# Patient Record
Sex: FEMALE | Race: WHITE | NOT HISPANIC OR LATINO | Employment: UNEMPLOYED | URBAN - NONMETROPOLITAN AREA
[De-identification: names, ages, dates, MRNs, and addresses within clinical notes are randomized per-mention and may not be internally consistent; named-entity substitution may affect disease eponyms.]

---

## 2017-01-21 ENCOUNTER — HOSPITAL ENCOUNTER (EMERGENCY)
Facility: HOSPITAL | Age: 52
Discharge: HOME | End: 2017-01-21
Admitting: EMERGENCY MEDICINE

## 2017-01-21 ENCOUNTER — APPOINTMENT (OUTPATIENT)
Dept: GENERAL RADIOLOGY | Facility: HOSPITAL | Age: 52
End: 2017-01-21

## 2017-01-21 VITALS
TEMPERATURE: 97.5 F | RESPIRATION RATE: 16 BRPM | HEIGHT: 62 IN | BODY MASS INDEX: 25.76 KG/M2 | DIASTOLIC BLOOD PRESSURE: 79 MMHG | WEIGHT: 140 LBS | SYSTOLIC BLOOD PRESSURE: 125 MMHG | HEART RATE: 72 BPM | OXYGEN SATURATION: 100 %
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
## 2017-01-21 PROCEDURE — 73130 X-RAY EXAM OF HAND: CPT

## 2017-01-21 PROCEDURE — 99283 EMERGENCY DEPT VISIT LOW MDM: CPT

## 2017-01-21 PROCEDURE — 25010000002 TDAP 5-2.5-18.5 LF-MCG/0.5 SUSPENSION: Performed by: PHYSICIAN ASSISTANT

## 2017-01-21 PROCEDURE — 90715 TDAP VACCINE 7 YRS/> IM: CPT | Performed by: PHYSICIAN ASSISTANT

## 2017-01-21 PROCEDURE — 90471 IMMUNIZATION ADMIN: CPT | Performed by: PHYSICIAN ASSISTANT

## 2017-01-21 PROCEDURE — 73130 X-RAY EXAM OF HAND: CPT | Performed by: RADIOLOGY

## 2017-01-21 RX ORDER — AMOXICILLIN AND CLAVULANATE POTASSIUM 875; 125 MG/1; MG/1
1 TABLET, FILM COATED ORAL ONCE
Status: COMPLETED | OUTPATIENT
Start: 2017-01-21 | End: 2017-01-21

## 2017-01-21 RX ORDER — ACETAMINOPHEN AND CODEINE PHOSPHATE 300; 30 MG/1; MG/1
1 TABLET ORAL ONCE
Status: COMPLETED | OUTPATIENT
Start: 2017-01-21 | End: 2017-01-21

## 2017-01-21 RX ORDER — AMOXICILLIN AND CLAVULANATE POTASSIUM 875; 125 MG/1; MG/1
1 TABLET, FILM COATED ORAL 2 TIMES DAILY
Qty: 10 TABLET | Refills: 0 | Status: SHIPPED | OUTPATIENT
Start: 2017-01-21 | End: 2017-01-26

## 2017-01-21 RX ADMIN — AMOXICILLIN AND CLAVULANATE POTASSIUM 1 TABLET: 875; 125 TABLET, FILM COATED ORAL at 22:46

## 2017-01-21 RX ADMIN — ACETAMINOPHEN AND CODEINE PHOSPHATE 1 TABLET: 30; 300 TABLET ORAL at 22:46

## 2017-01-21 RX ADMIN — TETANUS TOXOID, REDUCED DIPHTHERIA TOXOID AND ACELLULAR PERTUSSIS VACCINE, ADSORBED 0.5 ML: 5; 2.5; 8; 8; 2.5 SUSPENSION INTRAMUSCULAR at 21:25

## 2017-01-22 NOTE — ED PROVIDER NOTES
Subjective   Patient is a 51 y.o. female presenting with animal bite.   History provided by:  Patient   used: No    Animal Bite   Contact animal:  Cat  Location:  Finger  Finger injury location:  R ring finger  Time since incident:  1 day  Incident location:  Home  Provoked: provoked    Notifications:  None  Animal's rabies vaccination status:  Unknown  Animal in possession: yes    Tetanus status:  Out of date  Relieved by:  Nothing  Worsened by:  Nothing  Ineffective treatments:  None tried      Review of Systems   Constitutional: Negative.    HENT: Negative.    Eyes: Negative.    Respiratory: Negative.    Cardiovascular: Negative.    Gastrointestinal: Negative.    Endocrine: Negative.    Genitourinary: Negative.    Musculoskeletal: Negative.    Skin: Negative.    Allergic/Immunologic: Negative.    Neurological: Negative.    Hematological: Negative.    Psychiatric/Behavioral: Negative.    All other systems reviewed and are negative.      Past Medical History   Diagnosis Date   • Coronary artery disease    • Hyperlipidemia    • Hypertension        Allergies   Allergen Reactions   • Bactrim [Sulfamethoxazole-Trimethoprim]    • Ciprofloxacin        History reviewed. No pertinent past surgical history.    Family History   Problem Relation Age of Onset   • Heart disease Mother    • Heart disease Father    • Heart attack Father    • No Known Problems Sister    • Heart attack Brother    • Heart disease Brother        Social History     Social History   • Marital status:      Spouse name: N/A   • Number of children: N/A   • Years of education: N/A     Social History Main Topics   • Smoking status: Current Every Day Smoker     Packs/day: 0.50     Years: 25.00     Types: Cigarettes   • Smokeless tobacco: None   • Alcohol use No   • Drug use: No   • Sexual activity: Not Asked     Other Topics Concern   • None     Social History Narrative           Objective   Physical Exam   Constitutional: She is  oriented to person, place, and time. She appears well-developed and well-nourished.   HENT:   Head: Normocephalic and atraumatic.   Right Ear: External ear normal.   Left Ear: External ear normal.   Nose: Nose normal.   Mouth/Throat: Oropharynx is clear and moist.   Eyes: EOM are normal. Pupils are equal, round, and reactive to light.   Neck: Normal range of motion. Neck supple.   Cardiovascular: Normal rate, regular rhythm, normal heart sounds and intact distal pulses.    Pulmonary/Chest: Effort normal and breath sounds normal.   Abdominal: Soft. Bowel sounds are normal.   Musculoskeletal: Normal range of motion.   Neurological: She is alert and oriented to person, place, and time.   Skin: Skin is warm and dry.        Nursing note and vitals reviewed.      Procedures         ED Course  ED Course                  MDM    Final diagnoses:   Cat bite, initial encounter            RACIEL Islas  01/21/17 6890

## 2017-03-06 RX ORDER — ATENOLOL 25 MG/1
25 TABLET ORAL DAILY
Qty: 30 TABLET | Refills: 11 | Status: SHIPPED | OUTPATIENT
Start: 2017-03-06 | End: 2018-01-31 | Stop reason: SDUPTHER

## 2017-04-17 ENCOUNTER — TELEPHONE (OUTPATIENT)
Dept: CARDIOLOGY | Facility: CLINIC | Age: 52
End: 2017-04-17

## 2017-06-06 RX ORDER — SPIRONOLACTONE 25 MG/1
25 TABLET ORAL DAILY
Qty: 30 TABLET | Refills: 6 | Status: SHIPPED | OUTPATIENT
Start: 2017-06-06 | End: 2017-12-19 | Stop reason: SDUPTHER

## 2017-06-06 RX ORDER — ISOSORBIDE MONONITRATE 60 MG/1
60 TABLET, EXTENDED RELEASE ORAL DAILY
Qty: 30 TABLET | Refills: 6 | Status: SHIPPED | OUTPATIENT
Start: 2017-06-06 | End: 2017-12-19 | Stop reason: SDUPTHER

## 2017-06-27 RX ORDER — PRASUGREL 10 MG/1
10 TABLET, FILM COATED ORAL DAILY
Qty: 30 TABLET | Refills: 6 | Status: SHIPPED | OUTPATIENT
Start: 2017-06-27 | End: 2017-12-19 | Stop reason: SDUPTHER

## 2017-07-10 ENCOUNTER — TELEPHONE (OUTPATIENT)
Dept: CARDIOLOGY | Facility: CLINIC | Age: 52
End: 2017-07-10

## 2017-07-11 ENCOUNTER — OFFICE VISIT (OUTPATIENT)
Dept: CARDIOLOGY | Facility: CLINIC | Age: 52
End: 2017-07-11

## 2017-07-11 VITALS
DIASTOLIC BLOOD PRESSURE: 68 MMHG | BODY MASS INDEX: 24.95 KG/M2 | OXYGEN SATURATION: 98 % | SYSTOLIC BLOOD PRESSURE: 124 MMHG | WEIGHT: 140.8 LBS | HEART RATE: 77 BPM | HEIGHT: 63 IN

## 2017-07-11 DIAGNOSIS — I25.10 CORONARY ARTERY DISEASE INVOLVING NATIVE CORONARY ARTERY OF NATIVE HEART WITHOUT ANGINA PECTORIS: Primary | ICD-10-CM

## 2017-07-11 DIAGNOSIS — E78.2 MIXED HYPERLIPIDEMIA: ICD-10-CM

## 2017-07-11 DIAGNOSIS — Z72.0 TOBACCO ABUSE: ICD-10-CM

## 2017-07-11 PROCEDURE — 99214 OFFICE O/P EST MOD 30 MIN: CPT | Performed by: INTERNAL MEDICINE

## 2017-07-11 NOTE — PROGRESS NOTES
Subjective   Veda Enamorado is a 52 y.o. female.     Chief Complaint   Patient presents with   • Follow-up   • Hypertension       History of Present Illness     Veda is a pleasant 52-year-old woman who presents with a history of known coronary artery disease. She initially presented approximately 15 years ago with coronary artery disease and over the years undergone multiple percutaneous revascularizations. However, in February 2015 she presented with relatively typical anginal symptoms and had a drug-eluting stent placed in her LAD and diagonal branches. She did relatively well until December 2015 at which time she developed recurrent chest pain. She underwent a stress test which was unremarkable and has been managed clinically since that time. She currently denies any angina or anginal-like symptoms. She denies any heart failure symptoms. She denies any palpitations, near syncope or syncopal symptoms. However, she does note that with any activity she becomes extremely exhausted relatively quickly.  She believes that this has been gradually progressive.  She has been compliant with medical therapy.  Unfortunately, she has continued to use tobacco products.    The following portions of the patient's history were reviewed and updated as appropriate: allergies, current medications, past family history, past medical history, past social history, past surgical history and problem list.    Review of Systems   Constitutional: Positive for activity change and fatigue. Negative for appetite change.   HENT: Negative for congestion and tinnitus.    Eyes: Negative for visual disturbance.   Respiratory: Negative for cough, chest tightness and shortness of breath.    Cardiovascular: Positive for leg swelling. Negative for chest pain and palpitations.   Gastrointestinal: Positive for nausea. Negative for blood in stool and vomiting.   Endocrine: Negative for cold intolerance, heat intolerance and polyuria.   Genitourinary: Negative  for dysuria.   Musculoskeletal: Negative for myalgias and neck pain.   Skin: Negative for rash.   Neurological: Negative for dizziness, syncope, weakness and light-headedness.   Hematological: Bruises/bleeds easily.   Psychiatric/Behavioral: Negative for confusion and sleep disturbance.       Objective   Physical Exam   Constitutional: She is oriented to person, place, and time. She appears well-developed and well-nourished. No distress.   HENT:   Head: Normocephalic and atraumatic.   Nose: Nose normal.   Mouth/Throat: Oropharynx is clear and moist.   Eyes: Conjunctivae and EOM are normal. Right eye exhibits no discharge. Left eye exhibits no discharge. No scleral icterus.   Neck: Normal range of motion. No hepatojugular reflux and no JVD present. Carotid bruit is not present. No tracheal deviation present.   Cardiovascular: Normal rate, regular rhythm, S1 normal, S2 normal and intact distal pulses.  PMI is not displaced.  Exam reveals no gallop, no S3, no S4 and no friction rub.    No murmur heard.  Pulmonary/Chest: Effort normal and breath sounds normal. No accessory muscle usage. No respiratory distress. She has no wheezes. She has no rales. She exhibits no tenderness.   Abdominal: Soft. Bowel sounds are normal. She exhibits no distension. There is no tenderness.   Musculoskeletal: Normal range of motion. She exhibits no edema or tenderness.       Vascular Status -  Her exam exhibits no right foot edema. Her exam exhibits no left foot edema.  Neurological: She is alert and oriented to person, place, and time. No cranial nerve deficit. Coordination normal.   Skin: Skin is warm and dry. She is not diaphoretic.   Nursing note and vitals reviewed.      Procedures    Assessment/Plan     Coronary Artery Disease. I suspect that overall the patient's coronary artery disease is stable.  However, I am somewhat concerned that Veda is describing exertional fatigue.  I did discuss with her the possibility of obtaining a  stress Cardiolite test.  However, she feels that most of her symptoms are due to back pain issues that are limiting her exertional capacity.  For now we agreed to simply work on risk factor modification however, if her symptoms escalate then we'll obtain a stress Cardiolite test.  Additionally, after she sees orthopedic surgery if she continues to have symptoms again, we will consider stress testing.     Tobacco.  In regard to their history of tobacco consumption, I have discussed the importance of tobacco cessation as it relates to progression of coronary artery disease and comorbidities. I discussed options for tobacco cessation to include medical therapy. I discussed the importance of the planned approach.     Hyperlipidemia. Given the patient's history of CAD I have asked them to obtain a fasting lipid panel and an AST and ALT.     In short, it is been a pleasure to participate in Veda's care, I look forward to seeing her back in 4 months.

## 2017-12-19 RX ORDER — ISOSORBIDE MONONITRATE 60 MG/1
60 TABLET, EXTENDED RELEASE ORAL DAILY
Qty: 30 TABLET | Refills: 6 | Status: SHIPPED | OUTPATIENT
Start: 2017-12-19 | End: 2018-08-07 | Stop reason: SDUPTHER

## 2017-12-19 RX ORDER — SPIRONOLACTONE 25 MG/1
25 TABLET ORAL DAILY
Qty: 30 TABLET | Refills: 6 | Status: SHIPPED | OUTPATIENT
Start: 2017-12-19 | End: 2018-08-07 | Stop reason: SDUPTHER

## 2017-12-19 RX ORDER — PRASUGREL 10 MG/1
10 TABLET, FILM COATED ORAL DAILY
Qty: 30 TABLET | Refills: 6 | Status: SHIPPED | OUTPATIENT
Start: 2017-12-19 | End: 2018-01-31 | Stop reason: SDUPTHER

## 2018-01-24 ENCOUNTER — OFFICE VISIT (OUTPATIENT)
Dept: CARDIOLOGY | Facility: CLINIC | Age: 53
End: 2018-01-24

## 2018-01-24 VITALS
DIASTOLIC BLOOD PRESSURE: 73 MMHG | BODY MASS INDEX: 25.8 KG/M2 | HEIGHT: 62 IN | WEIGHT: 140.2 LBS | HEART RATE: 73 BPM | SYSTOLIC BLOOD PRESSURE: 126 MMHG | OXYGEN SATURATION: 98 %

## 2018-01-24 DIAGNOSIS — M79.605 PAIN OF LEFT LOWER EXTREMITY: ICD-10-CM

## 2018-01-24 DIAGNOSIS — I25.10 CORONARY ARTERY DISEASE INVOLVING NATIVE CORONARY ARTERY OF NATIVE HEART WITHOUT ANGINA PECTORIS: Primary | ICD-10-CM

## 2018-01-24 DIAGNOSIS — E78.2 MIXED HYPERLIPIDEMIA: ICD-10-CM

## 2018-01-24 PROCEDURE — 99214 OFFICE O/P EST MOD 30 MIN: CPT | Performed by: NURSE PRACTITIONER

## 2018-01-24 PROCEDURE — 93000 ELECTROCARDIOGRAM COMPLETE: CPT | Performed by: NURSE PRACTITIONER

## 2018-01-24 NOTE — PROGRESS NOTES
[facility] CARDIOLOGY  [address] [place] [address]. [address]  [place] [address]  Phone: [phone]  Fax: [phone]    01/24/2018    Chief Complaint: {[facility]:35389}    History:   Veda Enamorado is a 53 y.o. female seen in followup,     Past Medical History:   Diagnosis Date   • Coronary artery disease    • Hyperlipidemia    • Hypertension        Past Social History:  Social History     Social History   • Marital status:      Spouse name: N/A   • Number of children: N/A   • Years of education: N/A     Social History Main Topics   • Smoking status: Current Every Day Smoker     Packs/day: 0.50     Years: 25.00     Types: Cigarettes   • Smokeless tobacco: None   • Alcohol use No   • Drug use: No   • Sexual activity: Not Asked     Other Topics Concern   • None     Social History Narrative       Past Family History:  Family History   Problem Relation Age of Onset   • Heart disease Mother    • Heart disease Father    • Heart attack Father    • No Known Problems Sister    • Heart attack Brother    • Heart disease Brother        Review of Systems:   Please see HPI  Constitution: No chills, no rigors, no unexplained weight loss or weight gain  Eyes:  No diplopia, no blurred vision, no loss of vision, conjunctiva is pink and sclera is anicteric  ENT:  No tinnitus, no otorrhea, no epistaxis, no sore throat   Respiratory: No cough, no hemoptysis  Cardiovascular: see HPI  Gastrointestinal: No nausea, no vomiting, no hematemesis, no diarrhea or constipation, no melena  Genitourinary: No frequency of dysuria no hematuria  Integument: No pruritis and  no skin rash  Hematologic / Lymphatic: No excessive bleeding, easy bruising, fatigue, lymphadenopathy and petechiae  Musculoskeletal: No joint pain, joint stiffness, joint swelling, muscle pain, muscle weakness and neck pain  Neurological: No dizziness, headaches, light headedness, seizures and vertigo  Endocrine: No frequent urination and nocturia, temperature  intolerance, weight gain, unintended and weight loss, unintended      Current Outpatient Prescriptions on File Prior to Visit   Medication Sig Dispense Refill   • aspirin 81 MG EC tablet Take 81 mg by mouth daily.     • atenolol (TENORMIN) 25 MG tablet Take 1 tablet by mouth Daily. 30 tablet 11   • atorvastatin (LIPITOR) 40 MG tablet Take 1 tablet by mouth Daily. 30 tablet 6   • HYDROcodone-acetaminophen (NORCO) 7.5-325 MG per tablet Take 1 tablet by mouth every 6 (six) hours as needed for moderate pain (4-6).     • isosorbide mononitrate (IMDUR) 60 MG 24 hr tablet Take 1 tablet by mouth Daily. 30 tablet 6   • omeprazole (PriLOSEC) 20 MG capsule Take 20 mg by mouth daily.     • ondansetron (ZOFRAN) 8 MG tablet Take  by mouth every 8 (eight) hours as needed for nausea or vomiting.     • prasugrel (EFFIENT) 10 MG tablet Take 1 tablet by mouth Daily. 30 tablet 6   • spironolactone (ALDACTONE) 25 MG tablet Take 1 tablet by mouth Daily. 30 tablet 6   • traMADol (ULTRAM) 50 MG tablet Take 50 mg by mouth every 6 (six) hours as needed for moderate pain (4-6).       No current facility-administered medications on file prior to visit.        Allergies   Allergen Reactions   • Bactrim [Sulfamethoxazole-Trimethoprim]    • Ciprofloxacin        Objective:  Vitals:    01/24/18 1124   BP: 126/73   Pulse: 73   SpO2: 98%     Comfortable NAD  PERRL, conjunctiva clear  Neck supple, no JVD or thyromegaly appreciated  S1/S2 RRR, no m/r/g  Lungs CTA B, normal effort  Abdomen S/NT/ND (+) BS, no HSM appreciated  Extremities warm, no clubbing, cyanosis, or edema  Normal gait  No visible or palpable skin lesions  A/Ox4, mood and affect appropriate  Pulse exam: Torsten's:    DATA:                               ECG 12 Lead  Date/Time: 1/24/2018 11:29 AM  Performed by: ZAIRA BURDICK  Authorized by: ZAIRA BURDICK               I personally viewed and interpreted the patient's EKG:      A/P:    Thank you for allowing me to participate in the  care of Veda Enamorado. Feel free to contact me directly with any further questions or concerns.

## 2018-01-24 NOTE — PROGRESS NOTES
Subjective     Chief Complaint: Coronary Artery Disease and Hyperlipidemia    History of Present Illness   Veda Enamorado is a 53 y.o. female who presents with coronary artery disease and hyper-lipidemia.  She initially presented approximately 16 years ago with coronary artery disease.  Over the years she has undergone multiple percutaneous revascularizations.  In February 2050 T and she presented with relatively typical anginal symptoms and had a drug eluding stent placed to her LAD and diagonal branches.  She did reasonably well until December 2015 at which time she developed recurrent chest pain.  She underwent a Cardiolite stress test which was unremarkable and has been managed clinically since that time.    At today's visit she denies chest pain and palpitations.  She reports lower extremity swelling.  She reports that her left leg, in the back of her calf, has been hurting for some time.  The pain apparently waxes and wanes.  She does not recall any injury to the leg, nor has she been participating in any strenuous exercise.  She also reports generalized weakness and fatigue.  Her physical activity is limited due to a low back injury.  She reports that she bruises easily however she denies bright red blood per rectum or black tarry stools.    Mrs. Enamorado is requesting a cardiac clearance.  She is scheduled to have all of her upper teeth removed and replaced with dentures.    She she continues to smoke about half a pack a day.  She does not participate in any regular physical activity.      Current Outpatient Prescriptions:   •  aspirin 81 MG EC tablet, Take 81 mg by mouth daily., Disp: , Rfl:   •  atenolol (TENORMIN) 25 MG tablet, Take 1 tablet by mouth Daily., Disp: 30 tablet, Rfl: 11  •  atorvastatin (LIPITOR) 40 MG tablet, Take 1 tablet by mouth Daily., Disp: 30 tablet, Rfl: 6  •  HYDROcodone-acetaminophen (NORCO) 7.5-325 MG per tablet, Take 1 tablet by mouth every 6 (six) hours as needed for moderate pain  "(4-6)., Disp: , Rfl:   •  isosorbide mononitrate (IMDUR) 60 MG 24 hr tablet, Take 1 tablet by mouth Daily., Disp: 30 tablet, Rfl: 6  •  omeprazole (PriLOSEC) 20 MG capsule, Take 20 mg by mouth daily., Disp: , Rfl:   •  ondansetron (ZOFRAN) 8 MG tablet, Take  by mouth every 8 (eight) hours as needed for nausea or vomiting., Disp: , Rfl:   •  prasugrel (EFFIENT) 10 MG tablet, Take 1 tablet by mouth Daily., Disp: 30 tablet, Rfl: 6  •  spironolactone (ALDACTONE) 25 MG tablet, Take 1 tablet by mouth Daily., Disp: 30 tablet, Rfl: 6  •  traMADol (ULTRAM) 50 MG tablet, Take 50 mg by mouth every 6 (six) hours as needed for moderate pain (4-6)., Disp: , Rfl:      The following portions of the patient's history were reviewed and updated as appropriate: allergies, current medications, past family history, past medical history, past social history, past surgical history and problem list.    Review of Systems   Constitutional: Positive for fatigue.   Respiratory: Negative for shortness of breath.    Cardiovascular: Positive for leg swelling. Negative for chest pain and palpitations.   Gastrointestinal: Negative for blood in stool.   Neurological: Positive for weakness. Negative for dizziness, syncope and light-headedness.   Hematological: Does not bruise/bleed easily.       Objective     /73 (BP Location: Right arm)  Pulse 73  Ht 157.5 cm (62\")  Wt 63.6 kg (140 lb 3.2 oz)  SpO2 98%  BMI 25.64 kg/m2    Physical Exam   Constitutional: She appears well-developed and well-nourished.   HENT:   Head: Normocephalic and atraumatic.   Eyes: Pupils are equal, round, and reactive to light.   Neck: No JVD present.   Cardiovascular: Normal rate, regular rhythm and intact distal pulses.  Exam reveals no gallop and no friction rub.    No murmur heard.  Pulmonary/Chest: Effort normal and breath sounds normal. No respiratory distress. She has no wheezes. She has no rales.   Abdominal: Soft. She exhibits no mass. There is no tenderness. " No hernia.   Musculoskeletal: She exhibits tenderness (left lower extremity). She exhibits no edema.   Skin: Skin is warm and dry.   Psychiatric: She has a normal mood and affect.   Vitals reviewed.        ECG 12 Lead  Date/Time: 1/24/2018 1:13 PM  Performed by: SHARDA MEYERS  Authorized by: SHARDA MEYERS   Rhythm: sinus rhythm  Clinical impression: normal ECG  Comments: QTc  402              Assessment/Plan       Veda was seen today for coronary artery disease and hyperlipidemia.    Diagnoses and all orders for this visit:    Coronary artery disease involving native coronary artery of native heart without angina pectoris  -     ECG 12 Lead  -     Stress Test With Myocardial Perfusion (1 Day); Future  -     Adult Transthoracic Echo Complete W/ Cont if Necessary Per Protocol; Future  -     Comprehensive Metabolic Panel; Future  -     CBC (No Diff); Future     Stable.  She does not have any chest discomfort complaints.   I have ordered a stress test for surgical clearance evaluation   Echocardiogram for same   Risk factor modification as discussed below    Mixed hyperlipidemia        -     Lipid Panel; Future     No current labs available   Tests have been ordered and Mrs. Enamorado prefers to use [facility] in [place]    Pain of left lower extremity  -     Duplex Venous Lower Extremity - Left CAR; Future     Duplex scan to rule out presence of DVT               Sharda Meyers, ZION

## 2018-01-29 ENCOUNTER — RESULTS ENCOUNTER (OUTPATIENT)
Dept: CARDIOLOGY | Facility: CLINIC | Age: 53
End: 2018-01-29

## 2018-01-29 DIAGNOSIS — I25.10 CORONARY ARTERY DISEASE INVOLVING NATIVE CORONARY ARTERY OF NATIVE HEART WITHOUT ANGINA PECTORIS: ICD-10-CM

## 2018-01-31 RX ORDER — NITROGLYCERIN 0.4 MG/1
TABLET SUBLINGUAL
Qty: 100 TABLET | Refills: 3 | Status: SHIPPED | OUTPATIENT
Start: 2018-01-31 | End: 2019-11-14 | Stop reason: SDUPTHER

## 2018-01-31 RX ORDER — PRASUGREL 10 MG/1
10 TABLET, FILM COATED ORAL DAILY
Qty: 30 TABLET | Refills: 11 | Status: SHIPPED | OUTPATIENT
Start: 2018-01-31 | End: 2018-08-07 | Stop reason: SDUPTHER

## 2018-01-31 RX ORDER — ATENOLOL 25 MG/1
25 TABLET ORAL DAILY
Qty: 30 TABLET | Refills: 11 | Status: SHIPPED | OUTPATIENT
Start: 2018-01-31 | End: 2019-03-06 | Stop reason: SDUPTHER

## 2018-02-09 ENCOUNTER — APPOINTMENT (OUTPATIENT)
Dept: CARDIOLOGY | Facility: HOSPITAL | Age: 53
End: 2018-02-09

## 2018-02-09 ENCOUNTER — APPOINTMENT (OUTPATIENT)
Dept: NUCLEAR MEDICINE | Facility: HOSPITAL | Age: 53
End: 2018-02-09

## 2018-02-22 ENCOUNTER — HOSPITAL ENCOUNTER (OUTPATIENT)
Dept: NUCLEAR MEDICINE | Facility: HOSPITAL | Age: 53
Discharge: HOME | End: 2018-02-22

## 2018-02-22 ENCOUNTER — HOSPITAL ENCOUNTER (OUTPATIENT)
Dept: CARDIOLOGY | Facility: HOSPITAL | Age: 53
Discharge: HOME | End: 2018-02-22

## 2018-02-22 DIAGNOSIS — M79.605 PAIN OF LEFT LOWER EXTREMITY: ICD-10-CM

## 2018-02-22 DIAGNOSIS — I25.10 CORONARY ARTERY DISEASE INVOLVING NATIVE CORONARY ARTERY OF NATIVE HEART WITHOUT ANGINA PECTORIS: ICD-10-CM

## 2018-02-22 PROCEDURE — 93971 EXTREMITY STUDY: CPT

## 2018-02-22 PROCEDURE — 25010000002 REGADENOSON 0.4 MG/5ML SOLUTION: Performed by: NURSE PRACTITIONER

## 2018-02-22 PROCEDURE — A9500 TC99M SESTAMIBI: HCPCS | Performed by: NURSE PRACTITIONER

## 2018-02-22 PROCEDURE — 93017 CV STRESS TEST TRACING ONLY: CPT

## 2018-02-22 PROCEDURE — 78452 HT MUSCLE IMAGE SPECT MULT: CPT

## 2018-02-22 PROCEDURE — 93018 CV STRESS TEST I&R ONLY: CPT | Performed by: INTERNAL MEDICINE

## 2018-02-22 PROCEDURE — 0 TECHNETIUM SESTAMIBI: Performed by: NURSE PRACTITIONER

## 2018-02-22 PROCEDURE — 25010000002 AMINOPHYLLINE PER 250 MG: Performed by: NURSE PRACTITIONER

## 2018-02-22 PROCEDURE — 93971 EXTREMITY STUDY: CPT | Performed by: RADIOLOGY

## 2018-02-22 PROCEDURE — 78452 HT MUSCLE IMAGE SPECT MULT: CPT | Performed by: INTERNAL MEDICINE

## 2018-02-22 RX ORDER — AMINOPHYLLINE DIHYDRATE 25 MG/ML
125 INJECTION, SOLUTION INTRAVENOUS
Status: COMPLETED | OUTPATIENT
Start: 2018-02-22 | End: 2018-02-22

## 2018-02-22 RX ADMIN — TECHNETIUM TC 99M SESTAMIBI 1 DOSE: 1 INJECTION INTRAVENOUS at 11:05

## 2018-02-22 RX ADMIN — TECHNETIUM TC 99M SESTAMIBI 1 DOSE: 1 INJECTION INTRAVENOUS at 09:15

## 2018-02-22 RX ADMIN — REGADENOSON 0.4 MG: 0.08 INJECTION, SOLUTION INTRAVENOUS at 11:05

## 2018-02-22 RX ADMIN — AMINOPHYLLINE 125 MG: 25 INJECTION, SOLUTION INTRAVENOUS at 11:10

## 2018-02-23 LAB
BH CV ECHO MEAS - % IVS THICK: 79.2 %
BH CV ECHO MEAS - % LVPW THICK: 64 %
BH CV ECHO MEAS - ACS: 1.8 CM
BH CV ECHO MEAS - AO MAX PG (FULL): 1.6 MMHG
BH CV ECHO MEAS - AO MAX PG: 6.8 MMHG
BH CV ECHO MEAS - AO MEAN PG (FULL): 0.98 MMHG
BH CV ECHO MEAS - AO MEAN PG: 3.3 MMHG
BH CV ECHO MEAS - AO ROOT AREA (BSA CORRECTED): 1.8
BH CV ECHO MEAS - AO ROOT AREA: 6.6 CM^2
BH CV ECHO MEAS - AO ROOT DIAM: 2.9 CM
BH CV ECHO MEAS - AO V2 MAX: 130.3 CM/SEC
BH CV ECHO MEAS - AO V2 MEAN: 84.3 CM/SEC
BH CV ECHO MEAS - AO V2 VTI: 29.7 CM
BH CV ECHO MEAS - AVA(I,A): 2.5 CM^2
BH CV ECHO MEAS - AVA(I,D): 2.5 CM^2
BH CV ECHO MEAS - AVA(V,A): 2.7 CM^2
BH CV ECHO MEAS - AVA(V,D): 2.7 CM^2
BH CV ECHO MEAS - BSA(HAYCOCK): 1.7 M^2
BH CV ECHO MEAS - BSA: 1.6 M^2
BH CV ECHO MEAS - BZI_BMI: 25.6 KILOGRAMS/M^2
BH CV ECHO MEAS - BZI_METRIC_HEIGHT: 157.5 CM
BH CV ECHO MEAS - BZI_METRIC_WEIGHT: 63.5 KG
BH CV ECHO MEAS - EDV(CUBED): 135.3 ML
BH CV ECHO MEAS - EDV(TEICH): 125.7 ML
BH CV ECHO MEAS - EF(CUBED): 80.6 %
BH CV ECHO MEAS - EF(TEICH): 72.8 %
BH CV ECHO MEAS - ESV(CUBED): 26.2 ML
BH CV ECHO MEAS - ESV(TEICH): 34.1 ML
BH CV ECHO MEAS - FS: 42.1 %
BH CV ECHO MEAS - IVS/LVPW: 0.96
BH CV ECHO MEAS - IVSD: 0.88 CM
BH CV ECHO MEAS - IVSS: 1.6 CM
BH CV ECHO MEAS - LA DIMENSION: 3.6 CM
BH CV ECHO MEAS - LA/AO: 1.3
BH CV ECHO MEAS - LV MASS(C)D: 164.9 GRAMS
BH CV ECHO MEAS - LV MASS(C)DI: 100.4 GRAMS/M^2
BH CV ECHO MEAS - LV MASS(C)S: 163 GRAMS
BH CV ECHO MEAS - LV MASS(C)SI: 99.2 GRAMS/M^2
BH CV ECHO MEAS - LV MAX PG: 5.2 MMHG
BH CV ECHO MEAS - LV MEAN PG: 2.3 MMHG
BH CV ECHO MEAS - LV V1 MAX: 114 CM/SEC
BH CV ECHO MEAS - LV V1 MEAN: 67.6 CM/SEC
BH CV ECHO MEAS - LV V1 VTI: 23.3 CM
BH CV ECHO MEAS - LVIDD: 5.1 CM
BH CV ECHO MEAS - LVIDS: 3 CM
BH CV ECHO MEAS - LVOT AREA (M): 3.1 CM^2
BH CV ECHO MEAS - LVOT AREA: 3.1 CM^2
BH CV ECHO MEAS - LVOT DIAM: 2 CM
BH CV ECHO MEAS - LVPWD: 0.92 CM
BH CV ECHO MEAS - LVPWS: 1.5 CM
BH CV ECHO MEAS - MV A MAX VEL: 47.4 CM/SEC
BH CV ECHO MEAS - MV DEC SLOPE: 410.4 CM/SEC^2
BH CV ECHO MEAS - MV E MAX VEL: 100.2 CM/SEC
BH CV ECHO MEAS - MV E/A: 2.1
BH CV ECHO MEAS - MV P1/2T MAX VEL: 101.7 CM/SEC
BH CV ECHO MEAS - MV P1/2T: 72.6 MSEC
BH CV ECHO MEAS - MVA P1/2T LCG: 2.2 CM^2
BH CV ECHO MEAS - MVA(P1/2T): 3 CM^2
BH CV ECHO MEAS - PA ACC SLOPE: 835.3 CM/SEC^2
BH CV ECHO MEAS - PA ACC TIME: 0.13 SEC
BH CV ECHO MEAS - PA PR(ACCEL): 22 MMHG
BH CV ECHO MEAS - RVDD: 2.4 CM
BH CV ECHO MEAS - SI(AO): 119 ML/M^2
BH CV ECHO MEAS - SI(CUBED): 66.4 ML/M^2
BH CV ECHO MEAS - SI(LVOT): 44.6 ML/M^2
BH CV ECHO MEAS - SI(TEICH): 55.7 ML/M^2
BH CV ECHO MEAS - SV(AO): 195.5 ML
BH CV ECHO MEAS - SV(CUBED): 109.1 ML
BH CV ECHO MEAS - SV(LVOT): 73.2 ML
BH CV ECHO MEAS - SV(TEICH): 91.5 ML
BH CV NUCLEAR PRIOR STUDY: 3
BH CV STRESS BP STAGE 1: NORMAL
BH CV STRESS BP STAGE 2: NORMAL
BH CV STRESS COMMENTS STAGE 1: NORMAL
BH CV STRESS COMMENTS STAGE 2: NORMAL
BH CV STRESS DOSE REGADENOSON STAGE 1: 0.4
BH CV STRESS DURATION MIN STAGE 1: 0
BH CV STRESS DURATION MIN STAGE 2: 4
BH CV STRESS DURATION SEC STAGE 1: 15
BH CV STRESS DURATION SEC STAGE 2: 0
BH CV STRESS HR STAGE 1: 93
BH CV STRESS HR STAGE 2: 63
BH CV STRESS PROTOCOL 1: NORMAL
BH CV STRESS RECOVERY BP: NORMAL MMHG
BH CV STRESS RECOVERY HR: 63 BPM
BH CV STRESS STAGE 1: 1
BH CV STRESS STAGE 2: 2
LV EF NUC BP: 82 %
MAXIMAL PREDICTED HEART RATE: 168 BPM
PERCENT MAX PREDICTED HR: 55.36 %
STRESS BASELINE BP: NORMAL MMHG
STRESS BASELINE HR: 66 BPM
STRESS PERCENT HR: 65 %
STRESS POST PEAK BP: NORMAL MMHG
STRESS POST PEAK HR: 93 BPM
STRESS TARGET HR: 143 BPM

## 2018-02-26 ENCOUNTER — TELEPHONE (OUTPATIENT)
Dept: CARDIOLOGY | Facility: CLINIC | Age: 53
End: 2018-02-26

## 2018-02-26 RX ORDER — ATORVASTATIN CALCIUM 40 MG/1
40 TABLET, FILM COATED ORAL DAILY
Qty: 30 TABLET | Refills: 11 | Status: SHIPPED | OUTPATIENT
Start: 2018-02-26 | End: 2018-09-18 | Stop reason: SINTOL

## 2018-02-26 NOTE — TELEPHONE ENCOUNTER
----- Message from ZION Drake sent at 2/26/2018  1:24 PM EST -----  Please call and tell her that her stress test was normal.  Echo was normal as well.    Thanks, Sharda

## 2018-02-26 NOTE — TELEPHONE ENCOUNTER
----- Message from ZION Drake sent at 2/22/2018  3:37 PM EST -----  Please call Ms Enamorado and tell her that her duplex scan was normal.      Thanks, Sharda

## 2018-02-26 NOTE — TELEPHONE ENCOUNTER
----- Message from ZION Drake sent at 2/26/2018  1:25 PM EST -----  Please call and tell her that her echo was normal.  Stress was normal as well.    Thanks, Sharda

## 2018-02-26 NOTE — TELEPHONE ENCOUNTER
----- Message from ZION Drake sent at 2/21/2018 10:28 AM EST -----  Please call Ms Enamorado.  I have reviewed her labs from 2/19/18.  Her total cholesterol is 215 and LDL cholesterol is 156.    Is she taking her atorvastatin as prescribed?  If so, please have her to increase her medication to 80mg daily.    If not, she needs to get back on that medication.    Let me know if I need to send through a new Rx for 80.    Thanks, Sharda

## 2018-02-26 NOTE — TELEPHONE ENCOUNTER
Called patient about recent labs. Asked if she was still taking her atorvastatin as prescribed. She states that she has not taken this in a while since it hurt her legs. But agrees to go back on it since her LDL was not where it needed to be.

## 2018-08-07 RX ORDER — PRASUGREL 10 MG/1
10 TABLET, FILM COATED ORAL DAILY
Qty: 30 TABLET | Refills: 11 | Status: SHIPPED | OUTPATIENT
Start: 2018-08-07 | End: 2019-09-30 | Stop reason: SDUPTHER

## 2018-08-07 RX ORDER — ISOSORBIDE MONONITRATE 60 MG/1
60 TABLET, EXTENDED RELEASE ORAL DAILY
Qty: 30 TABLET | Refills: 6 | Status: SHIPPED | OUTPATIENT
Start: 2018-08-07 | End: 2019-03-06 | Stop reason: SDUPTHER

## 2018-08-07 RX ORDER — SPIRONOLACTONE 25 MG/1
25 TABLET ORAL DAILY
Qty: 30 TABLET | Refills: 6 | Status: SHIPPED | OUTPATIENT
Start: 2018-08-07 | End: 2019-03-06 | Stop reason: SDUPTHER

## 2018-09-17 NOTE — PROGRESS NOTES
"Subjective     Chief Complaint: Coronary Artery Disease; Hyperlipidemia; and Leg Pain (Left)    History of Present Illness   Veda Enamorado is a 53 y.o. female who presents with past medical history significant for coronary artery disease, status post multiple percutaneous revascularizations, last being in in February 2015 when she underwent successful PCI ZURI to LAD and diagonal branches for angina, hyperlipidemia and hypertension.  She is here today for follow up.      St today's visit she denies chest pain and lower extremity swelling.  She reports palpitations and irregular heart beats.  This is chronic and non worsening for her.  There are no asociated symptoms.  She reports recent worsening cough, congestion and shortness of breath.  She has seen her PCP for these complaints.      Ms Enamorado reports that \"months\" ago she was having worsening muscle pain, particularly in her left shoulder and arm.  She discontinued her lipitor and the muscle cramping resolved.  She has her blood work with her today, drawn 8/24/18, which shows LDL of 130 and triglycerides of 198.  BUN and creatinine are normal and ALT/AST/Alk Phos are not concerning.      Unfortunately Ms Enamorado has continued to smoke.        Current Outpatient Prescriptions:   •  aspirin 81 MG EC tablet, Take 81 mg by mouth daily., Disp: , Rfl:   •  atenolol (TENORMIN) 25 MG tablet, Take 1 tablet by mouth Daily., Disp: 30 tablet, Rfl: 11  •  HYDROcodone-acetaminophen (NORCO) 7.5-325 MG per tablet, Take 1 tablet by mouth every 6 (six) hours as needed for moderate pain (4-6)., Disp: , Rfl:   •  isosorbide mononitrate (IMDUR) 60 MG 24 hr tablet, Take 1 tablet by mouth Daily., Disp: 30 tablet, Rfl: 6  •  nitroglycerin (NITROSTAT) 0.4 MG SL tablet, 1 under the tongue as needed for angina, may repeat q5mins for up three doses, Disp: 100 tablet, Rfl: 3  •  omeprazole (PriLOSEC) 20 MG capsule, Take 20 mg by mouth daily., Disp: , Rfl:   •  prasugrel (EFFIENT) 10 MG " "tablet, Take 1 tablet by mouth Daily., Disp: 30 tablet, Rfl: 11  •  spironolactone (ALDACTONE) 25 MG tablet, Take 1 tablet by mouth Daily., Disp: 30 tablet, Rfl: 6  •  traMADol (ULTRAM) 50 MG tablet, Take 50 mg by mouth every 6 (six) hours as needed for moderate pain (4-6)., Disp: , Rfl:   •  ondansetron (ZOFRAN) 8 MG tablet, Take  by mouth every 8 (eight) hours as needed for nausea or vomiting., Disp: , Rfl:   •  pravastatin (PRAVACHOL) 20 MG tablet, Take 1 tablet by mouth Daily for 30 days., Disp: 30 tablet, Rfl: 5     The following portions of the patient's history were reviewed and updated as appropriate: allergies, current medications, past family history, past medical history, past social history, past surgical history and problem list.    Review of Systems   Constitution: Positive for decreased appetite, weakness, malaise/fatigue and weight loss.   HENT: Negative.    Eyes: Negative.    Cardiovascular: Positive for claudication, irregular heartbeat and palpitations.   Respiratory: Positive for cough and shortness of breath.    Endocrine: Negative.    Skin: Negative.    Musculoskeletal: Negative.    Gastrointestinal: Negative.    Genitourinary: Negative.    Psychiatric/Behavioral: Negative.    Allergic/Immunologic: Negative.          Objective     /69 (BP Location: Left arm)   Pulse 61   Ht 157.5 cm (62\")   Wt 57.7 kg (127 lb 1.6 oz)   SpO2 98%   BMI 23.25 kg/m²     Physical Exam   Constitutional: She appears well-developed and well-nourished.   HENT:   Head: Normocephalic and atraumatic.   Eyes: Pupils are equal, round, and reactive to light.   Neck: No JVD present.   Cardiovascular: Normal rate, regular rhythm and intact distal pulses.  Exam reveals no gallop and no friction rub.    No murmur heard.  Pulmonary/Chest: Effort normal and breath sounds normal. No respiratory distress. She has no wheezes. She has no rales.   Abdominal: Soft. She exhibits no mass. There is no tenderness. No hernia. "   Skin: Skin is warm and dry.   Psychiatric: She has a normal mood and affect.   Flat affect   Vitals reviewed.      Procedures      Assessment/Plan       Veda was seen today for coronary artery disease, hyperlipidemia and leg pain.    Diagnoses and all orders for this visit:    Assessment/Plan:  1. Coronary artery disease, status post multiple percutaneous revascularizations, last being in in February 2015 when she underwent successful PCI ZURI to LAD and diagonal branches for angina. Stable.  2. Hyperlipidemia.  I have asked Ms Enamorado to try pravastatin and if she experiences muscle cramps and aches, add Co-Q 10.  She has stated understanding.  3. I have counseled Ms Enamorado with regard to tobacco cessation.  Educational materials were given.    4. Return to clinic in 6 months, sooner for worsening or concerning symptoms.    Return in about 6 months (around 3/18/2019).      ZION Ocasio

## 2018-09-18 ENCOUNTER — OFFICE VISIT (OUTPATIENT)
Dept: CARDIOLOGY | Facility: CLINIC | Age: 53
End: 2018-09-18

## 2018-09-18 VITALS
HEART RATE: 61 BPM | OXYGEN SATURATION: 98 % | WEIGHT: 127.1 LBS | SYSTOLIC BLOOD PRESSURE: 137 MMHG | HEIGHT: 62 IN | DIASTOLIC BLOOD PRESSURE: 69 MMHG | BODY MASS INDEX: 23.39 KG/M2

## 2018-09-18 DIAGNOSIS — E78.2 MIXED HYPERLIPIDEMIA: ICD-10-CM

## 2018-09-18 DIAGNOSIS — I25.10 CORONARY ARTERY DISEASE INVOLVING NATIVE CORONARY ARTERY OF NATIVE HEART WITHOUT ANGINA PECTORIS: Primary | ICD-10-CM

## 2018-09-18 DIAGNOSIS — Z72.0 TOBACCO ABUSE: ICD-10-CM

## 2018-09-18 DIAGNOSIS — I10 ESSENTIAL HYPERTENSION: ICD-10-CM

## 2018-09-18 PROCEDURE — 99213 OFFICE O/P EST LOW 20 MIN: CPT | Performed by: NURSE PRACTITIONER

## 2018-09-18 RX ORDER — DILTIAZEM HYDROCHLORIDE 120 MG/1
CAPSULE, COATED, EXTENDED RELEASE ORAL
COMMUNITY
End: 2018-09-18

## 2018-09-18 RX ORDER — PRAVASTATIN SODIUM 20 MG
20 TABLET ORAL DAILY
Qty: 30 TABLET | Refills: 5 | Status: SHIPPED | OUTPATIENT
Start: 2018-09-18 | End: 2018-10-18

## 2018-09-18 RX ORDER — CLOPIDOGREL BISULFATE 75 MG/1
TABLET ORAL
COMMUNITY
End: 2018-09-18

## 2018-09-18 NOTE — PATIENT INSTRUCTIONS
You may want to try Co Q 10 which will help decrease and possibly eliminate the muscle pain that you have when you take a statin.        Steps to Quit Smoking  Smoking tobacco can be bad for your health. It can also affect almost every organ in your body. Smoking puts you and people around you at risk for many serious long-lasting (chronic) diseases. Quitting smoking is hard, but it is one of the best things that you can do for your health. It is never too late to quit.  What are the benefits of quitting smoking?  When you quit smoking, you lower your risk for getting serious diseases and conditions. They can include:  · Lung cancer or lung disease.  · Heart disease.  · Stroke.  · Heart attack.  · Not being able to have children (infertility).  · Weak bones (osteoporosis) and broken bones (fractures).    If you have coughing, wheezing, and shortness of breath, those symptoms may get better when you quit. You may also get sick less often. If you are pregnant, quitting smoking can help to lower your chances of having a baby of low birth weight.  What can I do to help me quit smoking?  Talk with your doctor about what can help you quit smoking. Some things you can do (strategies) include:  · Quitting smoking totally, instead of slowly cutting back how much you smoke over a period of time.  · Going to in-person counseling. You are more likely to quit if you go to many counseling sessions.  · Using resources and support systems, such as:  ? Online chats with a counselor.  ? Phone quitlines.  ? Printed self-help materials.  ? Support groups or group counseling.  ? Text messaging programs.  ? Mobile phone apps or applications.  · Taking medicines. Some of these medicines may have nicotine in them. If you are pregnant or breastfeeding, do not take any medicines to quit smoking unless your doctor says it is okay. Talk with your doctor about counseling or other things that can help you.    Talk with your doctor about using  more than one strategy at the same time, such as taking medicines while you are also going to in-person counseling. This can help make quitting easier.  What things can I do to make it easier to quit?  Quitting smoking might feel very hard at first, but there is a lot that you can do to make it easier. Take these steps:  · Talk to your family and friends. Ask them to support and encourage you.  · Call phone quitlines, reach out to support groups, or work with a counselor.  · Ask people who smoke to not smoke around you.  · Avoid places that make you want (trigger) to smoke, such as:  ? Bars.  ? Parties.  ? Smoke-break areas at work.  · Spend time with people who do not smoke.  · Lower the stress in your life. Stress can make you want to smoke. Try these things to help your stress:  ? Getting regular exercise.  ? Deep-breathing exercises.  ? Yoga.  ? Meditating.  ? Doing a body scan. To do this, close your eyes, focus on one area of your body at a time from head to toe, and notice which parts of your body are tense. Try to relax the muscles in those areas.  · Download or buy apps on your mobile phone or tablet that can help you stick to your quit plan. There are many free apps, such as QuitGuide from the CDC (Centers for Disease Control and Prevention). You can find more support from smokefree.gov and other websites.    This information is not intended to replace advice given to you by your health care provider. Make sure you discuss any questions you have with your health care provider.  Document Released: 10/14/2010 Document Revised: 08/15/2017 Document Reviewed: 05/03/2016  Elsevier Interactive Patient Education © 2018 [employer] Inc[employer]

## 2019-01-23 DIAGNOSIS — M25.552 LEFT HIP PAIN: Primary | ICD-10-CM

## 2019-01-24 ENCOUNTER — HOSPITAL ENCOUNTER (OUTPATIENT)
Dept: ULTRASOUND IMAGING | Facility: HOSPITAL | Age: 54
End: 2019-01-24

## 2019-01-24 ENCOUNTER — APPOINTMENT (OUTPATIENT)
Dept: GENERAL RADIOLOGY | Facility: HOSPITAL | Age: 54
End: 2019-01-24
Attending: ORTHOPAEDIC SURGERY

## 2019-01-31 ENCOUNTER — HOSPITAL ENCOUNTER (OUTPATIENT)
Dept: MAMMOGRAPHY | Facility: HOSPITAL | Age: 54
Discharge: HOME | End: 2019-01-31

## 2019-01-31 ENCOUNTER — HOSPITAL ENCOUNTER (OUTPATIENT)
Dept: ULTRASOUND IMAGING | Facility: HOSPITAL | Age: 54
Discharge: HOME | End: 2019-01-31
Admitting: NURSE PRACTITIONER

## 2019-01-31 DIAGNOSIS — R92.8 ABNORMAL MAMMOGRAM OF BOTH BREASTS: ICD-10-CM

## 2019-01-31 DIAGNOSIS — R92.8 ABNORMAL MAMMOGRAM: ICD-10-CM

## 2019-01-31 PROCEDURE — 77066 DX MAMMO INCL CAD BI: CPT | Performed by: RADIOLOGY

## 2019-01-31 PROCEDURE — G0279 TOMOSYNTHESIS, MAMMO: HCPCS

## 2019-01-31 PROCEDURE — 77062 BREAST TOMOSYNTHESIS BI: CPT | Performed by: RADIOLOGY

## 2019-01-31 PROCEDURE — 77066 DX MAMMO INCL CAD BI: CPT

## 2019-02-14 ENCOUNTER — OFFICE VISIT (OUTPATIENT)
Dept: SURGERY | Facility: CLINIC | Age: 54
End: 2019-02-14

## 2019-02-14 VITALS — BODY MASS INDEX: 23.37 KG/M2 | WEIGHT: 127 LBS | HEIGHT: 62 IN

## 2019-02-14 DIAGNOSIS — L98.9 SKIN LESION OF BACK: ICD-10-CM

## 2019-02-14 DIAGNOSIS — L72.3 SEBACEOUS CYST: Primary | ICD-10-CM

## 2019-02-14 PROCEDURE — 99202 OFFICE O/P NEW SF 15 MIN: CPT | Performed by: SURGERY

## 2019-02-14 RX ORDER — OMEPRAZOLE 40 MG/1
CAPSULE, DELAYED RELEASE ORAL
COMMUNITY
Start: 2018-12-31 | End: 2019-09-30 | Stop reason: ALTCHOICE

## 2019-02-14 RX ORDER — PRAVASTATIN SODIUM 20 MG
TABLET ORAL
COMMUNITY
Start: 2019-01-28 | End: 2019-04-04 | Stop reason: SDUPTHER

## 2019-02-14 NOTE — PROGRESS NOTES
Subjective   Veda Enamorado is a 54 y.o. female.     History of Present Illness She has a sebaceous cyst on her left anterior chest that has been growing over the last couple of years. It does bother her. She also has a tag like lesion on the upper mid back that is bothering her as well and is growing.     The following portions of the patient's history were reviewed and updated as appropriate: current medications, past family history, past medical history, past social history, past surgical history and problem list.    Review of Systems skin lesions    Objective   Physical Exam 2.5 cm sebaceous cyst left anterior chest. 6 mm lesion upper mid back    Assessment/Plan   Veda was seen today for suspicious skin lesion and cyst.    Diagnoses and all orders for this visit:    Sebaceous cyst    Skin lesion of back    excise in the office

## 2019-02-19 ENCOUNTER — OFFICE VISIT (OUTPATIENT)
Dept: SURGERY | Facility: CLINIC | Age: 54
End: 2019-02-19

## 2019-02-19 VITALS — HEIGHT: 62 IN | BODY MASS INDEX: 23.37 KG/M2 | WEIGHT: 127 LBS

## 2019-02-19 DIAGNOSIS — L72.3 SEBACEOUS CYST: ICD-10-CM

## 2019-02-19 DIAGNOSIS — L98.9 SKIN LESION OF BACK: Primary | ICD-10-CM

## 2019-02-19 PROCEDURE — 11401 EXC TR-EXT B9+MARG 0.6-1 CM: CPT | Performed by: SURGERY

## 2019-02-19 PROCEDURE — 11403 EXC TR-EXT B9+MARG 2.1-3CM: CPT | Performed by: SURGERY

## 2019-02-19 NOTE — PROGRESS NOTES
Subjective   Veda Enamorado is a 54 y.o. female.     History of Present Illness She is here for excision of cyst on chest that is growing and bothering her,  and lesion on the back that is rough, growing and bothering her.    The following portions of the patient's history were reviewed and updated as appropriate: current medications, past family history, past medical history, past social history, past surgical history and problem list.    Review of Systems    Objective   Physical Exam Excised 2.5 cm cyst on left anterior chest and 6 mm lesion on her back    Assessment/Plan   Veda was seen today for suspicious skin lesion.    Diagnoses and all orders for this visit:    Skin lesion of back    Sebaceous cyst      Sutures out in a week.

## 2019-02-26 ENCOUNTER — OFFICE VISIT (OUTPATIENT)
Dept: SURGERY | Facility: CLINIC | Age: 54
End: 2019-02-26

## 2019-02-26 VITALS — HEIGHT: 62 IN | BODY MASS INDEX: 23.37 KG/M2 | WEIGHT: 127 LBS

## 2019-02-26 DIAGNOSIS — Z72.0 TOBACCO ABUSE: Primary | ICD-10-CM

## 2019-02-26 DIAGNOSIS — Z51.89 VISIT FOR WOUND CHECK: ICD-10-CM

## 2019-02-26 PROBLEM — C00.9: Status: ACTIVE | Noted: 2019-02-26

## 2019-02-26 PROBLEM — L72.3 SEBACEOUS CYST: Status: RESOLVED | Noted: 2019-02-14 | Resolved: 2019-02-26

## 2019-02-26 PROBLEM — L98.9 SKIN LESION OF BACK: Status: RESOLVED | Noted: 2019-02-14 | Resolved: 2019-02-26

## 2019-02-26 PROCEDURE — 99024 POSTOP FOLLOW-UP VISIT: CPT | Performed by: SURGERY

## 2019-02-26 NOTE — PROGRESS NOTES
Subjective   Veda Enamorado is a 54 y.o. female.     History of Present Illness she is here for suture removal from chest and back    The following portions of the patient's history were reviewed and updated as appropriate: current medications, past family history, past medical history, past social history, past surgical history and problem list.    Review of Systems    Objective   Physical Exam wounds ok and sutures removed.    Assessment/Plan   Veda was seen today for follow-up.    Diagnoses and all orders for this visit:    Tobacco abuse    Visit for wound check    return prn

## 2019-03-06 RX ORDER — SPIRONOLACTONE 25 MG/1
25 TABLET ORAL DAILY
Qty: 30 TABLET | Refills: 6 | Status: SHIPPED | OUTPATIENT
Start: 2019-03-06 | End: 2019-09-30 | Stop reason: SDUPTHER

## 2019-03-06 RX ORDER — ISOSORBIDE MONONITRATE 60 MG/1
60 TABLET, EXTENDED RELEASE ORAL DAILY
Qty: 30 TABLET | Refills: 6 | Status: SHIPPED | OUTPATIENT
Start: 2019-03-06 | End: 2019-09-30 | Stop reason: SDUPTHER

## 2019-03-06 RX ORDER — ATENOLOL 25 MG/1
25 TABLET ORAL DAILY
Qty: 30 TABLET | Refills: 6 | Status: SHIPPED | OUTPATIENT
Start: 2019-03-06 | End: 2019-09-30 | Stop reason: SDUPTHER

## 2019-03-19 ENCOUNTER — HOSPITAL ENCOUNTER (OUTPATIENT)
Dept: GENERAL RADIOLOGY | Facility: HOSPITAL | Age: 54
Discharge: HOME | End: 2019-03-19

## 2019-03-19 DIAGNOSIS — M25.559 ARTHRALGIA OF HIP, UNSPECIFIED LATERALITY: ICD-10-CM

## 2019-03-22 ENCOUNTER — OFFICE VISIT (OUTPATIENT)
Dept: CARDIOLOGY | Facility: CLINIC | Age: 54
End: 2019-03-22

## 2019-03-22 VITALS
DIASTOLIC BLOOD PRESSURE: 75 MMHG | OXYGEN SATURATION: 98 % | SYSTOLIC BLOOD PRESSURE: 133 MMHG | HEART RATE: 73 BPM | HEIGHT: 62 IN | BODY MASS INDEX: 22.41 KG/M2 | WEIGHT: 121.8 LBS

## 2019-03-22 DIAGNOSIS — I25.10 CORONARY ARTERY DISEASE INVOLVING NATIVE CORONARY ARTERY OF NATIVE HEART WITHOUT ANGINA PECTORIS: Primary | ICD-10-CM

## 2019-03-22 DIAGNOSIS — I10 ESSENTIAL HYPERTENSION: ICD-10-CM

## 2019-03-22 DIAGNOSIS — E78.2 MIXED HYPERLIPIDEMIA: ICD-10-CM

## 2019-03-22 PROCEDURE — 99213 OFFICE O/P EST LOW 20 MIN: CPT | Performed by: NURSE PRACTITIONER

## 2019-03-22 NOTE — PROGRESS NOTES
Subjective     Chief Complaint: Coronary Artery Disease and Hyperlipidemia    History of Present Illness   Veda Enamorado is a 54 y.o. female who presents with past medical history significant for coronary artery disease, status post multiple percutaneous revascularizations, last being in in February 2015 when she underwent successful PCI ZURI to LAD and diagonal branches for angina, hyperlipidemia and hypertension.  She is here today for follow-up.    At today's visit Ms. Enamorado denies chest pain, lower extremity swelling.  She does report some occasional palpitations.  She reports fatigue.      Current Outpatient Medications:   •  aspirin 81 MG EC tablet, Take 81 mg by mouth daily., Disp: , Rfl:   •  atenolol (TENORMIN) 25 MG tablet, Take 1 tablet by mouth Daily., Disp: 30 tablet, Rfl: 6  •  HYDROcodone-acetaminophen (NORCO) 7.5-325 MG per tablet, Take 1 tablet by mouth every 6 (six) hours as needed for moderate pain (4-6)., Disp: , Rfl:   •  isosorbide mononitrate (IMDUR) 60 MG 24 hr tablet, Take 1 tablet by mouth Daily., Disp: 30 tablet, Rfl: 6  •  omeprazole (PriLOSEC) 20 MG capsule, Take 20 mg by mouth daily., Disp: , Rfl:   •  omeprazole (priLOSEC) 40 MG capsule, , Disp: , Rfl:   •  ondansetron (ZOFRAN) 8 MG tablet, Take  by mouth every 8 (eight) hours as needed for nausea or vomiting., Disp: , Rfl:   •  prasugrel (EFFIENT) 10 MG tablet, Take 1 tablet by mouth Daily., Disp: 30 tablet, Rfl: 11  •  pravastatin (PRAVACHOL) 20 MG tablet, , Disp: , Rfl:   •  spironolactone (ALDACTONE) 25 MG tablet, Take 1 tablet by mouth Daily., Disp: 30 tablet, Rfl: 6  •  traMADol (ULTRAM) 50 MG tablet, Take 50 mg by mouth every 6 (six) hours as needed for moderate pain (4-6)., Disp: , Rfl:   •  nitroglycerin (NITROSTAT) 0.4 MG SL tablet, 1 under the tongue as needed for angina, may repeat q5mins for up three doses, Disp: 100 tablet, Rfl: 3     The following portions of the patient's history were reviewed and updated as  "appropriate: allergies, current medications, past family history, past medical history, past social history, past surgical history and problem list.    Review of Systems   Constitution: Positive for malaise/fatigue. Negative for weakness.   Cardiovascular: Positive for palpitations. Negative for chest pain, dyspnea on exertion, irregular heartbeat, leg swelling, near-syncope, orthopnea, paroxysmal nocturnal dyspnea and syncope.   Respiratory: Negative for shortness of breath.    Hematologic/Lymphatic: Does not bruise/bleed easily.   Neurological: Negative for dizziness and light-headedness.         Objective     /75 (BP Location: Left arm)   Pulse 73   Ht 157.5 cm (62\")   Wt 55.2 kg (121 lb 12.8 oz)   SpO2 98%   BMI 22.28 kg/m²     Physical Exam   Constitutional: She is oriented to person, place, and time. She appears well-developed and well-nourished.   HENT:   Head: Normocephalic and atraumatic.   Eyes: Pupils are equal, round, and reactive to light.   Neck: No JVD present.   Cardiovascular: Normal rate, regular rhythm and intact distal pulses. Exam reveals no gallop and no friction rub.   No murmur heard.  Pulmonary/Chest: Effort normal and breath sounds normal. No respiratory distress. She has no wheezes. She has no rales.   Neurological: She is alert and oriented to person, place, and time.   Skin: Skin is warm and dry.   Psychiatric: She has a normal mood and affect.   Vitals reviewed.      Procedures      Assessment/Plan       Veda was seen today for coronary artery disease and hyperlipidemia.    Diagnoses and all orders for this visit:    Assessment:  1. ASCVD, status post multiple stent placements, most recently PCI ZURI to LAD and diagonal branches in February 2015  2. Mixed hyperlipidemia  3. Essential hypertension  4. Tobacco use      Plan:  1. To continue current medications  2. Risk factor modification: Patient counseled regarding smoking cessation.  Educational materials given.  3. Return to " clinic in 6 months, sooner for any worsening or concerning symptoms.      Return in about 6 months (around 9/22/2019).    Sharda Meyers APRN

## 2019-03-22 NOTE — PATIENT INSTRUCTIONS
Steps to Quit Smoking  Smoking tobacco can be bad for your health. It can also affect almost every organ in your body. Smoking puts you and people around you at risk for many serious long-lasting (chronic) diseases. Quitting smoking is hard, but it is one of the best things that you can do for your health. It is never too late to quit.  What are the benefits of quitting smoking?  When you quit smoking, you lower your risk for getting serious diseases and conditions. They can include:  · Lung cancer or lung disease.  · Heart disease.  · Stroke.  · Heart attack.  · Not being able to have children (infertility).  · Weak bones (osteoporosis) and broken bones (fractures).    If you have coughing, wheezing, and shortness of breath, those symptoms may get better when you quit. You may also get sick less often. If you are pregnant, quitting smoking can help to lower your chances of having a baby of low birth weight.  What can I do to help me quit smoking?  Talk with your doctor about what can help you quit smoking. Some things you can do (strategies) include:  · Quitting smoking totally, instead of slowly cutting back how much you smoke over a period of time.  · Going to in-person counseling. You are more likely to quit if you go to many counseling sessions.  · Using resources and support systems, such as:  ? Online chats with a counselor.  ? Phone quitlines.  ? Printed self-help materials.  ? Support groups or group counseling.  ? Text messaging programs.  ? Mobile phone apps or applications.  · Taking medicines. Some of these medicines may have nicotine in them. If you are pregnant or breastfeeding, do not take any medicines to quit smoking unless your doctor says it is okay. Talk with your doctor about counseling or other things that can help you.    Talk with your doctor about using more than one strategy at the same time, such as taking medicines while you are also going to in-person counseling. This can help make  quitting easier.  What things can I do to make it easier to quit?  Quitting smoking might feel very hard at first, but there is a lot that you can do to make it easier. Take these steps:  · Talk to your family and friends. Ask them to support and encourage you.  · Call phone quitlines, reach out to support groups, or work with a counselor.  · Ask people who smoke to not smoke around you.  · Avoid places that make you want (trigger) to smoke, such as:  ? Bars.  ? Parties.  ? Smoke-break areas at work.  · Spend time with people who do not smoke.  · Lower the stress in your life. Stress can make you want to smoke. Try these things to help your stress:  ? Getting regular exercise.  ? Deep-breathing exercises.  ? Yoga.  ? Meditating.  ? Doing a body scan. To do this, close your eyes, focus on one area of your body at a time from head to toe, and notice which parts of your body are tense. Try to relax the muscles in those areas.  · Download or buy apps on your mobile phone or tablet that can help you stick to your quit plan. There are many free apps, such as QuitGuide from the CDC (Centers for Disease Control and Prevention). You can find more support from smokefree.gov and other websites.    This information is not intended to replace advice given to you by your health care provider. Make sure you discuss any questions you have with your health care provider.  Document Released: 10/14/2010 Document Revised: 08/15/2017 Document Reviewed: 05/03/2016  [employer] Interactive Patient Education © 2019 [employer] Inc.

## 2019-03-25 ENCOUNTER — TELEPHONE (OUTPATIENT)
Dept: CARDIOLOGY | Facility: CLINIC | Age: 54
End: 2019-03-25

## 2019-03-25 NOTE — TELEPHONE ENCOUNTER
Called patient to see who I needed to speak with about her cardiac clearance for her hip injection. She said just radiology downstairs here at the hospital.    Called RAD nurses at [phone] to let them know that the clearance for Veda to come off her effient for 5-7 days prior to injection was in chart. Did not get to speak to anyone but left a detailed message with this information in it with my extension as well as the phone number for the patient so that she could be scheduled for injection.

## 2019-03-29 ENCOUNTER — HOSPITAL ENCOUNTER (OUTPATIENT)
Dept: GENERAL RADIOLOGY | Facility: HOSPITAL | Age: 54
Discharge: HOME | End: 2019-03-29
Admitting: PHYSICIAN ASSISTANT

## 2019-03-29 VITALS
SYSTOLIC BLOOD PRESSURE: 142 MMHG | HEART RATE: 63 BPM | RESPIRATION RATE: 18 BRPM | DIASTOLIC BLOOD PRESSURE: 78 MMHG | OXYGEN SATURATION: 99 %

## 2019-03-29 PROCEDURE — 77002 NEEDLE LOCALIZATION BY XRAY: CPT | Performed by: RADIOLOGY

## 2019-03-29 PROCEDURE — 77002 NEEDLE LOCALIZATION BY XRAY: CPT

## 2019-03-29 PROCEDURE — 25010000002 IOPAMIDOL 61 % SOLUTION: Performed by: RADIOLOGY

## 2019-03-29 PROCEDURE — 25010000002 TRIAMCINOLONE PER 10 MG: Performed by: RADIOLOGY

## 2019-03-29 PROCEDURE — 20610 DRAIN/INJ JOINT/BURSA W/O US: CPT | Performed by: RADIOLOGY

## 2019-03-29 RX ORDER — TRIAMCINOLONE ACETONIDE 40 MG/ML
40 INJECTION, SUSPENSION INTRA-ARTICULAR; INTRAMUSCULAR
Status: COMPLETED | OUTPATIENT
Start: 2019-03-29 | End: 2019-03-29

## 2019-03-29 RX ORDER — LIDOCAINE HYDROCHLORIDE 20 MG/ML
20 INJECTION, SOLUTION INFILTRATION; PERINEURAL
Status: COMPLETED | OUTPATIENT
Start: 2019-03-29 | End: 2019-03-29

## 2019-03-29 RX ORDER — BUPIVACAINE HYDROCHLORIDE 5 MG/ML
30 INJECTION, SOLUTION PERINEURAL
Status: COMPLETED | OUTPATIENT
Start: 2019-03-29 | End: 2019-03-29

## 2019-03-29 RX ADMIN — IOPAMIDOL 3 ML: 612 INJECTION, SOLUTION INTRAVENOUS at 10:45

## 2019-03-29 RX ADMIN — TRIAMCINOLONE ACETONIDE 40 MG: 40 INJECTION, SUSPENSION INTRA-ARTICULAR; INTRAMUSCULAR at 10:46

## 2019-03-29 RX ADMIN — BUPIVACAINE HYDROCHLORIDE 5 ML: 5 INJECTION, SOLUTION PERINEURAL at 10:46

## 2019-03-29 RX ADMIN — LIDOCAINE HYDROCHLORIDE 20 ML: 20 INJECTION, SOLUTION INFILTRATION; PERINEURAL at 10:39

## 2019-04-04 RX ORDER — PRAVASTATIN SODIUM 20 MG
20 TABLET ORAL DAILY
Qty: 30 TABLET | Refills: 6 | Status: SHIPPED | OUTPATIENT
Start: 2019-04-04 | End: 2019-09-30 | Stop reason: SDUPTHER

## 2019-09-30 ENCOUNTER — OFFICE VISIT (OUTPATIENT)
Dept: CARDIOLOGY | Facility: CLINIC | Age: 54
End: 2019-09-30

## 2019-09-30 ENCOUNTER — CLINICAL SUPPORT NO REQUIREMENTS (OUTPATIENT)
Dept: CARDIOLOGY | Facility: CLINIC | Age: 54
End: 2019-09-30

## 2019-09-30 VITALS
BODY MASS INDEX: 21.53 KG/M2 | HEART RATE: 55 BPM | WEIGHT: 117 LBS | SYSTOLIC BLOOD PRESSURE: 148 MMHG | OXYGEN SATURATION: 98 % | DIASTOLIC BLOOD PRESSURE: 76 MMHG | HEIGHT: 62 IN

## 2019-09-30 DIAGNOSIS — R00.2 PALPITATIONS: Primary | ICD-10-CM

## 2019-09-30 DIAGNOSIS — Z72.0 TOBACCO USE: Primary | ICD-10-CM

## 2019-09-30 DIAGNOSIS — R00.2 PALPITATIONS: ICD-10-CM

## 2019-09-30 PROCEDURE — 93270 REMOTE 30 DAY ECG REV/REPORT: CPT | Performed by: NURSE PRACTITIONER

## 2019-09-30 PROCEDURE — 99214 OFFICE O/P EST MOD 30 MIN: CPT | Performed by: NURSE PRACTITIONER

## 2019-09-30 RX ORDER — ATENOLOL 25 MG/1
25 TABLET ORAL DAILY
Qty: 30 TABLET | Refills: 6 | Status: SHIPPED | OUTPATIENT
Start: 2019-09-30 | End: 2019-12-11

## 2019-09-30 RX ORDER — PRAVASTATIN SODIUM 20 MG
20 TABLET ORAL DAILY
Qty: 30 TABLET | Refills: 6 | Status: SHIPPED | OUTPATIENT
Start: 2019-09-30 | End: 2020-05-07 | Stop reason: SDUPTHER

## 2019-09-30 RX ORDER — SPIRONOLACTONE 25 MG/1
25 TABLET ORAL DAILY
Qty: 30 TABLET | Refills: 6 | Status: SHIPPED | OUTPATIENT
Start: 2019-09-30 | End: 2020-05-07 | Stop reason: SDUPTHER

## 2019-09-30 RX ORDER — PANTOPRAZOLE SODIUM 20 MG/1
20 TABLET, DELAYED RELEASE ORAL DAILY
COMMUNITY
End: 2020-07-05

## 2019-09-30 RX ORDER — PRASUGREL 10 MG/1
10 TABLET, FILM COATED ORAL DAILY
Qty: 30 TABLET | Refills: 6 | Status: SHIPPED | OUTPATIENT
Start: 2019-09-30 | End: 2020-07-21 | Stop reason: HOSPADM

## 2019-09-30 RX ORDER — ISOSORBIDE MONONITRATE 60 MG/1
60 TABLET, EXTENDED RELEASE ORAL DAILY
Qty: 30 TABLET | Refills: 6 | Status: SHIPPED | OUTPATIENT
Start: 2019-09-30 | End: 2020-05-07 | Stop reason: SDUPTHER

## 2019-09-30 NOTE — PROGRESS NOTES
Subjective     Chief Complaint: Coronary Artery Disease; Hypertension; and Hyperlipidemia    History of Present Illness   Veda Enamorado is a 54 y.o. female who presents with past medical history significant for coronary artery disease, status post multiple percutaneous revascularizations, last being in in February 2015 when she underwent successful PCI ZURI to LAD and diagonal branches for angina, hyperlipidemia and hypertension.  She is here today for follow-up.    Ms. Enamorado reports intermittent palpitations.  She states that she can have the palpitations for as long as just a few seconds to up to an hour.  They are worrisome to the patient.  She denies any associated shortness of breath, chest pain, or diaphoresis.  She states that she has these palpitations about 3-4 times a week.    With regard to her blood pressure today: Ms. Enamorado states that her blood pressure at home usually runs systolically in the 120s.  She does not take it on a daily basis.     Ms. Enamorado reports that she has recent diagnosis of Estevez's esophagitis.  She also reports that she is scheduled for colonoscopy in October.    Current Outpatient Medications:   •  aspirin 81 MG EC tablet, Take 81 mg by mouth daily., Disp: , Rfl:   •  atenolol (TENORMIN) 25 MG tablet, Take 1 tablet by mouth Daily., Disp: 30 tablet, Rfl: 6  •  HYDROcodone-acetaminophen (NORCO) 7.5-325 MG per tablet, Take 1 tablet by mouth every 6 (six) hours as needed for moderate pain (4-6)., Disp: , Rfl:   •  isosorbide mononitrate (IMDUR) 60 MG 24 hr tablet, Take 1 tablet by mouth Daily., Disp: 30 tablet, Rfl: 6  •  nitroglycerin (NITROSTAT) 0.4 MG SL tablet, 1 under the tongue as needed for angina, may repeat q5mins for up three doses, Disp: 100 tablet, Rfl: 3  •  ondansetron (ZOFRAN) 8 MG tablet, Take  by mouth every 8 (eight) hours as needed for nausea or vomiting., Disp: , Rfl:   •  pantoprazole (PROTONIX) 20 MG EC tablet, Take 20 mg by mouth Daily., Disp: , Rfl:   •   "prasugrel (EFFIENT) 10 MG tablet, Take 1 tablet by mouth Daily., Disp: 30 tablet, Rfl: 6  •  pravastatin (PRAVACHOL) 20 MG tablet, Take 1 tablet by mouth Daily., Disp: 30 tablet, Rfl: 6  •  spironolactone (ALDACTONE) 25 MG tablet, Take 1 tablet by mouth Daily., Disp: 30 tablet, Rfl: 6  •  traMADol (ULTRAM) 50 MG tablet, Take 50 mg by mouth every 6 (six) hours as needed for moderate pain (4-6)., Disp: , Rfl:      On this date:  The following portions of the patient's history were reviewed and updated as appropriate: allergies, current medications, past family history, past medical history, past social history, past surgical history and problem list.    Review of Systems   Constitution: Positive for decreased appetite, malaise/fatigue and weight gain. Negative for weakness.   Cardiovascular: Positive for leg swelling and palpitations. Negative for chest pain, dyspnea on exertion, irregular heartbeat, near-syncope, orthopnea, paroxysmal nocturnal dyspnea and syncope.   Respiratory: Negative for shortness of breath.    Hematologic/Lymphatic: Bruises/bleeds easily.   Neurological: Negative for dizziness and light-headedness.         Objective     /76 (BP Location: Left arm, Patient Position: Sitting, Cuff Size: Adult)   Pulse 55   Ht 157.5 cm (62\")   Wt 53.1 kg (117 lb)   SpO2 98%   BMI 21.40 kg/m²     Physical Exam   Constitutional: She appears well-developed and well-nourished.   HENT:   Head: Normocephalic and atraumatic.   Eyes: Pupils are equal, round, and reactive to light.   Neck: No JVD present.   Cardiovascular: Normal rate, regular rhythm and intact distal pulses. Exam reveals no gallop and no friction rub.   No murmur heard.  Pulmonary/Chest: Effort normal and breath sounds normal. No respiratory distress. She has no wheezes. She has no rales.   Abdominal: Soft. She exhibits no mass. There is no tenderness. No hernia.   Skin: Skin is warm and dry.   Psychiatric: She has a normal mood and affect. "   Vitals reviewed.      Procedures      Assessment/Plan       Veda was seen today for coronary artery disease, hypertension and hyperlipidemia.    Diagnoses and all orders for this visit:      Assessment:  1. Palpitations  2. CAD, status post multiple percutaneous revascularizations, last being in February 2015 when she underwent successful PCI ZURI LAD and diagonal branches for angina  3. Essential hypertension  4. Dyslipidemia  5. Estevez's esophagitis  6. Perioperative risk assessment  7. Tobacco use      Plan:  1. Request results of recent labs from PCP office  2. Requested pt keep a blood pressure diary  3. Event monitor placed for evaluation of heart palpitations  4. Pt to continue guideline directed medical therapy for CAD: Aspirin, Effient and pravastatin.  Patient is also on atenolol, spironolactone, and Imdur  5. Risk factor modification: Patient counseled for less than 3 minutes regarding smoking cessation.  Educational material given.  6. Return to clinic in 4 weeks, sooner for any worsening or concerning symptoms.      Return in about 4 weeks (around 10/28/2019).      ZION Ocasio

## 2019-09-30 NOTE — PATIENT INSTRUCTIONS
Steps to Quit Smoking    Smoking tobacco can be harmful to your health and can affect almost every organ in your body. Smoking puts you, and those around you, at risk for developing many serious chronic diseases. Quitting smoking is difficult, but it is one of the best things that you can do for your health. It is never too late to quit.  What are the benefits of quitting smoking?  When you quit smoking, you lower your risk of developing serious diseases and conditions, such as:  · Lung cancer or lung disease, such as COPD.  · Heart disease.  · Stroke.  · Heart attack.  · Infertility.  · Osteoporosis and bone fractures.  Additionally, symptoms such as coughing, wheezing, and shortness of breath may get better when you quit. You may also find that you get sick less often because your body is stronger at fighting off colds and infections. If you are pregnant, quitting smoking can help to reduce your chances of having a baby of low birth weight.  How do I get ready to quit?  When you decide to quit smoking, create a plan to make sure that you are successful. Before you quit:  · Pick a date to quit. Set a date within the next two weeks to give you time to prepare.  · Write down the reasons why you are quitting. Keep this list in places where you will see it often, such as on your bathroom mirror or in your car or wallet.  · Identify the people, places, things, and activities that make you want to smoke (triggers) and avoid them. Make sure to take these actions:  ? Throw away all cigarettes at home, at work, and in your car.  ? Throw away smoking accessories, such as ashtrays and lighters.  ? Clean your car and make sure to empty the ashtray.  ? Clean your home, including curtains and carpets.  · Tell your family, friends, and coworkers that you are quitting. Support from your loved ones can make quitting easier.  · Talk with your health care provider about your options for quitting smoking.  · Find out what  treatment options are covered by your health insurance.  What strategies can I use to quit smoking?  Talk with your healthcare provider about different strategies to quit smoking. Some strategies include:  · Quitting smoking altogether instead of gradually lessening how much you smoke over a period of time. Research shows that quitting “cold turkey” is more successful than gradually quitting.  · Attending in-person counseling to help you build problem-solving skills. You are more likely to have success in quitting if you attend several counseling sessions. Even short sessions of 10 minutes can be effective.  · Finding resources and support systems that can help you to quit smoking and remain smoke-free after you quit. These resources are most helpful when you use them often. They can include:  ? Online chats with a counselor.  ? Telephone quitlines.  ? Printed self-help materials.  ? Support groups or group counseling.  ? Text messaging programs.  ? Mobile phone applications.  · Taking medicines to help you quit smoking. (If you are pregnant or breastfeeding, talk with your health care provider first.) Some medicines contain nicotine and some do not. Both types of medicines help with cravings, but the medicines that include nicotine help to relieve withdrawal symptoms. Your health care provider may recommend:  ? Nicotine patches, gum, or lozenges.  ? Nicotine inhalers or sprays.  ? Non-nicotine medicine that is taken by mouth.  Talk with your health care provider about combining strategies, such as taking medicines while you are also receiving in-person counseling. Using these two strategies together makes you more likely to succeed in quitting than if you used either strategy on its own.  If you are pregnant or breastfeeding, talk with your health care provider about finding counseling or other support strategies to quit smoking. Do not take medicine to help you quit smoking unless told to do so by your health care  provider.  What things can I do to make it easier to quit?  Quitting smoking might feel overwhelming at first, but there is a lot that you can do to make it easier. Take these important actions:  · Reach out to your family and friends and ask that they support and encourage you during this time. Call telephone quitlines, reach out to support groups, or work with a counselor for support.  · Ask people who smoke to avoid smoking around you.  · Avoid places that trigger you to smoke, such as bars, parties, or smoke-break areas at work.  · Spend time around people who do not smoke.  · Lessen stress in your life, because stress can be a smoking trigger for some people. To lessen stress, try:  ? Exercising regularly.  ? Deep-breathing exercises.  ? Yoga.  ? Meditating.  ? Performing a body scan. This involves closing your eyes, scanning your body from head to toe, and noticing which parts of your body are particularly tense. Purposefully relax the muscles in those areas.  · Download or purchase mobile phone or tablet apps (applications) that can help you stick to your quit plan by providing reminders, tips, and encouragement. There are many free apps, such as QuitGuide from the CDC (Centers for Disease Control and Prevention). You can find other support for quitting smoking (smoking cessation) through smokefree.gov and other websites.  How will I feel when I quit smoking?  Within the first 24 hours of quitting smoking, you may start to feel some withdrawal symptoms. These symptoms are usually most noticeable 2-3 days after quitting, but they usually do not last beyond 2-3 weeks. Changes or symptoms that you might experience include:  · Mood swings.  · Restlessness, anxiety, or irritation.  · Difficulty concentrating.  · Dizziness.  · Strong cravings for sugary foods in addition to nicotine.  · Mild weight gain.  · Constipation.  · Nausea.  · Coughing or a sore throat.  · Changes in how your medicines work in your  body.  · A depressed mood.  · Difficulty sleeping (insomnia).  After the first 2-3 weeks of quitting, you may start to notice more positive results, such as:  · Improved sense of smell and taste.  · Decreased coughing and sore throat.  · Slower heart rate.  · Lower blood pressure.  · Clearer skin.  · The ability to breathe more easily.  · Fewer sick days.  Quitting smoking is very challenging for most people. Do not get discouraged if you are not successful the first time. Some people need to make many attempts to quit before they achieve long-term success. Do your best to stick to your quit plan, and talk with your health care provider if you have any questions or concerns.  This information is not intended to replace advice given to you by your health care provider. Make sure you discuss any questions you have with your health care provider.  Document Released: 12/12/2002 Document Revised: 07/24/2018 Document Reviewed: 05/03/2016  [employer] Interactive Patient Education © 2019 [employer]

## 2019-10-13 PROCEDURE — 93272 ECG/REVIEW INTERPRET ONLY: CPT | Performed by: NURSE PRACTITIONER

## 2019-11-08 NOTE — PROGRESS NOTES
"Subjective     Chief Complaint: Hyperlipidemia and Coronary Artery Disease    History of Present Illness   Veda Enamorado is a 54 y.o. female who presents with a past medical history significant for coronary artery disease, status post multiple percutaneous revascularizations, last being in February 2015 when she underwent successful PCI ZURI to the LAD and diagonal branches for angina, hyperlipidemia and hypertension.  At her last visit in September 2019 she reported intermittent palpitations.  She was placed on event monitor and is here today for follow-up.    Event monitor was unremarkable.    At today's visit Ms. Enamorado reports ongoing fatigue and occasional palpitations.  She also reports that over the last week she has had intermittent neck and back of head pain.  She states that in the past this is been a sign that she was \"clogged up\".  She denies any specific chest pain.  She denies left arm pain.  She states that she does not always have chest pain with her coronary events.        Current Outpatient Medications:   •  aspirin 81 MG EC tablet, Take 81 mg by mouth daily., Disp: , Rfl:   •  atenolol (TENORMIN) 25 MG tablet, Take 1 tablet by mouth Daily., Disp: 30 tablet, Rfl: 6  •  HYDROcodone-acetaminophen (NORCO) 7.5-325 MG per tablet, Take 1 tablet by mouth every 6 (six) hours as needed for moderate pain (4-6)., Disp: , Rfl:   •  isosorbide mononitrate (IMDUR) 60 MG 24 hr tablet, Take 1 tablet by mouth Daily., Disp: 30 tablet, Rfl: 6  •  nitroglycerin (NITROSTAT) 0.4 MG SL tablet, 1 under the tongue as needed for angina, may repeat q5mins for up three doses, Disp: 100 tablet, Rfl: 3  •  ondansetron (ZOFRAN) 8 MG tablet, Take  by mouth every 8 (eight) hours as needed for nausea or vomiting., Disp: , Rfl:   •  pantoprazole (PROTONIX) 20 MG EC tablet, Take 20 mg by mouth Daily., Disp: , Rfl:   •  prasugrel (EFFIENT) 10 MG tablet, Take 1 tablet by mouth Daily., Disp: 30 tablet, Rfl: 6  •  pravastatin (PRAVACHOL) " "20 MG tablet, Take 1 tablet by mouth Daily., Disp: 30 tablet, Rfl: 6  •  spironolactone (ALDACTONE) 25 MG tablet, Take 1 tablet by mouth Daily., Disp: 30 tablet, Rfl: 6  •  traMADol (ULTRAM) 50 MG tablet, Take 50 mg by mouth every 6 (six) hours as needed for moderate pain (4-6)., Disp: , Rfl:      On this date:  The following portions of the patient's history were reviewed and updated as appropriate: allergies, current medications, past family history, past medical history, past social history, past surgical history and problem list.    Review of Systems   Constitution: Positive for malaise/fatigue. Negative for weakness.   Cardiovascular: Positive for palpitations. Negative for chest pain, dyspnea on exertion, irregular heartbeat, leg swelling, near-syncope, orthopnea, paroxysmal nocturnal dyspnea and syncope.   Respiratory: Negative for shortness of breath.    Hematologic/Lymphatic: Does not bruise/bleed easily.   Neurological: Negative for dizziness and light-headedness.         Objective     /79 (BP Location: Left arm, Patient Position: Sitting)   Pulse 55   Ht 157.5 cm (62\")   Wt 52.6 kg (116 lb)   SpO2 99%   BMI 21.22 kg/m²     Physical Exam   Constitutional: She appears well-developed and well-nourished.   HENT:   Head: Normocephalic and atraumatic.   Eyes: Pupils are equal, round, and reactive to light.   Neck: No JVD present.   Cardiovascular: Normal rate and regular rhythm. Exam reveals no gallop and no friction rub.   No murmur heard.  Pulmonary/Chest: Effort normal and breath sounds normal. No respiratory distress. She has no wheezes. She has no rales.   Abdominal: Soft. She exhibits no mass. There is no tenderness. No hernia.   Skin: Skin is warm and dry.   Psychiatric: She exhibits a depressed mood.   Vitals reviewed.      Procedures      Assessment/Plan       Veda was seen today for hyperlipidemia and coronary artery disease.    Diagnoses and all orders for this " visit:    Assessment:  1. Palpitations  2. ASCVD, status post multiple percutaneous revascularizations, last being February 2015 when she underwent successful PCI ZURI LAD and diagonal branches for angina  3. Essential hypertension  4. Dyslipidemia, 2/19/2018 total cholesterol 215, triglycerides 83, and   5. Tobacco use      Plan:  1. Results reviewed with patient: Event monitor  2. Will request labs from patient's primary care provider.  She states she has had them in the last 6 months.  3. Pt to continue guideline directed medical therapy for CAD: Aspirin, Effient, atenolol and pravastatin.  4. With patient report of neck pain I have ordered a nuclear stress test as this may well be anginal equivalent for her.  I do, however recognize that she may be experiencing muscle stress and strain.  5. She does straight that her blood pressures usually run in the 130s systolically at home.  I have asked her to keep a blood pressure log.  6. Risk factor modification: Patient counseled for less than 3 minutes regarding smoking cessation.  I reminded the patient regarding the connection between coronary artery disease and tobacco use.  Educational material given.  7. Return to clinic in 1 month, sooner for any worsening or concerning symptoms.      Return in about 4 weeks (around 12/9/2019).      ZION Ocasio

## 2019-11-11 ENCOUNTER — OFFICE VISIT (OUTPATIENT)
Dept: CARDIOLOGY | Facility: CLINIC | Age: 54
End: 2019-11-11

## 2019-11-11 VITALS
HEIGHT: 62 IN | DIASTOLIC BLOOD PRESSURE: 79 MMHG | BODY MASS INDEX: 21.35 KG/M2 | WEIGHT: 116 LBS | HEART RATE: 55 BPM | OXYGEN SATURATION: 99 % | SYSTOLIC BLOOD PRESSURE: 148 MMHG

## 2019-11-11 DIAGNOSIS — Z72.0 TOBACCO ABUSE: ICD-10-CM

## 2019-11-11 DIAGNOSIS — I25.110 CORONARY ARTERY DISEASE INVOLVING NATIVE CORONARY ARTERY OF NATIVE HEART WITH UNSTABLE ANGINA PECTORIS (HCC): ICD-10-CM

## 2019-11-11 DIAGNOSIS — I25.10 CORONARY ARTERY DISEASE INVOLVING NATIVE CORONARY ARTERY OF NATIVE HEART WITHOUT ANGINA PECTORIS: Primary | ICD-10-CM

## 2019-11-11 DIAGNOSIS — E78.2 MIXED HYPERLIPIDEMIA: ICD-10-CM

## 2019-11-11 DIAGNOSIS — I10 ESSENTIAL HYPERTENSION: ICD-10-CM

## 2019-11-11 PROCEDURE — 99214 OFFICE O/P EST MOD 30 MIN: CPT | Performed by: NURSE PRACTITIONER

## 2019-11-11 NOTE — PATIENT INSTRUCTIONS
Steps to Quit Smoking    Smoking tobacco can be bad for your health. It can also affect almost every organ in your body. Smoking puts you and people around you at risk for many serious long-lasting (chronic) diseases. Quitting smoking is hard, but it is one of the best things that you can do for your health. It is never too late to quit.  What are the benefits of quitting smoking?  When you quit smoking, you lower your risk for getting serious diseases and conditions. They can include:  · Lung cancer or lung disease.  · Heart disease.  · Stroke.  · Heart attack.  · Not being able to have children (infertility).  · Weak bones (osteoporosis) and broken bones (fractures).     If you have coughing, wheezing, and shortness of breath, those symptoms may get better when you quit. You may also get sick less often. If you are pregnant, quitting smoking can help to lower your chances of having a baby of low birth weight.  What can I do to help me quit smoking?  Talk with your doctor about what can help you quit smoking. Some things you can do (strategies) include:  · Quitting smoking totally, instead of slowly cutting back how much you smoke over a period of time.  · Going to in-person counseling. You are more likely to quit if you go to many counseling sessions.  · Using resources and support systems, such as:  ? Online chats with a counselor.  ? Phone quitlines.  ? Printed self-help materials.  ? Support groups or group counseling.  ? Text messaging programs.  ? Mobile phone apps or applications.  · Taking medicines. Some of these medicines may have nicotine in them. If you are pregnant or breastfeeding, do not take any medicines to quit smoking unless your doctor says it is okay. Talk with your doctor about counseling or other things that can help you.     Talk with your doctor about using more than one strategy at the same time, such as taking medicines while you are also going to in-person counseling. This can help make  quitting easier.  What things can I do to make it easier to quit?  Quitting smoking might feel very hard at first, but there is a lot that you can do to make it easier. Take these steps:  · Talk to your family and friends. Ask them to support and encourage you.  · Call phone quitlines, reach out to support groups, or work with a counselor.  · Ask people who smoke to not smoke around you.  · Avoid places that make you want (trigger) to smoke, such as:  ? Bars.  ? Parties.  ? Smoke-break areas at work.  · Spend time with people who do not smoke.  · Lower the stress in your life. Stress can make you want to smoke. Try these things to help your stress:  ? Getting regular exercise.  ? Deep-breathing exercises.  ? Yoga.  ? Meditating.  ? Doing a body scan. To do this, close your eyes, focus on one area of your body at a time from head to toe, and notice which parts of your body are tense. Try to relax the muscles in those areas.  · Download or buy apps on your mobile phone or tablet that can help you stick to your quit plan. There are many free apps, such as QuitGuide from the CDC (Centers for Disease Control and Prevention). You can find more support from smokefree.gov and other websites.     This information is not intended to replace advice given to you by your health care provider. Make sure you discuss any questions you have with your health care provider.  Document Released: 10/14/2010 Document Revised: 08/15/2017 Document Reviewed: 05/03/2016  [employer] Interactive Patient Education © 2019 [employer]

## 2019-11-14 RX ORDER — NITROGLYCERIN 0.4 MG/1
TABLET SUBLINGUAL
Qty: 100 TABLET | Refills: 3 | Status: SHIPPED | OUTPATIENT
Start: 2019-11-14 | End: 2021-05-11

## 2019-11-25 ENCOUNTER — HOSPITAL ENCOUNTER (OUTPATIENT)
Dept: NUCLEAR MEDICINE | Facility: HOSPITAL | Age: 54
Discharge: HOME | End: 2019-11-25

## 2019-11-25 ENCOUNTER — HOSPITAL ENCOUNTER (OUTPATIENT)
Dept: CARDIOLOGY | Facility: HOSPITAL | Age: 54
Discharge: HOME | End: 2019-11-25

## 2019-11-25 DIAGNOSIS — I25.10 CORONARY ARTERY DISEASE INVOLVING NATIVE CORONARY ARTERY OF NATIVE HEART WITHOUT ANGINA PECTORIS: ICD-10-CM

## 2019-11-25 PROCEDURE — 25010000002 REGADENOSON 0.4 MG/5ML SOLUTION: Performed by: NURSE PRACTITIONER

## 2019-11-25 PROCEDURE — 78452 HT MUSCLE IMAGE SPECT MULT: CPT

## 2019-11-25 PROCEDURE — A9500 TC99M SESTAMIBI: HCPCS | Performed by: NURSE PRACTITIONER

## 2019-11-25 PROCEDURE — 78452 HT MUSCLE IMAGE SPECT MULT: CPT | Performed by: INTERNAL MEDICINE

## 2019-11-25 PROCEDURE — 0 TECHNETIUM SESTAMIBI: Performed by: NURSE PRACTITIONER

## 2019-11-25 PROCEDURE — 25010000002 AMINOPHYLLINE PER 250 MG: Performed by: NURSE PRACTITIONER

## 2019-11-25 PROCEDURE — 93018 CV STRESS TEST I&R ONLY: CPT | Performed by: INTERNAL MEDICINE

## 2019-11-25 PROCEDURE — 93017 CV STRESS TEST TRACING ONLY: CPT

## 2019-11-25 RX ORDER — AMINOPHYLLINE DIHYDRATE 25 MG/ML
100 INJECTION, SOLUTION INTRAVENOUS
Status: COMPLETED | OUTPATIENT
Start: 2019-11-25 | End: 2019-11-25

## 2019-11-25 RX ADMIN — REGADENOSON 0.4 MG: 0.08 INJECTION, SOLUTION INTRAVENOUS at 10:11

## 2019-11-25 RX ADMIN — AMINOPHYLLINE 100 MG: 25 INJECTION, SOLUTION INTRAVENOUS at 10:17

## 2019-11-25 RX ADMIN — TECHNETIUM TC 99M SESTAMIBI 1 DOSE: 1 INJECTION INTRAVENOUS at 10:11

## 2019-11-25 RX ADMIN — TECHNETIUM TC 99M SESTAMIBI 1 DOSE: 1 INJECTION INTRAVENOUS at 08:25

## 2019-11-26 LAB
BH CV NUCLEAR PRIOR STUDY: 3
BH CV STRESS BP STAGE 1: NORMAL
BH CV STRESS BP STAGE 2: NORMAL
BH CV STRESS COMMENTS STAGE 1: NORMAL
BH CV STRESS COMMENTS STAGE 2: NORMAL
BH CV STRESS DOSE REGADENOSON STAGE 1: 0.4
BH CV STRESS DURATION MIN STAGE 1: 0
BH CV STRESS DURATION MIN STAGE 2: 4
BH CV STRESS DURATION SEC STAGE 1: 10
BH CV STRESS DURATION SEC STAGE 2: 0
BH CV STRESS HR STAGE 1: 84
BH CV STRESS HR STAGE 2: 63
BH CV STRESS PROTOCOL 1: NORMAL
BH CV STRESS RECOVERY BP: NORMAL MMHG
BH CV STRESS RECOVERY HR: 63 BPM
BH CV STRESS STAGE 1: 1
BH CV STRESS STAGE 2: 2
LV EF NUC BP: 64 %
MAXIMAL PREDICTED HEART RATE: 167 BPM
PERCENT MAX PREDICTED HR: 50.3 %
STRESS BASELINE BP: NORMAL MMHG
STRESS BASELINE HR: 61 BPM
STRESS PERCENT HR: 59 %
STRESS POST PEAK BP: NORMAL MMHG
STRESS POST PEAK HR: 84 BPM
STRESS TARGET HR: 142 BPM

## 2019-12-04 ENCOUNTER — TELEPHONE (OUTPATIENT)
Dept: CARDIOLOGY | Facility: CLINIC | Age: 54
End: 2019-12-04

## 2019-12-04 NOTE — TELEPHONE ENCOUNTER
Called patient to discuss nuclear stress test results.  The stress test was reviewed with Dr Chang and patient will require cath.  These results were discussed with the patient.  Louisa will call to make arrangements with the patient.

## 2019-12-11 ENCOUNTER — OFFICE VISIT (OUTPATIENT)
Dept: CARDIOLOGY | Facility: CLINIC | Age: 54
End: 2019-12-11

## 2019-12-11 VITALS
BODY MASS INDEX: 21.42 KG/M2 | WEIGHT: 116.4 LBS | DIASTOLIC BLOOD PRESSURE: 80 MMHG | HEIGHT: 62 IN | HEART RATE: 60 BPM | SYSTOLIC BLOOD PRESSURE: 155 MMHG | OXYGEN SATURATION: 98 %

## 2019-12-11 DIAGNOSIS — I25.110 CORONARY ARTERY DISEASE INVOLVING NATIVE CORONARY ARTERY OF NATIVE HEART WITH UNSTABLE ANGINA PECTORIS (HCC): Primary | ICD-10-CM

## 2019-12-11 DIAGNOSIS — I10 ESSENTIAL HYPERTENSION: ICD-10-CM

## 2019-12-11 DIAGNOSIS — Z72.0 TOBACCO ABUSE: ICD-10-CM

## 2019-12-11 DIAGNOSIS — E78.2 MIXED HYPERLIPIDEMIA: ICD-10-CM

## 2019-12-11 PROCEDURE — 99214 OFFICE O/P EST MOD 30 MIN: CPT | Performed by: NURSE PRACTITIONER

## 2019-12-11 RX ORDER — METOPROLOL SUCCINATE 25 MG/1
25 TABLET, EXTENDED RELEASE ORAL DAILY
Qty: 30 TABLET | Refills: 11 | Status: SHIPPED | OUTPATIENT
Start: 2019-12-11 | End: 2019-12-18 | Stop reason: HOSPADM

## 2019-12-11 NOTE — PATIENT INSTRUCTIONS
Steps to Quit Smoking    Smoking tobacco can be bad for your health. It can also affect almost every organ in your body. Smoking puts you and people around you at risk for many serious long-lasting (chronic) diseases. Quitting smoking is hard, but it is one of the best things that you can do for your health. It is never too late to quit.  What are the benefits of quitting smoking?  When you quit smoking, you lower your risk for getting serious diseases and conditions. They can include:  · Lung cancer or lung disease.  · Heart disease.  · Stroke.  · Heart attack.  · Not being able to have children (infertility).  · Weak bones (osteoporosis) and broken bones (fractures).     If you have coughing, wheezing, and shortness of breath, those symptoms may get better when you quit. You may also get sick less often. If you are pregnant, quitting smoking can help to lower your chances of having a baby of low birth weight.  What can I do to help me quit smoking?  Talk with your doctor about what can help you quit smoking. Some things you can do (strategies) include:  · Quitting smoking totally, instead of slowly cutting back how much you smoke over a period of time.  · Going to in-person counseling. You are more likely to quit if you go to many counseling sessions.  · Using resources and support systems, such as:  ? Online chats with a counselor.  ? Phone quitlines.  ? Printed self-help materials.  ? Support groups or group counseling.  ? Text messaging programs.  ? Mobile phone apps or applications.  · Taking medicines. Some of these medicines may have nicotine in them. If you are pregnant or breastfeeding, do not take any medicines to quit smoking unless your doctor says it is okay. Talk with your doctor about counseling or other things that can help you.     Talk with your doctor about using more than one strategy at the same time, such as taking medicines while you are also going to in-person counseling. This can help make  quitting easier.  What things can I do to make it easier to quit?  Quitting smoking might feel very hard at first, but there is a lot that you can do to make it easier. Take these steps:  · Talk to your family and friends. Ask them to support and encourage you.  · Call phone quitlines, reach out to support groups, or work with a counselor.  · Ask people who smoke to not smoke around you.  · Avoid places that make you want (trigger) to smoke, such as:  ? Bars.  ? Parties.  ? Smoke-break areas at work.  · Spend time with people who do not smoke.  · Lower the stress in your life. Stress can make you want to smoke. Try these things to help your stress:  ? Getting regular exercise.  ? Deep-breathing exercises.  ? Yoga.  ? Meditating.  ? Doing a body scan. To do this, close your eyes, focus on one area of your body at a time from head to toe, and notice which parts of your body are tense. Try to relax the muscles in those areas.  · Download or buy apps on your mobile phone or tablet that can help you stick to your quit plan. There are many free apps, such as QuitGuide from the CDC (Centers for Disease Control and Prevention). You can find more support from smokefree.gov and other websites.     This information is not intended to replace advice given to you by your health care provider. Make sure you discuss any questions you have with your health care provider.  Document Released: 10/14/2010 Document Revised: 08/15/2017 Document Reviewed: 05/03/2016  [employer] Interactive Patient Education © 2019 [employer]

## 2019-12-11 NOTE — PROGRESS NOTES
Subjective     Chief Complaint: Coronary Artery Disease and Hyperlipidemia    History of Present Illness   Veda nEamorado is a 54 y.o. female who presents with a past medical history significant for coronary artery disease, status post multiple percutaneous revascularizations, last being in February 2015 when she underwent successful PCI ZURI to the LAD and diagonal branches for angina, hyperlipidemia and hypertension.  At her last visit she reported chest pain similar to how she felt before her last stents were placed.  Nuclear stress test was ordered and were abnormal.  She was made aware of the results and has been scheduled for cardiac catheterization.    Ms Enamorado denies recent chest pain.  She reports occasional palpitations.      Current Outpatient Medications:   •  aspirin 81 MG EC tablet, Take 81 mg by mouth daily., Disp: , Rfl:   •  HYDROcodone-acetaminophen (NORCO) 7.5-325 MG per tablet, Take 1 tablet by mouth every 6 (six) hours as needed for moderate pain (4-6)., Disp: , Rfl:   •  isosorbide mononitrate (IMDUR) 60 MG 24 hr tablet, Take 1 tablet by mouth Daily., Disp: 30 tablet, Rfl: 6  •  nitroglycerin (NITROSTAT) 0.4 MG SL tablet, 1 under the tongue as needed for angina, may repeat q5mins for up three doses, Disp: 100 tablet, Rfl: 3  •  ondansetron (ZOFRAN) 8 MG tablet, Take  by mouth every 8 (eight) hours as needed for nausea or vomiting., Disp: , Rfl:   •  pantoprazole (PROTONIX) 20 MG EC tablet, Take 20 mg by mouth Daily., Disp: , Rfl:   •  prasugrel (EFFIENT) 10 MG tablet, Take 1 tablet by mouth Daily., Disp: 30 tablet, Rfl: 6  •  pravastatin (PRAVACHOL) 20 MG tablet, Take 1 tablet by mouth Daily., Disp: 30 tablet, Rfl: 6  •  spironolactone (ALDACTONE) 25 MG tablet, Take 1 tablet by mouth Daily., Disp: 30 tablet, Rfl: 6  •  traMADol (ULTRAM) 50 MG tablet, Take 50 mg by mouth every 6 (six) hours as needed for moderate pain (4-6)., Disp: , Rfl:   •  metoprolol succinate XL (TOPROL-XL) 25 MG 24 hr  "tablet, Take 1 tablet by mouth Daily., Disp: 30 tablet, Rfl: 11     The following portions of the patient's history were reviewed and updated as appropriate: allergies, current medications, past family history, past medical history, past social history, past surgical history and problem list.    Review of Systems   Constitution: Negative for malaise/fatigue.   Cardiovascular: Positive for palpitations. Negative for chest pain, dyspnea on exertion, irregular heartbeat, leg swelling, near-syncope, orthopnea, paroxysmal nocturnal dyspnea and syncope.   Respiratory: Negative for shortness of breath.    Hematologic/Lymphatic: Does not bruise/bleed easily.   Neurological: Negative for dizziness, light-headedness and weakness.         Objective     /80 (BP Location: Left arm, Patient Position: Sitting)   Pulse 60   Ht 157.5 cm (62\")   Wt 52.8 kg (116 lb 6.4 oz)   SpO2 98%   BMI 21.29 kg/m²     Physical Exam   Constitutional: She is oriented to person, place, and time. She appears well-developed and well-nourished.   HENT:   Head: Normocephalic and atraumatic.   Eyes: Pupils are equal, round, and reactive to light.   Neck: No JVD present.   Cardiovascular: Normal rate, regular rhythm and intact distal pulses. Exam reveals no gallop and no friction rub.   No murmur heard.  No lower extremity swelling   Pulmonary/Chest: Effort normal and breath sounds normal. No respiratory distress. She has no wheezes. She has no rales.   Neurological: She is alert and oriented to person, place, and time.   Skin: Skin is warm and dry.   Psychiatric: She has a normal mood and affect.   Vitals reviewed.      Procedures      Assessment/Plan       Veda was seen today for coronary artery disease and hyperlipidemia.    Diagnoses and all orders for this visit:    Coronary artery disease involving native coronary artery of native heart with unstable angina pectoris (CMS/HCC)   Abnormal nuclear stress test, plan for cath next " week     Mixed hyperlipidemia   Requested labs from PCP office however labs that were sent here do not include lipid panel.     Will have panel drawn when pt comes for cath   Pt had myalgias with atorvastatin in the past and she was placed on pravastatin    Essential hypertension   Changed tenormin to metoprolol for better BP control.  Will monitor her response.    Tobacco abuse   Counseled patient regarding smoking cessation.    Other orders  -     metoprolol succinate XL (TOPROL-XL) 25 MG 24 hr tablet; Take 1 tablet by mouth Daily.    Reviewed treatment plan with patient.           Return in about 4 weeks (around 1/8/2020).       ZION Ocasio

## 2019-12-16 ENCOUNTER — LAB (OUTPATIENT)
Dept: LAB | Facility: HOSPITAL | Age: 54
End: 2019-12-16

## 2019-12-16 DIAGNOSIS — E78.2 MIXED HYPERLIPIDEMIA: Primary | ICD-10-CM

## 2019-12-16 DIAGNOSIS — Z72.0 TOBACCO USE: ICD-10-CM

## 2019-12-16 DIAGNOSIS — I25.110 CORONARY ARTERY DISEASE INVOLVING NATIVE CORONARY ARTERY OF NATIVE HEART WITH UNSTABLE ANGINA PECTORIS (HCC): ICD-10-CM

## 2019-12-16 DIAGNOSIS — Z72.0 TOBACCO ABUSE: ICD-10-CM

## 2019-12-16 DIAGNOSIS — E78.2 MIXED HYPERLIPIDEMIA: ICD-10-CM

## 2019-12-16 LAB
ALBUMIN SERPL-MCNC: 3.96 G/DL (ref 3.5–5.2)
ALBUMIN/GLOB SERPL: 1.1 G/DL
ALP SERPL-CCNC: 140 U/L (ref 39–117)
ALT SERPL W P-5'-P-CCNC: <5 U/L (ref 1–33)
ANION GAP SERPL CALCULATED.3IONS-SCNC: 11.1 MMOL/L (ref 5–15)
APTT PPP: 25 SECONDS (ref 23.8–36.1)
AST SERPL-CCNC: 11 U/L (ref 1–32)
BASOPHILS # BLD AUTO: 0.04 10*3/MM3 (ref 0–0.2)
BASOPHILS NFR BLD AUTO: 0.4 % (ref 0–1.5)
BILIRUB SERPL-MCNC: 0.2 MG/DL (ref 0.2–1.2)
BUN BLD-MCNC: 5 MG/DL (ref 6–20)
BUN/CREAT SERPL: 7.7 (ref 7–25)
CALCIUM SPEC-SCNC: 9.3 MG/DL (ref 8.6–10.5)
CHLORIDE SERPL-SCNC: 102 MMOL/L (ref 98–107)
CHOLEST SERPL-MCNC: 175 MG/DL (ref 0–200)
CO2 SERPL-SCNC: 29.9 MMOL/L (ref 22–29)
CREAT BLD-MCNC: 0.65 MG/DL (ref 0.57–1)
DEPRECATED RDW RBC AUTO: 46.7 FL (ref 37–54)
EOSINOPHIL # BLD AUTO: 0.07 10*3/MM3 (ref 0–0.4)
EOSINOPHIL NFR BLD AUTO: 0.6 % (ref 0.3–6.2)
ERYTHROCYTE [DISTWIDTH] IN BLOOD BY AUTOMATED COUNT: 14 % (ref 12.3–15.4)
GFR SERPL CREATININE-BSD FRML MDRD: 95 ML/MIN/1.73
GLOBULIN UR ELPH-MCNC: 3.5 GM/DL
GLUCOSE BLD-MCNC: 96 MG/DL (ref 65–99)
HCT VFR BLD AUTO: 38 % (ref 34–46.6)
HDLC SERPL-MCNC: 48 MG/DL (ref 40–60)
HGB BLD-MCNC: 12.3 G/DL (ref 12–15.9)
IMM GRANULOCYTES # BLD AUTO: 0.04 10*3/MM3 (ref 0–0.05)
IMM GRANULOCYTES NFR BLD AUTO: 0.4 % (ref 0–0.5)
INR PPP: 0.98 (ref 0.9–1.1)
LDLC SERPL CALC-MCNC: 103 MG/DL (ref 0–100)
LDLC/HDLC SERPL: 2.14 {RATIO}
LYMPHOCYTES # BLD AUTO: 1.57 10*3/MM3 (ref 0.7–3.1)
LYMPHOCYTES NFR BLD AUTO: 14.5 % (ref 19.6–45.3)
MCH RBC QN AUTO: 29.1 PG (ref 26.6–33)
MCHC RBC AUTO-ENTMCNC: 32.4 G/DL (ref 31.5–35.7)
MCV RBC AUTO: 90 FL (ref 79–97)
MONOCYTES # BLD AUTO: 0.57 10*3/MM3 (ref 0.1–0.9)
MONOCYTES NFR BLD AUTO: 5.2 % (ref 5–12)
NEUTROPHILS # BLD AUTO: 8.57 10*3/MM3 (ref 1.7–7)
NEUTROPHILS NFR BLD AUTO: 78.9 % (ref 42.7–76)
NRBC BLD AUTO-RTO: 0 /100 WBC (ref 0–0.2)
PLATELET # BLD AUTO: 329 10*3/MM3 (ref 140–450)
PMV BLD AUTO: 9.6 FL (ref 6–12)
POTASSIUM BLD-SCNC: 2.8 MMOL/L (ref 3.5–5.2)
PROT SERPL-MCNC: 7.5 G/DL (ref 6–8.5)
PROTHROMBIN TIME: 13.5 SECONDS (ref 11–15.4)
RBC # BLD AUTO: 4.22 10*6/MM3 (ref 3.77–5.28)
SODIUM BLD-SCNC: 143 MMOL/L (ref 136–145)
TRIGL SERPL-MCNC: 122 MG/DL (ref 0–150)
VLDLC SERPL-MCNC: 24.4 MG/DL (ref 5–40)
WBC NRBC COR # BLD: 10.86 10*3/MM3 (ref 3.4–10.8)

## 2019-12-16 PROCEDURE — 80053 COMPREHEN METABOLIC PANEL: CPT

## 2019-12-16 PROCEDURE — 85025 COMPLETE CBC W/AUTO DIFF WBC: CPT

## 2019-12-16 PROCEDURE — 85610 PROTHROMBIN TIME: CPT

## 2019-12-16 PROCEDURE — 36415 COLL VENOUS BLD VENIPUNCTURE: CPT

## 2019-12-16 PROCEDURE — 85730 THROMBOPLASTIN TIME PARTIAL: CPT

## 2019-12-16 PROCEDURE — 80061 LIPID PANEL: CPT

## 2019-12-17 ENCOUNTER — TELEPHONE (OUTPATIENT)
Dept: CARDIOLOGY | Facility: CLINIC | Age: 54
End: 2019-12-17

## 2019-12-17 DIAGNOSIS — I25.110 CORONARY ARTERY DISEASE INVOLVING NATIVE CORONARY ARTERY OF NATIVE HEART WITH UNSTABLE ANGINA PECTORIS (HCC): Primary | ICD-10-CM

## 2019-12-17 DIAGNOSIS — R00.2 PALPITATIONS: ICD-10-CM

## 2019-12-17 DIAGNOSIS — I10 ESSENTIAL HYPERTENSION: ICD-10-CM

## 2019-12-17 RX ORDER — POTASSIUM CHLORIDE 20 MEQ/1
40 TABLET, EXTENDED RELEASE ORAL DAILY
Qty: 60 TABLET | Refills: 0 | Status: ON HOLD | OUTPATIENT
Start: 2019-12-17 | End: 2020-01-08

## 2019-12-17 RX ORDER — POTASSIUM CHLORIDE 20 MEQ/1
40 TABLET, EXTENDED RELEASE ORAL DAILY
COMMUNITY
End: 2019-12-17 | Stop reason: SDUPTHER

## 2019-12-17 NOTE — TELEPHONE ENCOUNTER
Patient notified by Elisa RN in Cath lab to take Klor Con 40 meq today.  Rx sent to pharmacy. Labs to be rechecked 12-18-19

## 2019-12-18 ENCOUNTER — HOSPITAL ENCOUNTER (OUTPATIENT)
Facility: HOSPITAL | Age: 54
Discharge: HOME | End: 2019-12-18
Attending: INTERNAL MEDICINE | Admitting: NURSE PRACTITIONER

## 2019-12-18 VITALS
SYSTOLIC BLOOD PRESSURE: 164 MMHG | TEMPERATURE: 98.9 F | DIASTOLIC BLOOD PRESSURE: 99 MMHG | HEIGHT: 62 IN | OXYGEN SATURATION: 100 % | BODY MASS INDEX: 21.35 KG/M2 | RESPIRATION RATE: 16 BRPM | HEART RATE: 61 BPM | WEIGHT: 116 LBS

## 2019-12-18 DIAGNOSIS — I25.110 CORONARY ARTERY DISEASE INVOLVING NATIVE CORONARY ARTERY OF NATIVE HEART WITH UNSTABLE ANGINA PECTORIS (HCC): ICD-10-CM

## 2019-12-18 DIAGNOSIS — R00.2 PALPITATIONS: ICD-10-CM

## 2019-12-18 DIAGNOSIS — E78.2 MIXED HYPERLIPIDEMIA: ICD-10-CM

## 2019-12-18 DIAGNOSIS — I10 ESSENTIAL HYPERTENSION: ICD-10-CM

## 2019-12-18 LAB
ALBUMIN SERPL-MCNC: 3.98 G/DL (ref 3.5–5.2)
ALBUMIN/GLOB SERPL: 1.2 G/DL
ALP SERPL-CCNC: 131 U/L (ref 39–117)
ALT SERPL W P-5'-P-CCNC: <5 U/L (ref 1–33)
ANION GAP SERPL CALCULATED.3IONS-SCNC: 11.4 MMOL/L (ref 5–15)
AST SERPL-CCNC: 11 U/L (ref 1–32)
BILIRUB SERPL-MCNC: 0.2 MG/DL (ref 0.2–1.2)
BUN BLD-MCNC: 8 MG/DL (ref 6–20)
BUN/CREAT SERPL: 11.3 (ref 7–25)
CALCIUM SPEC-SCNC: 9.2 MG/DL (ref 8.6–10.5)
CHLORIDE SERPL-SCNC: 103 MMOL/L (ref 98–107)
CO2 SERPL-SCNC: 28.6 MMOL/L (ref 22–29)
CREAT BLD-MCNC: 0.71 MG/DL (ref 0.57–1)
GFR SERPL CREATININE-BSD FRML MDRD: 86 ML/MIN/1.73
GLOBULIN UR ELPH-MCNC: 3.2 GM/DL
GLUCOSE BLD-MCNC: 115 MG/DL (ref 65–99)
POTASSIUM BLD-SCNC: 3.4 MMOL/L (ref 3.5–5.2)
PROT SERPL-MCNC: 7.2 G/DL (ref 6–8.5)
SODIUM BLD-SCNC: 143 MMOL/L (ref 136–145)

## 2019-12-18 PROCEDURE — 93458 L HRT ARTERY/VENTRICLE ANGIO: CPT | Performed by: INTERNAL MEDICINE

## 2019-12-18 PROCEDURE — 25010000002 MIDAZOLAM PER 1 MG: Performed by: INTERNAL MEDICINE

## 2019-12-18 PROCEDURE — 0 IOPAMIDOL PER 1 ML: Performed by: INTERNAL MEDICINE

## 2019-12-18 PROCEDURE — 94799 UNLISTED PULMONARY SVC/PX: CPT

## 2019-12-18 PROCEDURE — 80053 COMPREHEN METABOLIC PANEL: CPT | Performed by: INTERNAL MEDICINE

## 2019-12-18 PROCEDURE — 25010000002 HEPARIN (PORCINE) PER 1000 UNITS: Performed by: INTERNAL MEDICINE

## 2019-12-18 PROCEDURE — C1894 INTRO/SHEATH, NON-LASER: HCPCS | Performed by: INTERNAL MEDICINE

## 2019-12-18 PROCEDURE — C1769 GUIDE WIRE: HCPCS | Performed by: INTERNAL MEDICINE

## 2019-12-18 PROCEDURE — 25010000002 FENTANYL CITRATE (PF) 100 MCG/2ML SOLUTION: Performed by: INTERNAL MEDICINE

## 2019-12-18 RX ORDER — ATENOLOL 25 MG/1
25 TABLET ORAL DAILY
COMMUNITY
End: 2020-05-07 | Stop reason: SDUPTHER

## 2019-12-18 RX ORDER — SODIUM CHLORIDE 9 MG/ML
100 INJECTION, SOLUTION INTRAVENOUS CONTINUOUS
Status: DISCONTINUED | OUTPATIENT
Start: 2019-12-18 | End: 2019-12-18 | Stop reason: HOSPADM

## 2019-12-18 RX ORDER — SODIUM CHLORIDE 9 MG/ML
INJECTION, SOLUTION INTRAVENOUS CONTINUOUS PRN
Status: COMPLETED | OUTPATIENT
Start: 2019-12-18 | End: 2019-12-18

## 2019-12-18 RX ORDER — HEPARIN SODIUM 1000 [USP'U]/ML
INJECTION, SOLUTION INTRAVENOUS; SUBCUTANEOUS AS NEEDED
Status: DISCONTINUED | OUTPATIENT
Start: 2019-12-18 | End: 2019-12-18 | Stop reason: HOSPADM

## 2019-12-18 RX ORDER — FENTANYL CITRATE 50 UG/ML
INJECTION, SOLUTION INTRAMUSCULAR; INTRAVENOUS AS NEEDED
Status: DISCONTINUED | OUTPATIENT
Start: 2019-12-18 | End: 2019-12-18 | Stop reason: HOSPADM

## 2019-12-18 RX ORDER — MIDAZOLAM HYDROCHLORIDE 1 MG/ML
INJECTION INTRAMUSCULAR; INTRAVENOUS AS NEEDED
Status: DISCONTINUED | OUTPATIENT
Start: 2019-12-18 | End: 2019-12-18 | Stop reason: HOSPADM

## 2019-12-18 RX ORDER — LIDOCAINE HYDROCHLORIDE 20 MG/ML
INJECTION, SOLUTION INFILTRATION; PERINEURAL AS NEEDED
Status: DISCONTINUED | OUTPATIENT
Start: 2019-12-18 | End: 2019-12-18 | Stop reason: HOSPADM

## 2020-01-03 ENCOUNTER — TELEPHONE (OUTPATIENT)
Dept: CARDIOLOGY | Facility: CLINIC | Age: 55
End: 2020-01-03

## 2020-01-03 NOTE — TELEPHONE ENCOUNTER
----- Message from ZION Drake sent at 1/2/2020  4:49 PM EST -----  Please call the patient regarding her abnormal result.  Her LDL is too high.  She will need a referral to the lipid clinic for repatha every other week.      Thanks, massiel

## 2020-01-06 ENCOUNTER — MEDICATION THERAPY MANAGEMENT (OUTPATIENT)
Dept: PHARMACY | Facility: HOSPITAL | Age: 55
End: 2020-01-06

## 2020-01-06 NOTE — PROGRESS NOTES
Pharmacy Note  Medication:  Repatha  Dose: 140mg every 2 weeks  Referring Provider: Sharda Meyers  Start Date: TBD  Last Injection: N/A  PA status/Time Period: Through 5/7/2020, copay $4    Diagnosis Details:     Established CVD (with primary hyperlipidemia):  ACS +  Hx of MI +  Stable/unstable angina +  Coronary or other arterial revascularization +      Past Medical History:   Diagnosis Date   • Arthritis    • Back ache    • Coronary artery disease    • Hyperlipidemia    • Hypertension      Social History     Socioeconomic History   • Marital status:      Spouse name: Not on file   • Number of children: Not on file   • Years of education: Not on file   • Highest education level: Not on file   Tobacco Use   • Smoking status: Current Every Day Smoker     Packs/day: 0.50     Years: 25.00     Pack years: 12.50     Types: Cigarettes   • Smokeless tobacco: Never Used   Substance and Sexual Activity   • Alcohol use: No   • Drug use: No   • Sexual activity: Defer     Allergies   Allergen Reactions   • Bactrim [Sulfamethoxazole-Trimethoprim] Rash   • Ciprofloxacin      Current Outpatient Medications   Medication Sig Dispense Refill   • aspirin 81 MG EC tablet Take 81 mg by mouth daily.     • atenolol (TENORMIN) 25 MG tablet Take 25 mg by mouth Daily.     • fluticasone-salmeterol (ADVAIR) 500-50 MCG/DOSE DISKUS Inhale 2 (Two) Times a Day.     • HYDROcodone-acetaminophen (NORCO) 7.5-325 MG per tablet Take 1 tablet by mouth every 6 (six) hours as needed for moderate pain (4-6).     • isosorbide mononitrate (IMDUR) 60 MG 24 hr tablet Take 1 tablet by mouth Daily. 30 tablet 6   • nitroglycerin (NITROSTAT) 0.4 MG SL tablet 1 under the tongue as needed for angina, may repeat q5mins for up three doses 100 tablet 3   • pantoprazole (PROTONIX) 20 MG EC tablet Take 20 mg by mouth Daily.     • potassium chloride (K-DUR,KLOR-CON) 20 MEQ CR tablet Take 2 tablets by mouth Daily. 60 tablet 0   • prasugrel (EFFIENT) 10 MG tablet  Take 1 tablet by mouth Daily. 30 tablet 6   • pravastatin (PRAVACHOL) 20 MG tablet Take 1 tablet by mouth Daily. 30 tablet 6   • spironolactone (ALDACTONE) 25 MG tablet Take 1 tablet by mouth Daily. 30 tablet 6   • traMADol (ULTRAM) 50 MG tablet Take 50 mg by mouth every 6 (six) hours as needed for moderate pain (4-6).       No current facility-administered medications for this visit.        Labs  Lab Results   Component Value Date    CHOL 175 12/16/2019    CHLPL 215 (H) 04/11/2016    TRIG 122 12/16/2019    HDL 48 12/16/2019     (H) 12/16/2019       Assessment     Drug Dates Notes   Current Lipid-lowering Thearpy Pravastatin 20mg       Previous Lipid-lowering Therapy Lipitor [id number] (Per patient) Myalgias                           Pt was assessed today for PCSK9 inhibitor therapy.  The patient denies any allergies to the medication or latex and denies pregnancy, breastfeeding, or planning to become pregnant.      Plan    Will order Repatha 140mg every 2 weeks and begin the prior authorization, if applicable.  Will see patient in clinic once prior authorization is obtained for injection training and medication counseling and follow-up with patient as necessary.        Kaia Stein, [facility]  01/06/20  4:33 PM

## 2020-01-06 NOTE — TELEPHONE ENCOUNTER
Patient called back, I explained to her that per Shadra Mira, her LDL cholesterol was still too high and that Sharda would like to refer her to the lipid clinic and start her on Repatha. Patient states she is already taking medication for her cholesterol and was confused by this. I told her that the new medication was more specific to the LDL or bad cholesterol and worked in addition to the pravastatin. Patient was more willing to try Repatha after brief explanation of drug. She is aware and understands that someone from the lipid clinic will be reaching out to her once insurance approves the medication.

## 2020-01-07 PROCEDURE — 99284 EMERGENCY DEPT VISIT MOD MDM: CPT

## 2020-01-07 NOTE — PROGRESS NOTES
Left message with patient to call me back. Need to schedule injection training.     Kaia Stein, [facility]  01/07/20  1:04 PM

## 2020-01-08 ENCOUNTER — HOSPITAL ENCOUNTER (INPATIENT)
Facility: HOSPITAL | Age: 55
LOS: 5 days | Discharge: HOME HEALTH | End: 2020-01-13
Attending: FAMILY MEDICINE | Admitting: OBSTETRICS & GYNECOLOGY

## 2020-01-08 ENCOUNTER — APPOINTMENT (OUTPATIENT)
Dept: CT IMAGING | Facility: HOSPITAL | Age: 55
End: 2020-01-08

## 2020-01-08 DIAGNOSIS — N76.2 CELLULITIS OF LABIA: Primary | ICD-10-CM

## 2020-01-08 DIAGNOSIS — N76.4 LABIAL ABSCESS: ICD-10-CM

## 2020-01-08 LAB
ALBUMIN SERPL-MCNC: 4.04 G/DL (ref 3.5–5.2)
ALBUMIN/GLOB SERPL: 1.2 G/DL
ALP SERPL-CCNC: 124 U/L (ref 39–117)
ALT SERPL W P-5'-P-CCNC: 6 U/L (ref 1–33)
ANION GAP SERPL CALCULATED.3IONS-SCNC: 15.6 MMOL/L (ref 5–15)
AST SERPL-CCNC: 12 U/L (ref 1–32)
BACTERIA UR QL AUTO: ABNORMAL /HPF
BASOPHILS # BLD AUTO: 0.04 10*3/MM3 (ref 0–0.2)
BASOPHILS NFR BLD AUTO: 0.3 % (ref 0–1.5)
BILIRUB SERPL-MCNC: 0.3 MG/DL (ref 0.2–1.2)
BILIRUB UR QL STRIP: NEGATIVE
BUN BLD-MCNC: 10 MG/DL (ref 6–20)
BUN/CREAT SERPL: 16.1 (ref 7–25)
CALCIUM SPEC-SCNC: 9.1 MG/DL (ref 8.6–10.5)
CHLORIDE SERPL-SCNC: 101 MMOL/L (ref 98–107)
CLARITY UR: CLEAR
CO2 SERPL-SCNC: 24.4 MMOL/L (ref 22–29)
COLOR UR: YELLOW
CREAT BLD-MCNC: 0.62 MG/DL (ref 0.57–1)
CRP SERPL-MCNC: 6.95 MG/DL (ref 0–0.5)
D-LACTATE SERPL-SCNC: 1.1 MMOL/L (ref 0.5–2)
DEPRECATED RDW RBC AUTO: 49.3 FL (ref 37–54)
EOSINOPHIL # BLD AUTO: 0.16 10*3/MM3 (ref 0–0.4)
EOSINOPHIL NFR BLD AUTO: 1.3 % (ref 0.3–6.2)
ERYTHROCYTE [DISTWIDTH] IN BLOOD BY AUTOMATED COUNT: 14.6 % (ref 12.3–15.4)
ERYTHROCYTE [SEDIMENTATION RATE] IN BLOOD: 41 MM/HR (ref 0–30)
GFR SERPL CREATININE-BSD FRML MDRD: 101 ML/MIN/1.73
GLOBULIN UR ELPH-MCNC: 3.5 GM/DL
GLUCOSE BLD-MCNC: 97 MG/DL (ref 65–99)
GLUCOSE UR STRIP-MCNC: NEGATIVE MG/DL
HCT VFR BLD AUTO: 37.1 % (ref 34–46.6)
HGB BLD-MCNC: 12.2 G/DL (ref 12–15.9)
HGB UR QL STRIP.AUTO: ABNORMAL
HOLD SPECIMEN: NORMAL
HOLD SPECIMEN: NORMAL
HYALINE CASTS UR QL AUTO: ABNORMAL /LPF
IMM GRANULOCYTES # BLD AUTO: 0.04 10*3/MM3 (ref 0–0.05)
IMM GRANULOCYTES NFR BLD AUTO: 0.3 % (ref 0–0.5)
KETONES UR QL STRIP: NEGATIVE
LEUKOCYTE ESTERASE UR QL STRIP.AUTO: NEGATIVE
LYMPHOCYTES # BLD AUTO: 3.05 10*3/MM3 (ref 0.7–3.1)
LYMPHOCYTES NFR BLD AUTO: 24.7 % (ref 19.6–45.3)
MCH RBC QN AUTO: 29.7 PG (ref 26.6–33)
MCHC RBC AUTO-ENTMCNC: 32.9 G/DL (ref 31.5–35.7)
MCV RBC AUTO: 90.3 FL (ref 79–97)
MONOCYTES # BLD AUTO: 1.05 10*3/MM3 (ref 0.1–0.9)
MONOCYTES NFR BLD AUTO: 8.5 % (ref 5–12)
MRSA DNA SPEC QL NAA+PROBE: NEGATIVE
NEUTROPHILS # BLD AUTO: 8.02 10*3/MM3 (ref 1.7–7)
NEUTROPHILS NFR BLD AUTO: 64.9 % (ref 42.7–76)
NITRITE UR QL STRIP: NEGATIVE
NRBC BLD AUTO-RTO: 0 /100 WBC (ref 0–0.2)
PH UR STRIP.AUTO: 5.5 [PH] (ref 5–8)
PLATELET # BLD AUTO: 270 10*3/MM3 (ref 140–450)
PMV BLD AUTO: 9.7 FL (ref 6–12)
POTASSIUM BLD-SCNC: 3.1 MMOL/L (ref 3.5–5.2)
PROT SERPL-MCNC: 7.5 G/DL (ref 6–8.5)
PROT UR QL STRIP: NEGATIVE
RBC # BLD AUTO: 4.11 10*6/MM3 (ref 3.77–5.28)
RBC # UR: ABNORMAL /HPF
REF LAB TEST METHOD: ABNORMAL
S AUREUS DNA SPEC QL NAA+PROBE: NEGATIVE
SODIUM BLD-SCNC: 141 MMOL/L (ref 136–145)
SP GR UR STRIP: 1.01 (ref 1–1.03)
SQUAMOUS #/AREA URNS HPF: ABNORMAL /HPF
UROBILINOGEN UR QL STRIP: ABNORMAL
WBC NRBC COR # BLD: 12.36 10*3/MM3 (ref 3.4–10.8)
WBC UR QL AUTO: ABNORMAL /HPF
WHOLE BLOOD HOLD SPECIMEN: NORMAL
WHOLE BLOOD HOLD SPECIMEN: NORMAL

## 2020-01-08 PROCEDURE — 87040 BLOOD CULTURE FOR BACTERIA: CPT | Performed by: PHYSICIAN ASSISTANT

## 2020-01-08 PROCEDURE — 87186 SC STD MICRODIL/AGAR DIL: CPT | Performed by: OBSTETRICS & GYNECOLOGY

## 2020-01-08 PROCEDURE — 80053 COMPREHEN METABOLIC PANEL: CPT | Performed by: PHYSICIAN ASSISTANT

## 2020-01-08 PROCEDURE — 87641 MR-STAPH DNA AMP PROBE: CPT | Performed by: NURSE PRACTITIONER

## 2020-01-08 PROCEDURE — 83605 ASSAY OF LACTIC ACID: CPT | Performed by: PHYSICIAN ASSISTANT

## 2020-01-08 PROCEDURE — 25010000002 PIPERACILLIN-TAZOBACTAM: Performed by: NURSE PRACTITIONER

## 2020-01-08 PROCEDURE — 25010000002 ONDANSETRON PER 1 MG: Performed by: PHYSICIAN ASSISTANT

## 2020-01-08 PROCEDURE — 87205 SMEAR GRAM STAIN: CPT | Performed by: OBSTETRICS & GYNECOLOGY

## 2020-01-08 PROCEDURE — 85652 RBC SED RATE AUTOMATED: CPT | Performed by: PHYSICIAN ASSISTANT

## 2020-01-08 PROCEDURE — 81001 URINALYSIS AUTO W/SCOPE: CPT | Performed by: PHYSICIAN ASSISTANT

## 2020-01-08 PROCEDURE — 36415 COLL VENOUS BLD VENIPUNCTURE: CPT

## 2020-01-08 PROCEDURE — 0 IOVERSOL 68 % SOLUTION: Performed by: FAMILY MEDICINE

## 2020-01-08 PROCEDURE — 25010000002 MORPHINE PER 10 MG: Performed by: OBSTETRICS & GYNECOLOGY

## 2020-01-08 PROCEDURE — 85025 COMPLETE CBC W/AUTO DIFF WBC: CPT | Performed by: PHYSICIAN ASSISTANT

## 2020-01-08 PROCEDURE — 86140 C-REACTIVE PROTEIN: CPT | Performed by: PHYSICIAN ASSISTANT

## 2020-01-08 PROCEDURE — 87147 CULTURE TYPE IMMUNOLOGIC: CPT | Performed by: OBSTETRICS & GYNECOLOGY

## 2020-01-08 PROCEDURE — 74177 CT ABD & PELVIS W/CONTRAST: CPT

## 2020-01-08 PROCEDURE — 87640 STAPH A DNA AMP PROBE: CPT | Performed by: NURSE PRACTITIONER

## 2020-01-08 PROCEDURE — 25010000002 VANCOMYCIN 5 G RECONSTITUTED SOLUTION 5,000 MG VIAL: Performed by: PHYSICIAN ASSISTANT

## 2020-01-08 PROCEDURE — 87070 CULTURE OTHR SPECIMN AEROBIC: CPT | Performed by: OBSTETRICS & GYNECOLOGY

## 2020-01-08 PROCEDURE — 25010000002 MORPHINE PER 10 MG: Performed by: FAMILY MEDICINE

## 2020-01-08 RX ORDER — MORPHINE SULFATE 2 MG/ML
2 INJECTION, SOLUTION INTRAMUSCULAR; INTRAVENOUS ONCE
Status: COMPLETED | OUTPATIENT
Start: 2020-01-08 | End: 2020-01-08

## 2020-01-08 RX ORDER — HYDROCODONE BITARTRATE AND ACETAMINOPHEN 7.5; 325 MG/1; MG/1
1 TABLET ORAL EVERY 6 HOURS PRN
Status: CANCELLED | OUTPATIENT
Start: 2020-01-08

## 2020-01-08 RX ORDER — PRASUGREL 10 MG/1
10 TABLET, FILM COATED ORAL DAILY
Status: CANCELLED | OUTPATIENT
Start: 2020-01-08

## 2020-01-08 RX ORDER — BUDESONIDE AND FORMOTEROL FUMARATE DIHYDRATE 160; 4.5 UG/1; UG/1
2 AEROSOL RESPIRATORY (INHALATION)
Status: CANCELLED | OUTPATIENT
Start: 2020-01-08

## 2020-01-08 RX ORDER — ASPIRIN 81 MG/1
81 TABLET ORAL DAILY
Status: DISCONTINUED | OUTPATIENT
Start: 2020-01-08 | End: 2020-01-13 | Stop reason: HOSPADM

## 2020-01-08 RX ORDER — TRAMADOL HYDROCHLORIDE 50 MG/1
50 TABLET ORAL EVERY 6 HOURS PRN
Status: DISCONTINUED | OUTPATIENT
Start: 2020-01-08 | End: 2020-01-13 | Stop reason: HOSPADM

## 2020-01-08 RX ORDER — SODIUM CHLORIDE 0.9 % (FLUSH) 0.9 %
10 SYRINGE (ML) INJECTION AS NEEDED
Status: DISCONTINUED | OUTPATIENT
Start: 2020-01-08 | End: 2020-01-13 | Stop reason: HOSPADM

## 2020-01-08 RX ORDER — BUDESONIDE AND FORMOTEROL FUMARATE DIHYDRATE 160; 4.5 UG/1; UG/1
2 AEROSOL RESPIRATORY (INHALATION)
Status: DISCONTINUED | OUTPATIENT
Start: 2020-01-08 | End: 2020-01-08

## 2020-01-08 RX ORDER — NITROGLYCERIN 0.4 MG/1
0.4 TABLET SUBLINGUAL
Status: CANCELLED | OUTPATIENT
Start: 2020-01-08

## 2020-01-08 RX ORDER — SPIRONOLACTONE 25 MG/1
25 TABLET ORAL DAILY
Status: DISCONTINUED | OUTPATIENT
Start: 2020-01-08 | End: 2020-01-13 | Stop reason: HOSPADM

## 2020-01-08 RX ORDER — PRAVASTATIN SODIUM 40 MG
20 TABLET ORAL DAILY
Status: CANCELLED | OUTPATIENT
Start: 2020-01-08

## 2020-01-08 RX ORDER — ONDANSETRON 2 MG/ML
4 INJECTION INTRAMUSCULAR; INTRAVENOUS EVERY 6 HOURS PRN
Status: DISCONTINUED | OUTPATIENT
Start: 2020-01-08 | End: 2020-01-13 | Stop reason: HOSPADM

## 2020-01-08 RX ORDER — NITROGLYCERIN 0.4 MG/1
0.4 TABLET SUBLINGUAL
Status: DISCONTINUED | OUTPATIENT
Start: 2020-01-08 | End: 2020-01-13 | Stop reason: HOSPADM

## 2020-01-08 RX ORDER — PRASUGREL 10 MG/1
10 TABLET, FILM COATED ORAL DAILY
Status: DISCONTINUED | OUTPATIENT
Start: 2020-01-08 | End: 2020-01-13 | Stop reason: HOSPADM

## 2020-01-08 RX ORDER — ATENOLOL 25 MG/1
25 TABLET ORAL DAILY
Status: DISCONTINUED | OUTPATIENT
Start: 2020-01-08 | End: 2020-01-13 | Stop reason: HOSPADM

## 2020-01-08 RX ORDER — ONDANSETRON 2 MG/ML
4 INJECTION INTRAMUSCULAR; INTRAVENOUS ONCE
Status: COMPLETED | OUTPATIENT
Start: 2020-01-08 | End: 2020-01-08

## 2020-01-08 RX ORDER — ISOSORBIDE MONONITRATE 60 MG/1
60 TABLET, EXTENDED RELEASE ORAL DAILY
Status: DISCONTINUED | OUTPATIENT
Start: 2020-01-08 | End: 2020-01-13 | Stop reason: HOSPADM

## 2020-01-08 RX ORDER — ASPIRIN 81 MG/1
81 TABLET ORAL DAILY
Status: CANCELLED | OUTPATIENT
Start: 2020-01-08

## 2020-01-08 RX ORDER — MORPHINE SULFATE 2 MG/ML
1 INJECTION, SOLUTION INTRAMUSCULAR; INTRAVENOUS
Status: DISCONTINUED | OUTPATIENT
Start: 2020-01-08 | End: 2020-01-13 | Stop reason: HOSPADM

## 2020-01-08 RX ORDER — HYDROCODONE BITARTRATE AND ACETAMINOPHEN 7.5; 325 MG/1; MG/1
1 TABLET ORAL EVERY 6 HOURS PRN
Status: DISCONTINUED | OUTPATIENT
Start: 2020-01-08 | End: 2020-01-13 | Stop reason: HOSPADM

## 2020-01-08 RX ORDER — PRASUGREL 10 MG/1
10 TABLET, FILM COATED ORAL DAILY
Status: DISCONTINUED | OUTPATIENT
Start: 2020-01-08 | End: 2020-01-08

## 2020-01-08 RX ORDER — PANTOPRAZOLE SODIUM 20 MG/1
20 TABLET, DELAYED RELEASE ORAL DAILY
Status: CANCELLED | OUTPATIENT
Start: 2020-01-08

## 2020-01-08 RX ORDER — ISOSORBIDE MONONITRATE 60 MG/1
60 TABLET, EXTENDED RELEASE ORAL DAILY
Status: CANCELLED | OUTPATIENT
Start: 2020-01-08

## 2020-01-08 RX ORDER — SPIRONOLACTONE 25 MG/1
25 TABLET ORAL DAILY
Status: CANCELLED | OUTPATIENT
Start: 2020-01-08

## 2020-01-08 RX ORDER — PRAVASTATIN SODIUM 40 MG
20 TABLET ORAL NIGHTLY
Status: DISCONTINUED | OUTPATIENT
Start: 2020-01-08 | End: 2020-01-13 | Stop reason: HOSPADM

## 2020-01-08 RX ORDER — SODIUM CHLORIDE, SODIUM LACTATE, POTASSIUM CHLORIDE, CALCIUM CHLORIDE 600; 310; 30; 20 MG/100ML; MG/100ML; MG/100ML; MG/100ML
125 INJECTION, SOLUTION INTRAVENOUS CONTINUOUS
Status: DISCONTINUED | OUTPATIENT
Start: 2020-01-08 | End: 2020-01-10

## 2020-01-08 RX ORDER — PANTOPRAZOLE SODIUM 40 MG/1
40 TABLET, DELAYED RELEASE ORAL DAILY
Status: DISCONTINUED | OUTPATIENT
Start: 2020-01-08 | End: 2020-01-13 | Stop reason: HOSPADM

## 2020-01-08 RX ORDER — ATENOLOL 25 MG/1
25 TABLET ORAL DAILY
Status: CANCELLED | OUTPATIENT
Start: 2020-01-08

## 2020-01-08 RX ORDER — TRAMADOL HYDROCHLORIDE 50 MG/1
50 TABLET ORAL EVERY 6 HOURS PRN
Status: CANCELLED | OUTPATIENT
Start: 2020-01-08

## 2020-01-08 RX ADMIN — ASPIRIN 81 MG: 81 TABLET, COATED ORAL at 18:07

## 2020-01-08 RX ADMIN — MORPHINE SULFATE 1 MG: 2 INJECTION, SOLUTION INTRAMUSCULAR; INTRAVENOUS at 04:26

## 2020-01-08 RX ADMIN — MORPHINE SULFATE 1 MG: 2 INJECTION, SOLUTION INTRAMUSCULAR; INTRAVENOUS at 22:14

## 2020-01-08 RX ADMIN — ISOSORBIDE MONONITRATE 60 MG: 60 TABLET, EXTENDED RELEASE ORAL at 18:07

## 2020-01-08 RX ADMIN — PIPERACILLIN SODIUM,TAZOBACTAM SODIUM 3.38 G: 3; .375 INJECTION, POWDER, FOR SOLUTION INTRAVENOUS at 18:07

## 2020-01-08 RX ADMIN — ATENOLOL 25 MG: 25 TABLET ORAL at 18:07

## 2020-01-08 RX ADMIN — MORPHINE SULFATE 1 MG: 2 INJECTION, SOLUTION INTRAMUSCULAR; INTRAVENOUS at 15:10

## 2020-01-08 RX ADMIN — PIPERACILLIN SODIUM,TAZOBACTAM SODIUM 3.38 G: 3; .375 INJECTION, POWDER, FOR SOLUTION INTRAVENOUS at 12:17

## 2020-01-08 RX ADMIN — PANTOPRAZOLE SODIUM 40 MG: 40 TABLET, DELAYED RELEASE ORAL at 18:07

## 2020-01-08 RX ADMIN — SODIUM CHLORIDE, POTASSIUM CHLORIDE, SODIUM LACTATE AND CALCIUM CHLORIDE 125 ML/HR: 600; 310; 30; 20 INJECTION, SOLUTION INTRAVENOUS at 04:19

## 2020-01-08 RX ADMIN — PRASUGREL 10 MG: 10 TABLET, FILM COATED ORAL at 18:07

## 2020-01-08 RX ADMIN — MORPHINE SULFATE 2 MG: 2 INJECTION, SOLUTION INTRAMUSCULAR; INTRAVENOUS at 03:44

## 2020-01-08 RX ADMIN — IOVERSOL 95 ML: 678 INJECTION INTRA-ARTERIAL; INTRAVENOUS at 03:07

## 2020-01-08 RX ADMIN — PRAVASTATIN SODIUM 20 MG: 40 TABLET ORAL at 20:41

## 2020-01-08 RX ADMIN — MORPHINE SULFATE 1 MG: 2 INJECTION, SOLUTION INTRAMUSCULAR; INTRAVENOUS at 08:01

## 2020-01-08 RX ADMIN — SODIUM CHLORIDE 1000 ML: 9 INJECTION, SOLUTION INTRAVENOUS at 03:25

## 2020-01-08 RX ADMIN — VANCOMYCIN HYDROCHLORIDE 1000 MG: 5 INJECTION, POWDER, LYOPHILIZED, FOR SOLUTION INTRAVENOUS at 03:26

## 2020-01-08 RX ADMIN — MORPHINE SULFATE 2 MG: 2 INJECTION, SOLUTION INTRAMUSCULAR; INTRAVENOUS at 02:15

## 2020-01-08 RX ADMIN — ONDANSETRON 4 MG: 2 INJECTION INTRAMUSCULAR; INTRAVENOUS at 02:15

## 2020-01-08 RX ADMIN — SPIRONOLACTONE 25 MG: 25 TABLET ORAL at 18:07

## 2020-01-09 ENCOUNTER — ANESTHESIA (OUTPATIENT)
Dept: PERIOP | Facility: HOSPITAL | Age: 55
End: 2020-01-09

## 2020-01-09 ENCOUNTER — ANESTHESIA EVENT (OUTPATIENT)
Dept: PERIOP | Facility: HOSPITAL | Age: 55
End: 2020-01-09

## 2020-01-09 LAB
B-HCG UR QL: NEGATIVE
BASOPHILS # BLD AUTO: 0.04 10*3/MM3 (ref 0–0.2)
BASOPHILS NFR BLD AUTO: 0.3 % (ref 0–1.5)
CRP SERPL-MCNC: 7.05 MG/DL (ref 0–0.5)
DEPRECATED RDW RBC AUTO: 51.7 FL (ref 37–54)
EOSINOPHIL # BLD AUTO: 0.24 10*3/MM3 (ref 0–0.4)
EOSINOPHIL NFR BLD AUTO: 2 % (ref 0.3–6.2)
ERYTHROCYTE [DISTWIDTH] IN BLOOD BY AUTOMATED COUNT: 14.7 % (ref 12.3–15.4)
HCT VFR BLD AUTO: 33.9 % (ref 34–46.6)
HGB BLD-MCNC: 10.6 G/DL (ref 12–15.9)
IMM GRANULOCYTES # BLD AUTO: 0.03 10*3/MM3 (ref 0–0.05)
IMM GRANULOCYTES NFR BLD AUTO: 0.2 % (ref 0–0.5)
LYMPHOCYTES # BLD AUTO: 3.5 10*3/MM3 (ref 0.7–3.1)
LYMPHOCYTES NFR BLD AUTO: 29 % (ref 19.6–45.3)
MCH RBC QN AUTO: 29.5 PG (ref 26.6–33)
MCHC RBC AUTO-ENTMCNC: 31.3 G/DL (ref 31.5–35.7)
MCV RBC AUTO: 94.4 FL (ref 79–97)
MONOCYTES # BLD AUTO: 1.1 10*3/MM3 (ref 0.1–0.9)
MONOCYTES NFR BLD AUTO: 9.1 % (ref 5–12)
NEUTROPHILS # BLD AUTO: 7.14 10*3/MM3 (ref 1.7–7)
NEUTROPHILS NFR BLD AUTO: 59.4 % (ref 42.7–76)
NRBC BLD AUTO-RTO: 0 /100 WBC (ref 0–0.2)
PLATELET # BLD AUTO: 255 10*3/MM3 (ref 140–450)
PMV BLD AUTO: 10.2 FL (ref 6–12)
RBC # BLD AUTO: 3.59 10*6/MM3 (ref 3.77–5.28)
WBC NRBC COR # BLD: 12.05 10*3/MM3 (ref 3.4–10.8)

## 2020-01-09 PROCEDURE — 25010000002 PIPERACILLIN-TAZOBACTAM: Performed by: NURSE PRACTITIONER

## 2020-01-09 PROCEDURE — 25010000002 MORPHINE PER 10 MG: Performed by: OBSTETRICS & GYNECOLOGY

## 2020-01-09 PROCEDURE — 86140 C-REACTIVE PROTEIN: CPT | Performed by: NURSE PRACTITIONER

## 2020-01-09 PROCEDURE — 81025 URINE PREGNANCY TEST: CPT | Performed by: OBSTETRICS & GYNECOLOGY

## 2020-01-09 PROCEDURE — 25010000002 VANCOMYCIN 5 G RECONSTITUTED SOLUTION 5,000 MG VIAL: Performed by: INTERNAL MEDICINE

## 2020-01-09 PROCEDURE — 0U9M0ZX DRAINAGE OF VULVA, OPEN APPROACH, DIAGNOSTIC: ICD-10-PCS | Performed by: OBSTETRICS & GYNECOLOGY

## 2020-01-09 PROCEDURE — 25010000002 FENTANYL CITRATE (PF) 100 MCG/2ML SOLUTION: Performed by: NURSE ANESTHETIST, CERTIFIED REGISTERED

## 2020-01-09 PROCEDURE — 25010000002 MIDAZOLAM PER 1 MG: Performed by: NURSE ANESTHETIST, CERTIFIED REGISTERED

## 2020-01-09 PROCEDURE — 85025 COMPLETE CBC W/AUTO DIFF WBC: CPT | Performed by: NURSE PRACTITIONER

## 2020-01-09 PROCEDURE — 25010000002 ONDANSETRON PER 1 MG: Performed by: NURSE ANESTHETIST, CERTIFIED REGISTERED

## 2020-01-09 PROCEDURE — 25010000002 KETOROLAC TROMETHAMINE PER 15 MG: Performed by: OBSTETRICS & GYNECOLOGY

## 2020-01-09 PROCEDURE — 25010000002 HYDROMORPHONE PER 4 MG: Performed by: NURSE ANESTHETIST, CERTIFIED REGISTERED

## 2020-01-09 PROCEDURE — 25010000002 PROPOFOL 10 MG/ML EMULSION: Performed by: NURSE ANESTHETIST, CERTIFIED REGISTERED

## 2020-01-09 PROCEDURE — 25010000002 PIPERACILLIN-TAZOBACTAM: Performed by: INTERNAL MEDICINE

## 2020-01-09 RX ORDER — FENTANYL CITRATE 50 UG/ML
50 INJECTION, SOLUTION INTRAMUSCULAR; INTRAVENOUS
Status: DISCONTINUED | OUTPATIENT
Start: 2020-01-09 | End: 2020-01-09 | Stop reason: HOSPADM

## 2020-01-09 RX ORDER — ONDANSETRON 4 MG/1
4 TABLET, FILM COATED ORAL EVERY 6 HOURS PRN
Status: DISCONTINUED | OUTPATIENT
Start: 2020-01-09 | End: 2020-01-13 | Stop reason: HOSPADM

## 2020-01-09 RX ORDER — PROPOFOL 10 MG/ML
VIAL (ML) INTRAVENOUS AS NEEDED
Status: DISCONTINUED | OUTPATIENT
Start: 2020-01-09 | End: 2020-01-09 | Stop reason: SURG

## 2020-01-09 RX ORDER — ONDANSETRON 2 MG/ML
4 INJECTION INTRAMUSCULAR; INTRAVENOUS AS NEEDED
Status: DISCONTINUED | OUTPATIENT
Start: 2020-01-09 | End: 2020-01-09 | Stop reason: HOSPADM

## 2020-01-09 RX ORDER — ONDANSETRON 2 MG/ML
4 INJECTION INTRAMUSCULAR; INTRAVENOUS EVERY 6 HOURS PRN
Status: DISCONTINUED | OUTPATIENT
Start: 2020-01-09 | End: 2020-01-13 | Stop reason: HOSPADM

## 2020-01-09 RX ORDER — MIDAZOLAM HYDROCHLORIDE 1 MG/ML
INJECTION INTRAMUSCULAR; INTRAVENOUS AS NEEDED
Status: DISCONTINUED | OUTPATIENT
Start: 2020-01-09 | End: 2020-01-09 | Stop reason: SURG

## 2020-01-09 RX ORDER — SODIUM CHLORIDE 0.9 % (FLUSH) 0.9 %
10 SYRINGE (ML) INJECTION EVERY 12 HOURS SCHEDULED
Status: DISCONTINUED | OUTPATIENT
Start: 2020-01-09 | End: 2020-01-09 | Stop reason: HOSPADM

## 2020-01-09 RX ORDER — FAMOTIDINE 10 MG/ML
INJECTION, SOLUTION INTRAVENOUS AS NEEDED
Status: DISCONTINUED | OUTPATIENT
Start: 2020-01-09 | End: 2020-01-09 | Stop reason: SURG

## 2020-01-09 RX ORDER — SODIUM CHLORIDE 0.9 % (FLUSH) 0.9 %
10 SYRINGE (ML) INJECTION AS NEEDED
Status: DISCONTINUED | OUTPATIENT
Start: 2020-01-09 | End: 2020-01-09 | Stop reason: HOSPADM

## 2020-01-09 RX ORDER — LIDOCAINE HYDROCHLORIDE 20 MG/ML
INJECTION, SOLUTION INFILTRATION; PERINEURAL AS NEEDED
Status: DISCONTINUED | OUTPATIENT
Start: 2020-01-09 | End: 2020-01-09 | Stop reason: SURG

## 2020-01-09 RX ORDER — KETOROLAC TROMETHAMINE 30 MG/ML
30 INJECTION, SOLUTION INTRAMUSCULAR; INTRAVENOUS EVERY 6 HOURS PRN
Status: DISPENSED | OUTPATIENT
Start: 2020-01-09 | End: 2020-01-10

## 2020-01-09 RX ORDER — FENTANYL CITRATE 50 UG/ML
INJECTION, SOLUTION INTRAMUSCULAR; INTRAVENOUS AS NEEDED
Status: DISCONTINUED | OUTPATIENT
Start: 2020-01-09 | End: 2020-01-09 | Stop reason: SURG

## 2020-01-09 RX ORDER — OXYCODONE HYDROCHLORIDE AND ACETAMINOPHEN 5; 325 MG/1; MG/1
1 TABLET ORAL ONCE AS NEEDED
Status: DISCONTINUED | OUTPATIENT
Start: 2020-01-09 | End: 2020-01-09 | Stop reason: HOSPADM

## 2020-01-09 RX ORDER — MEPERIDINE HYDROCHLORIDE 25 MG/ML
12.5 INJECTION INTRAMUSCULAR; INTRAVENOUS; SUBCUTANEOUS
Status: DISCONTINUED | OUTPATIENT
Start: 2020-01-09 | End: 2020-01-09 | Stop reason: HOSPADM

## 2020-01-09 RX ORDER — ONDANSETRON 2 MG/ML
INJECTION INTRAMUSCULAR; INTRAVENOUS AS NEEDED
Status: DISCONTINUED | OUTPATIENT
Start: 2020-01-09 | End: 2020-01-09 | Stop reason: SURG

## 2020-01-09 RX ORDER — HYDROMORPHONE HCL 110MG/55ML
PATIENT CONTROLLED ANALGESIA SYRINGE INTRAVENOUS AS NEEDED
Status: DISCONTINUED | OUTPATIENT
Start: 2020-01-09 | End: 2020-01-09 | Stop reason: SURG

## 2020-01-09 RX ORDER — HYDROCODONE BITARTRATE AND ACETAMINOPHEN 5; 325 MG/1; MG/1
1 TABLET ORAL EVERY 4 HOURS PRN
Status: DISCONTINUED | OUTPATIENT
Start: 2020-01-09 | End: 2020-01-13 | Stop reason: HOSPADM

## 2020-01-09 RX ORDER — SODIUM CHLORIDE, SODIUM LACTATE, POTASSIUM CHLORIDE, CALCIUM CHLORIDE 600; 310; 30; 20 MG/100ML; MG/100ML; MG/100ML; MG/100ML
125 INJECTION, SOLUTION INTRAVENOUS CONTINUOUS
Status: DISCONTINUED | OUTPATIENT
Start: 2020-01-09 | End: 2020-01-10

## 2020-01-09 RX ORDER — MAGNESIUM HYDROXIDE 1200 MG/15ML
LIQUID ORAL AS NEEDED
Status: DISCONTINUED | OUTPATIENT
Start: 2020-01-09 | End: 2020-01-09 | Stop reason: HOSPADM

## 2020-01-09 RX ORDER — IPRATROPIUM BROMIDE AND ALBUTEROL SULFATE 2.5; .5 MG/3ML; MG/3ML
3 SOLUTION RESPIRATORY (INHALATION) ONCE AS NEEDED
Status: DISCONTINUED | OUTPATIENT
Start: 2020-01-09 | End: 2020-01-09 | Stop reason: HOSPADM

## 2020-01-09 RX ORDER — SODIUM CHLORIDE 0.9 % (FLUSH) 0.9 %
3 SYRINGE (ML) INJECTION EVERY 12 HOURS SCHEDULED
Status: DISCONTINUED | OUTPATIENT
Start: 2020-01-09 | End: 2020-01-09 | Stop reason: HOSPADM

## 2020-01-09 RX ADMIN — FAMOTIDINE 20 MG: 10 INJECTION INTRAVENOUS at 15:08

## 2020-01-09 RX ADMIN — MORPHINE SULFATE 1 MG: 2 INJECTION, SOLUTION INTRAMUSCULAR; INTRAVENOUS at 16:27

## 2020-01-09 RX ADMIN — PANTOPRAZOLE SODIUM 40 MG: 40 TABLET, DELAYED RELEASE ORAL at 09:45

## 2020-01-09 RX ADMIN — PIPERACILLIN SODIUM,TAZOBACTAM SODIUM 3.38 G: 3; .375 INJECTION, POWDER, FOR SOLUTION INTRAVENOUS at 19:13

## 2020-01-09 RX ADMIN — LIDOCAINE HYDROCHLORIDE 40 MG: 20 INJECTION, SOLUTION INFILTRATION; PERINEURAL at 15:08

## 2020-01-09 RX ADMIN — PIPERACILLIN SODIUM,TAZOBACTAM SODIUM 3.38 G: 3; .375 INJECTION, POWDER, FOR SOLUTION INTRAVENOUS at 09:45

## 2020-01-09 RX ADMIN — FENTANYL CITRATE 100 MCG: 50 INJECTION INTRAMUSCULAR; INTRAVENOUS at 15:08

## 2020-01-09 RX ADMIN — PRAVASTATIN SODIUM 20 MG: 40 TABLET ORAL at 21:52

## 2020-01-09 RX ADMIN — MORPHINE SULFATE 1 MG: 2 INJECTION, SOLUTION INTRAMUSCULAR; INTRAVENOUS at 02:15

## 2020-01-09 RX ADMIN — SPIRONOLACTONE 25 MG: 25 TABLET ORAL at 09:45

## 2020-01-09 RX ADMIN — MIDAZOLAM HYDROCHLORIDE 2 MG: 1 INJECTION, SOLUTION INTRAMUSCULAR; INTRAVENOUS at 15:08

## 2020-01-09 RX ADMIN — MORPHINE SULFATE 1 MG: 2 INJECTION, SOLUTION INTRAMUSCULAR; INTRAVENOUS at 19:14

## 2020-01-09 RX ADMIN — ONDANSETRON 4 MG: 2 INJECTION INTRAMUSCULAR; INTRAVENOUS at 15:12

## 2020-01-09 RX ADMIN — MORPHINE SULFATE 1 MG: 2 INJECTION, SOLUTION INTRAMUSCULAR; INTRAVENOUS at 21:53

## 2020-01-09 RX ADMIN — MORPHINE SULFATE 1 MG: 2 INJECTION, SOLUTION INTRAMUSCULAR; INTRAVENOUS at 09:55

## 2020-01-09 RX ADMIN — KETOROLAC TROMETHAMINE 30 MG: 30 INJECTION, SOLUTION INTRAMUSCULAR at 18:57

## 2020-01-09 RX ADMIN — SODIUM CHLORIDE, POTASSIUM CHLORIDE, SODIUM LACTATE AND CALCIUM CHLORIDE 125 ML/HR: 600; 310; 30; 20 INJECTION, SOLUTION INTRAVENOUS at 14:49

## 2020-01-09 RX ADMIN — MORPHINE SULFATE 1 MG: 2 INJECTION, SOLUTION INTRAMUSCULAR; INTRAVENOUS at 12:37

## 2020-01-09 RX ADMIN — PROPOFOL 150 MG: 10 INJECTION, EMULSION INTRAVENOUS at 15:12

## 2020-01-09 RX ADMIN — HYDROMORPHONE HYDROCHLORIDE 1 MG: 2 INJECTION, SOLUTION INTRAMUSCULAR; INTRAVENOUS; SUBCUTANEOUS at 15:24

## 2020-01-09 RX ADMIN — ASPIRIN 81 MG: 81 TABLET, COATED ORAL at 09:45

## 2020-01-09 RX ADMIN — PRASUGREL 10 MG: 10 TABLET, FILM COATED ORAL at 09:45

## 2020-01-09 RX ADMIN — ATENOLOL 25 MG: 25 TABLET ORAL at 09:45

## 2020-01-09 RX ADMIN — ISOSORBIDE MONONITRATE 60 MG: 60 TABLET, EXTENDED RELEASE ORAL at 09:45

## 2020-01-09 RX ADMIN — PIPERACILLIN SODIUM,TAZOBACTAM SODIUM 3.38 G: 3; .375 INJECTION, POWDER, FOR SOLUTION INTRAVENOUS at 02:15

## 2020-01-09 RX ADMIN — HYDROMORPHONE HYDROCHLORIDE 1 MG: 2 INJECTION, SOLUTION INTRAMUSCULAR; INTRAVENOUS; SUBCUTANEOUS at 15:28

## 2020-01-09 RX ADMIN — VANCOMYCIN HYDROCHLORIDE 750 MG: 5 INJECTION, POWDER, LYOPHILIZED, FOR SOLUTION INTRAVENOUS at 16:35

## 2020-01-09 NOTE — ANESTHESIA PROCEDURE NOTES
Airway  Urgency: elective    Date/Time: 1/9/2020 3:12 PM  Airway not difficult    General Information and Staff    Patient location during procedure: OR  Anesthesiologist: Raphael Lozoya MD  CRNA: Mirella Madrigal CRNA    Indications and Patient Condition  Indications for airway management: airway protection    Preoxygenated: yes  Mask difficulty assessment: 0 - not attempted    Final Airway Details  Final airway type: supraglottic airway      Successful airway: classic  Size 4    Number of attempts at approach: 1  Assessment: lips, teeth, and gum same as pre-op    Additional Comments  LMA placed with no trauma noted. Patient tolerated well. Good seal. Secured.

## 2020-01-09 NOTE — ANESTHESIA POSTPROCEDURE EVALUATION
Patient: Veda Enamorado    Procedure Summary     Date:  01/09/20 Room / Location:  [facility] OR 02 /  [facility] OR    Anesthesia Start:  1503 Anesthesia Stop:  1538    Procedure:  I&D ABSCESS (Right Perineum) Diagnosis:       Cellulitis of labia      Labial abscess      (Cellulitis of labia [N76.2])      (Labial abscess [N76.4])    Surgeon:  Leander Cardenas III, MD Provider:  Raphael Lozoya MD    Anesthesia Type:  general ASA Status:  3          Anesthesia Type: general    Vitals  Vitals Value Taken Time   /82 1/9/2020  4:07 PM   Temp 97.2 °F (36.2 °C) 1/9/2020  4:07 PM   Pulse 55 1/9/2020  4:07 PM   Resp 18 1/9/2020  4:07 PM   SpO2 100 % 1/9/2020  4:07 PM           Post Anesthesia Care and Evaluation    Patient location during evaluation: PACU  Patient participation: complete - patient participated  Level of consciousness: awake and alert  Pain score: 1  Pain management: adequate  Airway patency: patent  Anesthetic complications: No anesthetic complications  PONV Status: controlled  Cardiovascular status: acceptable  Respiratory status: acceptable  Hydration status: acceptable

## 2020-01-09 NOTE — ANESTHESIA PREPROCEDURE EVALUATION
Anesthesia Evaluation     Patient summary reviewed and Nursing notes reviewed   no history of anesthetic complications:  NPO Solid Status: > 8 hours  NPO Liquid Status: > 8 hours           Airway   Mallampati: II  TM distance: >3 FB  Neck ROM: full  no difficulty expected  Dental - normal exam   (+) upper dentures and poor dentition    Pulmonary - normal exam   (+) a smoker Current Smoked day of surgery,   (-) asthma  Cardiovascular - normal exam  Exercise tolerance: good (4-7 METS)    NYHA Classification: II  PT is on anticoagulation therapy  Patient on routine beta blocker and Beta blocker given within 24 hours of surgery    (+) hypertension, CAD, dysrhythmias, angina, hyperlipidemia,   (-) past MI, CHF      Neuro/Psych- negative ROS  (-) seizures, CVA  GI/Hepatic/Renal/Endo    (+)  GERD,    (-) liver disease, no renal disease, diabetes, no thyroid disorder    Musculoskeletal     (+) back pain,   Abdominal  - normal exam    Bowel sounds: normal.   Substance History - negative use     OB/GYN negative ob/gyn ROS         Other   arthritis,                      Anesthesia Plan    ASA 3     general     intravenous induction     Anesthetic plan, all risks, benefits, and alternatives have been provided, discussed and informed consent has been obtained with: patient.  Use of blood products discussed with patient  Consented to blood products.

## 2020-01-10 LAB — CRP SERPL-MCNC: 3.43 MG/DL (ref 0–0.5)

## 2020-01-10 PROCEDURE — 25010000002 PIPERACILLIN-TAZOBACTAM: Performed by: OBSTETRICS & GYNECOLOGY

## 2020-01-10 PROCEDURE — 86140 C-REACTIVE PROTEIN: CPT | Performed by: OBSTETRICS & GYNECOLOGY

## 2020-01-10 PROCEDURE — 25010000002 VANCOMYCIN 5 G RECONSTITUTED SOLUTION 5,000 MG VIAL: Performed by: OBSTETRICS & GYNECOLOGY

## 2020-01-10 PROCEDURE — 25010000002 MORPHINE PER 10 MG: Performed by: OBSTETRICS & GYNECOLOGY

## 2020-01-10 RX ORDER — OXYCODONE HYDROCHLORIDE AND ACETAMINOPHEN 5; 325 MG/1; MG/1
1 TABLET ORAL EVERY 4 HOURS PRN
Status: DISCONTINUED | OUTPATIENT
Start: 2020-01-10 | End: 2020-01-13 | Stop reason: HOSPADM

## 2020-01-10 RX ORDER — LOPERAMIDE HYDROCHLORIDE 2 MG/1
4 CAPSULE ORAL 4 TIMES DAILY PRN
Status: DISCONTINUED | OUTPATIENT
Start: 2020-01-10 | End: 2020-01-13 | Stop reason: HOSPADM

## 2020-01-10 RX ORDER — PROMETHAZINE HYDROCHLORIDE 25 MG/1
25 TABLET ORAL EVERY 6 HOURS PRN
Status: DISCONTINUED | OUTPATIENT
Start: 2020-01-10 | End: 2020-01-13 | Stop reason: HOSPADM

## 2020-01-10 RX ORDER — ZOLPIDEM TARTRATE 5 MG/1
5 TABLET ORAL NIGHTLY PRN
Status: DISCONTINUED | OUTPATIENT
Start: 2020-01-10 | End: 2020-01-13 | Stop reason: HOSPADM

## 2020-01-10 RX ORDER — SODIUM CHLORIDE, SODIUM LACTATE, POTASSIUM CHLORIDE, CALCIUM CHLORIDE 600; 310; 30; 20 MG/100ML; MG/100ML; MG/100ML; MG/100ML
30 INJECTION, SOLUTION INTRAVENOUS CONTINUOUS
Status: DISCONTINUED | OUTPATIENT
Start: 2020-01-10 | End: 2020-01-11

## 2020-01-10 RX ADMIN — HYDROCODONE BITARTRATE AND ACETAMINOPHEN 1 TABLET: 7.5; 325 TABLET ORAL at 21:43

## 2020-01-10 RX ADMIN — ZOLPIDEM TARTRATE 5 MG: 5 TABLET ORAL at 21:43

## 2020-01-10 RX ADMIN — HYDROCODONE BITARTRATE AND ACETAMINOPHEN 1 TABLET: 7.5; 325 TABLET ORAL at 06:41

## 2020-01-10 RX ADMIN — PIPERACILLIN SODIUM,TAZOBACTAM SODIUM 3.38 G: 3; .375 INJECTION, POWDER, FOR SOLUTION INTRAVENOUS at 09:23

## 2020-01-10 RX ADMIN — PRAVASTATIN SODIUM 20 MG: 40 TABLET ORAL at 21:43

## 2020-01-10 RX ADMIN — SODIUM CHLORIDE, POTASSIUM CHLORIDE, SODIUM LACTATE AND CALCIUM CHLORIDE 125 ML/HR: 600; 310; 30; 20 INJECTION, SOLUTION INTRAVENOUS at 19:13

## 2020-01-10 RX ADMIN — PIPERACILLIN SODIUM,TAZOBACTAM SODIUM 3.38 G: 3; .375 INJECTION, POWDER, FOR SOLUTION INTRAVENOUS at 02:25

## 2020-01-10 RX ADMIN — METRONIDAZOLE 500 MG: 500 INJECTION, SOLUTION INTRAVENOUS at 11:35

## 2020-01-10 RX ADMIN — ATENOLOL 25 MG: 25 TABLET ORAL at 08:27

## 2020-01-10 RX ADMIN — METRONIDAZOLE 500 MG: 500 INJECTION, SOLUTION INTRAVENOUS at 20:23

## 2020-01-10 RX ADMIN — ASPIRIN 81 MG: 81 TABLET, COATED ORAL at 08:27

## 2020-01-10 RX ADMIN — LOPERAMIDE HYDROCHLORIDE 4 MG: 2 CAPSULE ORAL at 21:43

## 2020-01-10 RX ADMIN — PANTOPRAZOLE SODIUM 40 MG: 40 TABLET, DELAYED RELEASE ORAL at 08:27

## 2020-01-10 RX ADMIN — OXYCODONE HYDROCHLORIDE AND ACETAMINOPHEN 1 TABLET: 5; 325 TABLET ORAL at 17:33

## 2020-01-10 RX ADMIN — VANCOMYCIN HYDROCHLORIDE 750 MG: 5 INJECTION, POWDER, LYOPHILIZED, FOR SOLUTION INTRAVENOUS at 02:25

## 2020-01-10 RX ADMIN — LOPERAMIDE HYDROCHLORIDE 4 MG: 2 CAPSULE ORAL at 13:31

## 2020-01-10 RX ADMIN — MORPHINE SULFATE 1 MG: 2 INJECTION, SOLUTION INTRAMUSCULAR; INTRAVENOUS at 06:07

## 2020-01-10 RX ADMIN — LOPERAMIDE HYDROCHLORIDE 4 MG: 2 CAPSULE ORAL at 08:27

## 2020-01-10 RX ADMIN — SPIRONOLACTONE 25 MG: 25 TABLET ORAL at 08:27

## 2020-01-10 RX ADMIN — MORPHINE SULFATE 1 MG: 2 INJECTION, SOLUTION INTRAMUSCULAR; INTRAVENOUS at 00:46

## 2020-01-10 RX ADMIN — OXYCODONE HYDROCHLORIDE AND ACETAMINOPHEN 1 TABLET: 5; 325 TABLET ORAL at 13:32

## 2020-01-10 RX ADMIN — PRASUGREL 10 MG: 10 TABLET, FILM COATED ORAL at 08:27

## 2020-01-10 RX ADMIN — MORPHINE SULFATE 1 MG: 2 INJECTION, SOLUTION INTRAMUSCULAR; INTRAVENOUS at 20:33

## 2020-01-10 RX ADMIN — OXYCODONE HYDROCHLORIDE AND ACETAMINOPHEN 1 TABLET: 5; 325 TABLET ORAL at 08:27

## 2020-01-10 RX ADMIN — ISOSORBIDE MONONITRATE 60 MG: 60 TABLET, EXTENDED RELEASE ORAL at 08:27

## 2020-01-11 LAB
BACTERIA SPEC AEROBE CULT: ABNORMAL
BASOPHILS # BLD AUTO: 0.04 10*3/MM3 (ref 0–0.2)
BASOPHILS NFR BLD AUTO: 0.4 % (ref 0–1.5)
CRP SERPL-MCNC: 2.7 MG/DL (ref 0–0.5)
DEPRECATED RDW RBC AUTO: 49.5 FL (ref 37–54)
EOSINOPHIL # BLD AUTO: 0.22 10*3/MM3 (ref 0–0.4)
EOSINOPHIL NFR BLD AUTO: 2.3 % (ref 0.3–6.2)
ERYTHROCYTE [DISTWIDTH] IN BLOOD BY AUTOMATED COUNT: 14.6 % (ref 12.3–15.4)
GRAM STN SPEC: ABNORMAL
GRAM STN SPEC: ABNORMAL
HCT VFR BLD AUTO: 25.7 % (ref 34–46.6)
HGB BLD-MCNC: 8.2 G/DL (ref 12–15.9)
IMM GRANULOCYTES # BLD AUTO: 0.03 10*3/MM3 (ref 0–0.05)
IMM GRANULOCYTES NFR BLD AUTO: 0.3 % (ref 0–0.5)
LYMPHOCYTES # BLD AUTO: 2.39 10*3/MM3 (ref 0.7–3.1)
LYMPHOCYTES NFR BLD AUTO: 24.8 % (ref 19.6–45.3)
MCH RBC QN AUTO: 29.4 PG (ref 26.6–33)
MCHC RBC AUTO-ENTMCNC: 31.9 G/DL (ref 31.5–35.7)
MCV RBC AUTO: 92.1 FL (ref 79–97)
MONOCYTES # BLD AUTO: 0.74 10*3/MM3 (ref 0.1–0.9)
MONOCYTES NFR BLD AUTO: 7.7 % (ref 5–12)
NEUTROPHILS # BLD AUTO: 6.22 10*3/MM3 (ref 1.7–7)
NEUTROPHILS NFR BLD AUTO: 64.5 % (ref 42.7–76)
NRBC BLD AUTO-RTO: 0 /100 WBC (ref 0–0.2)
PLATELET # BLD AUTO: 239 10*3/MM3 (ref 140–450)
PMV BLD AUTO: 10.2 FL (ref 6–12)
RBC # BLD AUTO: 2.79 10*6/MM3 (ref 3.77–5.28)
VANCOMYCIN TROUGH SERPL-MCNC: 12.2 MCG/ML (ref 5–20)
WBC NRBC COR # BLD: 9.64 10*3/MM3 (ref 3.4–10.8)

## 2020-01-11 PROCEDURE — 85025 COMPLETE CBC W/AUTO DIFF WBC: CPT | Performed by: NURSE PRACTITIONER

## 2020-01-11 PROCEDURE — 80202 ASSAY OF VANCOMYCIN: CPT

## 2020-01-11 PROCEDURE — 86140 C-REACTIVE PROTEIN: CPT | Performed by: OBSTETRICS & GYNECOLOGY

## 2020-01-11 PROCEDURE — 25010000002 VANCOMYCIN 5 G RECONSTITUTED SOLUTION 5,000 MG VIAL: Performed by: INTERNAL MEDICINE

## 2020-01-11 PROCEDURE — 25010000002 VANCOMYCIN 5 G RECONSTITUTED SOLUTION 5,000 MG VIAL: Performed by: OBSTETRICS & GYNECOLOGY

## 2020-01-11 RX ADMIN — HYDROCODONE BITARTRATE AND ACETAMINOPHEN 1 TABLET: 7.5; 325 TABLET ORAL at 21:57

## 2020-01-11 RX ADMIN — OXYCODONE HYDROCHLORIDE AND ACETAMINOPHEN 1 TABLET: 5; 325 TABLET ORAL at 12:37

## 2020-01-11 RX ADMIN — PRAVASTATIN SODIUM 20 MG: 40 TABLET ORAL at 21:57

## 2020-01-11 RX ADMIN — METRONIDAZOLE 500 MG: 500 INJECTION, SOLUTION INTRAVENOUS at 19:25

## 2020-01-11 RX ADMIN — VANCOMYCIN HYDROCHLORIDE 1000 MG: 5 INJECTION, POWDER, LYOPHILIZED, FOR SOLUTION INTRAVENOUS at 16:12

## 2020-01-11 RX ADMIN — VANCOMYCIN HYDROCHLORIDE 750 MG: 5 INJECTION, POWDER, LYOPHILIZED, FOR SOLUTION INTRAVENOUS at 03:06

## 2020-01-11 RX ADMIN — LOPERAMIDE HYDROCHLORIDE 4 MG: 2 CAPSULE ORAL at 08:37

## 2020-01-11 RX ADMIN — SPIRONOLACTONE 25 MG: 25 TABLET ORAL at 08:37

## 2020-01-11 RX ADMIN — ASPIRIN 81 MG: 81 TABLET, COATED ORAL at 08:37

## 2020-01-11 RX ADMIN — PRASUGREL 10 MG: 10 TABLET, FILM COATED ORAL at 08:37

## 2020-01-11 RX ADMIN — ISOSORBIDE MONONITRATE 60 MG: 60 TABLET, EXTENDED RELEASE ORAL at 08:37

## 2020-01-11 RX ADMIN — METRONIDAZOLE 500 MG: 500 INJECTION, SOLUTION INTRAVENOUS at 11:34

## 2020-01-11 RX ADMIN — PANTOPRAZOLE SODIUM 40 MG: 40 TABLET, DELAYED RELEASE ORAL at 08:37

## 2020-01-11 RX ADMIN — METRONIDAZOLE 500 MG: 500 INJECTION, SOLUTION INTRAVENOUS at 04:36

## 2020-01-11 RX ADMIN — ZOLPIDEM TARTRATE 5 MG: 5 TABLET ORAL at 23:02

## 2020-01-11 RX ADMIN — VANCOMYCIN HYDROCHLORIDE 750 MG: 5 INJECTION, POWDER, LYOPHILIZED, FOR SOLUTION INTRAVENOUS at 14:55

## 2020-01-11 RX ADMIN — OXYCODONE HYDROCHLORIDE AND ACETAMINOPHEN 1 TABLET: 5; 325 TABLET ORAL at 07:40

## 2020-01-11 RX ADMIN — ATENOLOL 25 MG: 25 TABLET ORAL at 08:37

## 2020-01-11 RX ADMIN — TRAMADOL HYDROCHLORIDE 50 MG: 50 TABLET, FILM COATED ORAL at 23:02

## 2020-01-12 PROBLEM — Z22.322 MRSA (METHICILLIN RESISTANT STAPH AUREUS) CULTURE POSITIVE: Status: ACTIVE | Noted: 2020-01-12

## 2020-01-12 LAB
ANION GAP SERPL CALCULATED.3IONS-SCNC: 12.5 MMOL/L (ref 5–15)
BASOPHILS # BLD AUTO: 0.03 10*3/MM3 (ref 0–0.2)
BASOPHILS NFR BLD AUTO: 0.3 % (ref 0–1.5)
BUN BLD-MCNC: 3 MG/DL (ref 6–20)
BUN/CREAT SERPL: 4.9 (ref 7–25)
CALCIUM SPEC-SCNC: 8.6 MG/DL (ref 8.6–10.5)
CHLORIDE SERPL-SCNC: 108 MMOL/L (ref 98–107)
CO2 SERPL-SCNC: 23.5 MMOL/L (ref 22–29)
CREAT BLD-MCNC: 0.61 MG/DL (ref 0.57–1)
CRP SERPL-MCNC: 2.37 MG/DL (ref 0–0.5)
DEPRECATED RDW RBC AUTO: 48.5 FL (ref 37–54)
EOSINOPHIL # BLD AUTO: 0.23 10*3/MM3 (ref 0–0.4)
EOSINOPHIL NFR BLD AUTO: 2.7 % (ref 0.3–6.2)
ERYTHROCYTE [DISTWIDTH] IN BLOOD BY AUTOMATED COUNT: 14.4 % (ref 12.3–15.4)
GFR SERPL CREATININE-BSD FRML MDRD: 103 ML/MIN/1.73
GLUCOSE BLD-MCNC: 105 MG/DL (ref 65–99)
HCT VFR BLD AUTO: 26.5 % (ref 34–46.6)
HGB BLD-MCNC: 8.3 G/DL (ref 12–15.9)
IMM GRANULOCYTES # BLD AUTO: 0.02 10*3/MM3 (ref 0–0.05)
IMM GRANULOCYTES NFR BLD AUTO: 0.2 % (ref 0–0.5)
LYMPHOCYTES # BLD AUTO: 1.88 10*3/MM3 (ref 0.7–3.1)
LYMPHOCYTES NFR BLD AUTO: 21.7 % (ref 19.6–45.3)
MCH RBC QN AUTO: 28.6 PG (ref 26.6–33)
MCHC RBC AUTO-ENTMCNC: 31.3 G/DL (ref 31.5–35.7)
MCV RBC AUTO: 91.4 FL (ref 79–97)
MONOCYTES # BLD AUTO: 0.78 10*3/MM3 (ref 0.1–0.9)
MONOCYTES NFR BLD AUTO: 9 % (ref 5–12)
NEUTROPHILS # BLD AUTO: 5.71 10*3/MM3 (ref 1.7–7)
NEUTROPHILS NFR BLD AUTO: 66.1 % (ref 42.7–76)
NRBC BLD AUTO-RTO: 0 /100 WBC (ref 0–0.2)
PLATELET # BLD AUTO: 225 10*3/MM3 (ref 140–450)
PMV BLD AUTO: 10.1 FL (ref 6–12)
POTASSIUM BLD-SCNC: 3 MMOL/L (ref 3.5–5.2)
RBC # BLD AUTO: 2.9 10*6/MM3 (ref 3.77–5.28)
SODIUM BLD-SCNC: 144 MMOL/L (ref 136–145)
WBC NRBC COR # BLD: 8.65 10*3/MM3 (ref 3.4–10.8)

## 2020-01-12 PROCEDURE — 25010000002 VANCOMYCIN 5 G RECONSTITUTED SOLUTION 5,000 MG VIAL: Performed by: INTERNAL MEDICINE

## 2020-01-12 PROCEDURE — 85025 COMPLETE CBC W/AUTO DIFF WBC: CPT | Performed by: OBSTETRICS & GYNECOLOGY

## 2020-01-12 PROCEDURE — 86140 C-REACTIVE PROTEIN: CPT | Performed by: OBSTETRICS & GYNECOLOGY

## 2020-01-12 PROCEDURE — 80048 BASIC METABOLIC PNL TOTAL CA: CPT | Performed by: INTERNAL MEDICINE

## 2020-01-12 RX ADMIN — VANCOMYCIN HYDROCHLORIDE 1000 MG: 5 INJECTION, POWDER, LYOPHILIZED, FOR SOLUTION INTRAVENOUS at 16:39

## 2020-01-12 RX ADMIN — PRASUGREL 10 MG: 10 TABLET, FILM COATED ORAL at 08:12

## 2020-01-12 RX ADMIN — PANTOPRAZOLE SODIUM 40 MG: 40 TABLET, DELAYED RELEASE ORAL at 08:12

## 2020-01-12 RX ADMIN — SPIRONOLACTONE 25 MG: 25 TABLET ORAL at 08:12

## 2020-01-12 RX ADMIN — ATENOLOL 25 MG: 25 TABLET ORAL at 08:12

## 2020-01-12 RX ADMIN — METRONIDAZOLE 500 MG: 500 INJECTION, SOLUTION INTRAVENOUS at 12:21

## 2020-01-12 RX ADMIN — HYDROCODONE BITARTRATE AND ACETAMINOPHEN 1 TABLET: 7.5; 325 TABLET ORAL at 19:29

## 2020-01-12 RX ADMIN — ISOSORBIDE MONONITRATE 60 MG: 60 TABLET, EXTENDED RELEASE ORAL at 08:12

## 2020-01-12 RX ADMIN — PRAVASTATIN SODIUM 20 MG: 40 TABLET ORAL at 21:38

## 2020-01-12 RX ADMIN — VANCOMYCIN HYDROCHLORIDE 1000 MG: 5 INJECTION, POWDER, LYOPHILIZED, FOR SOLUTION INTRAVENOUS at 03:12

## 2020-01-12 RX ADMIN — HYDROCODONE BITARTRATE AND ACETAMINOPHEN 1 TABLET: 7.5; 325 TABLET ORAL at 09:57

## 2020-01-12 RX ADMIN — ASPIRIN 81 MG: 81 TABLET, COATED ORAL at 08:12

## 2020-01-12 RX ADMIN — LOPERAMIDE HYDROCHLORIDE 4 MG: 2 CAPSULE ORAL at 17:01

## 2020-01-12 RX ADMIN — METRONIDAZOLE 500 MG: 500 INJECTION, SOLUTION INTRAVENOUS at 04:23

## 2020-01-12 RX ADMIN — ZOLPIDEM TARTRATE 5 MG: 5 TABLET ORAL at 21:38

## 2020-01-13 VITALS
RESPIRATION RATE: 18 BRPM | DIASTOLIC BLOOD PRESSURE: 76 MMHG | WEIGHT: 113 LBS | HEART RATE: 68 BPM | SYSTOLIC BLOOD PRESSURE: 148 MMHG | BODY MASS INDEX: 20.8 KG/M2 | TEMPERATURE: 98.2 F | HEIGHT: 62 IN | OXYGEN SATURATION: 98 %

## 2020-01-13 LAB
BACTERIA SPEC AEROBE CULT: NORMAL
BACTERIA SPEC AEROBE CULT: NORMAL
CRP SERPL-MCNC: 1.38 MG/DL (ref 0–0.5)

## 2020-01-13 PROCEDURE — 25010000002 VANCOMYCIN 5 G RECONSTITUTED SOLUTION 5,000 MG VIAL: Performed by: INTERNAL MEDICINE

## 2020-01-13 PROCEDURE — 86140 C-REACTIVE PROTEIN: CPT | Performed by: OBSTETRICS & GYNECOLOGY

## 2020-01-13 RX ORDER — HYDROCODONE BITARTRATE AND ACETAMINOPHEN 5; 325 MG/1; MG/1
1 TABLET ORAL EVERY 4 HOURS PRN
Qty: 15 TABLET | Refills: 0 | Status: SHIPPED | OUTPATIENT
Start: 2020-01-13 | End: 2020-01-19

## 2020-01-13 RX ORDER — DOXYCYCLINE HYCLATE 100 MG/1
100 CAPSULE ORAL 2 TIMES DAILY
Qty: 16 CAPSULE | Refills: 0 | Status: SHIPPED | OUTPATIENT
Start: 2020-01-13 | End: 2020-01-27

## 2020-01-13 RX ORDER — METRONIDAZOLE 500 MG/1
500 TABLET ORAL 3 TIMES DAILY
Qty: 24 TABLET | Refills: 0 | Status: SHIPPED | OUTPATIENT
Start: 2020-01-13 | End: 2020-01-20

## 2020-01-13 RX ORDER — FERROUS SULFATE 325(65) MG
325 TABLET ORAL
Qty: 60 TABLET | Refills: 1 | Status: SHIPPED | OUTPATIENT
Start: 2020-01-13 | End: 2020-07-05

## 2020-01-13 RX ADMIN — VANCOMYCIN HYDROCHLORIDE 1000 MG: 5 INJECTION, POWDER, LYOPHILIZED, FOR SOLUTION INTRAVENOUS at 16:09

## 2020-01-13 RX ADMIN — ATENOLOL 25 MG: 25 TABLET ORAL at 09:51

## 2020-01-13 RX ADMIN — PRASUGREL 10 MG: 10 TABLET, FILM COATED ORAL at 09:51

## 2020-01-13 RX ADMIN — SPIRONOLACTONE 25 MG: 25 TABLET ORAL at 09:52

## 2020-01-13 RX ADMIN — ISOSORBIDE MONONITRATE 60 MG: 60 TABLET, EXTENDED RELEASE ORAL at 09:52

## 2020-01-13 RX ADMIN — PANTOPRAZOLE SODIUM 40 MG: 40 TABLET, DELAYED RELEASE ORAL at 09:52

## 2020-01-13 RX ADMIN — VANCOMYCIN HYDROCHLORIDE 1000 MG: 5 INJECTION, POWDER, LYOPHILIZED, FOR SOLUTION INTRAVENOUS at 03:17

## 2020-01-13 RX ADMIN — HYDROCODONE BITARTRATE AND ACETAMINOPHEN 1 TABLET: 7.5; 325 TABLET ORAL at 18:02

## 2020-01-13 RX ADMIN — HYDROCODONE BITARTRATE AND ACETAMINOPHEN 1 TABLET: 7.5; 325 TABLET ORAL at 09:47

## 2020-01-13 RX ADMIN — ASPIRIN 81 MG: 81 TABLET, COATED ORAL at 09:52

## 2020-01-13 RX ADMIN — HYDROCODONE BITARTRATE AND ACETAMINOPHEN 1 TABLET: 5; 325 TABLET ORAL at 14:51

## 2020-01-14 ENCOUNTER — APPOINTMENT (OUTPATIENT)
Dept: PHARMACY | Facility: HOSPITAL | Age: 55
End: 2020-01-14

## 2020-01-14 ENCOUNTER — READMISSION MANAGEMENT (OUTPATIENT)
Dept: CALL CENTER | Facility: HOSPITAL | Age: 55
End: 2020-01-14

## 2020-01-14 NOTE — OUTREACH NOTE
Prep Survey      Responses   Facility patient discharged from?  [facility]   Is patient eligible?  Yes   Discharge diagnosis  Cellulitis of labia    Does the patient have one of the following disease processes/diagnoses(primary or secondary)?  General Surgery   Does the patient have Home health ordered?  Yes   What is the Home health agency?   [facility].     Is there a DME ordered?  No   Prep survey completed?  Yes          Swati Christopher RN

## 2020-01-16 ENCOUNTER — READMISSION MANAGEMENT (OUTPATIENT)
Dept: CALL CENTER | Facility: HOSPITAL | Age: 55
End: 2020-01-16

## 2020-01-16 ENCOUNTER — HOSPITAL ENCOUNTER (OUTPATIENT)
Dept: WOUND CARE | Facility: HOSPITAL | Age: 55
Discharge: HOME | End: 2020-01-16
Admitting: NURSE PRACTITIONER

## 2020-01-16 VITALS
HEART RATE: 63 BPM | DIASTOLIC BLOOD PRESSURE: 61 MMHG | WEIGHT: 113 LBS | TEMPERATURE: 98.4 F | HEIGHT: 62 IN | RESPIRATION RATE: 17 BRPM | OXYGEN SATURATION: 99 % | SYSTOLIC BLOOD PRESSURE: 129 MMHG | BODY MASS INDEX: 20.8 KG/M2

## 2020-01-16 DIAGNOSIS — N76.2 CELLULITIS OF LABIA: ICD-10-CM

## 2020-01-16 PROCEDURE — 97602 WOUND(S) CARE NON-SELECTIVE: CPT

## 2020-01-16 NOTE — OUTREACH NOTE
General Surgery Week 1 Survey      Responses   Facility patient discharged from?  [facility]   Does the patient have one of the following disease processes/diagnoses(primary or secondary)?  General Surgery   Is there a successful TCM telephone encounter documented?  No   Week 1 attempt successful?  Yes   Call start time  1253   Call end time  1258   Discharge diagnosis  Cellulitis of labia    Person spoke with today (if not patient) and relationship  Arliss-spouse    Meds reviewed with patient/caregiver?  Yes   Is the patient having any side effects they believe may be caused by any medication additions or changes?  Yes   Side effects comments   Hydrocodone is making patient sick   Does the patient have all medications related to this admission filled (includes all antibiotics, pain medications, etc.)  Yes   Is the patient taking all medications as directed (includes completed medication regime)?  Yes   Medication comments  Advised pt to call surgeon's office for different pain medication   Does the patient have a follow up appointment scheduled with their surgeon?  Yes [1/17/20]   Has the patient kept scheduled appointments due by today?  Yes   Comments  appt with wound care 1/16/20   What is the Home health agency?   [facility].     Has home health visited the patient within 72 hours of discharge?  Yes   Psychosocial issues?  No   Nursing interventions  Reviewed instructions with patient   What is the patient's perception of their health status since discharge?  Improving   Nursing interventions  Nurse provided patient education   Is the patient /caregiver able to teach back basic post-op care?  Lifting as instructed by MD in discharge instructions, Drive as instructed by MD in discharge instructions, Take showers only when approved by MD-sponge bathe until then, No tub bath, swimming, or hot tub until instructed by MD   Is the patient/caregiver able to teach back signs and symptoms of incisional infection?  Fever,  Increased drainage or bleeding   Is the patient/caregiver able to teach back steps to recovery at home?  Rest and rebuild strength, gradually increase activity, Eat a well-balance diet   If the patient is a current smoker, are they able to teach back resources for cessation?  [id number] [Pt is a smoker]   Is the patient/caregiver able to teach back the hierarchy of who to call/visit for symptoms/problems? PCP, Specialist, Home health nurse, Urgent Care, ED, 911  Yes   Week 1 call completed?  Yes          Louisa Domínguez RN

## 2020-01-16 NOTE — PATIENT INSTRUCTIONS
Wound Care, Adult  Taking care of your wound properly can help to prevent pain, infection, and scarring. It can also help your wound to heal more quickly.  How to care for your wound  Wound care         · Follow instructions from your health care provider about how to take care of your wound. Make sure you:  ? Wash your hands with soap and water before you change the bandage (dressing). If soap and water are not available, use hand .  ? Change your dressing as told by your health care provider.  ? Leave stitches (sutures), skin glue, or adhesive strips in place. These skin closures may need to stay in place for 2 weeks or longer. If adhesive strip edges start to loosen and curl up, you may trim the loose edges. Do not remove adhesive strips completely unless your health care provider tells you to do that.  · Check your wound area every day for signs of infection. Check for:  ? Redness, swelling, or pain.  ? Fluid or blood.  ? Warmth.  ? Pus or a bad smell.  · Ask your health care provider if you should clean the wound with mild soap and water. Doing this may include:  ? Using a clean towel to pat the wound dry after cleaning it. Do not rub or scrub the wound.  ? Applying a cream or ointment. Do this only as told by your health care provider.  ? Covering the incision with a clean dressing.  · Ask your health care provider when you can leave the wound uncovered.  · Keep the dressing dry until your health care provider says it can be removed. Do not take baths, swim, use a hot tub, or do anything that would put the wound underwater until your health care provider approves. Ask your health care provider if you can take showers. You may only be allowed to take sponge baths.  Medicines    · If you were prescribed an antibiotic medicine, cream, or ointment, take or use the antibiotic as told by your health care provider. Do not stop taking or using the antibiotic even if your condition improves.  · Take  over-the-counter and prescription medicines only as told by your health care provider. If you were prescribed pain medicine, take it 30 or more minutes before you do any wound care or as told by your health care provider.  General instructions  · Return to your normal activities as told by your health care provider. Ask your health care provider what activities are safe.  · Do not scratch or pick at the wound.  · Do not use any products that contain nicotine or tobacco, such as cigarettes and e-cigarettes. These may delay wound healing. If you need help quitting, ask your health care provider.  · Keep all follow-up visits as told by your health care provider. This is important.  · Eat a diet that includes protein, vitamin A, vitamin C, and other nutrient-rich foods to help the wound heal.  ? Foods rich in protein include meat, dairy, beans, nuts, and other sources.  ? Foods rich in vitamin A include carrots and dark green, leafy vegetables.  ? Foods rich in vitamin C include citrus, tomatoes, and other fruits and vegetables.  ? Nutrient-rich foods have protein, carbohydrates, fat, vitamins, or minerals. Eat a variety of healthy foods including vegetables, fruits, and whole grains.  Contact a health care provider if:  · You received a tetanus shot and you have swelling, severe pain, redness, or bleeding at the injection site.  · Your pain is not controlled with medicine.  · You have redness, swelling, or pain around the wound.  · You have fluid or blood coming from the wound.  · Your wound feels warm to the touch.  · You have pus or a bad smell coming from the wound.  · You have a fever or chills.  · You are nauseous or you vomit.  · You are dizzy.  Get help right away if:  · You have a red streak going away from your wound.  · The edges of the wound open up and separate.  · Your wound is bleeding, and the bleeding does not stop with gentle pressure.  · You have a rash.  · You faint.  · You have trouble  breathing.  Summary  · Always wash your hands with soap and water before changing your bandage (dressing).  · To help with healing, eat foods that are rich in protein, vitamin A, vitamin C, and other nutrients.  · Check your wound every day for signs of infection. Contact your health care provider if you suspect that your wound is infected.  This information is not intended to replace advice given to you by your health care provider. Make sure you discuss any questions you have with your health care provider.  Document Released: 09/26/2009 Document Revised: 01/29/2019 Document Reviewed: 07/04/2017  [employer] Interactive Patient Education © 2019 [employer]

## 2020-01-16 NOTE — PROGRESS NOTES
Wound Clinic Initial Evaluation  Patient Identification:  Name:  Veda Enamorado  Age:  54 y.o.  Sex:  female  :  1965  MRN:  [id number]   Visit Number:  [id number]  Primary Care Physician:  Chiquita Choudhary APRN     Subjective     Chief complaint:     Abscess     History of presenting illness:     Patient is a 54 y.o. female with no significant medical history.  Present's today for evaluation of right groin abscess s/p I&D. Patient was recently admitted to the hospital for abscess to right groin. I&D was performed by Dr. Jeffery. She reports that initially area started out as just a small bump occurring 20. She reports that pain has significantly improved since her procedure. She reports moderate amount of drainage without odor. She states she has family that will be helping her pack her wound twice a day at home. Has professional home set up for assistance. She denies any fever or chills. No nausea or vomiting reported. Wound was cultured during her admission and resulted with MRSA. She was discharged with Doxycyline and Flagyl.  ---------------------------------------------------------------------------------------------------------------------   Review of Systems   Constitutional: Negative for chills and fever.   HENT: Negative for congestion and rhinorrhea.    Eyes: Negative for pain.   Respiratory: Negative for cough and shortness of breath.    Cardiovascular: Negative for chest pain and leg swelling.   Gastrointestinal: Negative for abdominal pain, diarrhea and vomiting.   Genitourinary: Negative for difficulty urinating.   Musculoskeletal: Positive for back pain. Negative for gait problem.   Skin: Positive for wound.   Neurological: Negative for dizziness, syncope and numbness.   Hematological: Does not bruise/bleed easily.   Psychiatric/Behavioral: Negative for confusion.    ---------------------------------------------------------------------------------------------------------------------      Past Medical History:   Diagnosis Date   • Arthritis    • Back ache    • Coronary artery disease    • Hyperlipidemia    • Hypertension      Past Surgical History:   Procedure Laterality Date   • CARDIAC CATHETERIZATION     • CARDIAC CATHETERIZATION N/A 12/18/2019    Procedure: Left Heart Cath;  Surgeon: Lazaro Conway MD;  Location: [facility] CATH INVASIVE LOCATION;  Service: Cardiology   • PERINEAL LESION/CYST EXCISION Right 1/9/2020    Procedure: I&D ABSCESS;  Surgeon: Leander Cardenas III, MD;  Location: [facility] OR;  Service: Obstetrics/Gynecology     Family History   Problem Relation Age of Onset   • Heart disease Mother    • Heart disease Father    • Heart attack Father    • No Known Problems Sister    • Heart attack Brother    • Heart disease Brother    • Breast cancer Neg Hx      Social History     Socioeconomic History   • Marital status:      Spouse name: Not on file   • Number of children: Not on file   • Years of education: Not on file   • Highest education level: Not on file   Tobacco Use   • Smoking status: Current Every Day Smoker     Packs/day: 0.50     Years: 25.00     Pack years: 12.50     Types: Cigarettes   • Smokeless tobacco: Never Used   Substance and Sexual Activity   • Alcohol use: No   • Drug use: No   • Sexual activity: Defer     ---------------------------------------------------------------------------------------------------------------------   Allergies:  Bactrim [sulfamethoxazole-trimethoprim] and Ciprofloxacin  ---------------------------------------------------------------------------------------------------------------------  Objective     ---------------------------------------------------------------------------------------------------------------------   Vital Signs:  Temp:  [98.4 °F (36.9 °C)] 98.4 °F (36.9 °C)  Heart Rate:  [63] 63  Resp:  [17] 17  BP: (129)/(61) 129/61  SpO2 Percentage    01/16/20 1048   SpO2: 99%     SpO2:  [99 %] 99 %  on   ;      Body mass  index is 20.67 kg/m².  Wt Readings from Last 3 Encounters:   01/16/20 51.3 kg (113 lb)   01/09/20 51.3 kg (113 lb)   12/18/19 52.6 kg (116 lb)     ---------------------------------------------------------------------------------------------------------------------   Physical Exam  Constitutional: Vital sign were reviewed (temperature, pulse, respiration, and blood pressure) and found to be within expected limits, general appearance was assessed and the patient was found to be in no distress and calm and comfortable appears  Eyes:lids and lashes normal, pupils equal, round, reactive to light  Ears, Nose, Mouth, Throat:hearing intact and external inspection of ears and nose shoes no evidence of trauma or disease   Neck: shows normal range of motion without pain, and no evidence of trauma or deformity  and trachea is midline   Respiratory: Normal respiratory effort and symmetrical chest expansion and Auscultation of lugs reveals that lungs are clear to auscultation bilaterally   Cardiovascular:Auscultation of heart revealed regular rate, rhythm without murmurs, gallops or rubs.  Gastrointestinal:no masses and no tenderness  Musculoskeletal: inspection of digits and nails shows normal findings  and no edema   Neurologic: Mental Status orientated to person, place, time and situation  Skin: Temperature:normal turgor and temperatureColor: normal, no cyanosis, jaundice, pallor or bruising, Moisture: dry,Nails: pink nail beds, Hair:thinning to lower extremities .  Wound:present, Lesions: surgical wound, Rash: absent, Mobility Normal Gait, Location of wound: right groin, Changes since last exam: this is initial exam, Etiology and classification:surgical wound left open intentionally, Wound bed structures/characteristics: full-thickness (subcutaneous tissue is exposed in at least a portion of the wound), Drainage characteristics: moderate and serous drainage, Edges vital, , Bioburden characteristics:no bioburden is present in  "wound bed .  Measurements: 0.76cm X 1.07cm X 2.1cm   ---------------------------------------------------------------------------------------------------------------------             Results from last 7 days   Lab Units 01/13/20  0401 01/12/20  0820 01/12/20  0615 01/11/20  1320 01/11/20  0613   CRP mg/dL 1.38*  --  2.37*  --  2.70*   WBC 10*3/mm3  --  8.65  --  9.64  --    HEMOGLOBIN g/dL  --  8.3*  --  8.2*  --    HEMATOCRIT %  --  26.5*  --  25.7*  --    MCV fL  --  91.4  --  92.1  --    MCHC g/dL  --  31.3*  --  31.9  --    PLATELETS 10*3/mm3  --  225  --  239  --      Results from last 7 days   Lab Units 01/12/20  0615   SODIUM mmol/L 144   POTASSIUM mmol/L 3.0*   CHLORIDE mmol/L 108*   CO2 mmol/L 23.5   BUN mg/dL 3*   CREATININE mg/dL 0.61   EGFR IF NONAFRICN AM mL/min/1.73 103   CALCIUM mg/dL 8.6   GLUCOSE mg/dL 105*   Estimated Creatinine Clearance: 86.4 mL/min (by C-G formula based on SCr of 0.61 mg/dL).    Pain Management Panel     There is no flowsheet data to display.        I have personally reviewed the above laboratory results.   ---------------------------------------------------------------------------------------------------------------------    Assessment & Plan      Assessment     1. Abscess to right groin S/P I&D    Plan:     Will pack wound with 1/4\" gauze moistened with saline, and cover with mepilex. This is to be done twice a day. Advised that if signs of infection to report to clinic or Ed for further evaluation.     Follow up in 3 weeks per patient request.    ZION John   [facility]    01/16/20  11:18 AM  "

## 2020-01-27 ENCOUNTER — MEDICATION THERAPY MANAGEMENT (OUTPATIENT)
Dept: PHARMACY | Facility: HOSPITAL | Age: 55
End: 2020-01-27

## 2020-01-27 DIAGNOSIS — E78.2 MIXED HYPERLIPIDEMIA: Primary | ICD-10-CM

## 2020-01-27 NOTE — PROGRESS NOTES
Pharmacy Note  Medication:  Repatha  Dose: 140mg every 2 weeks  Referring Provider: Sharda Meyers  Start Date: 1/27/20  Last Injection: 1/27/20 (right arm)   PA status/Time Period: Through 5/7/2020, copay $4    Diagnosis Details:     Established CVD (with primary hyperlipidemia):  ACS +  Hx of MI +  Stable/unstable angina +  Coronary or other arterial revascularization +      Past Medical History:   Diagnosis Date   • Arthritis    • Back ache    • Coronary artery disease    • Hyperlipidemia    • Hypertension      Social History     Socioeconomic History   • Marital status:      Spouse name: Not on file   • Number of children: Not on file   • Years of education: Not on file   • Highest education level: Not on file   Tobacco Use   • Smoking status: Current Every Day Smoker     Packs/day: 0.50     Years: 25.00     Pack years: 12.50     Types: Cigarettes   • Smokeless tobacco: Never Used   Substance and Sexual Activity   • Alcohol use: No   • Drug use: No   • Sexual activity: Defer     Allergies   Allergen Reactions   • Bactrim [Sulfamethoxazole-Trimethoprim] Rash   • Ciprofloxacin Other (See Comments)     tremors     Current Outpatient Medications   Medication Sig Dispense Refill   • aspirin 81 MG EC tablet Take 81 mg by mouth daily.     • atenolol (TENORMIN) 25 MG tablet Take 25 mg by mouth Daily.     • doxycycline (VIBRAMYCIN) 100 MG capsule Take 1 capsule by mouth 2 (Two) Times a Day. 16 capsule 0   • Evolocumab (REPATHA SURECLICK) 140 MG/ML solution auto-injector Inject 1 mL under the skin into the appropriate area as directed Every 14 (Fourteen) Days. 2 mL 5   • ferrous sulfate 325 (65 FE) MG tablet Take 1 tablet by mouth Daily With Breakfast. 60 tablet 1   • fluticasone-salmeterol (ADVAIR) 500-50 MCG/DOSE DISKUS Inhale 1 puff 2 (Two) Times a Day.     • isosorbide mononitrate (IMDUR) 60 MG 24 hr tablet Take 1 tablet by mouth Daily. 30 tablet 6   • nitroglycerin (NITROSTAT) 0.4 MG SL tablet 1 under the  tongue as needed for angina, may repeat q5mins for up three doses (Patient taking differently: Place 0.4 mg under the tongue Every 5 (Five) Minutes As Needed for Chest Pain. 1 under the tongue as needed for angina, may repeat q5mins for up three doses) 100 tablet 3   • pantoprazole (PROTONIX) 20 MG EC tablet Take 20 mg by mouth Daily.     • prasugrel (EFFIENT) 10 MG tablet Take 1 tablet by mouth Daily. 30 tablet 6   • pravastatin (PRAVACHOL) 20 MG tablet Take 1 tablet by mouth Daily. 30 tablet 6   • spironolactone (ALDACTONE) 25 MG tablet Take 1 tablet by mouth Daily. 30 tablet 6   • traMADol (ULTRAM) 50 MG tablet Take 50 mg by mouth every 6 (six) hours as needed for moderate pain (4-6).       No current facility-administered medications for this visit.        Labs  Lab Results   Component Value Date    CHOL 175 12/16/2019    CHLPL 215 (H) 04/11/2016    TRIG 122 12/16/2019    HDL 48 12/16/2019     (H) 12/16/2019       Assessment     Drug Dates Notes   Current Lipid-lowering Thearpy Pravastatin 20mg       Previous Lipid-lowering Therapy Lipitor [id number] (Per patient) Myalgias                           Pt was assessed today for PCSK9 inhibitor therapy.  The patient denies any allergies to the medication or latex and denies pregnancy, breastfeeding, or planning to become pregnant.      Plan    Patient was seen in clinic today for injection training and medication counseling.  The patient received Repatha 140mg SC in her right arm.  The patient was instructed to go to the ED with any S/Sx of allergic reaction.  The patient tolerated the injection well and had no additional questions.     Patient instructed to get lipid panel in 4-8 weeks.  Will follow up then.      Pt requests mail out services each month.     Kaia Stein, [facility]  01/27/20  1:08 PM

## 2020-02-19 ENCOUNTER — TELEPHONE (OUTPATIENT)
Dept: PHARMACY | Facility: HOSPITAL | Age: 55
End: 2020-02-19

## 2020-02-19 ENCOUNTER — SPECIALTY PHARMACY (OUTPATIENT)
Dept: PHARMACY | Facility: HOSPITAL | Age: 55
End: 2020-02-19

## 2020-02-19 NOTE — TELEPHONE ENCOUNTER
Patient called to clinic stating that she had runny nose and has been sneezing since she has started using Repatha. Last injection was 02/11/2020. Patient seems to think it is the injection that's causing it. Told Patient about Praulent. She said she would try it and see if she could tolerate it better.

## 2020-02-19 NOTE — TELEPHONE ENCOUNTER
Patient reports similar symptoms to me and reports low grade fever running around 99 degrees. She denies any muscle aches or pains.  Her biggest complaint is runny nose and sneezing.      I advised her to go to her PCP or urgent care for evaluation.  If that provider feels like she does not have flu/cold/etc. She could contact us again and we could look into switching her Repatha.     Kaia Stein, formerly [facility]  02/19/20  11:12 AM

## 2020-03-02 ENCOUNTER — LAB (OUTPATIENT)
Dept: LAB | Facility: HOSPITAL | Age: 55
End: 2020-03-02

## 2020-03-02 DIAGNOSIS — E78.2 MIXED HYPERLIPIDEMIA: ICD-10-CM

## 2020-03-02 PROCEDURE — 80061 LIPID PANEL: CPT

## 2020-03-02 PROCEDURE — 36415 COLL VENOUS BLD VENIPUNCTURE: CPT

## 2020-03-03 LAB
CHOLEST SERPL-MCNC: 116 MG/DL (ref 0–200)
HDLC SERPL-MCNC: 48 MG/DL (ref 40–60)
LDLC SERPL CALC-MCNC: 47 MG/DL (ref 0–100)
LDLC/HDLC SERPL: 0.98 {RATIO}
TRIGL SERPL-MCNC: 104 MG/DL (ref 0–150)
VLDLC SERPL-MCNC: 20.8 MG/DL (ref 5–40)

## 2020-03-04 ENCOUNTER — TELEPHONE (OUTPATIENT)
Dept: CARDIOLOGY | Facility: CLINIC | Age: 55
End: 2020-03-04

## 2020-03-04 NOTE — TELEPHONE ENCOUNTER
Called Veda to let her know that Sharda Meyers had reviewed her Lipid Panel and that it was normal. Patient is aware and understands.

## 2020-03-04 NOTE — TELEPHONE ENCOUNTER
----- Message from ZION Drake sent at 3/3/2020  3:24 PM EST -----  Please call patient.  Normal results.    Thanks, Sharda

## 2020-03-11 ENCOUNTER — OFFICE VISIT (OUTPATIENT)
Dept: CARDIOLOGY | Facility: CLINIC | Age: 55
End: 2020-03-11

## 2020-03-11 VITALS
DIASTOLIC BLOOD PRESSURE: 72 MMHG | HEIGHT: 62 IN | SYSTOLIC BLOOD PRESSURE: 145 MMHG | OXYGEN SATURATION: 98 % | WEIGHT: 115.6 LBS | HEART RATE: 65 BPM | BODY MASS INDEX: 21.27 KG/M2

## 2020-03-11 DIAGNOSIS — I25.110 CORONARY ARTERY DISEASE INVOLVING NATIVE CORONARY ARTERY OF NATIVE HEART WITH UNSTABLE ANGINA PECTORIS (HCC): Primary | ICD-10-CM

## 2020-03-11 DIAGNOSIS — M79.605 PAIN IN BOTH LOWER EXTREMITIES: ICD-10-CM

## 2020-03-11 DIAGNOSIS — I10 ESSENTIAL HYPERTENSION: ICD-10-CM

## 2020-03-11 DIAGNOSIS — E78.2 MIXED HYPERLIPIDEMIA: ICD-10-CM

## 2020-03-11 DIAGNOSIS — M79.604 PAIN IN BOTH LOWER EXTREMITIES: ICD-10-CM

## 2020-03-11 PROCEDURE — 99214 OFFICE O/P EST MOD 30 MIN: CPT | Performed by: INTERNAL MEDICINE

## 2020-03-11 NOTE — PROGRESS NOTES
[facility] CARDIOLOGY  [address] [facility] [address]  [place] [address]  Phone: [phone]  Fax: [phone]    03/11/2020    Chief Complaint   Patient presents with   • Coronary Artery Disease   • Hypertension   • Hyperlipidemia        History:   Veda Enamorado is a 55 y.o. female seen in follow up.  She has complaints today of being very fatigued.  She hasn't been to see Dr. Marcus yet in [place].  LDL has improved with Repatha. Pain of both legs with exertion and calf area. She denies any chest pain, shortness of breath or palpitations.   She has a past medical history significant for chronic tobacco abuse, dyslipidemia, CAD S/P stent/PCI in the LAD and D1 in the pastSevere in stent restenosis of D1, and OA,   The chart and medications were reviewed today. Problems above addressed this visit.    Past Medical History:   Diagnosis Date   • Arthritis    • Back ache    • Coronary artery disease    • Hyperlipidemia    • Hypertension        Past Surgical History:   Procedure Laterality Date   • CARDIAC CATHETERIZATION     • CARDIAC CATHETERIZATION N/A 12/18/2019    Procedure: Left Heart Cath;  Surgeon: Lazaro Conway MD;  Location: [facility] CATH INVASIVE LOCATION;  Service: Cardiology   • PERINEAL LESION/CYST EXCISION Right 1/9/2020    Procedure: I&D ABSCESS;  Surgeon: Leander Cardenas III, MD;  Location: [facility] OR;  Service: Obstetrics/Gynecology        Past Social History:  Social History     Socioeconomic History   • Marital status:      Spouse name: Not on file   • Number of children: Not on file   • Years of education: Not on file   • Highest education level: Not on file   Tobacco Use   • Smoking status: Current Every Day Smoker     Packs/day: 0.50     Years: 25.00     Pack years: 12.50     Types: Cigarettes   • Smokeless tobacco: Never Used   Substance and Sexual Activity   • Alcohol use: No   • Drug use: No   • Sexual activity: Defer       Past Family History:  Family  History   Problem Relation Age of Onset   • Heart disease Mother    • Heart disease Father    • Heart attack Father    • No Known Problems Sister    • Heart attack Brother    • Heart disease Brother    • Breast cancer Neg Hx        Review of Systems:   Review of Systems   Constitution: Positive for fever and malaise/fatigue. Negative for diaphoresis, weight gain and weight loss.   HENT: Negative for congestion, nosebleeds and sore throat.    Eyes: Negative for blurred vision and visual disturbance.   Cardiovascular: Negative for chest pain, claudication, dyspnea on exertion, irregular heartbeat, leg swelling, orthopnea, palpitations, paroxysmal nocturnal dyspnea and syncope.   Respiratory: Negative for cough, hemoptysis, shortness of breath, sleep disturbances due to breathing, snoring, sputum production and wheezing.    Endocrine: Negative for cold intolerance, heat intolerance, polydipsia, polyphagia and polyuria.   Hematologic/Lymphatic: Positive for bleeding problem.   Skin: Negative for dry skin, itching and rash.   Musculoskeletal: Negative for muscle cramps, muscle weakness and myalgias.   Gastrointestinal: Negative for abdominal pain, constipation, diarrhea, heartburn, hematemesis, hematochezia, hemorrhoids, melena, nausea and vomiting.   Genitourinary: Negative for hematuria and nocturia.   Neurological: Positive for excessive daytime sleepiness. Negative for dizziness, focal weakness, headaches, light-headedness, numbness, seizures, vertigo and weakness.   Psychiatric/Behavioral: Negative for depression, substance abuse and suicidal ideas.   Allergic/Immunologic: Negative for environmental allergies.         Current Outpatient Medications   Medication Sig Dispense Refill   • aspirin 81 MG EC tablet Take 81 mg by mouth daily.     • atenolol (TENORMIN) 25 MG tablet Take 25 mg by mouth Daily.     • Evolocumab (REPATHA SURECLICK) 140 MG/ML solution auto-injector Inject 1 mL under the skin into the appropriate  "area as directed Every 14 (Fourteen) Days. 2 mL 5   • ferrous sulfate 325 (65 FE) MG tablet Take 1 tablet by mouth Daily With Breakfast. 60 tablet 1   • fluticasone-salmeterol (ADVAIR) 500-50 MCG/DOSE DISKUS Inhale 1 puff 2 (Two) Times a Day.     • isosorbide mononitrate (IMDUR) 60 MG 24 hr tablet Take 1 tablet by mouth Daily. 30 tablet 6   • nitroglycerin (NITROSTAT) 0.4 MG SL tablet 1 under the tongue as needed for angina, may repeat q5mins for up three doses (Patient taking differently: Place 0.4 mg under the tongue Every 5 (Five) Minutes As Needed for Chest Pain. 1 under the tongue as needed for angina, may repeat q5mins for up three doses) 100 tablet 3   • pantoprazole (PROTONIX) 20 MG EC tablet Take 20 mg by mouth Daily.     • prasugrel (EFFIENT) 10 MG tablet Take 1 tablet by mouth Daily. 30 tablet 6   • pravastatin (PRAVACHOL) 20 MG tablet Take 1 tablet by mouth Daily. 30 tablet 6   • spironolactone (ALDACTONE) 25 MG tablet Take 1 tablet by mouth Daily. 30 tablet 6   • traMADol (ULTRAM) 50 MG tablet Take 50 mg by mouth every 6 (six) hours as needed for moderate pain (4-6).       No current facility-administered medications for this visit.         Allergies   Allergen Reactions   • Bactrim [Sulfamethoxazole-Trimethoprim] Rash   • Ciprofloxacin Other (See Comments)     tremors       Objective     /72 (BP Location: Left arm, Patient Position: Sitting)   Pulse 65   Ht 157.5 cm (62\")   Wt 52.4 kg (115 lb 9.6 oz)   SpO2 98%   BMI 21.14 kg/m²     Physical Exam   Constitutional: She is oriented to person, place, and time. She appears well-developed and well-nourished.   HENT:   Head: Normocephalic.   Eyes: Pupils are equal, round, and reactive to light.   Neck: Neck supple. No JVD present.   Cardiovascular: Normal rate and regular rhythm.   Pulmonary/Chest: Effort normal and breath sounds normal. She has no wheezes.   Abdominal: Soft. Bowel sounds are normal.   Musculoskeletal: She exhibits no edema.   "   Neurological: She is alert and oriented to person, place, and time.   Skin: Skin is warm.   Psychiatric: She has a normal mood and affect. Judgment normal.       DATA:      Results for orders placed during the hospital encounter of 02/22/18   Adult Transthoracic Echo Complete W/ Cont if Necessary Per Protocol    Narrative · Left ventricular systolic function is normal.  · Estimated EF appears to be in the range of 61 - 65%. Normal left   ventricular cavity size and wall thickness noted. All left ventricular   wall segments contract normally.     No significant valvular heart disease noted.  Essentially normal cardiac structure and function.        Results for orders placed during the hospital encounter of 11/25/19   Stress Test With Myocardial Perfusion One Day    Narrative · Breast attenuation artifact is present.  · Left ventricular ejection fraction is normal (Calculated EF = 64%).  · Myocardial perfusion imaging indicates a medium-sized, mild-to-moderate   area of ischemia located in the anterior wall.  · Impressions are consistent with an intermediate risk study.  · Findings consistent with an abnormal ECG stress test.  · Symptoms of nausea with Lexiscan may be non-specific, and mild ST   depression noted in inferolateral leads.  · Partially reversible anterior defect may also represent breast tissue   artifact however may represent ischemia.  · Clinical correlation is required.         Results for orders placed during the hospital encounter of 11/25/19   Stress Test With Myocardial Perfusion One Day    Narrative · Breast attenuation artifact is present.  · Left ventricular ejection fraction is normal (Calculated EF = 64%).  · Myocardial perfusion imaging indicates a medium-sized, mild-to-moderate   area of ischemia located in the anterior wall.  · Impressions are consistent with an intermediate risk study.  · Findings consistent with an abnormal ECG stress test.  · Symptoms of nausea with Lexiscan may be  non-specific, and mild ST   depression noted in inferolateral leads.  · Partially reversible anterior defect may also represent breast tissue   artifact however may represent ischemia.  · Clinical correlation is required.         Results for orders placed during the hospital encounter of 12/18/19   Cardiac Catheterization/Vascular Study    Addendum                 CARDIAC CATHETERIZATION / INTERVENTION REPORT       DATE OF PROCEDURE: 12/18/2019    INDICATION FOR PROCEDURE: Abnormal stress test   PRE PROCEDURE DIAGNOSIS: CAD s/p LAD /D1 PCI in past CCS class 2 Abnormal stress test with anterolateral mild to moderate ischemia  POST PROCEDURE DIAGNOSIS: CAD LAD/Dx bifurcation lesion cool 1,1,1 with LAD just after takeoff of  Dx had 80%, Dx itself has 90% instent restenosis, diffuse type3, and also  severe 90% stenosis just distal to stent edge, and LAD before Dx also had  70% stenosis, mid LAD beyond this bifurcation also had 60-70% tubular  stenosis. OM1 branch also had proximal 50-60% stenosis, and Native lcx  after OM1 branch is small artery with mild diffuse disease. RCA Proximal  had 40% , mid 40% and distal mild 20% diffuse stenosis, PDA ostial had  diffuse 40%, PL no significant stenosis.    Face to face mdoerate conscious  sedation time :    COMPLICATIONS : None  Specimens collected : None   PROCEDURE PERFORMED:   1. Selective right and left Coronary Angiogram 2. Left heart catheretization   3. Left Ventriculography  Description of the procedure: Prior to the procedure risk, benefits and possible alternative were  discussed with the patient and informed consent was obtained. Patient was  brought to cardiac cath lab table in post absorbtive state. Patient was  prepped and drape in usual sterile fashion. IV Versed and Fentanyl was  used for moderate sedation. 2% Lidocaine was used for topical anesthesia.  R radial arterial site was prepped and a micropuncture needle was used to  access the artery and a 5 F  slender sheath was placed. 2.5 mg of Verapamil  and 200 mcg of NTG was given through the sheath intra arterial and 5000  units of Heparin was given once the catheter crossed the aortic arch.    5 F TIG 4 catheters was used for right and left coronary angiogram and 5  F pigtail catheter was used for Left heart hemodynamics and left  ventriculography. All the catheters were exchanged over 0.035 wire. The R  radial arterial sheath was removed and TR band was applied and immediate  and complete hemostasis was achieved. The patient tolerate the entire  procedure well without any immediate known complications.  Coronary anatomy findings:  LM: Is a large calibre vessel , normal take off from left cusp, divides  into LAD and Lcx. Distal had mild 30% tubular stenosis.  LAD:  LAD just after takeoff of Dx had 80%, Dx itself has 90% instent  restenosis, diffuse type3, and also severe 90% stenosis just distal to  stent edge, and LAD before Dx also had 70% stenosis, mid LAD beyond this  bifurcation also had 60-70% tubular stenosis.   LCX:  OM1 branch also had proximal 50-60% stenosis, and Native lcx after  OM1 branch is small artery with mild diffuse disease.   RCA: Large calibre, dominant artery, normal take off from right cusp.. RCA  Proximal had 40% , mid 40% and distal mild 20% diffuse stenosis, PDA  ostial had diffuse 40%, PL no significant stenosis.    Left Ventriculography:  LV systolic function was normal with visual estimated EF of 65%. No wall  motion abnormalities.  No significant mitral regurgitation noted.   LVEDP: 14 mmHg No gradient across the aortic valve on pull back.       Final Impression: CAD LAD/Dx bifurcation lesion cool 1,1,1 with LAD just after takeoff of  Dx had 80%, Dx itself has 90% instent restenosis, diffuse type3, and also  severe 90% stenosis just distal to stent edge, and LAD before Dx also had  70% stenosis, mid LAD beyond this bifurcation also had 60-70% tubular  stenosis. OM1 branch also had  proximal 50-60% stenosis, and Native lcx  after OM1 branch is small artery with mild diffuse disease. RCA Proximal  had 40% , mid 40% and distal mild 20% diffuse stenosis, PDA ostial had  diffuse 40%, PL no significant stenosis.  Normal LV systolic function   Recommendations: Pt has complex LAD /Dx bifurcation lesion with severe in stent restenosis  of D1, discussed with Interventionalist at [facility] regarding PCI options vs  CABG for better long term patency, pt initially was not interested in CABG  but after explaining her complexity of lesions she was open to , but  wanted to have another opinion, so we will refer to Dr Marcus at [facility] and  have her discuss about the options.  She is not having more than CCS II angina now on medications, will  continue with same.     Lazaro Conway MD, [facility] Interventional Cardiology  12/30/19 8:49 PM       Lazaro Conway MD 1/2/2020  8:03 PM          Narrative                 CARDIAC CATHETERIZATION / INTERVENTION REPORT             DATE OF PROCEDURE: 12/30/2019       INDICATION FOR PROCEDURE: Abnormal stress test      PRE PROCEDURE DIAGNOSIS:  CAD s/p LAD /D1 PCI in past  CCS class 2  Abnormal stress test with anterolateral mild to moderate ischemia    POST PROCEDURE DIAGNOSIS:  CAD LAD/Dx bifurcation lesion cool 1,1,1 with LAD just after takeoff of   Dx had 80%, Dx itself has 90% instent restenosis, diffuse type3, and also   severe 90% stenosis just distal to stent edge, and LAD before Dx also had   70% stenosis, mid LAD beyond this bifurcation also had 60-70% tubular   stenosis. OM1 branch also had proximal 50-60% stenosis, and Native lcx   after OM1 branch is small artery with mild diffuse disease. RCA Proximal   had 40% , mid 40% and distal mild 20% diffuse stenosis, PDA ostial had   diffuse 40%, PL no significant stenosis.       Face to face mdoerate conscious  sedation time :       COMPLICATIONS : None    Specimens collected : None     PROCEDURE PERFORMED:          1. Selective right and left Coronary Angiogram  2. Left heart catheretization    3. Left Ventriculography    Description of the procedure:  Prior to the procedure risk, benefits and possible alternative were   discussed with the patient and informed consent was obtained. Patient was   brought to cardiac cath lab table in post absorbtive state. Patient was   prepped and drape in usual sterile fashion. IV Versed and Fentanyl was   used for moderate sedation. 2% Lidocaine was used for topical anesthesia.   R radial arterial site was prepped and a micropuncture needle was used to   access the artery and a 5 F slender sheath was placed. 2.5 mg of Verapamil   and 200 mcg of NTG was given through the sheath intra arterial and 5000   units of Heparin was given once the catheter crossed the aortic arch.      5 F TIG 4 catheters was used for right and left coronary angiogram and 5   F pigtail catheter was used for Left heart hemodynamics and left   ventriculography. All the catheters were exchanged over 0.035 wire. The R   radial arterial sheath was removed and TR band was applied and immediate   and complete hemostasis was achieved. The patient tolerate the entire   procedure well without any immediate known complications.    Coronary anatomy findings:    LM: Is a large calibre vessel , normal take off from left cusp, divides   into LAD and Lcx. Distal had mild 30% tubular stenosis.    LAD:  LAD just after takeoff of Dx had 80%, Dx itself has 90% instent   restenosis, diffuse type3, and also severe 90% stenosis just distal to   stent edge, and LAD before Dx also had 70% stenosis, mid LAD beyond this   bifurcation also had 60-70% tubular stenosis.     LCX:  OM1 branch also had proximal 50-60% stenosis, and Native lcx after   OM1 branch is small artery with mild diffuse disease.     RCA: Large calibre, dominant artery, normal take off from right cusp.. RCA   Proximal had 40% , mid 40% and distal mild 20% diffuse stenosis,  PDA   ostial had diffuse 40%, PL no significant stenosis.       Left Ventriculography:    LV systolic function was normal with visual estimated EF of 65%. No wall   motion abnormalities.   No significant mitral regurgitation noted.     LVEDP: 14 mmHg  No gradient across the aortic valve on pull back.             Final Impression:  CAD LAD/Dx bifurcation lesion cool 1,1,1 with LAD just after takeoff of   Dx had 80%, Dx itself has 90% instent restenosis, diffuse type3, and also   severe 90% stenosis just distal to stent edge, and LAD before Dx also had   70% stenosis, mid LAD beyond this bifurcation also had 60-70% tubular   stenosis. OM1 branch also had proximal 50-60% stenosis, and Native lcx   after OM1 branch is small artery with mild diffuse disease. RCA Proximal   had 40% , mid 40% and distal mild 20% diffuse stenosis, PDA ostial had   diffuse 40%, PL no significant stenosis.   Normal LV systolic function      Recommendations:  Pt has complex LAD /Dx bifurcation lesion with severe in stent restenosis   of D1, discussed with Interventionalist at [place] [facility] regarding PCI options vs   CABG for better long term patency, pt initially was not interested in CABG   but after explaining her complexity of lesions she was open to , but   wanted to have another opinion, so we will refer to Dr Marcus at [place] [facility] and   have her discuss about the options.   She is not having more than CCS II angina now on medications, will   continue with same.          Lazaro Conway MD, [facility]  Interventional Cardiology    12/30/19  8:49 PM         Assessment:  Fatigue  CAD, Severe in stent restenosis of D1, and s/p LAD /D1 PCI in past   Essential Hypertension well controlled  Dyslipidemia improved LDL with Repatha  Chronic tobacco abuse  BLE leg pain      Plan:  Referral to Dr. Marcus PCI vs CABG. already discussed her coronary anatomy findings on the day of her coronary angiogram with possible approaches of laser atherectomy for in-stent restenosis  involving both LAD and the diagonal branches this is CABG which might give her better long-term outcomes.  Patient was initially not willing to go down the surgical revascularization pathway but now after better understanding she is more open to discuss about it.  Arterial US  No change in medications  Follow up in 3 months or sooner if needed.   I extensively discussed consequences of smoking namely cardiovascular/metabolic, lung cancer, mouth cancer, pulmonary disorder including COPD and reduced quality of life.  At the end of the conversation, patient voices understanding of the risk involved in smoking.  Patient also understands the benefits of quitting.  I provided the patient smoking cessation material. Patient displayed understanding and stated that he will try to quit smoking.  During this visit I spent 3-5  minutes counseling the patient regarding the smoking cessation.    Recommended increase activity to 30 minutes of walking daily, most days of the week.  Discussed diet and weight loss with patient.        Patient's Body mass index is 21.14 kg/m². BMI is within normal parameters. No follow-up required..       Return in about 3 months (around 6/11/2020).    Thank you for allowing me to participate in the care of Veda Enamorado. Feel free to contact me directly with any further questions or concerns.          Lazaro Conway MD, FACC  Interventional Cardiology    MARYJANE Ferrell, acting as scribe for Lazaro Conway MD, [facility], [facility].     03/11/20  15:51

## 2020-03-24 ENCOUNTER — HOSPITAL ENCOUNTER (OUTPATIENT)
Dept: CARDIOLOGY | Facility: HOSPITAL | Age: 55
Discharge: HOME | End: 2020-03-24
Admitting: INTERNAL MEDICINE

## 2020-03-24 DIAGNOSIS — I25.110 CORONARY ARTERY DISEASE INVOLVING NATIVE CORONARY ARTERY OF NATIVE HEART WITH UNSTABLE ANGINA PECTORIS (HCC): ICD-10-CM

## 2020-03-24 DIAGNOSIS — I10 ESSENTIAL HYPERTENSION: ICD-10-CM

## 2020-03-24 DIAGNOSIS — M79.604 PAIN IN BOTH LOWER EXTREMITIES: ICD-10-CM

## 2020-03-24 DIAGNOSIS — M79.605 PAIN IN BOTH LOWER EXTREMITIES: ICD-10-CM

## 2020-03-24 DIAGNOSIS — E78.2 MIXED HYPERLIPIDEMIA: ICD-10-CM

## 2020-03-24 PROCEDURE — 93925 LOWER EXTREMITY STUDY: CPT | Performed by: RADIOLOGY

## 2020-03-24 PROCEDURE — 93925 LOWER EXTREMITY STUDY: CPT

## 2020-03-25 ENCOUNTER — SPECIALTY PHARMACY (OUTPATIENT)
Dept: PHARMACY | Facility: HOSPITAL | Age: 55
End: 2020-03-25

## 2020-03-25 ENCOUNTER — TELEPHONE (OUTPATIENT)
Dept: CARDIOLOGY | Facility: CLINIC | Age: 55
End: 2020-03-25

## 2020-03-25 NOTE — TELEPHONE ENCOUNTER
Called patient per Dr. Chang, she is aware and understands that her Arterial doppler of her legs were normal.

## 2020-03-25 NOTE — TELEPHONE ENCOUNTER
----- Message from Lazaro Conway MD sent at 3/24/2020  3:24 PM EDT -----  Please call patient to and tell him his test was normal.   Thanks.

## 2020-03-27 NOTE — PROGRESS NOTES
Specialty Pharmacy Note      Name:  Veda Enamorado  :  1965  Date:  2020         Past Medical History:   Diagnosis Date   • Arthritis    • Back ache    • Coronary artery disease    • Hyperlipidemia    • Hypertension        Past Surgical History:   Procedure Laterality Date   • CARDIAC CATHETERIZATION     • CARDIAC CATHETERIZATION N/A 2019    Procedure: Left Heart Cath;  Surgeon: Lazaro Conway MD;  Location: [facility] CATH INVASIVE LOCATION;  Service: Cardiology   • PERINEAL LESION/CYST EXCISION Right 2020    Procedure: I&D ABSCESS;  Surgeon: Leander Cardenas III, MD;  Location: [facility] OR;  Service: Obstetrics/Gynecology       Social History     Socioeconomic History   • Marital status:      Spouse name: Not on file   • Number of children: Not on file   • Years of education: Not on file   • Highest education level: Not on file   Tobacco Use   • Smoking status: Current Every Day Smoker     Packs/day: 0.50     Years: 25.00     Pack years: 12.50     Types: Cigarettes   • Smokeless tobacco: Never Used   Substance and Sexual Activity   • Alcohol use: No   • Drug use: No   • Sexual activity: Defer       Family History   Problem Relation Age of Onset   • Heart disease Mother    • Heart disease Father    • Heart attack Father    • No Known Problems Sister    • Heart attack Brother    • Heart disease Brother    • Breast cancer Neg Hx        Allergies   Allergen Reactions   • Bactrim [Sulfamethoxazole-Trimethoprim] Rash   • Ciprofloxacin Other (See Comments)     tremors       Current Outpatient Medications   Medication Sig Dispense Refill   • aspirin 81 MG EC tablet Take 81 mg by mouth daily.     • atenolol (TENORMIN) 25 MG tablet Take 25 mg by mouth Daily.     • Evolocumab (Repatha SureClick) 140 MG/ML solution auto-injector Inject 1 mL under the skin into the appropriate area as directed Every 14 (Fourteen) Days. 2 mL 5   • ferrous sulfate 325 (65 FE) MG tablet Take 1 tablet by  mouth Daily With Breakfast. 60 tablet 1   • fluticasone-salmeterol (ADVAIR) 500-50 MCG/DOSE DISKUS Inhale 1 puff 2 (Two) Times a Day.     • isosorbide mononitrate (IMDUR) 60 MG 24 hr tablet Take 1 tablet by mouth Daily. 30 tablet 6   • nitroglycerin (NITROSTAT) 0.4 MG SL tablet 1 under the tongue as needed for angina, may repeat q5mins for up three doses (Patient taking differently: Place 0.4 mg under the tongue Every 5 (Five) Minutes As Needed for Chest Pain. 1 under the tongue as needed for angina, may repeat q5mins for up three doses) 100 tablet 3   • pantoprazole (PROTONIX) 20 MG EC tablet Take 20 mg by mouth Daily.     • prasugrel (EFFIENT) 10 MG tablet Take 1 tablet by mouth Daily. 30 tablet 6   • pravastatin (PRAVACHOL) 20 MG tablet Take 1 tablet by mouth Daily. 30 tablet 6   • spironolactone (ALDACTONE) 25 MG tablet Take 1 tablet by mouth Daily. 30 tablet 6   • traMADol (ULTRAM) 50 MG tablet Take 50 mg by mouth every 6 (six) hours as needed for moderate pain (4-6).       No current facility-administered medications for this visit.          LABORATORY:    Lab Results   Component Value Date    PROTIME 13.5 12/16/2019    INR 0.98 12/16/2019     Lab Results   Component Value Date    PROTIME 13.5 12/16/2019    INR 0.98 12/16/2019    PTT 25.0 12/16/2019       Last Urine Toxicity     There is no flowsheet data to display.          ASSESSMENT/PLAN:    Patient Update Assessment (new medications, allergies, medical history): No changes.      Medication(s): REPATHA SURECLICK 140 MG/ML solution auto-injector.     Currently Taking Medication(s): Patient reports taking medication as directed.      Experiencing Side Effects: Minimal.     Prior Authorization Status: Approved.      Financial Assistance Status: Assistance is not needed at this time.  Patient copay balance due after insurance is $4.     Any Issues Identified: No issues at this time.     Appropriate to Process Prescription(s):  Yes.  Medication refill will be  dispensed from [facility] and delivered via home delivery services per patient request.    Counseling Offered: Patient previously counseled upon initiation of therapy, aware to contact pharmacy with any new questions or concerns.      Next Specialty Pharmacy Visit:  04/16/2020

## 2020-04-23 ENCOUNTER — SPECIALTY PHARMACY (OUTPATIENT)
Dept: PHARMACY | Facility: HOSPITAL | Age: 55
End: 2020-04-23

## 2020-04-28 NOTE — PROGRESS NOTES
Specialty Pharmacy Note      Name:  Veda Enamorado  :  1965  Date:  2020         Past Medical History:   Diagnosis Date   • Arthritis    • Back ache    • Coronary artery disease    • Hyperlipidemia    • Hypertension        Past Surgical History:   Procedure Laterality Date   • CARDIAC CATHETERIZATION     • CARDIAC CATHETERIZATION N/A 2019    Procedure: Left Heart Cath;  Surgeon: Lazaro Conway MD;  Location: [facility] CATH INVASIVE LOCATION;  Service: Cardiology   • PERINEAL LESION/CYST EXCISION Right 2020    Procedure: I&D ABSCESS;  Surgeon: Leander Cardenas III, MD;  Location: [facility] OR;  Service: Obstetrics/Gynecology       Social History     Socioeconomic History   • Marital status:      Spouse name: Not on file   • Number of children: Not on file   • Years of education: Not on file   • Highest education level: Not on file   Tobacco Use   • Smoking status: Current Every Day Smoker     Packs/day: 0.50     Years: 25.00     Pack years: 12.50     Types: Cigarettes   • Smokeless tobacco: Never Used   Substance and Sexual Activity   • Alcohol use: No   • Drug use: No   • Sexual activity: Defer       Family History   Problem Relation Age of Onset   • Heart disease Mother    • Heart disease Father    • Heart attack Father    • No Known Problems Sister    • Heart attack Brother    • Heart disease Brother    • Breast cancer Neg Hx        Allergies   Allergen Reactions   • Bactrim [Sulfamethoxazole-Trimethoprim] Rash   • Ciprofloxacin Other (See Comments)     tremors       Current Outpatient Medications   Medication Sig Dispense Refill   • aspirin 81 MG EC tablet Take 81 mg by mouth daily.     • atenolol (TENORMIN) 25 MG tablet Take 25 mg by mouth Daily.     • Evolocumab (Repatha SureClick) 140 MG/ML solution auto-injector Inject 1 mL under the skin into the appropriate area as directed Every 14 (Fourteen) Days. 2 mL 5   • ferrous sulfate 325 (65 FE) MG tablet Take 1 tablet by  mouth Daily With Breakfast. 60 tablet 1   • fluticasone-salmeterol (ADVAIR) 500-50 MCG/DOSE DISKUS Inhale 1 puff 2 (Two) Times a Day.     • isosorbide mononitrate (IMDUR) 60 MG 24 hr tablet Take 1 tablet by mouth Daily. 30 tablet 6   • nitroglycerin (NITROSTAT) 0.4 MG SL tablet 1 under the tongue as needed for angina, may repeat q5mins for up three doses (Patient taking differently: Place 0.4 mg under the tongue Every 5 (Five) Minutes As Needed for Chest Pain. 1 under the tongue as needed for angina, may repeat q5mins for up three doses) 100 tablet 3   • pantoprazole (PROTONIX) 20 MG EC tablet Take 20 mg by mouth Daily.     • prasugrel (EFFIENT) 10 MG tablet Take 1 tablet by mouth Daily. 30 tablet 6   • pravastatin (PRAVACHOL) 20 MG tablet Take 1 tablet by mouth Daily. 30 tablet 6   • spironolactone (ALDACTONE) 25 MG tablet Take 1 tablet by mouth Daily. 30 tablet 6   • traMADol (ULTRAM) 50 MG tablet Take 50 mg by mouth every 6 (six) hours as needed for moderate pain (4-6).       No current facility-administered medications for this visit.          LABORATORY:    Lab Results   Component Value Date    PROTIME 13.5 12/16/2019    INR 0.98 12/16/2019     Lab Results   Component Value Date    PROTIME 13.5 12/16/2019    INR 0.98 12/16/2019    PTT 25.0 12/16/2019       Last Urine Toxicity     There is no flowsheet data to display.          ASSESSMENT/PLAN:    Patient Update Assessment (new medications, allergies, medical history): No changes.      Medication(s): REPATHA SURECLICK 140 MG/ML solution auto-injector.     Currently Taking Medication(s): Patient reports taking medication as directed.      Experiencing Side Effects: Minimal.     Prior Authorization Status: Approved.      Financial Assistance Status: Assistance is not needed at this time.  Patient copay balance due after insurance is $0.     Any Issues Identified: No issues at this time.      Appropriate to Process Prescription(s): Yes.  Medication will be  dispensed from [facility] and delivered vis home delivery services per patient request.     Counseling Offered: Patient previously counseled upon initiation of therapy, aware to contact pharmacy with any new questions or concerns.     Next Specialty Pharmacy Visit: 05/20/2020

## 2020-05-07 DIAGNOSIS — I25.110 CORONARY ARTERY DISEASE INVOLVING NATIVE CORONARY ARTERY OF NATIVE HEART WITH UNSTABLE ANGINA PECTORIS (HCC): ICD-10-CM

## 2020-05-07 DIAGNOSIS — M79.604 PAIN IN BOTH LOWER EXTREMITIES: ICD-10-CM

## 2020-05-07 DIAGNOSIS — E78.2 MIXED HYPERLIPIDEMIA: ICD-10-CM

## 2020-05-07 DIAGNOSIS — I10 ESSENTIAL HYPERTENSION: Primary | ICD-10-CM

## 2020-05-07 DIAGNOSIS — M79.605 PAIN IN BOTH LOWER EXTREMITIES: ICD-10-CM

## 2020-05-07 RX ORDER — PRAVASTATIN SODIUM 20 MG
20 TABLET ORAL DAILY
Qty: 30 TABLET | Refills: 6 | Status: SHIPPED | OUTPATIENT
Start: 2020-05-07 | End: 2020-07-21 | Stop reason: HOSPADM

## 2020-05-07 RX ORDER — SPIRONOLACTONE 25 MG/1
25 TABLET ORAL DAILY
Qty: 30 TABLET | Refills: 6 | Status: SHIPPED | OUTPATIENT
Start: 2020-05-07 | End: 2020-12-08 | Stop reason: SDUPTHER

## 2020-05-07 RX ORDER — ATENOLOL 25 MG/1
25 TABLET ORAL DAILY
Qty: 30 TABLET | Refills: 6 | Status: SHIPPED | OUTPATIENT
Start: 2020-05-07 | End: 2020-07-21 | Stop reason: HOSPADM

## 2020-05-07 RX ORDER — ISOSORBIDE MONONITRATE 60 MG/1
60 TABLET, EXTENDED RELEASE ORAL DAILY
Qty: 30 TABLET | Refills: 6 | Status: SHIPPED | OUTPATIENT
Start: 2020-05-07 | End: 2020-12-08 | Stop reason: SDUPTHER

## 2020-06-02 ENCOUNTER — DISEASE STATE MANAGEMENT VISIT (OUTPATIENT)
Dept: PHARMACY | Facility: HOSPITAL | Age: 55
End: 2020-06-02

## 2020-06-02 NOTE — PROGRESS NOTES
Pharmacy Note  Medication:  Praluent  Dose: 75mg every 2 weeks  Referring Provider: Sharda Meyers  Start Date: 1/27/20  Last Injection: 5/19/20   PA status/Time Period: TBD    Diagnosis Details:     Established CVD (with primary hyperlipidemia):  ACS +  Hx of MI +  Stable/unstable angina +  Coronary or other arterial revascularization +      Past Medical History:   Diagnosis Date   • Arthritis    • Back ache    • Coronary artery disease    • Hyperlipidemia    • Hypertension      Social History     Socioeconomic History   • Marital status:      Spouse name: Not on file   • Number of children: Not on file   • Years of education: Not on file   • Highest education level: Not on file   Tobacco Use   • Smoking status: Current Every Day Smoker     Packs/day: 0.50     Years: 25.00     Pack years: 12.50     Types: Cigarettes   • Smokeless tobacco: Never Used   Substance and Sexual Activity   • Alcohol use: No   • Drug use: No   • Sexual activity: Defer     Allergies   Allergen Reactions   • Bactrim [Sulfamethoxazole-Trimethoprim] Rash   • Ciprofloxacin Other (See Comments)     tremors     Current Outpatient Medications   Medication Sig Dispense Refill   • aspirin 81 MG EC tablet Take 81 mg by mouth daily.     • atenolol (TENORMIN) 25 MG tablet Take 1 tablet by mouth Daily. 30 tablet 6   • ferrous sulfate 325 (65 FE) MG tablet Take 1 tablet by mouth Daily With Breakfast. 60 tablet 1   • fluticasone-salmeterol (ADVAIR) 500-50 MCG/DOSE DISKUS Inhale 1 puff 2 (Two) Times a Day.     • isosorbide mononitrate (IMDUR) 60 MG 24 hr tablet Take 1 tablet by mouth Daily. 30 tablet 6   • nitroglycerin (NITROSTAT) 0.4 MG SL tablet 1 under the tongue as needed for angina, may repeat q5mins for up three doses (Patient taking differently: Place 0.4 mg under the tongue Every 5 (Five) Minutes As Needed for Chest Pain. 1 under the tongue as needed for angina, may repeat q5mins for up three doses) 100 tablet 3   • pantoprazole (PROTONIX)  20 MG EC tablet Take 20 mg by mouth Daily.     • prasugrel (EFFIENT) 10 MG tablet Take 1 tablet by mouth Daily. 30 tablet 6   • pravastatin (PRAVACHOL) 20 MG tablet Take 1 tablet by mouth Daily. 30 tablet 6   • spironolactone (ALDACTONE) 25 MG tablet Take 1 tablet by mouth Daily. 30 tablet 6   • traMADol (ULTRAM) 50 MG tablet Take 50 mg by mouth every 6 (six) hours as needed for moderate pain (4-6).       No current facility-administered medications for this visit.        Labs  Lab Results   Component Value Date    CHOL 116 03/02/2020    CHLPL 215 (H) 04/11/2016    TRIG 104 03/02/2020    HDL 48 03/02/2020    LDL 47 03/02/2020       Assessment     Drug Dates Notes   Current Lipid-lowering Thearpy Pravastatin 20mg  Repatha 140mg every 2 weeks       Previous Lipid-lowering Therapy Lipitor [id number] (Per patient) Myalgias                           Pt was assessed today for PCSK9 inhibitor therapy.  The patient denies any issues with Repatha. Insurance now considers Repatha a non-preferred drug.     Plan    Change Repatha to Praluent 75mg every 2 weeks due to insurance formulary change. Pt educated on change and has no questions.     Pt requests mail out services each month.     Kaia Stein, LT[facility], located within [facility]  06/02/20  16:43

## 2020-06-15 ENCOUNTER — OFFICE VISIT (OUTPATIENT)
Dept: CARDIOLOGY | Facility: CLINIC | Age: 55
End: 2020-06-15

## 2020-06-15 VITALS
OXYGEN SATURATION: 99 % | BODY MASS INDEX: 21.79 KG/M2 | HEART RATE: 59 BPM | WEIGHT: 118.4 LBS | DIASTOLIC BLOOD PRESSURE: 73 MMHG | SYSTOLIC BLOOD PRESSURE: 143 MMHG | HEIGHT: 62 IN

## 2020-06-15 DIAGNOSIS — I25.119 ANGINA CONCURRENT WITH AND DUE TO ARTERIOSCLEROSIS OF CORONARY ARTERY (HCC): ICD-10-CM

## 2020-06-15 DIAGNOSIS — E78.2 MIXED HYPERLIPIDEMIA: ICD-10-CM

## 2020-06-15 DIAGNOSIS — I10 ESSENTIAL HYPERTENSION: ICD-10-CM

## 2020-06-15 DIAGNOSIS — Z72.0 TOBACCO USE: ICD-10-CM

## 2020-06-15 DIAGNOSIS — I25.110 CORONARY ARTERY DISEASE INVOLVING NATIVE CORONARY ARTERY OF NATIVE HEART WITH UNSTABLE ANGINA PECTORIS (HCC): Primary | ICD-10-CM

## 2020-06-15 PROCEDURE — 99214 OFFICE O/P EST MOD 30 MIN: CPT | Performed by: INTERNAL MEDICINE

## 2020-06-15 RX ORDER — RANOLAZINE 500 MG/1
500 TABLET, EXTENDED RELEASE ORAL 2 TIMES DAILY
Qty: 60 TABLET | Refills: 5 | Status: SHIPPED | OUTPATIENT
Start: 2020-06-15 | End: 2020-07-05

## 2020-06-15 RX ORDER — RANOLAZINE 500 MG/1
500 TABLET, EXTENDED RELEASE ORAL 2 TIMES DAILY
COMMUNITY
End: 2020-06-15 | Stop reason: SDUPTHER

## 2020-06-15 NOTE — PROGRESS NOTES
[facility] CARDIOLOGY  [address] [facility] [address]  [place] [address]  Phone: [phone]  Fax: [phone]    06/15/2020    Chief Complaint   Patient presents with   • Coronary Artery Disease   • Hypertension   • Hyperlipidemia        History:   Veda Enamorado is a 55 y.o. female presents today for a regular follow up. She has a past medical history significant for CAD, she had coronary angiogram in #2019 following abnormal stress test which showed severe proximal mid LAD bifurcation disease involving a large diagonal branch in the diagonal artery itself in the proximal portion had a stent with a severe 90% diffuse in-stent restenosis and also a 90% lesion beyond the stent in the mid diagonal artery, essential hypertension, and dyslipidemia. She has complaints of intermittent episodes of left arm pain, gradually worsening, last up to 5-10 minutes. Happens on and off everyother day.  Mrs. Enamorado in the past voiced her wishes that she does not want bypass surgery for her complex mid LAD diagonal artery lesions which I thought personally would be her best option for giving her favorable long-term patency as she already has severe in-stent restenosis in the diagonal artery stent, she again voiced her wishes that she does not want to have open heart surgery and would like to have only transcatheter percutaneous approach.  I have discussed her coronary angiogram findings with Dr. Marcus at [facility] she is supposed to have a follow-up with Dr. Marcus on July 6, 2020, she did mention if it does possibility that she can go over there directly for procedure before an office visit.  The chart and medications were reviewed today. Problems above addressed this visit.       Past Medical History:   Diagnosis Date   • Arthritis    • Back ache    • Coronary artery disease    • Hyperlipidemia    • Hypertension        Past Surgical History:   Procedure Laterality Date   • CARDIAC CATHETERIZATION      • CARDIAC CATHETERIZATION N/A 12/18/2019    Procedure: Left Heart Cath;  Surgeon: Lazaro Conway MD;  Location:  [facility] CATH [facility]AS[facility];  Service: Cardiology   • PERINEAL LESION/CYST EXCISION Right 1/9/2020    Procedure: I&D ABSCESS;  Surgeon: Leander Cardenas III, MD;  Location:  [facility] OR;  Service: Obstetrics/Gynecology        Past Social History:  Social History     Socioeconomic History   • Marital status:      Spouse name: Not on file   • Number of children: Not on file   • Years of education: Not on file   • Highest education level: Not on file   Tobacco Use   • Smoking status: Current Every Day Smoker     Packs/day: 0.25     Years: 25.00     Pack years: 6.25     Types: Cigarettes, Electronic Cigarette   • Smokeless tobacco: Never Used   • Tobacco comment: Pt states only smoking a few cigs a day, transitioning to E-Cig.   Substance and Sexual Activity   • Alcohol use: No   • Drug use: No   • Sexual activity: Defer       Past Family History:  Family History   Problem Relation Age of Onset   • Heart disease Mother    • Heart disease Father    • Heart attack Father    • No Known Problems Sister    • Heart attack Brother    • Heart disease Brother    • Breast cancer Neg Hx        Review of Systems:   Review of Systems   Constitution: Positive for malaise/fatigue. Negative for diaphoresis, fever, weight gain and weight loss.   HENT: Negative for congestion, nosebleeds and sore throat.    Eyes: Negative for blurred vision and visual disturbance.   Cardiovascular: Positive for chest pain and palpitations. Negative for claudication, dyspnea on exertion, irregular heartbeat, leg swelling, orthopnea, paroxysmal nocturnal dyspnea and syncope.   Respiratory: Negative for cough, hemoptysis, shortness of breath, sleep disturbances due to breathing, snoring, sputum production and wheezing.    Endocrine: Negative for cold intolerance, heat intolerance, polydipsia, polyphagia and polyuria.      Hematologic/Lymphatic: Negative for bleeding problem.   Skin: Negative for dry skin, itching and rash.   Musculoskeletal: Negative for muscle cramps, muscle weakness and myalgias.   Gastrointestinal: Negative for abdominal pain, constipation, diarrhea, heartburn, hematemesis, hematochezia, hemorrhoids, melena, nausea and vomiting.   Genitourinary: Negative for hematuria and nocturia.   Neurological: Negative for excessive daytime sleepiness, dizziness, focal weakness, headaches, light-headedness, numbness, seizures, vertigo and weakness.   Psychiatric/Behavioral: Negative for depression, substance abuse and suicidal ideas.   Allergic/Immunologic: Negative for environmental allergies.         Current Outpatient Medications   Medication Sig Dispense Refill   • Alirocumab (Praluent) 75 MG/ML solution auto-injector Inject 1 mL under the skin into the appropriate area as directed Every 14 (Fourteen) Days. 2 mL 5   • aspirin 81 MG EC tablet Take 81 mg by mouth daily.     • atenolol (TENORMIN) 25 MG tablet Take 1 tablet by mouth Daily. 30 tablet 6   • ferrous sulfate 325 (65 FE) MG tablet Take 1 tablet by mouth Daily With Breakfast. 60 tablet 1   • fluticasone-salmeterol (ADVAIR) 500-50 MCG/DOSE DISKUS Inhale 1 puff 2 (Two) Times a Day.     • isosorbide mononitrate (IMDUR) 60 MG 24 hr tablet Take 1 tablet by mouth Daily. 30 tablet 6   • nitroglycerin (NITROSTAT) 0.4 MG SL tablet 1 under the tongue as needed for angina, may repeat q5mins for up three doses (Patient taking differently: Place 0.4 mg under the tongue Every 5 (Five) Minutes As Needed for Chest Pain. 1 under the tongue as needed for angina, may repeat q5mins for up three doses) 100 tablet 3   • pantoprazole (PROTONIX) 20 MG EC tablet Take 20 mg by mouth Daily.     • prasugrel (EFFIENT) 10 MG tablet Take 1 tablet by mouth Daily. 30 tablet 6   • pravastatin (PRAVACHOL) 20 MG tablet Take 1 tablet by mouth Daily. 30 tablet 6   • ranolazine (RANEXA) 500 MG 12 hr  "tablet Take 1 tablet by mouth 2 (Two) Times a Day. 60 tablet 5   • spironolactone (ALDACTONE) 25 MG tablet Take 1 tablet by mouth Daily. 30 tablet 6   • traMADol (ULTRAM) 50 MG tablet Take 50 mg by mouth every 6 (six) hours as needed for moderate pain (4-6).       No current facility-administered medications for this visit.         Allergies   Allergen Reactions   • Bactrim [Sulfamethoxazole-Trimethoprim] Rash   • Ciprofloxacin Other (See Comments)     tremors       Objective     /73 (BP Location: Left arm, Patient Position: Sitting)   Pulse 59   Ht 157.5 cm (62\")   Wt 53.7 kg (118 lb 6.4 oz)   SpO2 99%   BMI 21.66 kg/m²     Physical Exam   Constitutional: She is oriented to person, place, and time. She appears well-developed and well-nourished. No distress.   HENT:   Head: Normocephalic.   Eyes: Pupils are equal, round, and reactive to light.   Neck: Neck supple. No thyromegaly present.   Cardiovascular: Normal rate and regular rhythm.   No murmur heard.  Abdominal: Soft. Bowel sounds are normal.   Musculoskeletal: She exhibits no edema.   Neurological: She is alert and oriented to person, place, and time.   Skin: No erythema.   Psychiatric: She has a normal mood and affect. Judgment normal.       DATA:      Results for orders placed during the hospital encounter of 02/22/18   Adult Transthoracic Echo Complete W/ Cont if Necessary Per Protocol    Narrative · Left ventricular systolic function is normal.  · Estimated EF appears to be in the range of 61 - 65%. Normal left   ventricular cavity size and wall thickness noted. All left ventricular   wall segments contract normally.     No significant valvular heart disease noted.  Essentially normal cardiac structure and function.        Results for orders placed during the hospital encounter of 11/25/19   Stress Test With Myocardial Perfusion One Day    Narrative · Breast attenuation artifact is present.  · Left ventricular ejection fraction is normal " (Calculated EF = 64%).  · Myocardial perfusion imaging indicates a medium-sized, mild-to-moderate   area of ischemia located in the anterior wall.  · Impressions are consistent with an intermediate risk study.  · Findings consistent with an abnormal ECG stress test.  · Symptoms of nausea with Lexiscan may be non-specific, and mild ST   depression noted in inferolateral leads.  · Partially reversible anterior defect may also represent breast tissue   artifact however may represent ischemia.  · Clinical correlation is required.         Results for orders placed during the hospital encounter of 11/25/19   Stress Test With Myocardial Perfusion One Day    Narrative · Breast attenuation artifact is present.  · Left ventricular ejection fraction is normal (Calculated EF = 64%).  · Myocardial perfusion imaging indicates a medium-sized, mild-to-moderate   area of ischemia located in the anterior wall.  · Impressions are consistent with an intermediate risk study.  · Findings consistent with an abnormal ECG stress test.  · Symptoms of nausea with Lexiscan may be non-specific, and mild ST   depression noted in inferolateral leads.  · Partially reversible anterior defect may also represent breast tissue   artifact however may represent ischemia.  · Clinical correlation is required.         Results for orders placed during the hospital encounter of 12/18/19   Cardiac Catheterization/Vascular Study    Addendum                 CARDIAC CATHETERIZATION / INTERVENTION REPORT       DATE OF PROCEDURE: 12/18/2019    INDICATION FOR PROCEDURE: Abnormal stress test   PRE PROCEDURE DIAGNOSIS: CAD s/p LAD /D1 PCI in past CCS class 2 Abnormal stress test with anterolateral mild to moderate ischemia  POST PROCEDURE DIAGNOSIS: CAD LAD/Dx bifurcation lesion cool 1,1,1 with LAD just after takeoff of  Dx had 80%, Dx itself has 90% instent restenosis, diffuse type3, and also  severe 90% stenosis just distal to stent edge, and LAD before Dx also  had  70% stenosis, mid LAD beyond this bifurcation also had 60-70% tubular  stenosis. OM1 branch also had proximal 50-60% stenosis, and Native lcx  after OM1 branch is small artery with mild diffuse disease. RCA Proximal  had 40% , mid 40% and distal mild 20% diffuse stenosis, PDA ostial had  diffuse 40%, PL no significant stenosis.    Face to face mdoerate conscious  sedation time :    COMPLICATIONS : None  Specimens collected : None   PROCEDURE PERFORMED:   1. Selective right and left Coronary Angiogram 2. Left heart catheretization   3. Left Ventriculography  Description of the procedure: Prior to the procedure risk, benefits and possible alternative were  discussed with the patient and informed consent was obtained. Patient was  brought to cardiac cath lab table in post absorbtive state. Patient was  prepped and drape in usual sterile fashion. IV Versed and Fentanyl was  used for moderate sedation. 2% Lidocaine was used for topical anesthesia.  R radial arterial site was prepped and a micropuncture needle was used to  access the artery and a 5 F slender sheath was placed. 2.5 mg of Verapamil  and 200 mcg of NTG was given through the sheath intra arterial and 5000  units of Heparin was given once the catheter crossed the aortic arch.    5 F TIG 4 catheters was used for right and left coronary angiogram and 5  F pigtail catheter was used for Left heart hemodynamics and left  ventriculography. All the catheters were exchanged over 0.035 wire. The R  radial arterial sheath was removed and TR band was applied and immediate  and complete hemostasis was achieved. The patient tolerate the entire  procedure well without any immediate known complications.  Coronary anatomy findings:  LM: Is a large calibre vessel , normal take off from left cusp, divides  into LAD and Lcx. Distal had mild 30% tubular stenosis.  LAD:  LAD just after takeoff of Dx had 80%, Dx itself has 90% instent  restenosis, diffuse type3, and also  severe 90% stenosis just distal to  stent edge, and LAD before Dx also had 70% stenosis, mid LAD beyond this  bifurcation also had 60-70% tubular stenosis.   LCX:  OM1 branch also had proximal 50-60% stenosis, and Native lcx after  OM1 branch is small artery with mild diffuse disease.   RCA: Large calibre, dominant artery, normal take off from right cusp.. RCA  Proximal had 40% , mid 40% and distal mild 20% diffuse stenosis, PDA  ostial had diffuse 40%, PL no significant stenosis.    Left Ventriculography:  LV systolic function was normal with visual estimated EF of 65%. No wall  motion abnormalities.  No significant mitral regurgitation noted.   LVEDP: 14 mmHg No gradient across the aortic valve on pull back.       Final Impression: CAD LAD/Dx bifurcation lesion cool 1,1,1 with LAD just after takeoff of  Dx had 80%, Dx itself has 90% instent restenosis, diffuse type3, and also  severe 90% stenosis just distal to stent edge, and LAD before Dx also had  70% stenosis, mid LAD beyond this bifurcation also had 60-70% tubular  stenosis. OM1 branch also had proximal 50-60% stenosis, and Native lcx  after OM1 branch is small artery with mild diffuse disease. RCA Proximal  had 40% , mid 40% and distal mild 20% diffuse stenosis, PDA ostial had  diffuse 40%, PL no significant stenosis.  Normal LV systolic function   Recommendations: Pt has complex LAD /Dx bifurcation lesion with severe in stent restenosis  of D1, discussed with Interventionalist at [facility] regarding PCI options vs  CABG for better long term patency, pt initially was not interested in CABG  but after explaining her complexity of lesions she was open to , but  wanted to have another opinion, so we will refer to Dr Marcus at [facility] and  have her discuss about the options.  She is not having more than CCS II angina now on medications, will  continue with same.     Lazaro Conway MD, [facility] Interventional Cardiology  12/30/19 8:49 PM       Lazaro Conway  MD 1/2/2020  8:03 PM          Narrative                 CARDIAC CATHETERIZATION / INTERVENTION REPORT             DATE OF PROCEDURE: 12/30/2019       INDICATION FOR PROCEDURE: Abnormal stress test      PRE PROCEDURE DIAGNOSIS:  CAD s/p LAD /D1 PCI in past  CCS class 2  Abnormal stress test with anterolateral mild to moderate ischemia    POST PROCEDURE DIAGNOSIS:  CAD LAD/Dx bifurcation lesion cool 1,1,1 with LAD just after takeoff of   Dx had 80%, Dx itself has 90% instent restenosis, diffuse type3, and also   severe 90% stenosis just distal to stent edge, and LAD before Dx also had   70% stenosis, mid LAD beyond this bifurcation also had 60-70% tubular   stenosis. OM1 branch also had proximal 50-60% stenosis, and Native lcx   after OM1 branch is small artery with mild diffuse disease. RCA Proximal   had 40% , mid 40% and distal mild 20% diffuse stenosis, PDA ostial had   diffuse 40%, PL no significant stenosis.       Face to face mdoerate conscious  sedation time :       COMPLICATIONS : None    Specimens collected : None     PROCEDURE PERFORMED:     1. Selective right and left Coronary Angiogram  2. Left heart catheretization    3. Left Ventriculography    Description of the procedure:  Prior to the procedure risk, benefits and possible alternative were   discussed with the patient and informed consent was obtained. Patient was   brought to cardiac cath lab table in post absorbtive state. Patient was   prepped and drape in usual sterile fashion. IV Versed and Fentanyl was   used for moderate sedation. 2% Lidocaine was used for topical anesthesia.   R radial arterial site was prepped and a micropuncture needle was used to   access the artery and a 5 F slender sheath was placed. 2.5 mg of Verapamil   and 200 mcg of NTG was given through the sheath intra arterial and 5000   units of Heparin was given once the catheter crossed the aortic arch.      5 F TIG 4 catheters was used for right and left coronary angiogram  and 5   F pigtail catheter was used for Left heart hemodynamics and left   ventriculography. All the catheters were exchanged over 0.035 wire. The R   radial arterial sheath was removed and TR band was applied and immediate   and complete hemostasis was achieved. The patient tolerate the entire   procedure well without any immediate known complications.    Coronary anatomy findings:    LM: Is a large calibre vessel , normal take off from left cusp, divides   into LAD and Lcx. Distal had mild 30% tubular stenosis.    LAD:  LAD just after takeoff of Dx had 80%, Dx itself has 90% instent   restenosis, diffuse type3, and also severe 90% stenosis just distal to   stent edge, and LAD before Dx also had 70% stenosis, mid LAD beyond this   bifurcation also had 60-70% tubular stenosis.     LCX:  OM1 branch also had proximal 50-60% stenosis, and Native lcx after   OM1 branch is small artery with mild diffuse disease.     RCA: Large calibre, dominant artery, normal take off from right cusp.. RCA   Proximal had 40% , mid 40% and distal mild 20% diffuse stenosis, PDA   ostial had diffuse 40%, PL no significant stenosis.       Left Ventriculography:    LV systolic function was normal with visual estimated EF of 65%. No wall   motion abnormalities.   No significant mitral regurgitation noted.     LVEDP: 14 mmHg  No gradient across the aortic valve on pull back.             Final Impression:  CAD LAD/Dx bifurcation lesion cool 1,1,1 with LAD just after takeoff of   Dx had 80%, Dx itself has 90% instent restenosis, diffuse type3, and also   severe 90% stenosis just distal to stent edge, and LAD before Dx also had   70% stenosis, mid LAD beyond this bifurcation also had 60-70% tubular   stenosis. OM1 branch also had proximal 50-60% stenosis, and Native lcx   after OM1 branch is small artery with mild diffuse disease. RCA Proximal   had 40% , mid 40% and distal mild 20% diffuse stenosis, PDA ostial had   diffuse 40%, PL no  significant stenosis.   Normal LV systolic function      Recommendations:  Pt has complex LAD /Dx bifurcation lesion with severe in stent restenosis   of D1, discussed with Interventionalist at [facility] regarding PCI options vs   CABG for better long term patency, pt initially was not interested in CABG   but after explaining her complexity of lesions she was open to , but   wanted to have another opinion, so we will refer to Dr Marcus at [facility] and   have her discuss about the options.   She is not having more than CCS II angina now on medications, will   continue with same.          Lazaro Conway MD, [facility]  Interventional Cardiology    12/30/19  8:49 PM       Procedures       Veda was seen today for coronary artery disease, hypertension and hyperlipidemia.    Diagnoses and all orders for this visit:    Coronary artery disease involving native coronary artery of native heart with unstable angina pectoris (CMS/HCC)    Mixed hyperlipidemia    Essential hypertension    Tobacco use    Angina concurrent with and due to arteriosclerosis of coronary artery (CMS/HCC)    Other orders  -     ranolazine (RANEXA) 500 MG 12 hr tablet; Take 1 tablet by mouth 2 (Two) Times a Day.          Assessment:  1. Angina class III  2. CAD status post diagonal artery PCI, coronary angiogram in November 2019 showed significant stenosis in the LAD diagonal bifurcation, also severe in-stent restenosis in the diagonal 1 artery and also 90% stenosis in the mid diagonal artery, patient refused CABG back then, she wants only percutaneous intervention.  3. Essential hypertension  4. Dyslipidemia  5. Chronic tobacco abuse.      Plan:  1. Add Ranexa 500 mg BID.  She is already on Imdur 120 mg daily.  2. Continue with aspirin and Effient.  3. Continue with the Pravachol 20 mg daily, I believe she has not tolerated high intensity statin dose in the past.  4. I told her that I will discuss with Dr. Yeepz further regarding her PCA options, in the meanwhile I  also encouraged her to call 911 or go to the ER if she has worsening chest pain not relieved with a sublingual nitroglycerin. Follow up in 3 months unless otherwise needed.        Recommended increase activity to 30 minutes of walking daily, most days of the week.  Counseled the patient for 3-5 minutes regarding smoking cessation.  Discussed tobacco use relationship to cardiac disease progression.  Offered smoking cessation medications.  Discussed diet and weight loss with patient.        Patient's Body mass index is 21.66 kg/m². BMI is within normal parameters. No follow-up required..       Return in about 3 months (around 9/15/2020).    Thank you for allowing me to participate in the care of Veda Enamorado. Feel free to contact me directly with any further questions or concerns.          Lazaro Conway MD, [facility]  Interventional Cardiology    MARYJANE Ferrell, acting as scribe for Lazaro Conway MD, [facility]  06/15/20  15:49

## 2020-06-16 ENCOUNTER — HOSPITAL ENCOUNTER (OUTPATIENT)
Dept: GENERAL RADIOLOGY | Facility: HOSPITAL | Age: 55
Discharge: HOME | End: 2020-06-16
Admitting: NURSE PRACTITIONER

## 2020-06-16 ENCOUNTER — TRANSCRIBE ORDERS (OUTPATIENT)
Dept: ADMINISTRATIVE | Facility: HOSPITAL | Age: 55
End: 2020-06-16

## 2020-06-16 DIAGNOSIS — M54.50 LOW BACK PAIN, UNSPECIFIED BACK PAIN LATERALITY, UNSPECIFIED CHRONICITY, UNSPECIFIED WHETHER SCIATICA PRESENT: ICD-10-CM

## 2020-06-16 DIAGNOSIS — M25.559 HIP PAIN: ICD-10-CM

## 2020-06-16 DIAGNOSIS — M25.559 HIP PAIN: Primary | ICD-10-CM

## 2020-06-16 PROCEDURE — 73523 X-RAY EXAM HIPS BI 5/> VIEWS: CPT | Performed by: RADIOLOGY

## 2020-06-16 PROCEDURE — 73523 X-RAY EXAM HIPS BI 5/> VIEWS: CPT

## 2020-06-16 PROCEDURE — 72110 X-RAY EXAM L-2 SPINE 4/>VWS: CPT | Performed by: RADIOLOGY

## 2020-06-16 PROCEDURE — 72110 X-RAY EXAM L-2 SPINE 4/>VWS: CPT

## 2020-06-17 ENCOUNTER — OFFICE VISIT (OUTPATIENT)
Dept: CARDIAC SURGERY | Facility: CLINIC | Age: 55
End: 2020-06-17

## 2020-06-17 VITALS
SYSTOLIC BLOOD PRESSURE: 158 MMHG | HEART RATE: 92 BPM | DIASTOLIC BLOOD PRESSURE: 88 MMHG | HEIGHT: 62 IN | WEIGHT: 118 LBS | BODY MASS INDEX: 21.71 KG/M2 | OXYGEN SATURATION: 100 % | TEMPERATURE: 98.6 F

## 2020-06-17 DIAGNOSIS — I25.10 LEFT MAIN CORONARY ARTERY DISEASE: ICD-10-CM

## 2020-06-17 DIAGNOSIS — I25.110 CORONARY ARTERY DISEASE INVOLVING NATIVE CORONARY ARTERY OF NATIVE HEART WITH UNSTABLE ANGINA PECTORIS (HCC): Primary | ICD-10-CM

## 2020-06-17 PROCEDURE — 99204 OFFICE O/P NEW MOD 45 MIN: CPT | Performed by: THORACIC SURGERY (CARDIOTHORACIC VASCULAR SURGERY)

## 2020-06-17 NOTE — PROGRESS NOTES
"2020  Patient Information  Veda Enamorado                                                                                          [address] [place] [address]   1965  'PCP/Referring Physician'  Chiquita Choudhary, APRN  [phone]  No ref. provider found    Chief Complaint   Patient presents with   • Consult     N/P per Dr. Chang for CAD. Pt states that she has been having left arm pain that radiates into the shoulder. complain of head and neck pain. Pt is also very fatigued.        History of Present Illness: 55-year-old  female with a history of hypertension, hyperlipidemia, coronary artery disease status post stenting and extensive family history of coronary artery disease who presents with left arm pain.  The patient notes approximately 3 to 4 days of significant left arm pain.  This happens multiple times a day.  She denies any chest pain, vomiting or diaphoresis.  The patient does have some nausea that she attributes to gastroesophageal reflux.  The patient does feel tired \"all the time.\"      Patient Active Problem List   Diagnosis   • Coronary artery disease involving native coronary artery of native heart with unstable angina pectoris (CMS/HCC)   • Tobacco abuse   • Hyperlipidemia   • Essential hypertension   • Visit for wound check   • Mixed hyperlipidemia   • Cellulitis of labia   • Labial abscess   • MRSA (methicillin resistant staph aureus) culture positive     Past Medical History:   Diagnosis Date   • Arthritis    • Back ache    • Coronary artery disease    • GERD (gastroesophageal reflux disease)    • Hyperlipidemia    • Hypertension    • Peptic ulceration      Past Surgical History:   Procedure Laterality Date   • CARDIAC CATHETERIZATION     • CARDIAC CATHETERIZATION N/A 2019    Procedure: Left Heart Cath;  Surgeon: Lazaro Conway MD;  Location: [facility] CATH INVASIVE LOCATION;  Service: Cardiology   • CARDIAC CATHETERIZATION     •  SECTION      " x2   • HAND SURGERY      right   • PERINEAL LESION/CYST EXCISION Right 1/9/2020    Procedure: I&D ABSCESS;  Surgeon: Leander Cardenas III, MD;  Location: [facility];  Service: Obstetrics/Gynecology       Current Outpatient Medications:   •  Alirocumab (Praluent) 75 MG/ML solution auto-injector, Inject 1 mL under the skin into the appropriate area as directed Every 14 (Fourteen) Days., Disp: 2 mL, Rfl: 5  •  aspirin 81 MG EC tablet, Take 81 mg by mouth daily., Disp: , Rfl:   •  atenolol (TENORMIN) 25 MG tablet, Take 1 tablet by mouth Daily., Disp: 30 tablet, Rfl: 6  •  ferrous sulfate 325 (65 FE) MG tablet, Take 1 tablet by mouth Daily With Breakfast., Disp: 60 tablet, Rfl: 1  •  fluticasone-salmeterol (ADVAIR) 500-50 MCG/DOSE DISKUS, Inhale 1 puff 2 (Two) Times a Day., Disp: , Rfl:   •  isosorbide mononitrate (IMDUR) 60 MG 24 hr tablet, Take 1 tablet by mouth Daily., Disp: 30 tablet, Rfl: 6  •  nitroglycerin (NITROSTAT) 0.4 MG SL tablet, 1 under the tongue as needed for angina, may repeat q5mins for up three doses (Patient taking differently: Place 0.4 mg under the tongue Every 5 (Five) Minutes As Needed for Chest Pain. 1 under the tongue as needed for angina, may repeat q5mins for up three doses), Disp: 100 tablet, Rfl: 3  •  pantoprazole (PROTONIX) 20 MG EC tablet, Take 20 mg by mouth Daily., Disp: , Rfl:   •  prasugrel (EFFIENT) 10 MG tablet, Take 1 tablet by mouth Daily., Disp: 30 tablet, Rfl: 6  •  pravastatin (PRAVACHOL) 20 MG tablet, Take 1 tablet by mouth Daily., Disp: 30 tablet, Rfl: 6  •  spironolactone (ALDACTONE) 25 MG tablet, Take 1 tablet by mouth Daily., Disp: 30 tablet, Rfl: 6  •  traMADol (ULTRAM) 50 MG tablet, Take 50 mg by mouth every 6 (six) hours as needed for moderate pain (4-6)., Disp: , Rfl:   •  ranolazine (RANEXA) 500 MG 12 hr tablet, Take 1 tablet by mouth 2 (Two) Times a Day., Disp: 60 tablet, Rfl: 5  Allergies   Allergen Reactions   • Bactrim [Sulfamethoxazole-Trimethoprim] Rash   •  Ciprofloxacin Other (See Comments)     tremors     Social History     Socioeconomic History   • Marital status:      Spouse name: Not on file   • Number of children: 2   • Years of education: Not on file   • Highest education level: Not on file   Occupational History   • Occupation: Home at Home [employer]     Comment: disability   Tobacco Use   • Smoking status: Current Every Day Smoker     Packs/day: 0.25     Years: 25.00     Pack years: 6.25     Types: Cigarettes, Electronic Cigarette   • Smokeless tobacco: Never Used   • Tobacco comment: Pt states only smoking a few cigs a day, transitioning to E-Cig.   Substance and Sexual Activity   • Alcohol use: No   • Drug use: No   • Sexual activity: Defer     Family History   Problem Relation Age of Onset   • Heart disease Mother    • Heart disease Father    • Heart attack Father    • No Known Problems Sister    • Heart attack Brother    • Heart disease Brother    • Breast cancer Neg Hx      Review of Systems   Constitution: Positive for malaise/fatigue and weight loss. Negative for chills, fever and night sweats.   HENT: Positive for congestion. Negative for hearing loss, nosebleeds and odynophagia.    Cardiovascular: Positive for irregular heartbeat and palpitations. Negative for chest pain, claudication, dyspnea on exertion, leg swelling, orthopnea and syncope.   Respiratory: Negative for cough, hemoptysis, shortness of breath and wheezing.    Endocrine: Negative for cold intolerance, heat intolerance, polydipsia, polyphagia and polyuria.   Hematologic/Lymphatic: Bruises/bleeds easily.   Skin: Negative for itching, poor wound healing and rash.   Musculoskeletal: Positive for arthritis, back pain and joint pain. Negative for joint swelling and myalgias.   Gastrointestinal: Positive for nausea. Negative for abdominal pain, constipation, diarrhea, hematemesis, melena and vomiting.   Genitourinary: Negative for dysuria, frequency, hematuria, nocturia and urgency.  "  Neurological: Positive for loss of balance. Negative for dizziness, light-headedness and numbness.   Psychiatric/Behavioral: Negative for depression and suicidal ideas. The patient is nervous/anxious.    Allergic/Immunologic: Positive for environmental allergies. Negative for HIV exposure.     Vitals:    06/17/20 1227   BP: 158/88   Pulse: 92   Temp: 98.6 °F (37 °C)   TempSrc: Temporal   SpO2: 100%   Weight: 53.5 kg (118 lb)   Height: 157.5 cm (62\")      Physical Exam   Constitutional: She is oriented to person, place, and time. She appears well-developed and well-nourished. No distress.    female who appears stated age   HENT:   Head: Normocephalic and atraumatic.   Eyes: Conjunctivae are normal. No scleral icterus.   Neck: Normal range of motion. No JVD present. Carotid bruit is not present. No tracheal deviation present.   Cardiovascular: Normal rate, regular rhythm and normal heart sounds. Exam reveals no gallop and no friction rub.   No murmur heard.  Pulmonary/Chest: Effort normal and breath sounds normal. No stridor. No respiratory distress. She has no wheezes. She has no rales.   Abdominal: Soft. She exhibits no distension and no mass. There is no tenderness. There is no rebound and no guarding.   Musculoskeletal: Normal range of motion. She exhibits no edema.   Neurological: She is alert and oriented to person, place, and time.   Skin: Skin is warm and dry. No rash noted. She is not diaphoretic. No erythema.   Psychiatric: She has a normal mood and affect. Her behavior is normal. Judgment and thought content normal.       Labs/Imaging:  -Cardiac catheterization performed 12/18/2019, personally reviewed and discussed with cardiologist Dr. Marcus, demonstrates EF 65%, 30% distal left main stenosis, 80% LAD stenosis after the takeoff of the first diagonal branch, 90% in-stent restenosis of the diagonal branch, 70% distal LAD stenosis and 50 to 60% proximal first obtuse marginal " stenosis.    Assessment/Plan:  55-year-old  female with a history of hypertension, hyperlipidemia, coronary artery disease status post stenting and extensive family history of coronary artery disease who presents with left arm pain.  The patient has bifurcating disease in the LAD and diagonal branch.  I discussed this case with Dr. Marcus who felt that the patient was not amenable to percutaneous coronary intervention.  The patient is a reasonable candidate for coronary artery bypass grafting.  The risks and benefits of surgery were discussed with the patient including pain, bleeding, infection, renal failure, stroke and death.  She understood these risks and wished to proceed with surgery.  She will need an echocardiogram to assess her ventricular function and an rule out valvular pathology.  A carotid duplex will also be obtained to rule out significant stenosis in the setting of left main coronary artery disease.  The patient was tearful during this encounter and wanted to discuss cardiac surgery with both her  and her children prior to proceeding with surgery.        Patient Active Problem List   Diagnosis   • Coronary artery disease involving native coronary artery of native heart with unstable angina pectoris (CMS/[facility])   • Tobacco abuse   • Hyperlipidemia   • Essential hypertension   • Visit for wound check   • Mixed hyperlipidemia   • Cellulitis of labia   • Labial abscess   • MRSA (methicillin resistant staph aureus) culture positive

## 2020-06-25 ENCOUNTER — PREP FOR SURGERY (OUTPATIENT)
Dept: OTHER | Facility: HOSPITAL | Age: 55
End: 2020-06-25

## 2020-06-25 DIAGNOSIS — I25.10 CAD IN NATIVE ARTERY: Primary | ICD-10-CM

## 2020-06-25 RX ORDER — CHLORHEXIDINE GLUCONATE 500 MG/1
1 CLOTH TOPICAL EVERY 12 HOURS PRN
Status: CANCELLED | OUTPATIENT
Start: 2020-07-06

## 2020-06-25 RX ORDER — ACETAMINOPHEN 325 MG/1
650 TABLET ORAL EVERY 4 HOURS PRN
Status: CANCELLED | OUTPATIENT
Start: 2020-07-06

## 2020-06-25 RX ORDER — CHLORHEXIDINE GLUCONATE 500 MG/1
1 CLOTH TOPICAL EVERY 12 HOURS PRN
Status: CANCELLED | OUTPATIENT
Start: 2020-07-05

## 2020-06-25 RX ORDER — CHLORHEXIDINE GLUCONATE 0.12 MG/ML
15 RINSE ORAL ONCE
Status: CANCELLED | OUTPATIENT
Start: 2020-07-06 | End: 2020-07-06

## 2020-06-25 RX ORDER — ASPIRIN 325 MG
325 TABLET ORAL NIGHTLY
Status: CANCELLED | OUTPATIENT
Start: 2020-07-05 | End: 2020-07-06

## 2020-06-25 RX ORDER — NITROGLYCERIN 0.4 MG/1
0.4 TABLET SUBLINGUAL
Status: CANCELLED | OUTPATIENT
Start: 2020-07-06

## 2020-06-26 ENCOUNTER — HOSPITAL ENCOUNTER (OUTPATIENT)
Dept: CARDIOLOGY | Facility: HOSPITAL | Age: 55
Discharge: HOME | End: 2020-06-26

## 2020-06-26 ENCOUNTER — HOSPITAL ENCOUNTER (OUTPATIENT)
Dept: CARDIOLOGY | Facility: HOSPITAL | Age: 55
Discharge: HOME | End: 2020-06-26
Admitting: THORACIC SURGERY (CARDIOTHORACIC VASCULAR SURGERY)

## 2020-06-26 PROCEDURE — 93306 TTE W/DOPPLER COMPLETE: CPT | Performed by: INTERNAL MEDICINE

## 2020-06-26 PROCEDURE — 93880 EXTRACRANIAL BILAT STUDY: CPT

## 2020-06-26 PROCEDURE — 93880 EXTRACRANIAL BILAT STUDY: CPT | Performed by: RADIOLOGY

## 2020-06-26 PROCEDURE — 93306 TTE W/DOPPLER COMPLETE: CPT

## 2020-06-29 LAB
BH CV ECHO MEAS - % IVS THICK: 5.1 %
BH CV ECHO MEAS - % LVPW THICK: 6 %
BH CV ECHO MEAS - ACS: 2.5 CM
BH CV ECHO MEAS - AO MAX PG: 11.3 MMHG
BH CV ECHO MEAS - AO MEAN PG: 5 MMHG
BH CV ECHO MEAS - AO ROOT AREA (BSA CORRECTED): 2.1
BH CV ECHO MEAS - AO ROOT AREA: 8 CM^2
BH CV ECHO MEAS - AO ROOT DIAM: 3.2 CM
BH CV ECHO MEAS - AO V2 MAX: 168 CM/SEC
BH CV ECHO MEAS - AO V2 MEAN: 102 CM/SEC
BH CV ECHO MEAS - AO V2 VTI: 35.9 CM
BH CV ECHO MEAS - BSA(HAYCOCK): 1.5 M^2
BH CV ECHO MEAS - BSA: 1.5 M^2
BH CV ECHO MEAS - BZI_BMI: 21.6 KILOGRAMS/M^2
BH CV ECHO MEAS - BZI_METRIC_HEIGHT: 157.5 CM
BH CV ECHO MEAS - BZI_METRIC_WEIGHT: 53.5 KG
BH CV ECHO MEAS - EDV(CUBED): 95.1 ML
BH CV ECHO MEAS - EDV(MOD-SP4): 51.7 ML
BH CV ECHO MEAS - EDV(TEICH): 95.6 ML
BH CV ECHO MEAS - EF(CUBED): 76.7 %
BH CV ECHO MEAS - EF(MOD-SP4): 65.6 %
BH CV ECHO MEAS - EF(TEICH): 68.8 %
BH CV ECHO MEAS - ESV(CUBED): 22.2 ML
BH CV ECHO MEAS - ESV(MOD-SP4): 17.8 ML
BH CV ECHO MEAS - ESV(TEICH): 29.8 ML
BH CV ECHO MEAS - FS: 38.4 %
BH CV ECHO MEAS - IVS/LVPW: 1
BH CV ECHO MEAS - IVSD: 1 CM
BH CV ECHO MEAS - IVSS: 1.1 CM
BH CV ECHO MEAS - LA DIMENSION: 3.3 CM
BH CV ECHO MEAS - LA/AO: 1
BH CV ECHO MEAS - LV DIASTOLIC VOL/BSA (35-75): 33.8 ML/M^2
BH CV ECHO MEAS - LV MASS(C)D: 154.7 GRAMS
BH CV ECHO MEAS - LV MASS(C)DI: 101.3 GRAMS/M^2
BH CV ECHO MEAS - LV MASS(C)S: 80 GRAMS
BH CV ECHO MEAS - LV MASS(C)SI: 52.4 GRAMS/M^2
BH CV ECHO MEAS - LV SYSTOLIC VOL/BSA (12-30): 11.7 ML/M^2
BH CV ECHO MEAS - LVIDD: 4.6 CM
BH CV ECHO MEAS - LVIDS: 2.8 CM
BH CV ECHO MEAS - LVLD AP4: 6.2 CM
BH CV ECHO MEAS - LVLS AP4: 5.2 CM
BH CV ECHO MEAS - LVOT AREA (M): 2.8 CM^2
BH CV ECHO MEAS - LVOT AREA: 2.8 CM^2
BH CV ECHO MEAS - LVOT DIAM: 1.9 CM
BH CV ECHO MEAS - LVPWD: 0.97 CM
BH CV ECHO MEAS - LVPWS: 1 CM
BH CV ECHO MEAS - MV A MAX VEL: 45.8 CM/SEC
BH CV ECHO MEAS - MV E MAX VEL: 108 CM/SEC
BH CV ECHO MEAS - MV E/A: 2.4
BH CV ECHO MEAS - PA ACC TIME: 0.09 SEC
BH CV ECHO MEAS - PA PR(ACCEL): 39.4 MMHG
BH CV ECHO MEAS - RAP SYSTOLE: 10 MMHG
BH CV ECHO MEAS - RVSP: 38.3 MMHG
BH CV ECHO MEAS - SI(AO): 189 ML/M^2
BH CV ECHO MEAS - SI(CUBED): 47.8 ML/M^2
BH CV ECHO MEAS - SI(MOD-SP4): 22.2 ML/M^2
BH CV ECHO MEAS - SI(TEICH): 43.1 ML/M^2
BH CV ECHO MEAS - SV(AO): 288.7 ML
BH CV ECHO MEAS - SV(CUBED): 72.9 ML
BH CV ECHO MEAS - SV(MOD-SP4): 33.9 ML
BH CV ECHO MEAS - SV(TEICH): 65.8 ML
BH CV ECHO MEAS - TR MAX VEL: 266 CM/SEC
MAXIMAL PREDICTED HEART RATE: 166 BPM
STRESS TARGET HR: 141 BPM

## 2020-07-03 ENCOUNTER — TRANSCRIBE ORDERS (OUTPATIENT)
Dept: LAB | Facility: HOSPITAL | Age: 55
End: 2020-07-03

## 2020-07-03 ENCOUNTER — LAB (OUTPATIENT)
Dept: LAB | Facility: HOSPITAL | Age: 55
End: 2020-07-03

## 2020-07-03 DIAGNOSIS — Z01.818 PRE-OP TESTING: Primary | ICD-10-CM

## 2020-07-03 DIAGNOSIS — Z01.818 PRE-OP TESTING: ICD-10-CM

## 2020-07-03 PROCEDURE — U0004 COV-19 TEST NON-CDC HGH THRU: HCPCS

## 2020-07-03 PROCEDURE — C9803 HOPD COVID-19 SPEC COLLECT: HCPCS

## 2020-07-03 PROCEDURE — U0002 COVID-19 LAB TEST NON-CDC: HCPCS

## 2020-07-04 LAB
REF LAB TEST METHOD: NORMAL
SARS-COV-2 RNA RESP QL NAA+PROBE: NOT DETECTED

## 2020-07-05 ENCOUNTER — HOSPITAL ENCOUNTER (OUTPATIENT)
Dept: GENERAL RADIOLOGY | Facility: HOSPITAL | Age: 55
Discharge: HOME | End: 2020-07-05
Admitting: NURSE PRACTITIONER

## 2020-07-05 ENCOUNTER — APPOINTMENT (OUTPATIENT)
Dept: PREADMISSION TESTING | Facility: HOSPITAL | Age: 55
End: 2020-07-05

## 2020-07-05 ENCOUNTER — HOSPITAL ENCOUNTER (OUTPATIENT)
Dept: PULMONOLOGY | Facility: HOSPITAL | Age: 55
Discharge: HOME | End: 2020-07-05

## 2020-07-05 VITALS — HEIGHT: 62 IN | WEIGHT: 118.4 LBS | BODY MASS INDEX: 21.79 KG/M2 | OXYGEN SATURATION: 100 %

## 2020-07-05 DIAGNOSIS — I25.10 CAD IN NATIVE ARTERY: ICD-10-CM

## 2020-07-05 LAB
ABO GROUP BLD: NORMAL
ALBUMIN SERPL-MCNC: 4.4 G/DL (ref 3.5–5.2)
ALBUMIN/GLOB SERPL: 1.7 G/DL
ALP SERPL-CCNC: 131 U/L (ref 39–117)
ALT SERPL W P-5'-P-CCNC: 5 U/L (ref 1–33)
AMPHET+METHAMPHET UR QL: NEGATIVE
AMPHETAMINES UR QL: NEGATIVE
ANION GAP SERPL CALCULATED.3IONS-SCNC: 11 MMOL/L (ref 5–15)
APTT PPP: 26.5 SECONDS (ref 24–37)
AST SERPL-CCNC: 14 U/L (ref 1–32)
BARBITURATES UR QL SCN: NEGATIVE
BASOPHILS # BLD AUTO: 0.05 10*3/MM3 (ref 0–0.2)
BASOPHILS NFR BLD AUTO: 0.6 % (ref 0–1.5)
BENZODIAZ UR QL SCN: NEGATIVE
BILIRUB SERPL-MCNC: 0.2 MG/DL (ref 0.2–1.2)
BLD GP AB SCN SERPL QL: NEGATIVE
BUN SERPL-MCNC: 6 MG/DL (ref 6–20)
BUN/CREAT SERPL: 8.1 (ref 7–25)
BUPRENORPHINE SERPL-MCNC: NEGATIVE NG/ML
CALCIUM SPEC-SCNC: 9.2 MG/DL (ref 8.6–10.5)
CANNABINOIDS SERPL QL: NEGATIVE
CHLORIDE SERPL-SCNC: 107 MMOL/L (ref 98–107)
CO2 SERPL-SCNC: 27 MMOL/L (ref 22–29)
COCAINE UR QL: NEGATIVE
CREAT SERPL-MCNC: 0.74 MG/DL (ref 0.57–1)
DEPRECATED RDW RBC AUTO: 49.8 FL (ref 37–54)
EOSINOPHIL # BLD AUTO: 0.12 10*3/MM3 (ref 0–0.4)
EOSINOPHIL NFR BLD AUTO: 1.4 % (ref 0.3–6.2)
ERYTHROCYTE [DISTWIDTH] IN BLOOD BY AUTOMATED COUNT: 14.2 % (ref 12.3–15.4)
GFR SERPL CREATININE-BSD FRML MDRD: 82 ML/MIN/1.73
GLOBULIN UR ELPH-MCNC: 2.6 GM/DL
GLUCOSE SERPL-MCNC: 94 MG/DL (ref 65–99)
HBA1C MFR BLD: 5.5 % (ref 4.8–5.6)
HCT VFR BLD AUTO: 36.5 % (ref 34–46.6)
HGB BLD-MCNC: 11.7 G/DL (ref 12–15.9)
IMM GRANULOCYTES # BLD AUTO: 0.03 10*3/MM3 (ref 0–0.05)
IMM GRANULOCYTES NFR BLD AUTO: 0.3 % (ref 0–0.5)
INR PPP: 1.03 (ref 0.85–1.16)
LYMPHOCYTES # BLD AUTO: 2.13 10*3/MM3 (ref 0.7–3.1)
LYMPHOCYTES NFR BLD AUTO: 24.5 % (ref 19.6–45.3)
MAGNESIUM SERPL-MCNC: 2.4 MG/DL (ref 1.6–2.6)
MCH RBC QN AUTO: 30.7 PG (ref 26.6–33)
MCHC RBC AUTO-ENTMCNC: 32.1 G/DL (ref 31.5–35.7)
MCV RBC AUTO: 95.8 FL (ref 79–97)
METHADONE UR QL SCN: NEGATIVE
MONOCYTES # BLD AUTO: 0.55 10*3/MM3 (ref 0.1–0.9)
MONOCYTES NFR BLD AUTO: 6.3 % (ref 5–12)
NEUTROPHILS NFR BLD AUTO: 5.83 10*3/MM3 (ref 1.7–7)
NEUTROPHILS NFR BLD AUTO: 66.9 % (ref 42.7–76)
NRBC BLD AUTO-RTO: 0 /100 WBC (ref 0–0.2)
OPIATES UR QL: POSITIVE
OXYCODONE UR QL SCN: NEGATIVE
PA ADP PRP-ACNC: 43 PRU
PCP UR QL SCN: NEGATIVE
PLATELET # BLD AUTO: 305 10*3/MM3 (ref 140–450)
PMV BLD AUTO: 9.8 FL (ref 6–12)
POTASSIUM SERPL-SCNC: 3.9 MMOL/L (ref 3.5–5.2)
PROPOXYPH UR QL: NEGATIVE
PROT SERPL-MCNC: 7 G/DL (ref 6–8.5)
PROTHROMBIN TIME: 13.2 SECONDS (ref 11.5–14)
RBC # BLD AUTO: 3.81 10*6/MM3 (ref 3.77–5.28)
RH BLD: POSITIVE
SODIUM SERPL-SCNC: 145 MMOL/L (ref 136–145)
T&S EXPIRATION DATE: NORMAL
TRICYCLICS UR QL SCN: NEGATIVE
WBC # BLD AUTO: 8.71 10*3/MM3 (ref 3.4–10.8)

## 2020-07-05 PROCEDURE — 86901 BLOOD TYPING SEROLOGIC RH(D): CPT | Performed by: NURSE PRACTITIONER

## 2020-07-05 PROCEDURE — 80306 DRUG TEST PRSMV INSTRMNT: CPT | Performed by: NURSE PRACTITIONER

## 2020-07-05 PROCEDURE — 94010 BREATHING CAPACITY TEST: CPT | Performed by: INTERNAL MEDICINE

## 2020-07-05 PROCEDURE — 86850 RBC ANTIBODY SCREEN: CPT | Performed by: NURSE PRACTITIONER

## 2020-07-05 PROCEDURE — 94010 BREATHING CAPACITY TEST: CPT

## 2020-07-05 PROCEDURE — 36415 COLL VENOUS BLD VENIPUNCTURE: CPT

## 2020-07-05 PROCEDURE — 85610 PROTHROMBIN TIME: CPT | Performed by: NURSE PRACTITIONER

## 2020-07-05 PROCEDURE — 83735 ASSAY OF MAGNESIUM: CPT | Performed by: NURSE PRACTITIONER

## 2020-07-05 PROCEDURE — 85025 COMPLETE CBC W/AUTO DIFF WBC: CPT | Performed by: NURSE PRACTITIONER

## 2020-07-05 PROCEDURE — 80053 COMPREHEN METABOLIC PANEL: CPT | Performed by: NURSE PRACTITIONER

## 2020-07-05 PROCEDURE — 93010 ELECTROCARDIOGRAM REPORT: CPT | Performed by: INTERNAL MEDICINE

## 2020-07-05 PROCEDURE — 93005 ELECTROCARDIOGRAM TRACING: CPT

## 2020-07-05 PROCEDURE — 71046 X-RAY EXAM CHEST 2 VIEWS: CPT

## 2020-07-05 PROCEDURE — 85576 BLOOD PLATELET AGGREGATION: CPT | Performed by: NURSE PRACTITIONER

## 2020-07-05 PROCEDURE — 83036 HEMOGLOBIN GLYCOSYLATED A1C: CPT | Performed by: NURSE PRACTITIONER

## 2020-07-05 PROCEDURE — 86900 BLOOD TYPING SEROLOGIC ABO: CPT | Performed by: NURSE PRACTITIONER

## 2020-07-05 PROCEDURE — 85730 THROMBOPLASTIN TIME PARTIAL: CPT | Performed by: NURSE PRACTITIONER

## 2020-07-05 RX ORDER — ASPIRIN 325 MG
325 TABLET ORAL NIGHTLY
Status: SHIPPED | OUTPATIENT
Start: 2020-07-05 | End: 2020-07-06

## 2020-07-05 RX ORDER — CHLORHEXIDINE GLUCONATE 500 MG/1
1 CLOTH TOPICAL EVERY 12 HOURS PRN
Status: DISCONTINUED | OUTPATIENT
Start: 2020-07-05 | End: 2023-03-08

## 2020-07-05 NOTE — PAT
Notified Ramesh Anthony of history of MRSA.  No new orders.    Notified Ramesh Anthony via phone call that patient had not stopped Effient.  Last dose of Effient was this AM (7/5/20).  Per patient, she was not told by the office to stop it and her written instructions from the surgeon did not tell her to stop her Effient.  Patient cancelled and will need to call the surgeon's office in the AM to reschedule her surgery.  Per Ramesh Anthony she will need to be off her Effient for at least 5 days before surgery.  Consents signed in PAT, bloodwork, Urine drug screen, EKG, CXR, and PFT obtained in PAT today.    Patient to apply Chlorhexadine wipes  to surgical area (as instructed) the night before procedure and the AM of procedure. Wipes provided.    Patient/family provided a [facility] Heart Surgery book (if not already provided by the CT surgeon's office).  Encouraged patient and family to read the Heart Surgery book if they had not already done so.  Also completed Heart Surgery pre surgery checklist with patient.  Verified with patient that exercise handout was received, if not, then one was provided during PAT visit.      Education during PAT visit included general perioperative instructions that are routine for all surgical patients (PAT PASS, wipes, directions to pre-op,e tc.).  Additionally, a handout was provided and discussed that stressed the importance of Honesty, Incentive Spirometry use, Ambulation, and Pain control.  This handout also explained the tubes in place during immediate post op recovery.  Verbal instructions given as well.     Patient and/or family verbalized understanding of education and reinforcement was provided as needed.    Instructed patient to take 325 mg the night before heart surgery as per CT surgeon's order.  Patient and/or family verbalized understanding.        BACTROBAN ointment given to patient and instructed patient to administer to herself the night before surgery.    COVID  screening 7/3/20 in [facility] was negative

## 2020-07-05 NOTE — DISCHARGE INSTRUCTIONS
The following information and instructions were given:    Do not eat, drink, smoke or chew gum after midnight the night before surgery. This includes no mints.  Take all routine, prescribed medications including heart and blood pressure medicines with a sip of water unless otherwise instructed by your physician.   Do NOT take diabetic medication unless instructed by your physician.    DO NOT shave for two days before your procedure.  Do not wear makeup.      DO NOT wear fingernail polish (gel/regular) and/or acrylic/artificial nails on the day of surgery.   If you had a recent manicure and would rather not remove polish or artificial nails, the minimum requirement is that the polish/artificial nails must be removed from the middle finger on each hand.      If you are having surgery/procedure on an upper extremity, fingernail polish/artificial fingernails must be removed for surgery.  NO EXCEPTIONS.      If you are having surgery/procedure on a lower extremity, toenail polish on both extremities must be removed for surgery.  NO EXCEPTIONS.    Remove all jewelry (advise to go to jeweler if unable to remove).  Jewelry, especially rings, can no longer be taped for surgery.    Leave anything you consider valuable at home.    Leave your suitcase in the car until after your surgery.    Bring the following with you the day of your procedure (when applicable):       -Picture ID and insurance cards       -Co-pay/deductible required by insurance       -Medications in the original bottles (not a list) including all over-the-counter medications if not brought to PAT       -Copy of advance directive, living will or power of  documents if not brought to PAT       -CPAP or BIPAP mask and tubing (do not bring machine)       -Skin prep instruction(s) sheet       -PAT Pass    Education booklet, brochure, handout or binder related to procedure given to patient.  Education booklet also includes general information related to  their recovery that mentions signs and symptoms of infection and when to call the doctor.    When applicable, an ERAS handout/booklet was given to patient.    Pain Control After Surgery handout given to patient.    Respirex use (handout given to patient) and pneumonia prevention education provided.    Signs and Symptoms of infection discussed with patient in Pre Admission Testing.  Patient instructed to call their doctor if any of the following symptoms are noted during recovery:  Fever of 100.4 F or higher, incision that is warm or has increasing bleeding, redness or drainage.    DVT Prevention instructions given verbally during Pre Admission Testing appointment that stress the importance of ambulation to improve blood circulation.  Also encouraged patient to perform foot exercises when in bed and application of a sequential device may be applied to lower extremities to improve circulation.      Please apply Chlorhexidine wipes to surgical area (if instructed) the night before procedure and the AM of procedure and document date/time of applications on skin prep instruction sheet.        Patient/family provided a [facility] Heart Surgery book (if not already provided by the CT surgeon's office).  Encouraged patient and family to read the Heart Surgery book if they had not already done so.  Also completed Heart Surgery pre surgery checklist with patient.  Verified with patient that exercise handout was received, if not, then one was provided during PAT visit.      Education during PAT visit included general perioperative instructions that are routine for all surgical patients (PAT PASS, wipes, directions to pre-op,e tc.).  Additionally, a handout was provided and discussed that stressed the importance of Honesty, Incentive Spirometry use, Ambulation, and Pain control.  This handout also explained the tubes in place during immediate post op recovery.  Verbal instructions given as well.     Patient and/or  family verbalized understanding of education and reinforcement was provided as needed.    Instructed patient to take 325 mg the night before heart surgery as per CT surgeon's order.  Patient and/or family verbalized understanding.

## 2020-07-06 ENCOUNTER — TRANSCRIBE ORDERS (OUTPATIENT)
Dept: ADMINISTRATIVE | Facility: HOSPITAL | Age: 55
End: 2020-07-06

## 2020-07-06 DIAGNOSIS — Z01.818 PREOPERATIVE CLEARANCE: Primary | ICD-10-CM

## 2020-07-07 ENCOUNTER — APPOINTMENT (OUTPATIENT)
Dept: PREADMISSION TESTING | Facility: HOSPITAL | Age: 55
End: 2020-07-07

## 2020-07-08 ENCOUNTER — LAB (OUTPATIENT)
Dept: LAB | Facility: HOSPITAL | Age: 55
End: 2020-07-08

## 2020-07-08 DIAGNOSIS — Z01.818 PREOPERATIVE CLEARANCE: ICD-10-CM

## 2020-07-08 PROCEDURE — U0002 COVID-19 LAB TEST NON-CDC: HCPCS

## 2020-07-08 PROCEDURE — C9803 HOPD COVID-19 SPEC COLLECT: HCPCS

## 2020-07-08 PROCEDURE — U0004 COV-19 TEST NON-CDC HGH THRU: HCPCS

## 2020-07-09 ENCOUNTER — ANESTHESIA EVENT (OUTPATIENT)
Dept: PERIOP | Facility: HOSPITAL | Age: 55
End: 2020-07-09

## 2020-07-09 LAB
REF LAB TEST METHOD: NORMAL
SARS-COV-2 RNA RESP QL NAA+PROBE: NOT DETECTED

## 2020-07-09 RX ORDER — FAMOTIDINE 10 MG/ML
20 INJECTION, SOLUTION INTRAVENOUS ONCE
Status: CANCELLED | OUTPATIENT
Start: 2020-07-09 | End: 2020-07-09

## 2020-07-10 ENCOUNTER — ANESTHESIA (OUTPATIENT)
Dept: PERIOP | Facility: HOSPITAL | Age: 55
End: 2020-07-10

## 2020-07-11 ENCOUNTER — APPOINTMENT (OUTPATIENT)
Dept: GENERAL RADIOLOGY | Facility: HOSPITAL | Age: 55
End: 2020-07-11

## 2020-07-11 ENCOUNTER — HOSPITAL ENCOUNTER (EMERGENCY)
Facility: HOSPITAL | Age: 55
Discharge: ANOTHER HOSPITAL | End: 2020-07-11
Attending: EMERGENCY MEDICINE | Admitting: EMERGENCY MEDICINE

## 2020-07-11 ENCOUNTER — HOSPITAL ENCOUNTER (INPATIENT)
Facility: HOSPITAL | Age: 55
LOS: 2 days | Discharge: HOME | End: 2020-07-13
Attending: INTERNAL MEDICINE | Admitting: FAMILY MEDICINE

## 2020-07-11 VITALS
HEIGHT: 62 IN | RESPIRATION RATE: 18 BRPM | SYSTOLIC BLOOD PRESSURE: 126 MMHG | BODY MASS INDEX: 21.71 KG/M2 | OXYGEN SATURATION: 99 % | HEART RATE: 55 BPM | TEMPERATURE: 97.9 F | DIASTOLIC BLOOD PRESSURE: 80 MMHG | WEIGHT: 118 LBS

## 2020-07-11 DIAGNOSIS — I25.119 CHEST PAIN DUE TO CORONARY ARTERY DISEASE (HCC): Primary | ICD-10-CM

## 2020-07-11 DIAGNOSIS — I25.10 LEFT MAIN CORONARY ARTERY DISEASE: ICD-10-CM

## 2020-07-11 DIAGNOSIS — Z78.9 DECREASED ACTIVITIES OF DAILY LIVING (ADL): Primary | ICD-10-CM

## 2020-07-11 PROBLEM — M54.50 CHRONIC LOW BACK PAIN: Status: ACTIVE | Noted: 2020-07-11

## 2020-07-11 PROBLEM — G89.29 CHRONIC LOW BACK PAIN: Status: ACTIVE | Noted: 2020-07-11

## 2020-07-11 PROBLEM — K21.9 GERD WITHOUT ESOPHAGITIS: Status: ACTIVE | Noted: 2020-07-11

## 2020-07-11 LAB
ALBUMIN SERPL-MCNC: 4.43 G/DL (ref 3.5–5.2)
ALBUMIN/GLOB SERPL: 1.4 G/DL
ALP SERPL-CCNC: 133 U/L (ref 39–117)
ALT SERPL W P-5'-P-CCNC: 6 U/L (ref 1–33)
ANION GAP SERPL CALCULATED.3IONS-SCNC: 12.7 MMOL/L (ref 5–15)
APTT PPP: 25.5 SECONDS (ref 25.6–35.3)
APTT PPP: 25.6 SECONDS (ref 24–37)
AST SERPL-CCNC: 12 U/L (ref 1–32)
BASOPHILS # BLD AUTO: 0.02 10*3/MM3 (ref 0–0.2)
BASOPHILS # BLD AUTO: 0.04 10*3/MM3 (ref 0–0.2)
BASOPHILS NFR BLD AUTO: 0.2 % (ref 0–1.5)
BASOPHILS NFR BLD AUTO: 0.4 % (ref 0–1.5)
BILIRUB SERPL-MCNC: 0.2 MG/DL (ref 0–1.2)
BUN SERPL-MCNC: 6 MG/DL (ref 6–20)
BUN/CREAT SERPL: 8 (ref 7–25)
CALCIUM SPEC-SCNC: 9.4 MG/DL (ref 8.6–10.5)
CHLORIDE SERPL-SCNC: 104 MMOL/L (ref 98–107)
CO2 SERPL-SCNC: 25.3 MMOL/L (ref 22–29)
CREAT SERPL-MCNC: 0.75 MG/DL (ref 0.57–1)
DEPRECATED RDW RBC AUTO: 49.9 FL (ref 37–54)
DEPRECATED RDW RBC AUTO: 50 FL (ref 37–54)
EOSINOPHIL # BLD AUTO: 0.13 10*3/MM3 (ref 0–0.4)
EOSINOPHIL # BLD AUTO: 0.14 10*3/MM3 (ref 0–0.4)
EOSINOPHIL NFR BLD AUTO: 1.2 % (ref 0.3–6.2)
EOSINOPHIL NFR BLD AUTO: 1.5 % (ref 0.3–6.2)
ERYTHROCYTE [DISTWIDTH] IN BLOOD BY AUTOMATED COUNT: 14.1 % (ref 12.3–15.4)
ERYTHROCYTE [DISTWIDTH] IN BLOOD BY AUTOMATED COUNT: 14.2 % (ref 12.3–15.4)
GFR SERPL CREATININE-BSD FRML MDRD: 81 ML/MIN/1.73
GLOBULIN UR ELPH-MCNC: 3.1 GM/DL
GLUCOSE SERPL-MCNC: 68 MG/DL (ref 65–99)
HCT VFR BLD AUTO: 36.3 % (ref 34–46.6)
HCT VFR BLD AUTO: 39.1 % (ref 34–46.6)
HGB BLD-MCNC: 11.6 G/DL (ref 12–15.9)
HGB BLD-MCNC: 12.4 G/DL (ref 12–15.9)
HOLD SPECIMEN: NORMAL
HOLD SPECIMEN: NORMAL
IMM GRANULOCYTES # BLD AUTO: 0.02 10*3/MM3 (ref 0–0.05)
IMM GRANULOCYTES # BLD AUTO: 0.03 10*3/MM3 (ref 0–0.05)
IMM GRANULOCYTES NFR BLD AUTO: 0.2 % (ref 0–0.5)
IMM GRANULOCYTES NFR BLD AUTO: 0.3 % (ref 0–0.5)
INR PPP: 0.98 (ref 0.9–1.1)
INR PPP: 1.06 (ref 0.85–1.16)
LYMPHOCYTES # BLD AUTO: 1.97 10*3/MM3 (ref 0.7–3.1)
LYMPHOCYTES # BLD AUTO: 2.7 10*3/MM3 (ref 0.7–3.1)
LYMPHOCYTES NFR BLD AUTO: 20.9 % (ref 19.6–45.3)
LYMPHOCYTES NFR BLD AUTO: 24.5 % (ref 19.6–45.3)
MAGNESIUM SERPL-MCNC: 2.5 MG/DL (ref 1.6–2.6)
MCH RBC QN AUTO: 30.3 PG (ref 26.6–33)
MCH RBC QN AUTO: 30.8 PG (ref 26.6–33)
MCHC RBC AUTO-ENTMCNC: 31.7 G/DL (ref 31.5–35.7)
MCHC RBC AUTO-ENTMCNC: 32 G/DL (ref 31.5–35.7)
MCV RBC AUTO: 95.6 FL (ref 79–97)
MCV RBC AUTO: 96.3 FL (ref 79–97)
MONOCYTES # BLD AUTO: 0.5 10*3/MM3 (ref 0.1–0.9)
MONOCYTES # BLD AUTO: 0.66 10*3/MM3 (ref 0.1–0.9)
MONOCYTES NFR BLD AUTO: 4.5 % (ref 5–12)
MONOCYTES NFR BLD AUTO: 7 % (ref 5–12)
NEUTROPHILS NFR BLD AUTO: 6.6 10*3/MM3 (ref 1.7–7)
NEUTROPHILS NFR BLD AUTO: 69.1 % (ref 42.7–76)
NEUTROPHILS NFR BLD AUTO: 7.64 10*3/MM3 (ref 1.7–7)
NEUTROPHILS NFR BLD AUTO: 70.2 % (ref 42.7–76)
NRBC BLD AUTO-RTO: 0 /100 WBC (ref 0–0.2)
NRBC BLD AUTO-RTO: 0 /100 WBC (ref 0–0.2)
PLATELET # BLD AUTO: 267 10*3/MM3 (ref 140–450)
PLATELET # BLD AUTO: 297 10*3/MM3 (ref 140–450)
PMV BLD AUTO: 9.7 FL (ref 6–12)
PMV BLD AUTO: 9.7 FL (ref 6–12)
POTASSIUM SERPL-SCNC: 3.2 MMOL/L (ref 3.5–5.2)
PROT SERPL-MCNC: 7.5 G/DL (ref 6–8.5)
PROTHROMBIN TIME: 12.8 SECONDS (ref 11.9–14.1)
PROTHROMBIN TIME: 13.5 SECONDS (ref 11.5–14)
RBC # BLD AUTO: 3.77 10*6/MM3 (ref 3.77–5.28)
RBC # BLD AUTO: 4.09 10*6/MM3 (ref 3.77–5.28)
SODIUM SERPL-SCNC: 142 MMOL/L (ref 136–145)
TROPONIN T SERPL-MCNC: <0.01 NG/ML (ref 0–0.03)
TSH SERPL DL<=0.05 MIU/L-ACNC: 0.88 UIU/ML (ref 0.27–4.2)
UFH PPP CHRO-ACNC: 0.1 IU/ML (ref 0.3–0.7)
WBC # BLD AUTO: 11.04 10*3/MM3 (ref 3.4–10.8)
WBC # BLD AUTO: 9.41 10*3/MM3 (ref 3.4–10.8)
WHOLE BLOOD HOLD SPECIMEN: NORMAL
WHOLE BLOOD HOLD SPECIMEN: NORMAL

## 2020-07-11 PROCEDURE — 80050 GENERAL HEALTH PANEL: CPT | Performed by: EMERGENCY MEDICINE

## 2020-07-11 PROCEDURE — 84484 ASSAY OF TROPONIN QUANT: CPT | Performed by: NURSE PRACTITIONER

## 2020-07-11 PROCEDURE — 94799 UNLISTED PULMONARY SVC/PX: CPT

## 2020-07-11 PROCEDURE — 99285 EMERGENCY DEPT VISIT HI MDM: CPT

## 2020-07-11 PROCEDURE — 85730 THROMBOPLASTIN TIME PARTIAL: CPT | Performed by: EMERGENCY MEDICINE

## 2020-07-11 PROCEDURE — 85610 PROTHROMBIN TIME: CPT | Performed by: NURSE PRACTITIONER

## 2020-07-11 PROCEDURE — 85730 THROMBOPLASTIN TIME PARTIAL: CPT | Performed by: NURSE PRACTITIONER

## 2020-07-11 PROCEDURE — 71045 X-RAY EXAM CHEST 1 VIEW: CPT

## 2020-07-11 PROCEDURE — 87635 SARS-COV-2 COVID-19 AMP PRB: CPT | Performed by: NURSE PRACTITIONER

## 2020-07-11 PROCEDURE — 85025 COMPLETE CBC W/AUTO DIFF WBC: CPT | Performed by: NURSE PRACTITIONER

## 2020-07-11 PROCEDURE — C9803 HOPD COVID-19 SPEC COLLECT: HCPCS | Performed by: NURSE PRACTITIONER

## 2020-07-11 PROCEDURE — 83735 ASSAY OF MAGNESIUM: CPT | Performed by: EMERGENCY MEDICINE

## 2020-07-11 PROCEDURE — 96374 THER/PROPH/DIAG INJ IV PUSH: CPT

## 2020-07-11 PROCEDURE — 93005 ELECTROCARDIOGRAM TRACING: CPT | Performed by: EMERGENCY MEDICINE

## 2020-07-11 PROCEDURE — 93005 ELECTROCARDIOGRAM TRACING: CPT | Performed by: INTERNAL MEDICINE

## 2020-07-11 PROCEDURE — 85610 PROTHROMBIN TIME: CPT | Performed by: EMERGENCY MEDICINE

## 2020-07-11 PROCEDURE — 25010000002 HEPARIN (PORCINE) PER 1000 UNITS: Performed by: NURSE PRACTITIONER

## 2020-07-11 PROCEDURE — 25010000002 HEPARIN (PORCINE) 25000-0.45 UT/250ML-% SOLUTION: Performed by: NURSE PRACTITIONER

## 2020-07-11 PROCEDURE — 93010 ELECTROCARDIOGRAM REPORT: CPT | Performed by: INTERNAL MEDICINE

## 2020-07-11 PROCEDURE — 84484 ASSAY OF TROPONIN QUANT: CPT | Performed by: EMERGENCY MEDICINE

## 2020-07-11 PROCEDURE — 85520 HEPARIN ASSAY: CPT | Performed by: NURSE PRACTITIONER

## 2020-07-11 PROCEDURE — 25010000002 LORAZEPAM PER 2 MG: Performed by: EMERGENCY MEDICINE

## 2020-07-11 PROCEDURE — 96361 HYDRATE IV INFUSION ADD-ON: CPT

## 2020-07-11 PROCEDURE — 99223 1ST HOSP IP/OBS HIGH 75: CPT | Performed by: INTERNAL MEDICINE

## 2020-07-11 RX ORDER — ISOSORBIDE MONONITRATE 60 MG/1
60 TABLET, EXTENDED RELEASE ORAL DAILY
Status: DISCONTINUED | OUTPATIENT
Start: 2020-07-11 | End: 2020-07-11

## 2020-07-11 RX ORDER — TRAMADOL HYDROCHLORIDE 50 MG/1
50 TABLET ORAL EVERY 6 HOURS PRN
Status: DISCONTINUED | OUTPATIENT
Start: 2020-07-11 | End: 2020-07-13 | Stop reason: HOSPADM

## 2020-07-11 RX ORDER — NICOTINE 21 MG/24HR
1 PATCH, TRANSDERMAL 24 HOURS TRANSDERMAL
Status: DISCONTINUED | OUTPATIENT
Start: 2020-07-11 | End: 2020-07-13 | Stop reason: HOSPADM

## 2020-07-11 RX ORDER — ACETAMINOPHEN 650 MG/1
650 SUPPOSITORY RECTAL EVERY 4 HOURS PRN
Status: DISCONTINUED | OUTPATIENT
Start: 2020-07-11 | End: 2020-07-13 | Stop reason: HOSPADM

## 2020-07-11 RX ORDER — SODIUM CHLORIDE 0.9 % (FLUSH) 0.9 %
10 SYRINGE (ML) INJECTION EVERY 12 HOURS SCHEDULED
Status: DISCONTINUED | OUTPATIENT
Start: 2020-07-11 | End: 2020-07-13 | Stop reason: HOSPADM

## 2020-07-11 RX ORDER — ATENOLOL 25 MG/1
25 TABLET ORAL DAILY
Status: DISCONTINUED | OUTPATIENT
Start: 2020-07-11 | End: 2020-07-13 | Stop reason: HOSPADM

## 2020-07-11 RX ORDER — ACETAMINOPHEN 160 MG/5ML
650 SOLUTION ORAL EVERY 4 HOURS PRN
Status: DISCONTINUED | OUTPATIENT
Start: 2020-07-11 | End: 2020-07-13 | Stop reason: HOSPADM

## 2020-07-11 RX ORDER — PANTOPRAZOLE SODIUM 40 MG/1
40 TABLET, DELAYED RELEASE ORAL
Status: DISCONTINUED | OUTPATIENT
Start: 2020-07-12 | End: 2020-07-13 | Stop reason: HOSPADM

## 2020-07-11 RX ORDER — NITROGLYCERIN 20 MG/100ML
5-200 INJECTION INTRAVENOUS
Status: DISCONTINUED | OUTPATIENT
Start: 2020-07-11 | End: 2020-07-13

## 2020-07-11 RX ORDER — ONDANSETRON 4 MG/1
4 TABLET, FILM COATED ORAL EVERY 6 HOURS PRN
Status: DISCONTINUED | OUTPATIENT
Start: 2020-07-11 | End: 2020-07-13 | Stop reason: HOSPADM

## 2020-07-11 RX ORDER — SPIRONOLACTONE 25 MG/1
25 TABLET ORAL DAILY
Status: DISCONTINUED | OUTPATIENT
Start: 2020-07-11 | End: 2020-07-13 | Stop reason: HOSPADM

## 2020-07-11 RX ORDER — LORAZEPAM 2 MG/ML
0.5 INJECTION INTRAMUSCULAR EVERY 4 HOURS PRN
Status: DISCONTINUED | OUTPATIENT
Start: 2020-07-11 | End: 2020-07-13 | Stop reason: HOSPADM

## 2020-07-11 RX ORDER — HYDROCODONE BITARTRATE AND ACETAMINOPHEN 7.5; 325 MG/1; MG/1
1 TABLET ORAL 2 TIMES DAILY
Status: DISCONTINUED | OUTPATIENT
Start: 2020-07-11 | End: 2020-07-13 | Stop reason: HOSPADM

## 2020-07-11 RX ORDER — PRAVASTATIN SODIUM 20 MG
20 TABLET ORAL DAILY
Status: DISCONTINUED | OUTPATIENT
Start: 2020-07-11 | End: 2020-07-13 | Stop reason: HOSPADM

## 2020-07-11 RX ORDER — HEPARIN SODIUM 1000 [USP'U]/ML
60 INJECTION, SOLUTION INTRAVENOUS; SUBCUTANEOUS ONCE
Status: COMPLETED | OUTPATIENT
Start: 2020-07-11 | End: 2020-07-11

## 2020-07-11 RX ORDER — HEPARIN SODIUM 10000 [USP'U]/100ML
16 INJECTION, SOLUTION INTRAVENOUS
Status: DISCONTINUED | OUTPATIENT
Start: 2020-07-11 | End: 2020-07-13

## 2020-07-11 RX ORDER — ONDANSETRON 2 MG/ML
4 INJECTION INTRAMUSCULAR; INTRAVENOUS EVERY 6 HOURS PRN
Status: DISCONTINUED | OUTPATIENT
Start: 2020-07-11 | End: 2020-07-13 | Stop reason: HOSPADM

## 2020-07-11 RX ORDER — ASPIRIN 81 MG/1
81 TABLET, CHEWABLE ORAL ONCE
Status: COMPLETED | OUTPATIENT
Start: 2020-07-11 | End: 2020-07-11

## 2020-07-11 RX ORDER — LORAZEPAM 2 MG/ML
0.5 INJECTION INTRAMUSCULAR ONCE
Status: COMPLETED | OUTPATIENT
Start: 2020-07-11 | End: 2020-07-11

## 2020-07-11 RX ORDER — SODIUM CHLORIDE 0.9 % (FLUSH) 0.9 %
10 SYRINGE (ML) INJECTION AS NEEDED
Status: DISCONTINUED | OUTPATIENT
Start: 2020-07-11 | End: 2020-07-13 | Stop reason: HOSPADM

## 2020-07-11 RX ORDER — HYDROCODONE BITARTRATE AND ACETAMINOPHEN 7.5; 325 MG/1; MG/1
1 TABLET ORAL 2 TIMES DAILY
COMMUNITY
End: 2020-07-21 | Stop reason: HOSPADM

## 2020-07-11 RX ORDER — SODIUM CHLORIDE 9 MG/ML
125 INJECTION, SOLUTION INTRAVENOUS CONTINUOUS
Status: DISCONTINUED | OUTPATIENT
Start: 2020-07-11 | End: 2020-07-11 | Stop reason: HOSPADM

## 2020-07-11 RX ORDER — ASPIRIN 81 MG/1
81 TABLET ORAL DAILY
Status: DISCONTINUED | OUTPATIENT
Start: 2020-07-11 | End: 2020-07-13 | Stop reason: HOSPADM

## 2020-07-11 RX ORDER — ACETAMINOPHEN 325 MG/1
650 TABLET ORAL EVERY 4 HOURS PRN
Status: DISCONTINUED | OUTPATIENT
Start: 2020-07-11 | End: 2020-07-13 | Stop reason: HOSPADM

## 2020-07-11 RX ORDER — POTASSIUM CHLORIDE 20 MEQ/1
40 TABLET, EXTENDED RELEASE ORAL ONCE
Status: COMPLETED | OUTPATIENT
Start: 2020-07-11 | End: 2020-07-11

## 2020-07-11 RX ADMIN — HYDROCODONE BITARTRATE AND ACETAMINOPHEN 1 TABLET: 7.5; 325 TABLET ORAL at 20:56

## 2020-07-11 RX ADMIN — NITROGLYCERIN 10 MCG/MIN: 20 INJECTION INTRAVENOUS at 18:37

## 2020-07-11 RX ADMIN — NITROGLYCERIN 1 INCH: 20 OINTMENT TOPICAL at 10:06

## 2020-07-11 RX ADMIN — ASPIRIN 81 MG: 81 TABLET, CHEWABLE ORAL at 10:05

## 2020-07-11 RX ADMIN — SODIUM CHLORIDE, PRESERVATIVE FREE 10 ML: 5 INJECTION INTRAVENOUS at 20:58

## 2020-07-11 RX ADMIN — HEPARIN SODIUM 3210 UNITS: 1000 INJECTION INTRAVENOUS; SUBCUTANEOUS at 18:28

## 2020-07-11 RX ADMIN — TRAMADOL HYDROCHLORIDE 50 MG: 50 TABLET, FILM COATED ORAL at 19:04

## 2020-07-11 RX ADMIN — HEPARIN SODIUM 12 UNITS/KG/HR: 10000 INJECTION, SOLUTION INTRAVENOUS at 18:28

## 2020-07-11 RX ADMIN — LORAZEPAM 0.5 MG: 2 INJECTION INTRAMUSCULAR; INTRAVENOUS at 10:17

## 2020-07-11 RX ADMIN — POTASSIUM CHLORIDE 40 MEQ: 1500 TABLET, EXTENDED RELEASE ORAL at 11:07

## 2020-07-11 RX ADMIN — SODIUM CHLORIDE 125 ML/HR: 9 INJECTION, SOLUTION INTRAVENOUS at 10:13

## 2020-07-11 NOTE — ED NOTES
Bedside report given to [facility] at this time. Patient leaving ED to be transported to [facility]. Patient AAOx4. VSS. No acute distress noted. IV patent and infusing normal saline at 125 ml/hr. Patient belongings sent with patient in patient belonging bag.      Deborah Mccabe, RN  07/11/20 1496

## 2020-07-11 NOTE — ED NOTES
Spoke with [employer]. [facility]. They accepted and with a 30 min ETA.      Marco Hayes  07/11/20 9441

## 2020-07-11 NOTE — ED NOTES
Called [facility]. EMS for transfer to [facility]. [facility] will call back.      Marco Hayes  07/11/20 1300

## 2020-07-11 NOTE — H&P
"    [facility] Medicine Services  HISTORY AND PHYSICAL    Patient Name: Veda Enamorado  : 1965  MRN: [id number]  Primary Care Physician: Chiquita Choudhary, ZION  Date of admission: 2020      Subjective   Subjective     Chief Complaint:  Chest Pain     HPI:  Veda Enamorado is a 55 y.o. female with past medical history significant for CAD s/p 3 stents, HTN, HLD, PUD, GERD, and chronic low back pain who presented to the ED at [facility] due to severe substernal chest pain. She reports her pain began abruptly shortly after waking up this morning. She describes the pain as \"crushing\" but it did not radiate. She took two sublingual nitroglycerin which relieved her pain. She also took her Effient this morning which has been on hold due to upcoming CABG. She denies any recent fever, chills, dyspnea, cough, or loss of taste.     She has known history of coronary artery disease following a left heart catheterization on 2019 with Dr. Conway which revealed a 30% distal left main stenosis, 80% LAD stenosis after the takeoff of the first diagonal branch, 90% in-stent restenosis of the diagonal branch, 70% distal LAD stenosis and 50 to 60% proximal first obtuse marginal stenosis per cath report. She was scheduled for outpatient CABG on 2020 with Dr. Coronado, but  was transferred to [facility] today due to new onset of chest pain. Dr. Enriquez, who is covering for Dr. Coronado this weekend, accepted the patient and will consult in the morning. She will be admitted by hospital medicine for further evaluation and treatment.     Review of Systems   Constitutional: Positive for fatigue. Negative for appetite change, chills and fever.   HENT: Negative for congestion, trouble swallowing and voice change.    Eyes: Negative for photophobia, redness and visual disturbance.   Respiratory: Negative for cough, shortness of breath and wheezing.    Cardiovascular: " Positive for chest pain. Negative for palpitations and leg swelling.   Gastrointestinal: Negative for abdominal pain, constipation and diarrhea.   Endocrine: Negative for polydipsia, polyphagia and polyuria.   Genitourinary: Negative for difficulty urinating, flank pain and urgency.   Musculoskeletal: Positive for back pain. Negative for arthralgias and myalgias.   Skin: Negative for color change, pallor and rash.   Allergic/Immunologic: Negative.    Neurological: Positive for weakness. Negative for dizziness, seizures, syncope and headaches.   Hematological: Negative.    Psychiatric/Behavioral: Negative for agitation, confusion and suicidal ideas. The patient is nervous/anxious.         All other systems reviewed and are negative.     Personal History     Past Medical History:   Diagnosis Date   • Arthritis     back   • Back ache    • Chronic back pain    • Coronary artery disease     s/p 3 stents    • GERD (gastroesophageal reflux disease)    • History of MRSA infection 2020    labia; hospitalized 6 days on IV antibiotics and then went home on po antibiotics    • Hyperlipidemia    • Hypertension    • Peptic ulceration        Past Surgical History:   Procedure Laterality Date   • CARDIAC CATHETERIZATION     • CARDIAC CATHETERIZATION N/A 2019    Procedure: Left Heart Cath;  Surgeon: Lazaro Conway MD;  Location: [facility] INVASIVE LOCATION;  Service: Cardiology   • CARDIAC CATHETERIZATION     • CARDIAC SURGERY     •  SECTION      x2   • HAND SURGERY      right   • PERINEAL LESION/CYST EXCISION Right 2020    Procedure: I&D ABSCESS;  Surgeon: Leander Cardenas III, MD;  Location: [facility] OR;  Service: Obstetrics/Gynecology       Family History: family history includes Heart attack in her brother and father; Heart disease in her brother, father, and mother; No Known Problems in her sister. Otherwise pertinent FHx was reviewed and unremarkable.     Social History:  reports that she has  been smoking electronic cigarette and cigarettes. She has a 6.25 pack-year smoking history. She has never used smokeless tobacco. She reports that she does not drink alcohol or use drugs.  Social History     Social History Narrative   • Not on file       Medications:  Available home medication information reviewed.  Medications Prior to Admission   Medication Sig Dispense Refill Last Dose   • Alirocumab (Praluent) 75 MG/ML solution auto-injector Inject 1 mL under the skin into the appropriate area as directed Every 14 (Fourteen) Days. 2 mL 5 6/26/2020   • aspirin 81 MG EC tablet Take 81 mg by mouth daily.   Taking   • atenolol (TENORMIN) 25 MG tablet Take 1 tablet by mouth Daily. (Patient taking differently: Take 25 mg by mouth Every Morning.) 30 tablet 6 Taking   • fluticasone-salmeterol (ADVAIR) 500-50 MCG/DOSE DISKUS Inhale 1 puff As Needed.   Taking   • HYDROcodone-acetaminophen (NORCO) 7.5-325 MG per tablet Take 1 tablet by mouth 2 (Two) Times a Day.      • isosorbide mononitrate (IMDUR) 60 MG 24 hr tablet Take 1 tablet by mouth Daily. (Patient taking differently: Take 60 mg by mouth Every Morning.) 30 tablet 6 Taking   • nitroglycerin (NITROSTAT) 0.4 MG SL tablet 1 under the tongue as needed for angina, may repeat q5mins for up three doses (Patient taking differently: Place 0.4 mg under the tongue Every 5 (Five) Minutes As Needed for Chest Pain. 1 under the tongue as needed for angina, may repeat q5mins for up three doses) 100 tablet 3 Taking   • prasugrel (EFFIENT) 10 MG tablet Take 1 tablet by mouth Daily. (Patient taking differently: Take 10 mg by mouth Daily. Did not stop for surgery) 30 tablet 6 Taking   • pravastatin (PRAVACHOL) 20 MG tablet Take 1 tablet by mouth Daily. (Patient taking differently: Take 20 mg by mouth Every Morning.) 30 tablet 6 Taking   • spironolactone (ALDACTONE) 25 MG tablet Take 1 tablet by mouth Daily. (Patient taking differently: Take 25 mg by mouth Every Morning.) 30 tablet 6  Taking   • traMADol (ULTRAM) 50 MG tablet Take 50 mg by mouth every 6 (six) hours as needed for moderate pain (4-6).   Taking       Allergies   Allergen Reactions   • Bactrim [Sulfamethoxazole-Trimethoprim] Rash   • Ciprofloxacin Other (See Comments)     tremors   • Ranexa [Ranolazine Er] Unknown - Low Severity       Objective   Objective     Vital Signs:   Temp:  [97.9 °F (36.6 °C)] 97.9 °F (36.6 °C)  Heart Rate:  [49-68] 58  Resp:  [18] 18  BP: (113-197)/(69-96) 130/75        Physical Exam   Constitutional: Awake, alert  Eyes: PERRLA, sclerae anicteric, no conjunctival injection  HENT: NCAT, mucous membranes moist  Neck: Supple, no thyromegaly, no lymphadenopathy, trachea midline  Respiratory: Clear to auscultation bilaterally, nonlabored respirations   Cardiovascular: RRR, no murmurs, rubs, or gallops, palpable pedal pulses bilaterally  Gastrointestinal: Positive bowel sounds, soft, nontender, nondistended  Musculoskeletal: No bilateral ankle edema, no clubbing or cyanosis to extremities  Psychiatric: Appropriate affect, cooperative  Neurologic: Oriented x 3, strength symmetric in all extremities, speech clear  Skin: warm, dry, no visible rash    Results Reviewed:  I have personally reviewed current lab and radiology data.    Results from last 7 days   Lab Units 07/11/20  1756 07/11/20  0959   WBC 10*3/mm3  --  9.41   HEMOGLOBIN g/dL  --  12.4   HEMATOCRIT %  --  39.1   PLATELETS 10*3/mm3  --  297   INR  1.06 0.98     Results from last 7 days   Lab Units 07/11/20  1200 07/11/20  0959   SODIUM mmol/L  --  142   POTASSIUM mmol/L  --  3.2*   CHLORIDE mmol/L  --  104   CO2 mmol/L  --  25.3   BUN mg/dL  --  6   CREATININE mg/dL  --  0.75   GLUCOSE mg/dL  --  68   CALCIUM mg/dL  --  9.4   ALT (SGPT) U/L  --  6   AST (SGOT) U/L  --  12   TROPONIN T ng/mL <0.010 <0.010     Estimated Creatinine Clearance: 72.4 mL/min (by C-G formula based on SCr of 0.75 mg/dL).  Brief Urine Lab Results  (Last result in the past 365 days)        Color   Clarity   Blood   Leuk Est   Nitrite   Protein   CREAT   Urine HCG        01/09/20 1307               Negative         Imaging Results (Last 24 Hours)     ** No results found for the last 24 hours. **        Results for orders placed in visit on 06/17/20   Adult Transthoracic Echo Complete W/ Cont if Necessary Per Protocol    Narrative · Normal left ventricular cavity size and wall thickness noted.  · Left ventricular systolic function is normal. Estimated EF appears to be   in the range of 61 - 65%.  · Left ventricular diastolic function is normal.  · Tip of anterior mitral leaflet is mildly thickened ,no vegetations   noted.  · No significant valvular heart disease  · There is no evidence of pericardial effusion.          Assessment/Plan   Assessment & Plan     Active Hospital Problems    Diagnosis POA   • **Coronary artery disease involving native coronary artery of native heart with unstable angina pectoris (CMS/HCC) [I25.110] Yes   • Chronic low back pain [M54.5, G89.29] Yes   • GERD without esophagitis [K21.9] Yes   • Essential hypertension [I10] Yes   • Tobacco abuse [Z72.0] Yes   • Hyperlipidemia [E78.5] Yes       Veda Enamorado is a 55 y.o. female with past medical history significant for CAD s/p 3 stents, HTN, HLD, PUD, GERD, and chronic low back pain who presented to the ED at [facility] due to severe substernal chest pain. She was scheduled for outpatient CABG on 7/17/2020 with Dr. Coronado, but  was transferred to [facility] today due to new onset of chest pain.    CAD  Unstable angina   --LHC performed on 12/18/2019 by Dr. Conway revealed a 30% distal left main stenosis, 80% LAD stenosis after the takeoff of the first diagonal branch, 90% in-stent restenosis of the diagonal branch, 70% distal LAD stenosis and 50 to 60% proximal first obtuse marginal stenosis per cath report  --CT Surgery consult in AM  --Pre-OP COVID in-house Reji swab sent    --Heparin gtt  --NTG gtt PRN for chest pain  --Effient on hold pending surgery   --ASA 81 mg daily  --NPO after midnight  --EKG in AM  --CBC, CMP, Mag, P2y12 in AM     HTN  --continue home atenolol, aldactone    HLD  --continue home pravastatin    Chronic low back pain  --continue home Norco PRN, Tramadol PRN    Anxiety   --Ativan 0.5mg IV PRN    GERD  --Protonix daily     Tobacco Abuse  --smoking cession counseling   --Nicotine patch daily     DVT prophylaxis:  Heparin gtt        COVID-19 RISK SCREEN     1. Has the patient had close contact without PPE with a lab confirmed COVID-19 (+) person or a person under investigation (PUI) for COVID-19 infection?  -- No     2. Has the patient had respiratory symptoms, worsened/new cough and/or SOA, unexplained fever, or sudden loss of smell and/or taste in the past 7 days? --  No    3. Does the patient have baseline higher exposure risk such as working in healthcare field, currently residing in healthcare facility, or ongoing hemodialysis?  --  No         CODE STATUS:  Discussed with patient at bedside. At this time, her desire is to be a full code.   Code Status and Medical Interventions:   Ordered at: 07/11/20 0934     Level Of Support Discussed With:    Patient     Code Status:    CPR     Medical Interventions (Level of Support Prior to Arrest):    Full       Admission Status:  I believe this patient meets INPATIENT status due to need for CABG.  I feel patient’s risk for adverse outcomes and need for care warrant INPATIENT evaluation and I predict the patient’s care encounter to likely last beyond 2 midnights.    Electronically signed by ZION Jackman, 07/11/20, 5:28 PM.      Attending   Admission Attestation       I have seen and examined the patient, performing an independent face-to-face diagnostic evaluation with plan of care reviewed and developed with the advanced practice clinician (APC).      Brief Summary Statement:   Veda Enamorado is a 55 y.o.  female transferred from [facility] with chest pain. Patient was scheduled for CABG on 7/17 but since she is now having worsening chest pain was transferred here for urgent revascularization.    Remainder of detailed HPI is as noted by APC and has been reviewed and/or edited by me for completeness.    Attending Physical Exam:  Constitutional: Awake, alert  Eyes: PERRLA, sclerae anicteric, no conjunctival injection  HENT: NCAT, mucous membranes moist  Neck: Supple, no thyromegaly, no lymphadenopathy, trachea midline  Respiratory: Clear to auscultation bilaterally, nonlabored respirations   Cardiovascular: RRR, no murmurs, rubs, or gallops, palpable pedal pulses bilaterally  Gastrointestinal: Positive bowel sounds, soft, nontender, nondistended  Musculoskeletal: No bilateral ankle edema, no clubbing or cyanosis to extremities  Psychiatric: Appropriate affect, cooperative, anxious  Neurologic: Oriented x 3, strength symmetric in all extremities, Cranial Nerves grossly intact to confrontation, speech clear  Skin: No rashes    CXR personally reviewed showing no acute chest disease. Agree with interpretation.    Brief Assessment/Plan :    56 y/o female with known multivessel CAD presenting with chest pain.  --CT surgery will see in am to determine timing of CABG. Start IV heparin, IV nitro, asa. Hold Effient.     See detailed assessment and plan developed with APC which I have reviewed and/or edited for completeness.    Electronically signed by Talita Christopher II, DO, 07/11/20, 6:37 PM.

## 2020-07-11 NOTE — NURSING NOTE
ACC REVIEW REPORT: [facility]        PATIENT NAME: Veda Enamorado    PATIENT ID: [id number]      COVID-19 ACC SCREENING       DOES THE PATIENT HAVE A FEVER GREATER THAN OR EQUAL .4: NO    IS THE PATIENT EXPERIENCING SHORTNESS OF BREATH: NO    DOES THE PATIENT HAVE A COUGH: NO    DOES THE PATIENT HAVE ANY OF THE FOLLOWING RISK FACTORS:    EXPOSURE TO SUSPECTED OR KNOWN COVID-19: NO    RECENT TRAVEL HISTORY TO ENDEMIC AREA (DOMESTIC/LOCAL): NO    IS THE PATIENT A HEALTHCARE WORKER: NO    HAS THE PATIENT BEEN TESTED FOR COVID-19: YES    DATE TESTED: July 8TH AND TESTED ONCE BEFORE THAT    LAB TESTING SENT TO: UNKNOWN       BED: N625    BED TYPE: TELE    BED GIVEN TO: TANI AHUJA RN     TIME BED GIVEN: 1253    YOB: 1965    AGE: 55 YRS    GENDER: FEMALE    PREVIOUS ADMIT TO [facility]:     PREVIOUS ADMISSION DATE:     PATIENT CLASS:     TODAY'S DATE: 7/11/2020    TRANSFER DATE: 07/11/20    ETA:     TRANSFERRING FACILITY: [facility]    TRANSFERRING FACILITY PHONE # : [phone]    TRANSFERRING MD: DR RANGEL    DATE/TIME REQUEST RECEIVED: 07/11/2020    [facility] RN: HUGO SORIA    REPORT FROM:     TIME REPORT TAKEN: 6333    DIAGNOSIS: CHEST PAIN    REASON FOR TRANSFER TO [facility]: HIGHER LEVEL CARE    TRANSPORTATION: GROUND    CLINICAL REASON FOR TRANSFER TO [facility]: PRESENTED TO THE ER THIS MORNING WITH COMPLAINTS OF CHEST PAIN.  SHE TOOK NITRO SL X 2 AND IT COMPLETELY RELIEVED IT.  NOW SHE HAS AN INCH OF NITROPASTE ON HER CHEST AND SHE REMAINS FREE OF CHEST PAIN.  SHE IS SCHEDULED TO HAVE A CABG HERE AND HAS HAD TO RE-SCHEDULE IT SEVERAL TIMES.  SHE WILL HAVE IT ON THIS ADMISSION.  HER TROPONIN'S WERE NEGATIVE, HER POTASSIUM WAS 3.2 AND THEY REPLACED IT WITH 40 MEQ K+.  HER ALK-PHOS , PTT-25.5.  SHE'S BEEN IN SB WITH A RATE IN THE 50'S.  SHE HAS CHRONIC LUNG ISSUES.  SHE HAS BEEN TESTED FOR COVID SEVERAL TIMES AND HAS BEEN NEGATIVE.      CLINICAL INFORMATION    HEIGHT:     WEIGHT:     ALLERGIES:  BACTRIM, CIPROFLOXACIN, RANEXA    GARCIA:     INFECTIOUS DISEASE:     ISOLATION:     1ST VITAL SIGNS:   TIME:   TEMP:   PULSE:   B/P:   RESP:     LAST VITAL SIGNS:  TIME: 1232  TEMP: 97.9 ( AT 0936)  PULSE: 54  B/P: 124/69  RESP: 18  99% ON RMA    LAB INFORMATION: POTASSUM-3.2    CULTURE INFORMATION:     MEDS/IV FLUIDS: # 18G LAC WITH NS @ 125 ML/HR  RECEIVED:  ATIVAN .5 MG                        NITROPASTE 25.5    CARDIAC SYSTEM:    CHEST PAIN:     RATE:     SCALE:     RHYTHM: SB WITH A RATE BETWEEN 50'S    Is patient taking or has patient been given any drugs that could increase bleeding? YES  (Plavix, Brilinta, Effient, Eliquis, Xarelto, Warfarin, Integrilin, Angiomax)    DRUG: ASA 81 MG               EFFIENT 10 MG, LAST TAKEN THIS AM ON 07/11/20     DOSE/FREQUENCY:     CARDIAC ENZYMES:    DATE:   TIME:   CK:   CKMB:   RAMILA:   TROP:     DATE:   TIME:   CK:   CKMB:   RAMILA:   TROP:       CARDIAC NOTES:       RESPIRATORY SYSTEM:    LUNG SOUNDS:    CLEAR: YES  CRACKLES:   WHEEZES:   RHONCHI:   DIMINISHED:     ABG DATE:         ABG TIME:     ABG RESULTS:    PH:   PO2:   PCO2:   HCO3:   O2 SAT:       OXYGEN:     O2 SAT:     ADMINISTRATION ROUTE:     IMAGING FINDINGS:     PNEUMO LOCATION:     PNEUMO SIZE:     PNEUMO CHEST TUBE SEAL TYPE:     RADIOLOGY RESULTS:   FINDINGS:  Single portable AP view(s) of the chest.  Unremarkable cardiac silhouette. The vascularity is normal. Coarsened interstitial markings in a pattern suggestive of mild interstitial fibrosis/scarring, perhaps more prominent secondary to shallow lung  expansion and portable technique. No effusion or dense consolidation and no pneumothorax.     IMPRESSION:     1. Chronic appearing lung changes. No definite superimposed active disease.       RESPIRATORY STATUS:       CNS/MUSCULOSKELETAL    CAT SCAN RESULTS:     MRI RESULTS:     CNS/MUSCULOSKELETAL NOTES:       GI//GY      ABDOMINAL PAIN:     VOMITING:     DIARRHEA:     NAUSEA:     BOWEL SOUNDS:     OCCULT  STOOL:     VAGINAL BLEEDING:     TESTICULAR PAIN:     HEMATURIA:     NG TUBE:    SIZE:   DATE INSERTED:       ULTRASOUND:     ULTRASOUND RESULTS:       ACUTE ABDOMEN:     ACUTE ABDOMEN RESULTS:       CT SCAN:     CT SCAN RESULTS:       GI//GY NOTES:     PAST MEDICAL HISTORY:   Coronary artery disease involving native coronary artery of native heart with unstable angina pectoris 9/5/2016   Tobacco abuse 9/5/2016   High cholesterol or triglycerides 9/5/2016   High blood pressure disorder 9/18/2018   Encounter for aftercare 2/26/2019   Mixed hyperlipidemia 12/16/2019   Cellulitis of labia 1/8/2020   Abscess 1/8/2020   Methicillin resistant Staph aureus culture positive 1/12/2020   Left main coronary artery disease 6/17/2020         OTHER SYMPTOM NOTES:     ADDITIONAL NOTES:           Irma Wright RN  7/11/2020  12:34

## 2020-07-11 NOTE — ED PROVIDER NOTES
"Subjective   Patient is a 55-year-old female with known coronary artery disease.  She states that she is scheduled for 2, possibly 3 vessel CABG on Friday, July 17 with Dr. Coronado at [facility].  She reports that shortly after awakening this morning she developed \"crushing\" substernal chest pain during only very minimal household activity.  This was associated with some palpitations.  She reports that this did not radiate, and that she had no shortness of breath, nausea, vomiting, diaphoresis, syncope or near syncope, other associated symptoms or other complaints.  She reports that she took her Effient and 2 sublingual nitroglycerin and the pain subsided.  She reports that she is completely asymptomatic upon arrival here.  Her Effient had been on hold since last Monday for her upcoming CABG.  She did not take her aspirin this morning.          Review of Systems   Constitutional: Negative for chills, diaphoresis and fever.   HENT: Negative for ear pain, sore throat and trouble swallowing.    Eyes: Negative for photophobia and pain.   Respiratory: Negative for shortness of breath, wheezing and stridor.    Cardiovascular: Positive for chest pain.   Gastrointestinal: Negative for abdominal distention, abdominal pain, blood in stool, diarrhea, nausea and vomiting.   Endocrine: Negative for polydipsia and polyphagia.   Genitourinary: Negative for difficulty urinating and flank pain.   Musculoskeletal: Negative for back pain, neck pain and neck stiffness.   Skin: Negative for color change and pallor.   Neurological: Negative for seizures, syncope and speech difficulty.   Psychiatric/Behavioral: Negative for confusion.   All other systems reviewed and are negative.      Past Medical History:   Diagnosis Date   • Arthritis     back   • Back ache    • Chronic back pain    • Coronary artery disease     s/p 3 stents    • GERD (gastroesophageal reflux disease)    • History of MRSA infection 01/2020    labia; hospitalized " 6 days on IV antibiotics and then went home on po antibiotics    • Hyperlipidemia    • Hypertension    • Peptic ulceration        Allergies   Allergen Reactions   • Bactrim [Sulfamethoxazole-Trimethoprim] Rash   • Ciprofloxacin Other (See Comments)     tremors   • Ranexa [Ranolazine Er] Unknown - Low Severity       Past Surgical History:   Procedure Laterality Date   • CARDIAC CATHETERIZATION     • CARDIAC CATHETERIZATION N/A 2019    Procedure: Left Heart Cath;  Surgeon: Lazaro Conway MD;  Location: [facility] CATH INVASIVE LOCATION;  Service: Cardiology   • CARDIAC CATHETERIZATION     • CARDIAC SURGERY     •  SECTION      x2   • HAND SURGERY      right   • PERINEAL LESION/CYST EXCISION Right 2020    Procedure: I&D ABSCESS;  Surgeon: Leander Cardenas III, MD;  Location: [facility] OR;  Service: Obstetrics/Gynecology       Family History   Problem Relation Age of Onset   • Heart disease Mother    • Heart disease Father    • Heart attack Father    • No Known Problems Sister    • Heart attack Brother    • Heart disease Brother    • Breast cancer Neg Hx        Social History     Socioeconomic History   • Marital status:      Spouse name: Not on file   • Number of children: 2   • Years of education: Not on file   • Highest education level: Not on file   Occupational History   • Occupation: Homemaker     Comment: disability   Tobacco Use   • Smoking status: Current Every Day Smoker     Packs/day: 0.25     Years: 25.00     Pack years: 6.25     Types: Electronic Cigarette, Cigarettes   • Smokeless tobacco: Never Used   • Tobacco comment: Pt states only smoking a few cigs a day, transitioning to E-Cig.   Substance and Sexual Activity   • Alcohol use: No   • Drug use: No   • Sexual activity: Defer           Objective   Physical Exam   Constitutional: She is oriented to person, place, and time. She appears well-developed and well-nourished.  Non-toxic appearance. No distress.   Pleasant female,  alert and well-oriented.  She appears somewhat anxious but not otherwise in acute distress.   HENT:   Head: Normocephalic and atraumatic.   Eyes: Pupils are equal, round, and reactive to light. EOM are normal. No scleral icterus.   Neck: Normal range of motion. Neck supple. No neck rigidity. No tracheal deviation present.   Cardiovascular: Normal rate, regular rhythm and intact distal pulses.   Pulmonary/Chest: Effort normal and breath sounds normal. No respiratory distress. She exhibits no tenderness.   Abdominal: Soft. Bowel sounds are normal. There is no tenderness. There is no rebound and no guarding.   Musculoskeletal: Normal range of motion. She exhibits no tenderness.        Right lower leg: She exhibits no tenderness and no edema.        Left lower leg: She exhibits no tenderness and no edema.   Neurological: She is alert and oriented to person, place, and time. She has normal strength. No sensory deficit. She exhibits normal muscle tone. Coordination normal. GCS eye subscore is 4. GCS verbal subscore is 5. GCS motor subscore is 6.   Skin: Skin is warm and dry. Capillary refill takes less than 2 seconds. No cyanosis. No pallor.   Psychiatric: She has a normal mood and affect. Her behavior is normal.   Nursing note and vitals reviewed.      Procedures  EKG shows sinus rhythm with a rate of 59.  No apparent acute ischemia.  XR Chest 1 View   Final Result      1. Chronic appearing lung changes. No definite superimposed active disease.      Signer Name: Dionicio Vences MD    Signed: 7/11/2020 10:23 AM    Workstation [facility]: [facility]     [facility]        Results for orders placed or performed during the hospital encounter of 07/11/20   Comprehensive Metabolic Panel   Result Value Ref Range    Glucose 68 65 - 99 mg/dL    BUN 6 6 - 20 mg/dL    Creatinine 0.75 0.57 - 1.00 mg/dL    Sodium 142 136 - 145 mmol/L    Potassium 3.2 (L) 3.5 - 5.2 mmol/L    Chloride 104 98 - 107 mmol/L    CO2 25.3  22.0 - 29.0 mmol/L    Calcium 9.4 8.6 - 10.5 mg/dL    Total Protein 7.5 6.0 - 8.5 g/dL    Albumin 4.43 3.50 - 5.20 g/dL    ALT (SGPT) 6 1 - 33 U/L    AST (SGOT) 12 1 - 32 U/L    Alkaline Phosphatase 133 (H) 39 - 117 U/L    Total Bilirubin 0.2 0.0 - 1.2 mg/dL    eGFR Non African Amer 81 >60 mL/min/1.73    Globulin 3.1 gm/dL    A/G Ratio 1.4 g/dL    BUN/Creatinine Ratio 8.0 7.0 - 25.0    Anion Gap 12.7 5.0 - 15.0 mmol/L   Protime-INR   Result Value Ref Range    Protime 12.8 11.9 - 14.1 Seconds    INR 0.98 0.90 - 1.10   aPTT   Result Value Ref Range    PTT 25.5 (L) 25.6 - 35.3 seconds   Troponin   Result Value Ref Range    Troponin T <0.010 0.000 - 0.030 ng/mL   Troponin   Result Value Ref Range    Troponin T <0.010 0.000 - 0.030 ng/mL   TSH   Result Value Ref Range    TSH 0.875 0.270 - 4.200 uIU/mL   Magnesium   Result Value Ref Range    Magnesium 2.5 1.6 - 2.6 mg/dL   CBC Auto Differential   Result Value Ref Range    WBC 9.41 3.40 - 10.80 10*3/mm3    RBC 4.09 3.77 - 5.28 10*6/mm3    Hemoglobin 12.4 12.0 - 15.9 g/dL    Hematocrit 39.1 34.0 - 46.6 %    MCV 95.6 79.0 - 97.0 fL    MCH 30.3 26.6 - 33.0 pg    MCHC 31.7 31.5 - 35.7 g/dL    RDW 14.2 12.3 - 15.4 %    RDW-SD 49.9 37.0 - 54.0 fl    MPV 9.7 6.0 - 12.0 fL    Platelets 297 140 - 450 10*3/mm3    Neutrophil % 70.2 42.7 - 76.0 %    Lymphocyte % 20.9 19.6 - 45.3 %    Monocyte % 7.0 5.0 - 12.0 %    Eosinophil % 1.5 0.3 - 6.2 %    Basophil % 0.2 0.0 - 1.5 %    Immature Grans % 0.2 0.0 - 0.5 %    Neutrophils, Absolute 6.60 1.70 - 7.00 10*3/mm3    Lymphocytes, Absolute 1.97 0.70 - 3.10 10*3/mm3    Monocytes, Absolute 0.66 0.10 - 0.90 10*3/mm3    Eosinophils, Absolute 0.14 0.00 - 0.40 10*3/mm3    Basophils, Absolute 0.02 0.00 - 0.20 10*3/mm3    Immature Grans, Absolute 0.02 0.00 - 0.05 10*3/mm3    nRBC 0.0 0.0 - 0.2 /100 WBC   Light Blue Top   Result Value Ref Range    Extra Tube hold for add-on    Green Top (Gel)   Result Value Ref Range    Extra Tube Hold for add-ons.     Lavender Top   Result Value Ref Range    Extra Tube hold for add-on    Gold Top - SST   Result Value Ref Range    Extra Tube Hold for add-ons.                 ED Course  ED Course as of Jul 11 1403   Sat Jul 11, 2020   1204 Dr. Enriquez is covering for Dr. Coronado today at [facility].  I discussed the case with him.  He accepts the patient in transfer.  [facility] will call the hospitalist service for her admission, CT surgery will be consulting.  They will call us back with bed assignment.    [CM]   1219 I discussed the case with the hospitalist, Dr. Traylor.  He accepts the patient in transfer to the hospitalist service.  [facility] will call us back momentarily with the bed.    [CM]      ED Course User Index  [CM] Pedro Gomez MD                                           [facility]    Final diagnoses:   Chest pain due to coronary artery disease (CMS/[facility])             Please note that portions of this note were completed with a voice recognition program.        Pedro Gomez MD  07/11/20 [id number]

## 2020-07-11 NOTE — PROGRESS NOTES
HEPARIN INFUSION  Veda Enamorado is a  55 y.o. female receiving heparin infusion.             Therapy for (VTE/Cardiac):   cardiac  Patient Weight: 53.5 kg  Initial Bolus (Y/N):   yes  Any Bolus (Y/N):   yes        Signs or Symptoms of Bleeding:       Cardiac or Other (Not VTE)   Initial Bolus: 60 units/kg (Max 4,000 units)  Initial rate: 12 units/kg/hr (Max 1,000 units/hr)   Anti-Xa (IU/mL) Bolus Dose Stop Infusion Rate Change Repeat Anti-Xa      ?0.19 60 units/kg 0 hrs Increase rate by   4 units/kg/hr 6 hrs       0.2 - 0.29  30 units/kg 0 hrs Increase rate by 2 units/kg/hr 6 hrs    0.3 - 0.7 0 0 hrs No change 6 hrs      0.71 - 0.99 0  0 hrs Decrease rate by 2 units/kg/hr 6 hrs            ?1 0 Hold 1 hr Decrease rate by 3 units/kg/hr 6 hrs      Recommend Xa every 6 hours.             Results from last 7 days   Lab Units 07/11/20  0959 07/05/20  1557   INR  0.98 1.03   HEMOGLOBIN g/dL 12.4 11.7*   HEMATOCRIT % 39.1 36.5   PLATELETS 10*3/mm3 297 305          Date   Time   Anti-Xa Current Rate (Unit/kg/hr) Bolus   (Units) Rate Change   (Unit/kg/hr) New Rate (Unit/kg/hr) Next   Anti-Xa Comments  Pump Check Daily   7.11.20 1900 pend  3210   12  S/w go re start. Will initial bolus                                                                                                                                                                                                                                 Aristides Garcia [facility]  7/11/2020  18:15

## 2020-07-11 NOTE — ED NOTES
Called [facility]. Ya is going to page Dr. Enriquez with CT surgery for Dr. Gomez.      Marco Hayes  07/11/20 3733

## 2020-07-11 NOTE — PLAN OF CARE
Problem: Patient Care Overview  Goal: Plan of Care Review  Outcome: Ongoing (interventions implemented as appropriate)  Flowsheets  Taken 7/11/2020 1952  Progress: no change  Outcome Summary: Pt arrived from [place], no complaints of chest pain. Currently: heparin drip 12units/kg and nitro drip 3mL infusing. Pt possibly going for CABG.  Taken 7/11/2020 1700  Plan of Care Reviewed With: patient   Pt NSR, possible ST elevation per 1900 EKG.

## 2020-07-12 PROBLEM — I25.10 CORONARY ARTERY DISEASE: Status: ACTIVE | Noted: 2020-07-12

## 2020-07-12 LAB
ALBUMIN SERPL-MCNC: 3.9 G/DL (ref 3.5–5.2)
ALBUMIN/GLOB SERPL: 1.6 G/DL
ALP SERPL-CCNC: 112 U/L (ref 39–117)
ALT SERPL W P-5'-P-CCNC: <5 U/L (ref 1–33)
ANION GAP SERPL CALCULATED.3IONS-SCNC: 10 MMOL/L (ref 5–15)
APTT PPP: 75.7 SECONDS (ref 50–95)
AST SERPL-CCNC: 11 U/L (ref 1–32)
BASOPHILS # BLD AUTO: 0.03 10*3/MM3 (ref 0–0.2)
BASOPHILS NFR BLD AUTO: 0.4 % (ref 0–1.5)
BILIRUB SERPL-MCNC: 0.3 MG/DL (ref 0–1.2)
BUN SERPL-MCNC: 7 MG/DL (ref 6–20)
BUN/CREAT SERPL: 10 (ref 7–25)
CALCIUM SPEC-SCNC: 8.8 MG/DL (ref 8.6–10.5)
CHLORIDE SERPL-SCNC: 107 MMOL/L (ref 98–107)
CO2 SERPL-SCNC: 24 MMOL/L (ref 22–29)
CREAT SERPL-MCNC: 0.7 MG/DL (ref 0.57–1)
DEPRECATED RDW RBC AUTO: 50 FL (ref 37–54)
EOSINOPHIL # BLD AUTO: 0.12 10*3/MM3 (ref 0–0.4)
EOSINOPHIL NFR BLD AUTO: 1.5 % (ref 0.3–6.2)
ERYTHROCYTE [DISTWIDTH] IN BLOOD BY AUTOMATED COUNT: 14.2 % (ref 12.3–15.4)
GFR SERPL CREATININE-BSD FRML MDRD: 87 ML/MIN/1.73
GLOBULIN UR ELPH-MCNC: 2.5 GM/DL
GLUCOSE SERPL-MCNC: 96 MG/DL (ref 65–99)
HCT VFR BLD AUTO: 36.7 % (ref 34–46.6)
HGB BLD-MCNC: 11.7 G/DL (ref 12–15.9)
IMM GRANULOCYTES # BLD AUTO: 0.03 10*3/MM3 (ref 0–0.05)
IMM GRANULOCYTES NFR BLD AUTO: 0.4 % (ref 0–0.5)
LYMPHOCYTES # BLD AUTO: 2.1 10*3/MM3 (ref 0.7–3.1)
LYMPHOCYTES NFR BLD AUTO: 25.9 % (ref 19.6–45.3)
MAGNESIUM SERPL-MCNC: 2.4 MG/DL (ref 1.6–2.6)
MCH RBC QN AUTO: 30.5 PG (ref 26.6–33)
MCHC RBC AUTO-ENTMCNC: 31.9 G/DL (ref 31.5–35.7)
MCV RBC AUTO: 95.6 FL (ref 79–97)
MONOCYTES # BLD AUTO: 0.46 10*3/MM3 (ref 0.1–0.9)
MONOCYTES NFR BLD AUTO: 5.7 % (ref 5–12)
NEUTROPHILS NFR BLD AUTO: 5.38 10*3/MM3 (ref 1.7–7)
NEUTROPHILS NFR BLD AUTO: 66.1 % (ref 42.7–76)
NRBC BLD AUTO-RTO: 0 /100 WBC (ref 0–0.2)
PA ADP PRP-ACNC: 142 PRU
PA ADP PRP-ACNC: 145 PRU
PLATELET # BLD AUTO: 266 10*3/MM3 (ref 140–450)
PMV BLD AUTO: 9.8 FL (ref 6–12)
POTASSIUM SERPL-SCNC: 4 MMOL/L (ref 3.5–5.2)
PROT SERPL-MCNC: 6.4 G/DL (ref 6–8.5)
RBC # BLD AUTO: 3.84 10*6/MM3 (ref 3.77–5.28)
SARS-COV-2 RNA RESP QL NAA+PROBE: NOT DETECTED
SODIUM SERPL-SCNC: 141 MMOL/L (ref 136–145)
UFH PPP CHRO-ACNC: 0.18 IU/ML (ref 0.3–0.7)
UFH PPP CHRO-ACNC: 0.5 IU/ML (ref 0.3–0.7)
UFH PPP CHRO-ACNC: 0.59 IU/ML (ref 0.3–0.7)
WBC # BLD AUTO: 8.12 10*3/MM3 (ref 3.4–10.8)

## 2020-07-12 PROCEDURE — 85520 HEPARIN ASSAY: CPT

## 2020-07-12 PROCEDURE — 99232 SBSQ HOSP IP/OBS MODERATE 35: CPT | Performed by: FAMILY MEDICINE

## 2020-07-12 PROCEDURE — 85576 BLOOD PLATELET AGGREGATION: CPT | Performed by: NURSE PRACTITIONER

## 2020-07-12 PROCEDURE — 83735 ASSAY OF MAGNESIUM: CPT | Performed by: NURSE PRACTITIONER

## 2020-07-12 PROCEDURE — 85730 THROMBOPLASTIN TIME PARTIAL: CPT | Performed by: NURSE PRACTITIONER

## 2020-07-12 PROCEDURE — 99231 SBSQ HOSP IP/OBS SF/LOW 25: CPT | Performed by: PHYSICIAN ASSISTANT

## 2020-07-12 PROCEDURE — 85576 BLOOD PLATELET AGGREGATION: CPT | Performed by: THORACIC SURGERY (CARDIOTHORACIC VASCULAR SURGERY)

## 2020-07-12 PROCEDURE — 85520 HEPARIN ASSAY: CPT | Performed by: NURSE PRACTITIONER

## 2020-07-12 PROCEDURE — 80053 COMPREHEN METABOLIC PANEL: CPT | Performed by: NURSE PRACTITIONER

## 2020-07-12 PROCEDURE — 85025 COMPLETE CBC W/AUTO DIFF WBC: CPT | Performed by: NURSE PRACTITIONER

## 2020-07-12 PROCEDURE — 25010000002 HEPARIN (PORCINE) PER 1000 UNITS

## 2020-07-12 RX ORDER — CHLORHEXIDINE GLUCONATE 0.12 MG/ML
15 RINSE ORAL ONCE
Status: DISCONTINUED | OUTPATIENT
Start: 2020-07-12 | End: 2020-07-12

## 2020-07-12 RX ORDER — CHLORHEXIDINE GLUCONATE 0.12 MG/ML
15 RINSE ORAL EVERY 12 HOURS PRN
Status: DISCONTINUED | OUTPATIENT
Start: 2020-07-12 | End: 2020-07-13 | Stop reason: HOSPADM

## 2020-07-12 RX ORDER — HEPARIN SODIUM 1000 [USP'U]/ML
3000 INJECTION, SOLUTION INTRAVENOUS; SUBCUTANEOUS ONCE
Status: COMPLETED | OUTPATIENT
Start: 2020-07-12 | End: 2020-07-12

## 2020-07-12 RX ADMIN — CHLORHEXIDINE GLUCONATE 0.12% ORAL RINSE 15 ML: 1.2 LIQUID ORAL at 05:50

## 2020-07-12 RX ADMIN — PRAVASTATIN SODIUM 20 MG: 20 TABLET ORAL at 08:10

## 2020-07-12 RX ADMIN — HYDROCODONE BITARTRATE AND ACETAMINOPHEN 1 TABLET: 7.5; 325 TABLET ORAL at 08:10

## 2020-07-12 RX ADMIN — MUPIROCIN: 20 OINTMENT TOPICAL at 05:50

## 2020-07-12 RX ADMIN — SODIUM CHLORIDE, PRESERVATIVE FREE 10 ML: 5 INJECTION INTRAVENOUS at 21:00

## 2020-07-12 RX ADMIN — ASPIRIN 81 MG: 81 TABLET, COATED ORAL at 08:10

## 2020-07-12 RX ADMIN — NICOTINE 1 PATCH: 14 PATCH, EXTENDED RELEASE TRANSDERMAL at 08:09

## 2020-07-12 RX ADMIN — HEPARIN SODIUM 3000 UNITS: 1000 INJECTION INTRAVENOUS; SUBCUTANEOUS at 03:24

## 2020-07-12 RX ADMIN — SPIRONOLACTONE 25 MG: 25 TABLET ORAL at 08:10

## 2020-07-12 RX ADMIN — ATENOLOL 25 MG: 25 TABLET ORAL at 08:10

## 2020-07-12 RX ADMIN — HYDROCODONE BITARTRATE AND ACETAMINOPHEN 1 TABLET: 7.5; 325 TABLET ORAL at 20:54

## 2020-07-12 RX ADMIN — PANTOPRAZOLE SODIUM 40 MG: 40 TABLET, DELAYED RELEASE ORAL at 05:50

## 2020-07-12 NOTE — PROGRESS NOTES
[facility]  PROGRESS NOTE    Patient Name: Veda Enamorado  : 1965  MRN: [id number]    Date of Admission: 2020  Primary Care Physician: Chiquita Choudhary APRN    Subjective   Subjective     CC:  F/u chest pain    HPI:  Patient is resting in bed. She has not had chest pain since she was in ED in [place]. She is anxious about surgery.     Review of Systems  Gen- No fevers, chills  CV- No chest pain, palpitations  Resp- No cough, dyspnea  GI- No N/V/D, abd pain        Objective   Objective     Vital Signs:   Temp:  [97.5 °F (36.4 °C)-98.3 °F (36.8 °C)] 98.2 °F (36.8 °C)  Heart Rate:  [50-64] 53  Resp:  [18] 18  BP: (104-141)/(55-91) 129/66        Physical Exam:  Constitutional: No acute distress, awake, alert, female   HENT: NCAT, mucous membranes moist  Respiratory: Clear to auscultation bilaterally, respiratory effort normal   Cardiovascular: RRR, no murmurs, rubs, or gallops, palpable pedal pulses bilaterally  Gastrointestinal: Positive bowel sounds, soft, nontender, nondistended  Musculoskeletal: No bilateral ankle edema  Psychiatric: Appropriate affect, cooperative  Neurologic: Oriented x 3, strength symmetric in all extremities, Cranial Nerves grossly intact to confrontation, speech clear  Skin: No rashes      Results Reviewed:  Results from last 7 days   Lab Units 20  0851 20  1848 20  1756 20  0959   WBC 10*3/mm3 8.12 11.04*  --  9.41   HEMOGLOBIN g/dL 11.7* 11.6*  --  12.4   HEMATOCRIT % 36.7 36.3  --  39.1   PLATELETS 10*3/mm3 266 267  --  297   INR   --   --  1.06 0.98     Results from last 7 days   Lab Units 20  0851 20  1756 20  1200 20  0959   SODIUM mmol/L 141  --   --  142   POTASSIUM mmol/L 4.0  --   --  3.2*   CHLORIDE mmol/L 107  --   --  104   CO2 mmol/L 24.0  --   --  25.3   BUN mg/dL 7  --   --  6   CREATININE mg/dL 0.70  --   --  0.75   GLUCOSE mg/dL 96  --   --  68   CALCIUM mg/dL 8.8  --    --  9.4   ALT (SGPT) U/L <5  --   --  6   AST (SGOT) U/L 11  --   --  12   TROPONIN T ng/mL  --  <0.010 <0.010 <0.010     Estimated Creatinine Clearance: 77.6 mL/min (by C-G formula based on SCr of 0.7 mg/dL).    Microbiology Results Abnormal     Procedure Component Value - Date/Time    COVID PRE-OP / PRE-PROCEDURE SCREENING ORDER (NO ISOLATION) - Swab, Nasopharynx [[id number]] Collected:  07/11/20 1903    Lab Status:  Final result Specimen:  Swab from Nasopharynx Updated:  07/12/20 1401    Narrative:       The following orders were created for panel order COVID PRE-OP / PRE-PROCEDURE SCREENING ORDER (NO ISOLATION) - Swab, Nasopharynx.  Procedure                               Abnormality         Status                     ---------                               -----------         ------                     COVID-19,[facility] IN-HOUSE...[[id number]]  Normal              Final result                 Please view results for these tests on the individual orders.    COVID-19,[facility] IN-HOUSE, NP SWAB IN TRANSPORT MEDIA 8-12 HR TAT - Swab, Nasopharynx [[id number]]  (Normal) Collected:  07/11/20 1903    Lab Status:  Final result Specimen:  Swab from Nasopharynx Updated:  07/12/20 1401     COVID19 Not Detected    Narrative:       Fact sheet for providers: https://www.fda.gov/media/100000/download     Fact sheet for patients: https://www.fda.gov/media/582764/download          Imaging Results (Last 24 Hours)     ** No results found for the last 24 hours. **          Results for orders placed in visit on 06/17/20   Adult Transthoracic Echo Complete W/ Cont if Necessary Per Protocol    Narrative · Normal left ventricular cavity size and wall thickness noted.  · Left ventricular systolic function is normal. Estimated EF appears to be   in the range of 61 - 65%.  · Left ventricular diastolic function is normal.  · Tip of anterior mitral leaflet is mildly thickened ,no vegetations   noted.  · No significant valvular heart disease  ·  There is no evidence of pericardial effusion.          I have reviewed the medications:  Scheduled Meds:  aspirin 81 mg Oral Daily   atenolol 25 mg Oral Daily   HYDROcodone-acetaminophen 1 tablet Oral BID   nicotine 1 patch Transdermal Q24H   pantoprazole 40 mg Oral Q AM   pravastatin 20 mg Oral Daily   sodium chloride 10 mL Intravenous Q12H   spironolactone 25 mg Oral Daily     Continuous Infusions:  heparin 16 Units/kg/hr Last Rate: 16 Units/kg/hr (07/12/20 0324)   nitroglycerin 5-200 mcg/min Last Rate: 5 mcg/min (07/12/20 0325)   Pharmacy to Dose Heparin       PRN Meds:.•  acetaminophen **OR** acetaminophen **OR** acetaminophen  •  chlorhexidine  •  LORazepam  •  mupirocin  •  ondansetron **OR** ondansetron  •  Pharmacy to Dose Heparin  •  sodium chloride  •  traMADol    Assessment/Plan   Assessment & Plan     Active Hospital Problems    Diagnosis  POA   • **Coronary artery disease involving native coronary artery of native heart with unstable angina pectoris (CMS/HCC) [I25.110]  Yes   • Coronary artery disease [I25.10]  Yes   • Chronic low back pain [M54.5, G89.29]  Yes   • GERD without esophagitis [K21.9]  Yes   • Essential hypertension [I10]  Yes   • Tobacco abuse [Z72.0]  Yes   • Hyperlipidemia [E78.5]  Yes      Resolved Hospital Problems   No resolved problems to display.        Brief Hospital Course to date:  Veda Enamorado is a 55 y.o. female with past medical history significant for CAD s/p 3 stents, HTN, HLD, PUD, GERD, and chronic low back pain who presented to the ED at [facility] due to severe substernal chest pain. She was scheduled for outpatient CABG on 7/17/2020 with Dr. Coronado, but  was transferred to [facility] today due to new onset of chest pain.       MV CAD  Unstable angina  - LHC on 12/18/2019 by dr Conway showed mv cad  -She has had 2 CABG times re-scheduled   -CT surgery following  -Heparin GTT  -Nitro GTT   -ASA   HTN  HLD  Chronic Back  pain  Anxiety  GERD  Tobacco abuse  -Advised pt to stop.     DVT Prophylaxis:  Heparin gtt       Disposition: I expect the patient to be discharged TBD    CODE STATUS:   Code Status and Medical Interventions:   Ordered at: 07/11/20 174     Level Of Support Discussed With:    Patient     Code Status:    CPR     Medical Interventions (Level of Support Prior to Arrest):    Full         Electronically signed by Sabrina Cardenas DO, 07/12/20, 18:47.

## 2020-07-12 NOTE — PROGRESS NOTES
* No surgery found *       LOS: 1 day   Patient Care Team:  Chiquita Choudhary APRN as PCP - General  Chiquita Choudhary APRN as PCP - Family Medicine    Chief complaint: Coronary artery disease    Subjective   Denies chest pain, denies shortness of breath  Patient well-known to our service she is scheduled for surgery by Dr. Coronado on Friday, July 17  Objective    Vital Signs  Temp:  [97.5 °F (36.4 °C)-98.1 °F (36.7 °C)] 98.1 °F (36.7 °C)  Heart Rate:  [49-68] 57  Resp:  [18] 18  BP: (107-140)/(55-91) 133/67    Physical Exam:   General Appearance: alert, appears stated age and cooperative   Lungs: clear bilaterally   Heart: Regular rate and rhythm   Extremities without edema     Results   Results from last 7 days   Lab Units 07/12/20  0851   WBC 10*3/mm3 8.12   HEMOGLOBIN g/dL 11.7*   HEMATOCRIT % 36.7   PLATELETS 10*3/mm3 266     Results from last 7 days   Lab Units 07/12/20  0851   SODIUM mmol/L 141   POTASSIUM mmol/L 4.0   CHLORIDE mmol/L 107   CO2 mmol/L 24.0   BUN mg/dL 7   CREATININE mg/dL 0.70   GLUCOSE mg/dL 96   CALCIUM mg/dL 8.8               Assessment      Coronary artery disease involving native coronary artery of native heart with unstable angina pectoris (CMS/HCC)    Tobacco abuse    Hyperlipidemia    Essential hypertension    Chronic low back pain    GERD without esophagitis    Coronary artery disease        Plan   Ms. Enamorado was seen previously in the hospital as well as Dr. Cheung been following her and schedule her for outpatient coronary artery bypass grafting to be done on 7/17/2020  She went to the emergency room in [place] after having some chest discomfort and chest pain she was evaluated and transferred to [facility]      Naveed Anthony PA-C  07/12/20  10:54

## 2020-07-12 NOTE — PAYOR COMM NOTE
"Id # [id number]  Libby Ramírez RN, BSN  Phone # [phone]  Fax # [phone]  Dolly Murry (55 y.o. Female)     Date of Birth Social Security Number Address Home Phone MRN    1965  [address] [place] [place] [address] [phone] [id number]    Sabianism Marital Status          Other        Admission Date Admission Type Admitting Provider Attending Provider Department, Room/Bed    20 Urgent Sabrina Cardenas DO [place], Sarah Kathleen, DO [facility] 6B, N625/1    Discharge Date Discharge Disposition Discharge Destination                       Attending Provider:  Sabrina Cardenas DO    Allergies:  Bactrim [Sulfamethoxazole-trimethoprim], Ciprofloxacin, Ranexa [Ranolazine Er]    Isolation:  None   Infection:  COVID Screen (preop/placement) (20), MRSA (01/10/20)   Code Status:  CPR    Ht:  157.5 cm (62\")   Wt:  53.5 kg (118 lb)    Admission Cmt:  None   Principal Problem:  Coronary artery disease involving native coronary artery of native heart with unstable angina pectoris (CMS/HCC) [I25.110]                 Active Insurance as of 2020     Primary Coverage     Payor Plan Insurance Group Employer/Plan Group    [employer] [place] [employer] [place]ICA[place]      Payor Plan Address Payor Plan Phone Number Payor Plan Fax Number Effective Dates    [address] [address] [phone]  2016 - None Entered    [place] [address]       Subscriber Name Subscriber Birth Date Member ID       DOLLY MURRY 1965 [id number]                    History & Physical      Talita Christopher II, DO at 20 1728              [facility] Medicine Services  HISTORY AND PHYSICAL    Patient Name: Dolly Murry  : 1965  MRN: [id number]  Primary Care Physician: Chiquita Choudhary, ZION  Date of admission: 2020      Subjective   Subjective     Chief Complaint:  Chest Pain     HPI:  Dolly Murry is a 55 y.o. female with " "past medical history significant for CAD s/p 3 stents, HTN, HLD, PUD, GERD, and chronic low back pain who presented to the ED at [facility] due to severe substernal chest pain. She reports her pain began abruptly shortly after waking up this morning. She describes the pain as \"crushing\" but it did not radiate. She took two sublingual nitroglycerin which relieved her pain. She also took her Effient this morning which has been on hold due to upcoming CABG. She denies any recent fever, chills, dyspnea, cough, or loss of taste.     She has known history of coronary artery disease following a left heart catheterization on 12/18/2019 with Dr. Conway which revealed a 30% distal left main stenosis, 80% LAD stenosis after the takeoff of the first diagonal branch, 90% in-stent restenosis of the diagonal branch, 70% distal LAD stenosis and 50 to 60% proximal first obtuse marginal stenosis per cath report. She was scheduled for outpatient CABG on 7/17/2020 with Dr. Coronado, but  was transferred to [facility] today due to new onset of chest pain. Dr. Enriquez, who is covering for Dr. Coronado this weekend, accepted the patient and will consult in the morning. She will be admitted by hospital medicine for further evaluation and treatment.     Review of Systems   Constitutional: Positive for fatigue. Negative for appetite change, chills and fever.   HENT: Negative for congestion, trouble swallowing and voice change.    Eyes: Negative for photophobia, redness and visual disturbance.   Respiratory: Negative for cough, shortness of breath and wheezing.    Cardiovascular: Positive for chest pain. Negative for palpitations and leg swelling.   Gastrointestinal: Negative for abdominal pain, constipation and diarrhea.   Endocrine: Negative for polydipsia, polyphagia and polyuria.   Genitourinary: Negative for difficulty urinating, flank pain and urgency.   Musculoskeletal: Positive for back pain. Negative for " arthralgias and myalgias.   Skin: Negative for color change, pallor and rash.   Allergic/Immunologic: Negative.    Neurological: Positive for weakness. Negative for dizziness, seizures, syncope and headaches.   Hematological: Negative.    Psychiatric/Behavioral: Negative for agitation, confusion and suicidal ideas. The patient is nervous/anxious.         All other systems reviewed and are negative.     Personal History     Past Medical History:   Diagnosis Date   • Arthritis     back   • Back ache    • Chronic back pain    • Coronary artery disease     s/p 3 stents    • GERD (gastroesophageal reflux disease)    • History of MRSA infection 2020    labia; hospitalized 6 days on IV antibiotics and then went home on po antibiotics    • Hyperlipidemia    • Hypertension    • Peptic ulceration        Past Surgical History:   Procedure Laterality Date   • CARDIAC CATHETERIZATION     • CARDIAC CATHETERIZATION N/A 2019    Procedure: Left Heart Cath;  Surgeon: Lazaro Conway MD;  Location: [facility] INVASIVE LOCATION;  Service: Cardiology   • CARDIAC CATHETERIZATION     • CARDIAC SURGERY     •  SECTION      x2   • HAND SURGERY      right   • PERINEAL LESION/CYST EXCISION Right 2020    Procedure: I&D ABSCESS;  Surgeon: Leander Cardenas III, MD;  Location: [facility] OR;  Service: Obstetrics/Gynecology       Family History: family history includes Heart attack in her brother and father; Heart disease in her brother, father, and mother; No Known Problems in her sister. Otherwise pertinent FHx was reviewed and unremarkable.     Social History:  reports that she has been smoking electronic cigarette and cigarettes. She has a 6.25 pack-year smoking history. She has never used smokeless tobacco. She reports that she does not drink alcohol or use drugs.  Social History     Social History Narrative   • Not on file       Medications:  Available home medication information reviewed.  Medications Prior to  Admission   Medication Sig Dispense Refill Last Dose   • Alirocumab (Praluent) 75 MG/ML solution auto-injector Inject 1 mL under the skin into the appropriate area as directed Every 14 (Fourteen) Days. 2 mL 5 6/26/2020   • aspirin 81 MG EC tablet Take 81 mg by mouth daily.   Taking   • atenolol (TENORMIN) 25 MG tablet Take 1 tablet by mouth Daily. (Patient taking differently: Take 25 mg by mouth Every Morning.) 30 tablet 6 Taking   • fluticasone-salmeterol (ADVAIR) 500-50 MCG/DOSE DISKUS Inhale 1 puff As Needed.   Taking   • HYDROcodone-acetaminophen (NORCO) 7.5-325 MG per tablet Take 1 tablet by mouth 2 (Two) Times a Day.      • isosorbide mononitrate (IMDUR) 60 MG 24 hr tablet Take 1 tablet by mouth Daily. (Patient taking differently: Take 60 mg by mouth Every Morning.) 30 tablet 6 Taking   • nitroglycerin (NITROSTAT) 0.4 MG SL tablet 1 under the tongue as needed for angina, may repeat q5mins for up three doses (Patient taking differently: Place 0.4 mg under the tongue Every 5 (Five) Minutes As Needed for Chest Pain. 1 under the tongue as needed for angina, may repeat q5mins for up three doses) 100 tablet 3 Taking   • prasugrel (EFFIENT) 10 MG tablet Take 1 tablet by mouth Daily. (Patient taking differently: Take 10 mg by mouth Daily. Did not stop for surgery) 30 tablet 6 Taking   • pravastatin (PRAVACHOL) 20 MG tablet Take 1 tablet by mouth Daily. (Patient taking differently: Take 20 mg by mouth Every Morning.) 30 tablet 6 Taking   • spironolactone (ALDACTONE) 25 MG tablet Take 1 tablet by mouth Daily. (Patient taking differently: Take 25 mg by mouth Every Morning.) 30 tablet 6 Taking   • traMADol (ULTRAM) 50 MG tablet Take 50 mg by mouth every 6 (six) hours as needed for moderate pain (4-6).   Taking       Allergies   Allergen Reactions   • Bactrim [Sulfamethoxazole-Trimethoprim] Rash   • Ciprofloxacin Other (See Comments)     tremors   • Ranexa [Ranolazine Er] Unknown - Low Severity       Objective   Objective       Vital Signs:   Temp:  [97.9 °F (36.6 °C)] 97.9 °F (36.6 °C)  Heart Rate:  [49-68] 58  Resp:  [18] 18  BP: (113-197)/(69-96) 130/75        Physical Exam   Constitutional: Awake, alert  Eyes: PERRLA, sclerae anicteric, no conjunctival injection  HENT: NCAT, mucous membranes moist  Neck: Supple, no thyromegaly, no lymphadenopathy, trachea midline  Respiratory: Clear to auscultation bilaterally, nonlabored respirations   Cardiovascular: RRR, no murmurs, rubs, or gallops, palpable pedal pulses bilaterally  Gastrointestinal: Positive bowel sounds, soft, nontender, nondistended  Musculoskeletal: No bilateral ankle edema, no clubbing or cyanosis to extremities  Psychiatric: Appropriate affect, cooperative  Neurologic: Oriented x 3, strength symmetric in all extremities, speech clear  Skin: warm, dry, no visible rash    Results Reviewed:  I have personally reviewed current lab and radiology data.    Results from last 7 days   Lab Units 07/11/20  1756 07/11/20  0959   WBC 10*3/mm3  --  9.41   HEMOGLOBIN g/dL  --  12.4   HEMATOCRIT %  --  39.1   PLATELETS 10*3/mm3  --  297   INR  1.06 0.98     Results from last 7 days   Lab Units 07/11/20  1200 07/11/20  0959   SODIUM mmol/L  --  142   POTASSIUM mmol/L  --  3.2*   CHLORIDE mmol/L  --  104   CO2 mmol/L  --  25.3   BUN mg/dL  --  6   CREATININE mg/dL  --  0.75   GLUCOSE mg/dL  --  68   CALCIUM mg/dL  --  9.4   ALT (SGPT) U/L  --  6   AST (SGOT) U/L  --  12   TROPONIN T ng/mL <0.010 <0.010     Estimated Creatinine Clearance: 72.4 mL/min (by C-G formula based on SCr of 0.75 mg/dL).  Brief Urine Lab Results  (Last result in the past 365 days)      Color   Clarity   Blood   Leuk Est   Nitrite   Protein   CREAT   Urine HCG        01/09/20 1307               Negative         Imaging Results (Last 24 Hours)     ** No results found for the last 24 hours. **        Results for orders placed in visit on 06/17/20   Adult Transthoracic Echo Complete W/ Cont if Necessary Per Protocol     Narrative · Normal left ventricular cavity size and wall thickness noted.  · Left ventricular systolic function is normal. Estimated EF appears to be   in the range of 61 - 65%.  · Left ventricular diastolic function is normal.  · Tip of anterior mitral leaflet is mildly thickened ,no vegetations   noted.  · No significant valvular heart disease  · There is no evidence of pericardial effusion.          Assessment/Plan   Assessment & Plan     Active Hospital Problems    Diagnosis POA   • **Coronary artery disease involving native coronary artery of native heart with unstable angina pectoris (CMS/HCC) [I25.110] Yes   • Chronic low back pain [M54.5, G89.29] Yes   • GERD without esophagitis [K21.9] Yes   • Essential hypertension [I10] Yes   • Tobacco abuse [Z72.0] Yes   • Hyperlipidemia [E78.5] Yes       Veda Enamorado is a 55 y.o. female with past medical history significant for CAD s/p 3 stents, HTN, HLD, PUD, GERD, and chronic low back pain who presented to the ED at [facility] due to severe substernal chest pain. She was scheduled for outpatient CABG on 7/17/2020 with Dr. Coronado, but  was transferred to [facility] today due to new onset of chest pain.    CAD  Unstable angina   --LHC performed on 12/18/2019 by Dr. Conway revealed a 30% distal left main stenosis, 80% LAD stenosis after the takeoff of the first diagonal branch, 90% in-stent restenosis of the diagonal branch, 70% distal LAD stenosis and 50 to 60% proximal first obtuse marginal stenosis per cath report  --CT Surgery consult in AM  --[facility]   --Heparin gtt  --NTG gtt PRN for chest pain  --Effient on hold pending surgery   --ASA 81 mg daily  --NPO after midnight  --EKG in AM  --CBC, CMP, Mag, P2y12 in AM     HTN  --continue home atenolol, aldactone    HLD  --continue home pravastatin    Chronic low back pain  --continue home Norco PRN, Tramadol PRN    Anxiety   --Ativan 0.5mg IV  PRN    GERD  --Protonix daily     Tobacco Abuse  --smoking cession counseling   --Nicotine patch daily     DVT prophylaxis:  Heparin gtt        COVID-19 RISK SCREEN     1. Has the patient had close contact without PPE with a lab confirmed COVID-19 (+) person or a person under investigation (PUI) for COVID-19 infection?  -- No     2. Has the patient had respiratory symptoms, worsened/new cough and/or SOA, unexplained fever, or sudden loss of smell and/or taste in the past 7 days? --  No    3. Does the patient have baseline higher exposure risk such as working in healthcare field, currently residing in healthcare facility, or ongoing hemodialysis?  --  No         CODE STATUS:  Discussed with patient at bedside. At this time, her desire is to be a full code.   Code Status and Medical Interventions:   Ordered at: 07/11/20 9276     Level Of Support Discussed With:    Patient     Code Status:    CPR     Medical Interventions (Level of Support Prior to Arrest):    Full       Admission Status:  I believe this patient meets INPATIENT status due to need for CABG.  I feel patient’s risk for adverse outcomes and need for care warrant INPATIENT evaluation and I predict the patient’s care encounter to likely last beyond 2 midnights.    Electronically signed by ZION Jackman, 07/11/20, 5:28 PM.      Attending   Admission Attestation       I have seen and examined the patient, performing an independent face-to-face diagnostic evaluation with plan of care reviewed and developed with the advanced practice clinician (APC).      Brief Summary Statement:   Veda Enamorado is a 55 y.o. female transferred from [facility] with chest pain. Patient was scheduled for CABG on 7/17 but since she is now having worsening chest pain was transferred here for urgent revascularization.    Remainder of detailed HPI is as noted by APC and has been reviewed and/or edited by me for completeness.    Attending Physical Exam:  Constitutional: Awake,  alert  Eyes: PERRLA, sclerae anicteric, no conjunctival injection  HENT: NCAT, mucous membranes moist  Neck: Supple, no thyromegaly, no lymphadenopathy, trachea midline  Respiratory: Clear to auscultation bilaterally, nonlabored respirations   Cardiovascular: RRR, no murmurs, rubs, or gallops, palpable pedal pulses bilaterally  Gastrointestinal: Positive bowel sounds, soft, nontender, nondistended  Musculoskeletal: No bilateral ankle edema, no clubbing or cyanosis to extremities  Psychiatric: Appropriate affect, cooperative, anxious  Neurologic: Oriented x 3, strength symmetric in all extremities, Cranial Nerves grossly intact to confrontation, speech clear  Skin: No rashes    CXR personally reviewed showing no acute chest disease. Agree with interpretation.    Brief Assessment/Plan :    56 y/o female with known multivessel CAD presenting with chest pain.  --CT surgery will see in am to determine timing of CABG. Start IV heparin, IV nitro, asa. Hold Effient.     See detailed assessment and plan developed with APC which I have reviewed and/or edited for completeness.    Electronically signed by Talita Christopher II, DO, 07/11/20, 6:37 PM.              Electronically signed by Talita Christopher II, DO at 07/11/20 8895       Irma Wright, RN   Registered Nurse   Admission Coordination   Nursing Note   Signed   Date of Service:  07/11/20 1234   Creation Time:  07/11/20 1234            Signed             Show:Clear all  [x]Manual[x]Template[x]Copied    Added by:  [x]Irma Wright, RN    []Junior for details     ACC REVIEW REPORT: [facility]           PATIENT NAME: Veda Enamorado     PATIENT ID: [id number]        COVID-19 ACC SCREENING         DOES THE PATIENT HAVE A FEVER GREATER THAN OR EQUAL .4: NO     IS THE PATIENT EXPERIENCING SHORTNESS OF BREATH: NO     DOES THE PATIENT HAVE A COUGH: NO     DOES THE PATIENT HAVE ANY OF THE FOLLOWING RISK FACTORS:     EXPOSURE TO SUSPECTED OR  KNOWN COVID-19: NO     RECENT TRAVEL HISTORY TO ENDEMIC AREA (DOMESTIC/LOCAL): NO     IS THE PATIENT A HEALTHCARE WORKER: NO     HAS THE PATIENT BEEN TESTED FOR COVID-19: YES     DATE TESTED: July 8TH AND TESTED ONCE BEFORE THAT     LAB TESTING SENT TO: UNKNOWN         BED: N625     BED TYPE: TELE     BED GIVEN TO: TANI AHUJA RN      TIME BED GIVEN: 1253     YOB: 1965     AGE: 55 YRS     GENDER: FEMALE     PREVIOUS ADMIT TO [facility]:      PREVIOUS ADMISSION DATE:      PATIENT CLASS:      TODAY'S DATE: 7/11/2020     TRANSFER DATE: 07/11/20     ETA:      TRANSFERRING FACILITY: [facility]     TRANSFERRING FACILITY PHONE # : [phone]     TRANSFERRING MD: DR RANGEL     DATE/TIME REQUEST RECEIVED: 07/11/2020     [facility] RN: HUGO SORIA     REPORT FROM:      TIME REPORT TAKEN: 4663     DIAGNOSIS: CHEST PAIN     REASON FOR TRANSFER TO [facility]: HIGHER LEVEL CARE     TRANSPORTATION: GROUND     CLINICAL REASON FOR TRANSFER TO [facility]: PRESENTED TO THE ER THIS MORNING WITH COMPLAINTS OF CHEST PAIN.  SHE TOOK NITRO SL X 2 AND IT COMPLETELY RELIEVED IT.  NOW SHE HAS AN INCH OF NITROPASTE ON HER CHEST AND SHE REMAINS FREE OF CHEST PAIN.  SHE IS SCHEDULED TO HAVE A CABG HERE AND HAS HAD TO RE-SCHEDULE IT SEVERAL TIMES.  SHE WILL HAVE IT ON THIS ADMISSION.  HER TROPONIN'S WERE NEGATIVE, HER POTASSIUM WAS 3.2 AND THEY REPLACED IT WITH 40 MEQ K+.  HER ALK-PHOS , PTT-25.5.  SHE'S BEEN IN SB WITH A RATE IN THE 50'S.  SHE HAS CHRONIC LUNG ISSUES.  SHE HAS BEEN TESTED FOR COVID SEVERAL TIMES AND HAS BEEN NEGATIVE.        CLINICAL INFORMATION     HEIGHT:      WEIGHT:      ALLERGIES: BACTRIM, CIPROFLOXACIN, RANEXA     GARCIA:      INFECTIOUS DISEASE:      ISOLATION:      1ST VITAL SIGNS:   TIME:   TEMP:   PULSE:   B/P:   RESP:      LAST VITAL SIGNS:  TIME: 1232  TEMP: 97.9 ( AT 0936)  PULSE: 54  B/P: 124/69  RESP: 18  99% ON RMA     LAB INFORMATION: POTASSUM-3.2     CULTURE INFORMATION:      MEDS/IV FLUIDS: # 18G LAC WITH NS @ 125  ML/HR  RECEIVED:  ATIVAN .5 MG                        NITROPASTE 25.5     CARDIAC SYSTEM:     CHEST PAIN:      RATE:      SCALE:      RHYTHM: SB WITH A RATE BETWEEN 50'S     Is patient taking or has patient been given any drugs that could increase bleeding? YES  (Plavix, Brilinta, Effient, Eliquis, Xarelto, Warfarin, Integrilin, Angiomax)     DRUG: ASA 81 MG               EFFIENT 10 MG, LAST TAKEN THIS AM ON 07/11/20                                                     DOSE/FREQUENCY:      CARDIAC ENZYMES:     DATE:   TIME:   CK:   CKMB:   RAMILA:   TROP:      DATE:   TIME:   CK:   CKMB:   RAMILA:   TROP:         CARDIAC NOTES:         RESPIRATORY SYSTEM:     LUNG SOUNDS:     CLEAR: YES  CRACKLES:   WHEEZES:   RHONCHI:   DIMINISHED:      ABG DATE:                                                     ABG TIME:      ABG RESULTS:     PH:   PO2:   PCO2:   HCO3:   O2 SAT:         OXYGEN:      O2 SAT:      ADMINISTRATION ROUTE:      IMAGING FINDINGS:      PNEUMO LOCATION:      PNEUMO SIZE:      PNEUMO CHEST TUBE SEAL TYPE:      RADIOLOGY RESULTS:   FINDINGS:  Single portable AP view(s) of the chest.  Unremarkable cardiac silhouette. The vascularity is normal. Coarsened interstitial markings in a pattern suggestive of mild interstitial fibrosis/scarring, perhaps more prominent secondary to shallow lung  expansion and portable technique. No effusion or dense consolidation and no pneumothorax.     IMPRESSION:     1. Chronic appearing lung changes. No definite superimposed active disease.        RESPIRATORY STATUS:         CNS/MUSCULOSKELETAL     CAT SCAN RESULTS:      MRI RESULTS:      CNS/MUSCULOSKELETAL NOTES:         GI//GY        ABDOMINAL PAIN:      VOMITING:      DIARRHEA:      NAUSEA:      BOWEL SOUNDS:      OCCULT STOOL:      VAGINAL BLEEDING:      TESTICULAR PAIN:      HEMATURIA:      NG TUBE:     SIZE:   DATE INSERTED:         ULTRASOUND:      ULTRASOUND RESULTS:         ACUTE ABDOMEN:      ACUTE ABDOMEN RESULTS:          CT SCAN:      CT SCAN RESULTS:         GI//GY NOTES:      PAST MEDICAL HISTORY:   Coronary artery disease involving native coronary artery of native heart with unstable angina pectoris 9/5/2016   Tobacco abuse 9/5/2016   High cholesterol or triglycerides 9/5/2016   High blood pressure disorder 9/18/2018   Encounter for aftercare 2/26/2019   Mixed hyperlipidemia 12/16/2019   Cellulitis of labia 1/8/2020   Abscess 1/8/2020   Methicillin resistant Staph aureus culture positive 1/12/2020   Left main coronary artery disease 6/17/2020            OTHER SYMPTOM NOTES:      ADDITIONAL NOTES:               Irma Wright RN  7/11/2020  12:34                   Ana Izquierdo RNA   Registered Nurse      Plan of Care   Signed   Date of Service:  07/11/20 1954   Creation Time:  07/11/20 1954            Signed             Show:Clear all  [x]Manual[x]Template[]Copied    Added by:  [x]Ana Izquierdo RNA    []Donaldver for details     Problem: Patient Care Overview  Goal: Plan of Care Review  Outcome: Ongoing (interventions implemented as appropriate)  Flowsheets  Taken 7/11/2020 1952  Progress: no change  Outcome Summary: Pt arrived from [place], no complaints of chest pain. Currently: heparin drip 12units/kg and nitro drip 3mL infusing. Pt possibly going for CABG.  Taken 7/11/2020 1700  Plan of Care Reviewed With: patient   Pt NSR, possible ST elevation per 1900 EKG.

## 2020-07-12 NOTE — PLAN OF CARE
Pts VSS, room air, denies chest pain/discomfort. Heparin dose changed per pharmacy order, see charting. Nitro drip decreased to 5 mcg, see charting. Pt has been NPO since midnight. Cont to monitor.

## 2020-07-12 NOTE — PROGRESS NOTES
Discharge Planning Assessment  [facility]     Patient Name: Veda Enamorado  MRN: [id number]  Today's Date: 7/12/2020    Admit Date: 7/11/2020    Discharge Needs Assessment     Row Name 07/12/20 1524       Living Environment    Lives With  spouse    Current Living Arrangements  home/apartment/condo    Primary Care Provided by  self    Provides Primary Care For  no one    Family Caregiver if Needed  spouse    Quality of Family Relationships  helpful;involved;supportive    Able to Return to Prior Arrangements  yes       Resource/Environmental Concerns    Resource/Environmental Concerns  none       Transition Planning    Patient/Family Anticipates Transition to  home with family    Patient/Family Anticipated Services at Transition  none    Transportation Anticipated  family or friend will provide       Discharge Needs Assessment    Readmission Within the Last 30 Days  no previous admission in last 30 days    Concerns to be Addressed  discharge planning    Equipment Currently Used at Home  none    Equipment Needed After Discharge  commode    Current Discharge Risk  chronically ill        Discharge Plan     Row Name 07/12/20 1524       Plan    Plan  Home    Patient/Family in Agreement with Plan  yes    Plan Comments  Met with patient and  in the room to initiate discharge planning. Patient lives with her  in a single-story home in [facility]. She is independent with ADLs and mobility. Patient would like a BSC to use at home after surgery. CM will place the order closer to DC.  works for [employer] and will get the BSC from his store. Plan is home with assistance of . He will provide her ride. CM will continue to follow.     Final Discharge Disposition Code  01 - home or self-care        Destination      Coordination has not been started for this encounter.      Durable Medical Equipment      Coordination has not been started for this encounter.      Dialysis/Infusion      Coordination  has not been started for this encounter.      Home Medical Care      Coordination has not been started for this encounter.      Therapy      Coordination has not been started for this encounter.      Community Resources      Coordination has not been started for this encounter.          Demographic Summary     Row Name 07/12/20 1523       General Information    Admission Type  inpatient    Referral Source  admission list    Reason for Consult  discharge planning    General Information Comments  Confirmed with patient that PCP is Chiquita Choudhary. Patient has medical and rx coverage through [employer].         Functional Status     Row Name 07/12/20 1524       Functional Status    Usual Activity Tolerance  moderate       Functional Status, IADL    Medications  independent    Meal Preparation  independent    Housekeeping  independent    Laundry  independent    Shopping  independent        Psychosocial    No documentation.       Abuse/Neglect    No documentation.       Legal    No documentation.       Substance Abuse    No documentation.       Patient Forms    No documentation.           Tracey Mata RN

## 2020-07-12 NOTE — PROGRESS NOTES
HEPARIN INFUSION  Veda Enamorado is a  55 y.o. female receiving heparin infusion.             Therapy for (VTE/Cardiac):   cardiac  Patient Weight: 53.5 kg  Initial Bolus (Y/N):   yes  Any Bolus (Y/N):   yes        Signs or Symptoms of Bleeding:       Cardiac or Other (Not VTE)   Initial Bolus: 60 units/kg (Max 4,000 units)  Initial rate: 12 units/kg/hr (Max 1,000 units/hr)   Anti-Xa (IU/mL) Bolus Dose Stop Infusion Rate Change Repeat Anti-Xa      ?0.19 60 units/kg 0 hrs Increase rate by   4 units/kg/hr 6 hrs       0.2 - 0.29  30 units/kg 0 hrs Increase rate by 2 units/kg/hr 6 hrs    0.3 - 0.7 0 0 hrs No change 6 hrs      0.71 - 0.99 0  0 hrs Decrease rate by 2 units/kg/hr 6 hrs            ?1 0 Hold 1 hr Decrease rate by 3 units/kg/hr 6 hrs      Recommend Xa every 6 hours.             Results from last 7 days   Lab Units 07/12/20  0851 07/11/20  1848 07/11/20  1756 07/11/20  0959 07/05/20  1557   INR   --   --  1.06 0.98 1.03   HEMOGLOBIN g/dL 11.7* 11.6*  --  12.4 11.7*   HEMATOCRIT % 36.7 36.3  --  39.1 36.5   PLATELETS 10*3/mm3 266 267  --  297 305          Date   Time   Anti-Xa Current Rate (Unit/kg/hr) Bolus   (Units) Rate Change   (Unit/kg/hr) New Rate (Unit/kg/hr) Next   Anti-Xa Comments  Pump Check Daily   7.11.20 1900 pend  3210   12  S/w go re start. Will initial bolus   7/12 0300 0.18 12 3000 +4 16 0900 D/w RN   7/12 0851 0.59 16 -- -- 16 1500 D/W ESTELLA King, PharmD, BCPS  7/12/2020  10:06

## 2020-07-13 VITALS
BODY MASS INDEX: 23.67 KG/M2 | HEART RATE: 58 BPM | RESPIRATION RATE: 18 BRPM | TEMPERATURE: 96.8 F | DIASTOLIC BLOOD PRESSURE: 64 MMHG | WEIGHT: 129.4 LBS | SYSTOLIC BLOOD PRESSURE: 125 MMHG | OXYGEN SATURATION: 95 %

## 2020-07-13 LAB
ANION GAP SERPL CALCULATED.3IONS-SCNC: 8 MMOL/L (ref 5–15)
BASOPHILS # BLD AUTO: 0.05 10*3/MM3 (ref 0–0.2)
BASOPHILS NFR BLD AUTO: 0.7 % (ref 0–1.5)
BUN SERPL-MCNC: 11 MG/DL (ref 6–20)
BUN/CREAT SERPL: 13.6 (ref 7–25)
CALCIUM SPEC-SCNC: 8.7 MG/DL (ref 8.6–10.5)
CHLORIDE SERPL-SCNC: 105 MMOL/L (ref 98–107)
CO2 SERPL-SCNC: 27 MMOL/L (ref 22–29)
CREAT SERPL-MCNC: 0.81 MG/DL (ref 0.57–1)
DEPRECATED RDW RBC AUTO: 50.1 FL (ref 37–54)
EOSINOPHIL # BLD AUTO: 0.19 10*3/MM3 (ref 0–0.4)
EOSINOPHIL NFR BLD AUTO: 2.7 % (ref 0.3–6.2)
ERYTHROCYTE [DISTWIDTH] IN BLOOD BY AUTOMATED COUNT: 14.2 % (ref 12.3–15.4)
GFR SERPL CREATININE-BSD FRML MDRD: 74 ML/MIN/1.73
GLUCOSE SERPL-MCNC: 92 MG/DL (ref 65–99)
HCT VFR BLD AUTO: 35.2 % (ref 34–46.6)
HGB BLD-MCNC: 11 G/DL (ref 12–15.9)
IMM GRANULOCYTES # BLD AUTO: 0.01 10*3/MM3 (ref 0–0.05)
IMM GRANULOCYTES NFR BLD AUTO: 0.1 % (ref 0–0.5)
LYMPHOCYTES # BLD AUTO: 3.35 10*3/MM3 (ref 0.7–3.1)
LYMPHOCYTES NFR BLD AUTO: 47.9 % (ref 19.6–45.3)
MCH RBC QN AUTO: 30.2 PG (ref 26.6–33)
MCHC RBC AUTO-ENTMCNC: 31.3 G/DL (ref 31.5–35.7)
MCV RBC AUTO: 96.7 FL (ref 79–97)
MONOCYTES # BLD AUTO: 0.66 10*3/MM3 (ref 0.1–0.9)
MONOCYTES NFR BLD AUTO: 9.4 % (ref 5–12)
NEUTROPHILS NFR BLD AUTO: 2.73 10*3/MM3 (ref 1.7–7)
NEUTROPHILS NFR BLD AUTO: 39.2 % (ref 42.7–76)
NRBC BLD AUTO-RTO: 0 /100 WBC (ref 0–0.2)
PLATELET # BLD AUTO: 246 10*3/MM3 (ref 140–450)
PMV BLD AUTO: 10.3 FL (ref 6–12)
POTASSIUM SERPL-SCNC: 3.6 MMOL/L (ref 3.5–5.2)
RBC # BLD AUTO: 3.64 10*6/MM3 (ref 3.77–5.28)
SODIUM SERPL-SCNC: 140 MMOL/L (ref 136–145)
WBC # BLD AUTO: 6.99 10*3/MM3 (ref 3.4–10.8)

## 2020-07-13 PROCEDURE — 25010000002 HEPARIN (PORCINE) 25000-0.45 UT/250ML-% SOLUTION

## 2020-07-13 PROCEDURE — 99231 SBSQ HOSP IP/OBS SF/LOW 25: CPT | Performed by: THORACIC SURGERY (CARDIOTHORACIC VASCULAR SURGERY)

## 2020-07-13 PROCEDURE — 85025 COMPLETE CBC W/AUTO DIFF WBC: CPT | Performed by: FAMILY MEDICINE

## 2020-07-13 PROCEDURE — 80048 BASIC METABOLIC PNL TOTAL CA: CPT | Performed by: FAMILY MEDICINE

## 2020-07-13 PROCEDURE — 99239 HOSP IP/OBS DSCHRG MGMT >30: CPT | Performed by: FAMILY MEDICINE

## 2020-07-13 RX ADMIN — SPIRONOLACTONE 25 MG: 25 TABLET ORAL at 09:41

## 2020-07-13 RX ADMIN — ASPIRIN 81 MG: 81 TABLET, COATED ORAL at 09:41

## 2020-07-13 RX ADMIN — HYDROCODONE BITARTRATE AND ACETAMINOPHEN 1 TABLET: 7.5; 325 TABLET ORAL at 09:41

## 2020-07-13 RX ADMIN — PANTOPRAZOLE SODIUM 40 MG: 40 TABLET, DELAYED RELEASE ORAL at 05:52

## 2020-07-13 RX ADMIN — HEPARIN SODIUM 16 UNITS/KG/HR: 10000 INJECTION, SOLUTION INTRAVENOUS at 03:51

## 2020-07-13 RX ADMIN — PRAVASTATIN SODIUM 20 MG: 20 TABLET ORAL at 09:41

## 2020-07-13 RX ADMIN — ATENOLOL 25 MG: 25 TABLET ORAL at 09:41

## 2020-07-13 NOTE — PLAN OF CARE
Patient discharged with instructions provided regarding pre-procedure covid testing. George to contact patient to make arrangements instead of traveling to [facility]. Patient has denied chest pain including duringh the several hours being off of IV nitro & heparin. Patient and spouse understand to arrive ar 0530 Friday.

## 2020-07-13 NOTE — DISCHARGE PLACEMENT REQUEST
"Please see order for Bedside commode     Home delivery - call spouse for delivery arrangements     Thank you     Nicolle Harden RN? [phone]     Dolly Murry (55 y.o. Female)     Date of Birth Social Security Number Address Home Phone MRN    1965  [address] [facility] [address] [phone] [id number]    Muslim Marital Status          Other        Admission Date Admission Type Admitting Provider Attending Provider Department, Room/Bed    20 Urgent Sabrina Cardenas, Sabrina Fernández DO [facility] 6B, N625/1    Discharge Date Discharge Disposition Discharge Destination                       Attending Provider:  Sabrina Cardenas DO    Allergies:  Bactrim [Sulfamethoxazole-trimethoprim], Ciprofloxacin, Ranexa [Ranolazine Er]    Isolation:  None   Infection:  MRSA (01/10/20)   Code Status:  CPR    Ht:  157.5 cm (62\")   Wt:  58.7 kg (129 lb 6.4 oz)    Admission Cmt:  None   Principal Problem:  Coronary artery disease involving native coronary artery of native heart with unstable angina pectoris (CMS/[facility]) [I25.110]                 Active Insurance as of 2020     Primary Coverage     Payor Plan Insurance Group Employer/Plan Group    [employer] [place] [employer]      Payor Plan Address Payor Plan Phone Number Payor Plan Fax Number Effective Dates    [address] [address] [phone]  2016 - None Entered    [facility] [address]       Subscriber Name Subscriber Birth Date Member ID       DOLLY MURRY 1965 [id number]                 Emergency Contacts      (Rel.) Home Phone Work Phone Mobile Phone    Andres Murrymaria alejandranigel (Spouse) [phone] -- --          [address]  [address] [facility] [address]  Dept. Phone:  [phone]  Dept. Fax:   Date Ordered: 2020         Patient:  Dolly Murry MRN:  [id number]   [address]  [facility] [address] :  " "1965  SSN:    Phone: [phone] Sex:  F     Weight: 58.7 kg (129 lb 6.4 oz)         Ht Readings from Last 1 Encounters:   07/11/20 157.5 cm (62\")         Commode Chair          (Order ID: [id number])    Diagnosis:  Decreased activities of daily living (ADL) (Z78.9 [ICD-10-CM] V49.89 [ICD-9-CM])  Left main coronary artery disease (I25.10 [ICD-10-CM] 414.00 [ICD-9-CM])   Quantity:  1     Equipment:  Bedside Commode Chair w/Fixed Arms  Length of Need (99 Months = Lifetime): 99 Months = Lifetime        Authorizing Provider's Phone: [phone]   Authorizing Provider:Sabrina Cardenas DO  Authorizing Provider's NPI: [id number]  Order Entered By: Nicolle Harden RN 7/13/2020 12:03 PM     Electronically signed by: Sabrina Cardenas DO 7/13/2020 12:03 PM             Physician Progress Notes (most recent note)      Juanjo Coronado MD at 07/13/20 0903          Cardiothoracic Surgery Progress Note      Chief complaint: Chest pain     LOS: 2 days      Subjective:  No additional chest pain after admission. Denies shortness of breath    Objective:  Vital Signs  Temp:  [96.8 °F (36 °C)-98.3 °F (36.8 °C)] 96.8 °F (36 °C)  Heart Rate:  [52-71] 71  Resp:  [18-20] 18  BP: (117-138)/(53-73) 129/64    Physical Exam:   General Appearance: alert, appears stated age and cooperative   Lungs: clear to auscultation, respirations regular, respirations even and respirations unlabored   Heart: regular rhythm & normal rate, normal S1, S2 and no murmur, no gallop, no rub        Results:  Results from last 7 days   Lab Units 07/13/20  0520   WBC 10*3/mm3 6.99   HEMOGLOBIN g/dL 11.0*   HEMATOCRIT % 35.2   PLATELETS 10*3/mm3 246     Results from last 7 days   Lab Units 07/13/20  0520   SODIUM mmol/L 140   POTASSIUM mmol/L 3.6   CHLORIDE mmol/L 105   CO2 mmol/L 27.0   BUN mg/dL 11   CREATININE mg/dL 0.81   GLUCOSE mg/dL 92   CALCIUM mg/dL 8.7       Assessment:  55-year-old  female with a history of " hypertension, hyperlipidemia, coronary artery disease status post stenting and extensive family history of coronary artery disease who presented with chest pain.      Plan:  Patient wishes to go home prior to surgery on Friday.    D/C heparin gtt and Nitro gtt  If remains chest pain free, will plan to D/C for return on Friday for CABG.      Juanjo Coronado MD  07/13/20  09:03          Electronically signed by Juanjo Coronado MD at 07/13/20 0906

## 2020-07-13 NOTE — PROGRESS NOTES
Cardiothoracic Surgery Progress Note      Chief complaint: Chest pain     LOS: 2 days      Subjective:  No additional chest pain after admission. Denies shortness of breath    Objective:  Vital Signs  Temp:  [96.8 °F (36 °C)-98.3 °F (36.8 °C)] 96.8 °F (36 °C)  Heart Rate:  [52-71] 71  Resp:  [18-20] 18  BP: (117-138)/(53-73) 129/64    Physical Exam:   General Appearance: alert, appears stated age and cooperative   Lungs: clear to auscultation, respirations regular, respirations even and respirations unlabored   Heart: regular rhythm & normal rate, normal S1, S2 and no murmur, no gallop, no rub        Results:  Results from last 7 days   Lab Units 07/13/20  0520   WBC 10*3/mm3 6.99   HEMOGLOBIN g/dL 11.0*   HEMATOCRIT % 35.2   PLATELETS 10*3/mm3 246     Results from last 7 days   Lab Units 07/13/20  0520   SODIUM mmol/L 140   POTASSIUM mmol/L 3.6   CHLORIDE mmol/L 105   CO2 mmol/L 27.0   BUN mg/dL 11   CREATININE mg/dL 0.81   GLUCOSE mg/dL 92   CALCIUM mg/dL 8.7       Assessment:  55-year-old  female with a history of hypertension, hyperlipidemia, coronary artery disease status post stenting and extensive family history of coronary artery disease who presented with chest pain.      Plan:  Patient wishes to go home prior to surgery on Friday.    D/C heparin gtt and Nitro gtt  If remains chest pain free, will plan to D/C for return on Friday for CABG.      Juanjo Coronado MD  07/13/20  09:03

## 2020-07-13 NOTE — PROGRESS NOTES
Continued Stay Note  [facility]     Patient Name: Veda Enamorado  MRN: [id number]  Today's Date: 7/13/2020    Admit Date: 7/11/2020    Discharge Plan     Row Name 07/13/20 1750       Plan    Plan  Home at discharge     Patient/Family in Agreement with Plan  yes    Plan Comments  Patient will be returning home with her spouse to [facility] prior to returning for surgery later this week. They have requested an order for a bedside commode to be faxed to [facility] in [place]- this has been done and they have been notified to call the spouse for delivery arranements - Spouse to provide transport home - no other needs identified - baltazar [phone]         Discharge Codes    No documentation.       Expected Discharge Date and Time     Expected Discharge Date Expected Discharge Time    Jul 13, 2020             Baltazar Harden RN

## 2020-07-13 NOTE — DISCHARGE SUMMARY
[facility] Medicine Services  DISCHARGE SUMMARY    Patient Name: Veda Enamorado  : 1965  MRN: [id number]    Date of Admission: 2020  3:48 PM  Date of Discharge:  2020  Primary Care Physician: Chiquita Choudhary APRN    Consults     Date and Time Order Name Status Description    2020 0030 Inpatient Cardiothoracic Surgery Consult            Hospital Course     Presenting Problem:   Chest pain [R07.9]  Coronary artery disease [I25.10]    Active Hospital Problems    Diagnosis  POA   • **Coronary artery disease involving native coronary artery of native heart with unstable angina pectoris (CMS/HCC) [I25.110]  Yes   • Coronary artery disease [I25.10]  Yes   • Chronic low back pain [M54.5, G89.29]  Yes   • GERD without esophagitis [K21.9]  Yes   • Essential hypertension [I10]  Yes   • Tobacco abuse [Z72.0]  Yes   • Hyperlipidemia [E78.5]  Yes      Resolved Hospital Problems   No resolved problems to display.          Hospital Course:  Veda Enamorado is a 55 y.o. female with past medical history significant for CAD s/p 3 stents, HTN, HLD, PUD, GERD, and chronic low back pain who presented to the ED at [facility] due to severe substernal chest pain. She was scheduled for outpatient CABG on 2020 with Dr. Coronado, but  was transferred to [facility] due to chest pain. Upon arrival she was given heparin drip and nitro drip. EKG and troponin were negative. She did not have any more chest pain than the initial presentation at [place]. Pt will be discharged today and she is to return on 2020 for her CABG with dr Coronado.       Discharge Follow Up Recommendations for outpatient labs/diagnostics:   2020 CABG with dr coronado  pcp in 1-2 weeks     Day of Discharge     HPI:   Patient is sitting up in bed. She denies any chest pain or shortness of breath.     Review of Systems  Gen- No fevers, chills  CV- No chest pain, palpitations  Resp- No  cough, dyspnea  GI- No N/V/D, abd pain        Vital Signs:   Temp:  [96.8 °F (36 °C)-98.2 °F (36.8 °C)] 96.8 °F (36 °C)  Heart Rate:  [53-71] 63  Resp:  [18-20] 18  BP: (117-145)/(53-95) 131/66     Physical Exam:  Constitutional: No acute distress, awake, alert  HENT: NCAT, mucous membranes moist  Respiratory: Clear to auscultation bilaterally, respiratory effort normal   Cardiovascular: RRR, no murmurs, rubs, or gallops  Gastrointestinal: Positive bowel sounds, soft, nontender, nondistended  Musculoskeletal: No bilateral ankle edema  Psychiatric: Appropriate affect, cooperative  Neurologic: Oriented x 3, strength symmetric in all extremities, Cranial Nerves grossly intact to confrontation, speech clear  Skin: No rashes      Pertinent  and/or Most Recent Results     Results from last 7 days   Lab Units 07/13/20  0520 07/12/20  0851 07/11/20  1848 07/11/20  0959   WBC 10*3/mm3 6.99 8.12 11.04* 9.41   HEMOGLOBIN g/dL 11.0* 11.7* 11.6* 12.4   HEMATOCRIT % 35.2 36.7 36.3 39.1   PLATELETS 10*3/mm3 246 266 267 297   SODIUM mmol/L 140 141  --  142   POTASSIUM mmol/L 3.6 4.0  --  3.2*   CHLORIDE mmol/L 105 107  --  104   CO2 mmol/L 27.0 24.0  --  25.3   BUN mg/dL 11 7  --  6   CREATININE mg/dL 0.81 0.70  --  0.75   GLUCOSE mg/dL 92 96  --  68   CALCIUM mg/dL 8.7 8.8  --  9.4     Results from last 7 days   Lab Units 07/12/20  0852 07/12/20  0851 07/11/20  1756 07/11/20  0959   BILIRUBIN mg/dL  --  0.3  --  0.2   ALK PHOS U/L  --  112  --  133*   ALT (SGPT) U/L  --  <5  --  6   AST (SGOT) U/L  --  11  --  12   PROTIME Seconds  --   --  13.5 12.8   INR   --   --  1.06 0.98   APTT seconds 75.7  --  25.6 25.5*           Invalid input(s): TG, LDLCALC, LDLREALC  Results from last 7 days   Lab Units 07/11/20  1756 07/11/20  1200 07/11/20  0959   TSH uIU/mL  --   --  0.875   TROPONIN T ng/mL <0.010 <0.010 <0.010       Brief Urine Lab Results  (Last result in the past 365 days)      Color   Clarity   Blood   Leuk Est   Nitrite    Protein   CREAT   Urine HCG        01/09/20 1307               Negative           Microbiology Results Abnormal     Procedure Component Value - Date/Time    COVID PRE-OP / PRE-PROCEDURE SCREENING ORDER (NO ISOLATION) - Swab, Nasopharynx [[id number]] Collected:  07/11/20 1903    Lab Status:  Final result Specimen:  Swab from Nasopharynx Updated:  07/12/20 1401    Narrative:       The following orders were created for panel order COVID PRE-OP / PRE-PROCEDURE SCREENING ORDER (NO ISOLATION) - Swab, Nasopharynx.  Procedure                               Abnormality         Status                     ---------                               -----------         ------                     COVID-19,[facility] IN-HOUSE...[[id number]]  Normal              Final result                 Please view results for these tests on the individual orders.    COVID-19,[facility] IN-HOUSE, NP SWAB IN TRANSPORT MEDIA 8-12 HR TAT - Swab, Nasopharynx [[id number]]  (Normal) Collected:  07/11/20 1903    Lab Status:  Final result Specimen:  Swab from Nasopharynx Updated:  07/12/20 1401     COVID19 Not Detected    Narrative:       Fact sheet for providers: https://www.fda.gov/media/152938/download     Fact sheet for patients: https://www.fda.gov/media/980009/download          Imaging Results (All)     None          Results for orders placed during the hospital encounter of 02/22/18   Duplex Venous Lower Extremity - Left CAR    Narrative DUPLEX VENOUS LOWER EXTREMITY LEFT CAR-     CLINICAL INDICATION: left calf pain          COMPARISON: None available      TECHNIQUE: Color Doppler imaging was used with compression and  augmentation to evaluate the left lower extremity deep venous system.     FINDINGS:   There is patent spontaneous flow from the common femoral vein through  the posterior tibial veins.  There was no internal clot or area of noncompressibility in the left  lower extremity.  Normal augmentation was elicited where applicable.  The right common  femoral vein is patent       Impression No DVT in the left lower extremity on today's exam.       This report was finalized on 2/22/2018 8:46 AM by Dr. Dennis Zuluaga MD.          Results for orders placed during the hospital encounter of 02/22/18   Duplex Venous Lower Extremity - Left CAR    Narrative DUPLEX VENOUS LOWER EXTREMITY LEFT CAR-     CLINICAL INDICATION: left calf pain          COMPARISON: None available      TECHNIQUE: Color Doppler imaging was used with compression and  augmentation to evaluate the left lower extremity deep venous system.     FINDINGS:   There is patent spontaneous flow from the common femoral vein through  the posterior tibial veins.  There was no internal clot or area of noncompressibility in the left  lower extremity.  Normal augmentation was elicited where applicable.  The right common femoral vein is patent       Impression No DVT in the left lower extremity on today's exam.       This report was finalized on 2/22/2018 8:46 AM by Dr. Dennis Zuluaga MD.          Results for orders placed in visit on 06/17/20   Adult Transthoracic Echo Complete W/ Cont if Necessary Per Protocol    Narrative · Normal left ventricular cavity size and wall thickness noted.  · Left ventricular systolic function is normal. Estimated EF appears to be   in the range of 61 - 65%.  · Left ventricular diastolic function is normal.  · Tip of anterior mitral leaflet is mildly thickened ,no vegetations   noted.  · No significant valvular heart disease  · There is no evidence of pericardial effusion.          Plan for Follow-up of Pending Labs/Results:    Discharge Details        Discharge Medications      Changes to Medications      Instructions Start Date   atenolol 25 MG tablet  Commonly known as:  TENORMIN  What changed:  when to take this   25 mg, Oral, Daily      isosorbide mononitrate 60 MG 24 hr tablet  Commonly known as:  IMDUR  What changed:  when to take this   60 mg, Oral, Daily      nitroglycerin 0.4  MG SL tablet  Commonly known as:  NITROSTAT  What changed:    · how much to take  · how to take this  · when to take this  · reasons to take this   1 under the tongue as needed for angina, may repeat q5mins for up three doses      prasugrel 10 MG tablet  Commonly known as:  EFFIENT  What changed:  additional instructions   10 mg, Oral, Daily      pravastatin 20 MG tablet  Commonly known as:  PRAVACHOL  What changed:  when to take this   20 mg, Oral, Daily      spironolactone 25 MG tablet  Commonly known as:  ALDACTONE  What changed:  when to take this   25 mg, Oral, Daily         Continue These Medications      Instructions Start Date   aspirin 81 MG EC tablet   81 mg, Oral, Daily      fluticasone-salmeterol 500-50 MCG/DOSE DISKUS  Commonly known as:  ADVAIR   1 puff, Inhalation, As Needed      HYDROcodone-acetaminophen 7.5-325 MG per tablet  Commonly known as:  NORCO   1 tablet, Oral, 2 Times Daily      Praluent 75 MG/ML solution auto-injector  Generic drug:  Alirocumab   1 mL, Subcutaneous, Every 14 Days      traMADol 50 MG tablet  Commonly known as:  ULTRAM   50 mg, Oral, Every 6 Hours PRN             Allergies   Allergen Reactions   • Bactrim [Sulfamethoxazole-Trimethoprim] Rash   • Ciprofloxacin Other (See Comments)     tremors   • Ranexa [Ranolazine Er] Unknown - Low Severity         Discharge Disposition:  Home or Self Care    Diet:  Hospital:  Diet Order   Procedures   • Diet Regular; Cardiac       Activity:  Activity Instructions     Activity as Tolerated            Restrictions or Other Recommendations:         CODE STATUS:    Code Status and Medical Interventions:   Ordered at: 07/11/20 6357     Level Of Support Discussed With:    Patient     Code Status:    CPR     Medical Interventions (Level of Support Prior to Arrest):    Full       Future Appointments   Date Time Provider Department Center   7/15/2020  2:45 PM COVID-19 [facility]   9/24/2020  1:45 PM Lazaro Conway MD [facility]  [facility] None       Additional Instructions for the Follow-ups that You Need to Schedule     Discharge Follow-up with PCP   As directed       Currently Documented PCP:    Chiquita Choudhary APRN    PCP Phone Number:    [phone]     Follow Up Details:  1-2 weeks         Discharge Follow-up with Specified Provider: at [facility] 7/18 for CABG; 5 Days   As directed      To:  at [facility] 7/18 for CABG    Follow Up:  5 Days                     Electronically signed by Sabrina Cardenas DO, 07/13/20, 12:13 PM.      Time Spent on Discharge:  I spent  35  minutes on this discharge activity which included: face-to-face encounter with the patient, reviewing the data in the system, coordination of the care with the nursing staff as well as consultants, documentation, and entering orders.

## 2020-07-13 NOTE — DISCHARGE INSTR - OTHER ORDERS
Order for bedside commode faxed to [facility] in [facility] per patient request   They have been instructed to call spouse for delivery arrangements

## 2020-07-14 ENCOUNTER — TRANSCRIBE ORDERS (OUTPATIENT)
Dept: ADMINISTRATIVE | Facility: HOSPITAL | Age: 55
End: 2020-07-14

## 2020-07-14 ENCOUNTER — DOCUMENTATION (OUTPATIENT)
Dept: CARDIAC REHAB | Facility: HOSPITAL | Age: 55
End: 2020-07-14

## 2020-07-14 DIAGNOSIS — Z01.818 PREOP EXAMINATION: Primary | ICD-10-CM

## 2020-07-14 NOTE — PROGRESS NOTES
Referral received for Phase II Cardiac Rehab.  Staff has reviewed chart and patient does not have a qualifying diagnosis for Phase II Cardiac Rehab at this time. We will follow up with this patient after pending revascularization surgery scheduled with Dr. Coronado.  Staff available if further consultation is needed.

## 2020-07-15 ENCOUNTER — LAB (OUTPATIENT)
Dept: LAB | Facility: HOSPITAL | Age: 55
End: 2020-07-15

## 2020-07-15 DIAGNOSIS — Z01.818 PREOP EXAMINATION: ICD-10-CM

## 2020-07-16 ENCOUNTER — TRANSCRIBE ORDERS (OUTPATIENT)
Dept: LAB | Facility: HOSPITAL | Age: 55
End: 2020-07-16

## 2020-07-16 ENCOUNTER — LAB (OUTPATIENT)
Dept: LAB | Facility: HOSPITAL | Age: 55
End: 2020-07-16

## 2020-07-16 DIAGNOSIS — Z01.818 PRE-OPERATIVE CLEARANCE: Primary | ICD-10-CM

## 2020-07-16 DIAGNOSIS — Z01.818 PRE-OPERATIVE CLEARANCE: ICD-10-CM

## 2020-07-16 LAB
REF LAB TEST METHOD: NORMAL
SARS-COV-2 RDRP RESP QL NAA+PROBE: NOT DETECTED
SARS-COV-2 RNA RESP QL NAA+PROBE: NOT DETECTED

## 2020-07-16 PROCEDURE — U0004 COV-19 TEST NON-CDC HGH THRU: HCPCS

## 2020-07-16 PROCEDURE — 87635 SARS-COV-2 COVID-19 AMP PRB: CPT

## 2020-07-16 PROCEDURE — C9803 HOPD COVID-19 SPEC COLLECT: HCPCS

## 2020-07-16 PROCEDURE — U0002 COVID-19 LAB TEST NON-CDC: HCPCS

## 2020-07-16 NOTE — RESEARCH
RISK SCORES Procedure: Isolated CAB CALCULATE   Risk of Mortality:  0.704%    Renal Failure:  0.432%    Permanent Stroke:  0.602%    Prolonged Ventilation:  3.848%    DSW Infection:  0.103%    Reoperation:  2.704%    Morbidity or Mortality:  6.355%    Short Length of Stay:  65.908%    Long Length of Stay:  1.283%

## 2020-07-17 ENCOUNTER — APPOINTMENT (OUTPATIENT)
Dept: GENERAL RADIOLOGY | Facility: HOSPITAL | Age: 55
End: 2020-07-17

## 2020-07-17 ENCOUNTER — ANCILLARY PROCEDURE (OUTPATIENT)
Dept: PERIOP | Facility: HOSPITAL | Age: 55
End: 2020-07-17

## 2020-07-17 ENCOUNTER — HOSPITAL ENCOUNTER (INPATIENT)
Facility: HOSPITAL | Age: 55
LOS: 4 days | Discharge: HOME | End: 2020-07-21
Attending: THORACIC SURGERY (CARDIOTHORACIC VASCULAR SURGERY) | Admitting: THORACIC SURGERY (CARDIOTHORACIC VASCULAR SURGERY)

## 2020-07-17 DIAGNOSIS — I25.10 CAD IN NATIVE ARTERY: ICD-10-CM

## 2020-07-17 DIAGNOSIS — I25.110 CORONARY ARTERY DISEASE INVOLVING NATIVE CORONARY ARTERY OF NATIVE HEART WITH UNSTABLE ANGINA PECTORIS (HCC): Primary | ICD-10-CM

## 2020-07-17 LAB
ABO GROUP BLD: NORMAL
ACT BLD: 109 SECONDS (ref 82–152)
ACT BLD: 114 SECONDS (ref 82–152)
ACT BLD: 395 SECONDS (ref 82–152)
ACT BLD: 450 SECONDS (ref 82–152)
ACT BLD: 543 SECONDS (ref 82–152)
ACT BLD: 582 SECONDS (ref 82–152)
ALBUMIN SERPL-MCNC: 3 G/DL (ref 3.5–5.2)
ANION GAP SERPL CALCULATED.3IONS-SCNC: 7 MMOL/L (ref 5–15)
APTT PPP: 28.4 SECONDS (ref 24–37)
ARTERIAL PATENCY WRIST A: ABNORMAL
ATMOSPHERIC PRESS: ABNORMAL MM[HG]
B-HCG UR QL: NEGATIVE
BASE EXCESS BLDA CALC-SCNC: -0.7 MMOL/L (ref 0–2)
BASE EXCESS BLDA CALC-SCNC: -2 MMOL/L (ref -5–5)
BASE EXCESS BLDA CALC-SCNC: -3 MMOL/L (ref -5–5)
BASE EXCESS BLDA CALC-SCNC: 0 MMOL/L (ref -5–5)
BASE EXCESS BLDA CALC-SCNC: 1 MMOL/L (ref -5–5)
BASE EXCESS BLDA CALC-SCNC: 2 MMOL/L (ref -5–5)
BASE EXCESS BLDA CALC-SCNC: 2 MMOL/L (ref -5–5)
BDY SITE: ABNORMAL
BLD GP AB SCN SERPL QL: NEGATIVE
BODY TEMPERATURE: 37 C
BUN SERPL-MCNC: 7 MG/DL (ref 6–20)
BUN/CREAT SERPL: 11.3 (ref 7–25)
CA-I BLDA-SCNC: 0.91 MMOL/L (ref 1.2–1.32)
CA-I BLDA-SCNC: 0.92 MMOL/L (ref 1.2–1.32)
CA-I BLDA-SCNC: 0.97 MMOL/L (ref 1.2–1.32)
CA-I BLDA-SCNC: 1.16 MMOL/L (ref 1.2–1.32)
CA-I BLDA-SCNC: 1.19 MMOL/L (ref 1.2–1.32)
CA-I BLDA-SCNC: 1.34 MMOL/L (ref 1.2–1.32)
CA-I SERPL ISE-MCNC: 1.27 MMOL/L (ref 1.12–1.32)
CALCIUM SPEC-SCNC: 8.3 MG/DL (ref 8.6–10.5)
CHLORIDE SERPL-SCNC: 106 MMOL/L (ref 98–107)
CO2 BLDA-SCNC: 23 MMOL/L (ref 24–29)
CO2 BLDA-SCNC: 24 MMOL/L (ref 24–29)
CO2 BLDA-SCNC: 25 MMOL/L (ref 24–29)
CO2 BLDA-SCNC: 25.5 MMOL/L (ref 22–33)
CO2 BLDA-SCNC: 27 MMOL/L (ref 24–29)
CO2 BLDA-SCNC: 28 MMOL/L (ref 24–29)
CO2 BLDA-SCNC: 28 MMOL/L (ref 24–29)
CO2 SERPL-SCNC: 22 MMOL/L (ref 22–29)
COHGB MFR BLD: 0.8 % (ref 0–2)
CREAT SERPL-MCNC: 0.62 MG/DL (ref 0.57–1)
DEPRECATED RDW RBC AUTO: 50 FL (ref 37–54)
ERYTHROCYTE [DISTWIDTH] IN BLOOD BY AUTOMATED COUNT: 14.4 % (ref 12.3–15.4)
GFR SERPL CREATININE-BSD FRML MDRD: 100 ML/MIN/1.73
GLUCOSE BLDC GLUCOMTR-MCNC: 104 MG/DL (ref 70–130)
GLUCOSE BLDC GLUCOMTR-MCNC: 130 MG/DL (ref 70–130)
GLUCOSE BLDC GLUCOMTR-MCNC: 160 MG/DL (ref 70–130)
GLUCOSE BLDC GLUCOMTR-MCNC: 171 MG/DL (ref 70–130)
GLUCOSE SERPL-MCNC: 158 MG/DL (ref 65–99)
HCO3 BLDA-SCNC: 22 MMOL/L (ref 22–26)
HCO3 BLDA-SCNC: 23 MMOL/L (ref 22–26)
HCO3 BLDA-SCNC: 24.1 MMOL/L (ref 22–26)
HCO3 BLDA-SCNC: 24.3 MMOL/L (ref 20–26)
HCO3 BLDA-SCNC: 26.1 MMOL/L (ref 22–26)
HCO3 BLDA-SCNC: 26.5 MMOL/L (ref 22–26)
HCO3 BLDA-SCNC: 26.9 MMOL/L (ref 22–26)
HCT VFR BLD AUTO: 40.1 % (ref 34–46.6)
HCT VFR BLD CALC: 43.1 %
HCT VFR BLDA CALC: 21 % (ref 38–51)
HCT VFR BLDA CALC: 23 % (ref 38–51)
HCT VFR BLDA CALC: 27 % (ref 38–51)
HCT VFR BLDA CALC: 28 % (ref 38–51)
HCT VFR BLDA CALC: 31 % (ref 38–51)
HCT VFR BLDA CALC: 32 % (ref 38–51)
HGB BLD-MCNC: 13.2 G/DL (ref 12–15.9)
HGB BLDA-MCNC: 10.5 G/DL (ref 12–17)
HGB BLDA-MCNC: 10.9 G/DL (ref 12–17)
HGB BLDA-MCNC: 14 G/DL (ref 14–18)
HGB BLDA-MCNC: 7.1 G/DL (ref 12–17)
HGB BLDA-MCNC: 7.8 G/DL (ref 12–17)
HGB BLDA-MCNC: 9.2 G/DL (ref 12–17)
HGB BLDA-MCNC: 9.5 G/DL (ref 12–17)
INHALED O2 CONCENTRATION: 100 %
INR PPP: 1.41 (ref 0.85–1.16)
INTERNAL NEGATIVE CONTROL: NEGATIVE
INTERNAL POSITIVE CONTROL: POSITIVE
Lab: NORMAL
MAGNESIUM SERPL-MCNC: 3.7 MG/DL (ref 1.6–2.6)
MCH RBC QN AUTO: 31.1 PG (ref 26.6–33)
MCHC RBC AUTO-ENTMCNC: 32.9 G/DL (ref 31.5–35.7)
MCV RBC AUTO: 94.6 FL (ref 79–97)
METHGB BLD QL: 1.1 % (ref 0–1.5)
MODALITY: ABNORMAL
NOTE: ABNORMAL
OXYHGB MFR BLDV: 98 % (ref 94–99)
PA ADP PRP-ACNC: 199 PRU
PCO2 BLDA: 37.3 MM HG (ref 35–45)
PCO2 BLDA: 38.8 MM HG (ref 35–45)
PCO2 BLDA: 39.1 MM HG (ref 35–45)
PCO2 BLDA: 40.3 MM HG (ref 35–45)
PCO2 BLDA: 40.9 MM HG (ref 35–45)
PCO2 BLDA: 41.2 MM HG (ref 35–45)
PCO2 BLDA: 44.8 MM HG (ref 35–45)
PCO2 TEMP ADJ BLD: 40.3 MM HG (ref 35–45)
PEEP RESPIRATORY: 5 CM[H2O]
PH BLDA: 7.36 PH UNITS (ref 7.35–7.6)
PH BLDA: 7.36 PH UNITS (ref 7.35–7.6)
PH BLDA: 7.38 PH UNITS (ref 7.35–7.6)
PH BLDA: 7.39 PH UNITS (ref 7.35–7.45)
PH BLDA: 7.42 PH UNITS (ref 7.35–7.6)
PH BLDA: 7.42 PH UNITS (ref 7.35–7.6)
PH BLDA: 7.43 PH UNITS (ref 7.35–7.6)
PH, TEMP CORRECTED: 7.39 PH UNITS
PHOSPHATE SERPL-MCNC: 2.4 MG/DL (ref 2.5–4.5)
PLATELET # BLD AUTO: 153 10*3/MM3 (ref 140–450)
PMV BLD AUTO: 10.2 FL (ref 6–12)
PO2 BLDA: 325 MMHG (ref 80–105)
PO2 BLDA: 363 MM HG (ref 83–108)
PO2 BLDA: 382 MMHG (ref 80–105)
PO2 BLDA: 417 MMHG (ref 80–105)
PO2 BLDA: 42 MMHG (ref 80–105)
PO2 BLDA: 45 MMHG (ref 80–105)
PO2 BLDA: 56 MMHG (ref 80–105)
PO2 TEMP ADJ BLD: 363 MM HG (ref 83–108)
POTASSIUM BLDA-SCNC: 3.4 MMOL/L (ref 3.5–4.9)
POTASSIUM BLDA-SCNC: 3.5 MMOL/L (ref 3.5–4.9)
POTASSIUM BLDA-SCNC: 4.2 MMOL/L (ref 3.5–4.9)
POTASSIUM BLDA-SCNC: 4.5 MMOL/L (ref 3.5–4.9)
POTASSIUM BLDA-SCNC: 4.9 MMOL/L (ref 3.5–4.9)
POTASSIUM BLDA-SCNC: 5.2 MMOL/L (ref 3.5–4.9)
POTASSIUM SERPL-SCNC: 3.5 MMOL/L (ref 3.5–5.2)
POTASSIUM SERPL-SCNC: 3.9 MMOL/L (ref 3.5–5.2)
PROTHROMBIN TIME: 16.9 SECONDS (ref 11.5–14)
RBC # BLD AUTO: 4.24 10*6/MM3 (ref 3.77–5.28)
RH BLD: POSITIVE
SAO2 % BLDA: 100 % (ref 95–98)
SAO2 % BLDA: 75 % (ref 95–98)
SAO2 % BLDA: 82 % (ref 95–98)
SAO2 % BLDA: 88 % (ref 95–98)
SET MECH RESP RATE: 12
SODIUM BLD-SCNC: 138 MMOL/L (ref 138–146)
SODIUM BLD-SCNC: 138 MMOL/L (ref 138–146)
SODIUM BLD-SCNC: 140 MMOL/L (ref 138–146)
SODIUM BLD-SCNC: 141 MMOL/L (ref 138–146)
SODIUM BLD-SCNC: 142 MMOL/L (ref 138–146)
SODIUM BLD-SCNC: 145 MMOL/L (ref 138–146)
SODIUM SERPL-SCNC: 135 MMOL/L (ref 136–145)
T&S EXPIRATION DATE: NORMAL
TOTAL RATE: 12 BREATHS/MINUTE
VENTILATOR MODE: ABNORMAL
VT ON VENT VENT: 500 ML
WBC # BLD AUTO: 26.9 10*3/MM3 (ref 3.4–10.8)

## 2020-07-17 PROCEDURE — 86923 COMPATIBILITY TEST ELECTRIC: CPT

## 2020-07-17 PROCEDURE — 94799 UNLISTED PULMONARY SVC/PX: CPT

## 2020-07-17 PROCEDURE — 85347 COAGULATION TIME ACTIVATED: CPT

## 2020-07-17 PROCEDURE — A4648 IMPLANTABLE TISSUE MARKER: HCPCS | Performed by: THORACIC SURGERY (CARDIOTHORACIC VASCULAR SURGERY)

## 2020-07-17 PROCEDURE — 93318 ECHO TRANSESOPHAGEAL INTRAOP: CPT | Performed by: ANESTHESIOLOGY

## 2020-07-17 PROCEDURE — C1751 CATH, INF, PER/CENT/MIDLINE: HCPCS | Performed by: ANESTHESIOLOGY

## 2020-07-17 PROCEDURE — P9041 ALBUMIN (HUMAN),5%, 50ML: HCPCS

## 2020-07-17 PROCEDURE — 80069 RENAL FUNCTION PANEL: CPT | Performed by: PHYSICIAN ASSISTANT

## 2020-07-17 PROCEDURE — 33508 ENDOSCOPIC VEIN HARVEST: CPT | Performed by: THORACIC SURGERY (CARDIOTHORACIC VASCULAR SURGERY)

## 2020-07-17 PROCEDURE — 33518 CABG ARTERY-VEIN TWO: CPT | Performed by: PHYSICIAN ASSISTANT

## 2020-07-17 PROCEDURE — 85730 THROMBOPLASTIN TIME PARTIAL: CPT | Performed by: PHYSICIAN ASSISTANT

## 2020-07-17 PROCEDURE — 33533 CABG ARTERIAL SINGLE: CPT | Performed by: THORACIC SURGERY (CARDIOTHORACIC VASCULAR SURGERY)

## 2020-07-17 PROCEDURE — 85014 HEMATOCRIT: CPT

## 2020-07-17 PROCEDURE — P9016 RBC LEUKOCYTES REDUCED: HCPCS

## 2020-07-17 PROCEDURE — 02100Z9 BYPASS CORONARY ARTERY, ONE ARTERY FROM LEFT INTERNAL MAMMARY, OPEN APPROACH: ICD-10-PCS | Performed by: THORACIC SURGERY (CARDIOTHORACIC VASCULAR SURGERY)

## 2020-07-17 PROCEDURE — 83735 ASSAY OF MAGNESIUM: CPT | Performed by: PHYSICIAN ASSISTANT

## 2020-07-17 PROCEDURE — 25010000002 FENTANYL CITRATE (PF) 100 MCG/2ML SOLUTION: Performed by: ANESTHESIOLOGY

## 2020-07-17 PROCEDURE — 25010000002 FENTANYL CITRATE (PF) 100 MCG/2ML SOLUTION

## 2020-07-17 PROCEDURE — 33518 CABG ARTERY-VEIN TWO: CPT | Performed by: THORACIC SURGERY (CARDIOTHORACIC VASCULAR SURGERY)

## 2020-07-17 PROCEDURE — 33508 ENDOSCOPIC VEIN HARVEST: CPT | Performed by: PHYSICIAN ASSISTANT

## 2020-07-17 PROCEDURE — 86901 BLOOD TYPING SEROLOGIC RH(D): CPT | Performed by: THORACIC SURGERY (CARDIOTHORACIC VASCULAR SURGERY)

## 2020-07-17 PROCEDURE — 25010000002 PROPOFOL 10 MG/ML EMULSION: Performed by: ANESTHESIOLOGY

## 2020-07-17 PROCEDURE — 25010000002 PAPAVERINE PER 60 MG: Performed by: THORACIC SURGERY (CARDIOTHORACIC VASCULAR SURGERY)

## 2020-07-17 PROCEDURE — 021109W BYPASS CORONARY ARTERY, TWO ARTERIES FROM AORTA WITH AUTOLOGOUS VENOUS TISSUE, OPEN APPROACH: ICD-10-PCS | Performed by: THORACIC SURGERY (CARDIOTHORACIC VASCULAR SURGERY)

## 2020-07-17 PROCEDURE — 85027 COMPLETE CBC AUTOMATED: CPT | Performed by: PHYSICIAN ASSISTANT

## 2020-07-17 PROCEDURE — 25010000002 MIDAZOLAM PER 1 MG: Performed by: THORACIC SURGERY (CARDIOTHORACIC VASCULAR SURGERY)

## 2020-07-17 PROCEDURE — 33533 CABG ARTERIAL SINGLE: CPT | Performed by: PHYSICIAN ASSISTANT

## 2020-07-17 PROCEDURE — 86850 RBC ANTIBODY SCREEN: CPT | Performed by: THORACIC SURGERY (CARDIOTHORACIC VASCULAR SURGERY)

## 2020-07-17 PROCEDURE — 25010000002 VANCOMYCIN PER 500 MG: Performed by: THORACIC SURGERY (CARDIOTHORACIC VASCULAR SURGERY)

## 2020-07-17 PROCEDURE — 25010000002 ALBUMIN HUMAN 5% PER 50 ML

## 2020-07-17 PROCEDURE — 93010 ELECTROCARDIOGRAM REPORT: CPT | Performed by: INTERNAL MEDICINE

## 2020-07-17 PROCEDURE — 25810000003 DEXTROSE 5 % WITH KCL 20 MEQ 20-5 MEQ/L-% SOLUTION: Performed by: PHYSICIAN ASSISTANT

## 2020-07-17 PROCEDURE — 25010000002 PHENYLEPHRINE PER 1 ML: Performed by: ANESTHESIOLOGY

## 2020-07-17 PROCEDURE — 81025 URINE PREGNANCY TEST: CPT | Performed by: ANESTHESIOLOGY

## 2020-07-17 PROCEDURE — 25010000002 PROPOFOL 10 MG/ML EMULSION: Performed by: PHYSICIAN ASSISTANT

## 2020-07-17 PROCEDURE — 84132 ASSAY OF SERUM POTASSIUM: CPT | Performed by: THORACIC SURGERY (CARDIOTHORACIC VASCULAR SURGERY)

## 2020-07-17 PROCEDURE — 82330 ASSAY OF CALCIUM: CPT | Performed by: PHYSICIAN ASSISTANT

## 2020-07-17 PROCEDURE — 86900 BLOOD TYPING SEROLOGIC ABO: CPT | Performed by: THORACIC SURGERY (CARDIOTHORACIC VASCULAR SURGERY)

## 2020-07-17 PROCEDURE — 94002 VENT MGMT INPAT INIT DAY: CPT

## 2020-07-17 PROCEDURE — 84132 ASSAY OF SERUM POTASSIUM: CPT

## 2020-07-17 PROCEDURE — 86900 BLOOD TYPING SEROLOGIC ABO: CPT

## 2020-07-17 PROCEDURE — 25010000002 HEPARIN (PORCINE) PER 1000 UNITS: Performed by: THORACIC SURGERY (CARDIOTHORACIC VASCULAR SURGERY)

## 2020-07-17 PROCEDURE — C1751 CATH, INF, PER/CENT/MIDLINE: HCPCS | Performed by: THORACIC SURGERY (CARDIOTHORACIC VASCULAR SURGERY)

## 2020-07-17 PROCEDURE — 84295 ASSAY OF SERUM SODIUM: CPT

## 2020-07-17 PROCEDURE — 06BP4ZZ EXCISION OF RIGHT SAPHENOUS VEIN, PERCUTANEOUS ENDOSCOPIC APPROACH: ICD-10-PCS | Performed by: THORACIC SURGERY (CARDIOTHORACIC VASCULAR SURGERY)

## 2020-07-17 PROCEDURE — 71045 X-RAY EXAM CHEST 1 VIEW: CPT

## 2020-07-17 PROCEDURE — 82805 BLOOD GASES W/O2 SATURATION: CPT

## 2020-07-17 PROCEDURE — 5A1221Z PERFORMANCE OF CARDIAC OUTPUT, CONTINUOUS: ICD-10-PCS | Performed by: THORACIC SURGERY (CARDIOTHORACIC VASCULAR SURGERY)

## 2020-07-17 PROCEDURE — 99233 SBSQ HOSP IP/OBS HIGH 50: CPT | Performed by: INTERNAL MEDICINE

## 2020-07-17 PROCEDURE — 82330 ASSAY OF CALCIUM: CPT

## 2020-07-17 PROCEDURE — 25010000002 MIDAZOLAM PER 1 MG: Performed by: ANESTHESIOLOGY

## 2020-07-17 PROCEDURE — 93005 ELECTROCARDIOGRAM TRACING: CPT | Performed by: PHYSICIAN ASSISTANT

## 2020-07-17 PROCEDURE — 82803 BLOOD GASES ANY COMBINATION: CPT

## 2020-07-17 PROCEDURE — 25010000002 HEPARIN (PORCINE) PER 1000 UNITS: Performed by: ANESTHESIOLOGY

## 2020-07-17 PROCEDURE — 85576 BLOOD PLATELET AGGREGATION: CPT | Performed by: THORACIC SURGERY (CARDIOTHORACIC VASCULAR SURGERY)

## 2020-07-17 PROCEDURE — 36430 TRANSFUSION BLD/BLD COMPNT: CPT

## 2020-07-17 PROCEDURE — C1894 INTRO/SHEATH, NON-LASER: HCPCS | Performed by: THORACIC SURGERY (CARDIOTHORACIC VASCULAR SURGERY)

## 2020-07-17 PROCEDURE — 82947 ASSAY GLUCOSE BLOOD QUANT: CPT

## 2020-07-17 PROCEDURE — 25010000002 PROTAMINE SULFATE PER 10 MG: Performed by: ANESTHESIOLOGY

## 2020-07-17 PROCEDURE — 85610 PROTHROMBIN TIME: CPT | Performed by: PHYSICIAN ASSISTANT

## 2020-07-17 DEVICE — DISK-SHAPED STYLE, SILICONE (1 PER STERILE PKG)
Type: IMPLANTABLE DEVICE | Site: HEART | Status: FUNCTIONAL
Brand: SCANLAN® RADIOMARK® GRAFT MARKERS

## 2020-07-17 DEVICE — CLIP LIGAT VASC HORIZON TI SM/WD RD 6CT: Type: IMPLANTABLE DEVICE | Site: HEART | Status: FUNCTIONAL

## 2020-07-17 RX ORDER — FAMOTIDINE 20 MG/1
20 TABLET, FILM COATED ORAL ONCE
Status: COMPLETED | OUTPATIENT
Start: 2020-07-17 | End: 2020-07-17

## 2020-07-17 RX ORDER — VECURONIUM BROMIDE 1 MG/ML
INJECTION, POWDER, LYOPHILIZED, FOR SOLUTION INTRAVENOUS AS NEEDED
Status: DISCONTINUED | OUTPATIENT
Start: 2020-07-17 | End: 2020-07-17 | Stop reason: SURG

## 2020-07-17 RX ORDER — LIDOCAINE HYDROCHLORIDE 20 MG/ML
INJECTION, SOLUTION INFILTRATION; PERINEURAL AS NEEDED
Status: DISCONTINUED | OUTPATIENT
Start: 2020-07-17 | End: 2020-07-17 | Stop reason: SURG

## 2020-07-17 RX ORDER — POTASSIUM CHLORIDE 1.5 G/1.77G
40 POWDER, FOR SOLUTION ORAL AS NEEDED
Status: DISCONTINUED | OUTPATIENT
Start: 2020-07-17 | End: 2020-07-21 | Stop reason: HOSPADM

## 2020-07-17 RX ORDER — MORPHINE SULFATE 2 MG/ML
2 INJECTION, SOLUTION INTRAMUSCULAR; INTRAVENOUS
Status: DISCONTINUED | OUTPATIENT
Start: 2020-07-17 | End: 2020-07-21 | Stop reason: HOSPADM

## 2020-07-17 RX ORDER — METOPROLOL TARTRATE 5 MG/5ML
2.5 INJECTION INTRAVENOUS EVERY 6 HOURS SCHEDULED
Status: ACTIVE | OUTPATIENT
Start: 2020-07-18 | End: 2020-07-18

## 2020-07-17 RX ORDER — HEPARIN SODIUM 1000 [USP'U]/ML
INJECTION, SOLUTION INTRAVENOUS; SUBCUTANEOUS AS NEEDED
Status: DISCONTINUED | OUTPATIENT
Start: 2020-07-17 | End: 2020-07-17 | Stop reason: SURG

## 2020-07-17 RX ORDER — SODIUM CHLORIDE, SODIUM LACTATE, POTASSIUM CHLORIDE, CALCIUM CHLORIDE 600; 310; 30; 20 MG/100ML; MG/100ML; MG/100ML; MG/100ML
9 INJECTION, SOLUTION INTRAVENOUS CONTINUOUS
Status: DISCONTINUED | OUTPATIENT
Start: 2020-07-17 | End: 2020-07-18

## 2020-07-17 RX ORDER — SODIUM CHLORIDE 9 MG/ML
INJECTION, SOLUTION INTRAVENOUS CONTINUOUS PRN
Status: DISCONTINUED | OUTPATIENT
Start: 2020-07-17 | End: 2020-07-17 | Stop reason: SURG

## 2020-07-17 RX ORDER — PROTAMINE SULFATE 10 MG/ML
50 INJECTION, SOLUTION INTRAVENOUS ONCE
Status: DISCONTINUED | OUTPATIENT
Start: 2020-07-17 | End: 2020-07-18

## 2020-07-17 RX ORDER — SODIUM CHLORIDE 9 MG/ML
30 INJECTION, SOLUTION INTRAVENOUS CONTINUOUS PRN
Status: DISCONTINUED | OUTPATIENT
Start: 2020-07-17 | End: 2020-07-18

## 2020-07-17 RX ORDER — AMINOCAPROIC ACID 250 MG/ML
INJECTION, SOLUTION INTRAVENOUS AS NEEDED
Status: DISCONTINUED | OUTPATIENT
Start: 2020-07-17 | End: 2020-07-17 | Stop reason: SURG

## 2020-07-17 RX ORDER — ATENOLOL 25 MG/1
25 TABLET ORAL
Status: DISCONTINUED | OUTPATIENT
Start: 2020-07-17 | End: 2020-07-17 | Stop reason: HOSPADM

## 2020-07-17 RX ORDER — MIDAZOLAM HYDROCHLORIDE 1 MG/ML
INJECTION INTRAMUSCULAR; INTRAVENOUS AS NEEDED
Status: DISCONTINUED | OUTPATIENT
Start: 2020-07-17 | End: 2020-07-17 | Stop reason: SURG

## 2020-07-17 RX ORDER — BUDESONIDE AND FORMOTEROL FUMARATE DIHYDRATE 160; 4.5 UG/1; UG/1
2 AEROSOL RESPIRATORY (INHALATION)
Status: DISCONTINUED | OUTPATIENT
Start: 2020-07-18 | End: 2020-07-21 | Stop reason: HOSPADM

## 2020-07-17 RX ORDER — VANCOMYCIN HYDROCHLORIDE 1 G/200ML
15 INJECTION, SOLUTION INTRAVENOUS ONCE
Status: COMPLETED | OUTPATIENT
Start: 2020-07-17 | End: 2020-07-17

## 2020-07-17 RX ORDER — PROPOFOL 10 MG/ML
VIAL (ML) INTRAVENOUS CONTINUOUS PRN
Status: DISCONTINUED | OUTPATIENT
Start: 2020-07-17 | End: 2020-07-17 | Stop reason: SURG

## 2020-07-17 RX ORDER — PHENYLEPHRINE HCL IN 0.9% NACL 0.5 MG/5ML
.5-3 SYRINGE (ML) INTRAVENOUS CONTINUOUS PRN
Status: DISCONTINUED | OUTPATIENT
Start: 2020-07-17 | End: 2020-07-19

## 2020-07-17 RX ORDER — ALBUMIN, HUMAN INJ 5% 5 %
SOLUTION INTRAVENOUS
Status: COMPLETED
Start: 2020-07-17 | End: 2020-07-17

## 2020-07-17 RX ORDER — ATORVASTATIN CALCIUM 40 MG/1
40 TABLET, FILM COATED ORAL NIGHTLY
Status: DISCONTINUED | OUTPATIENT
Start: 2020-07-17 | End: 2020-07-21 | Stop reason: HOSPADM

## 2020-07-17 RX ORDER — SODIUM CHLORIDE 9 MG/ML
INJECTION, SOLUTION INTRAVENOUS AS NEEDED
Status: DISCONTINUED | OUTPATIENT
Start: 2020-07-17 | End: 2020-07-17 | Stop reason: HOSPADM

## 2020-07-17 RX ORDER — POTASSIUM CHLORIDE, DEXTROSE MONOHYDRATE 150; 5 MG/100ML; G/100ML
30 INJECTION, SOLUTION INTRAVENOUS CONTINUOUS
Status: DISCONTINUED | OUTPATIENT
Start: 2020-07-17 | End: 2020-07-21 | Stop reason: HOSPADM

## 2020-07-17 RX ORDER — NITROGLYCERIN 20 MG/100ML
INJECTION INTRAVENOUS CONTINUOUS PRN
Status: DISCONTINUED | OUTPATIENT
Start: 2020-07-17 | End: 2020-07-17 | Stop reason: SURG

## 2020-07-17 RX ORDER — CHLORHEXIDINE GLUCONATE 500 MG/1
1 CLOTH TOPICAL EVERY 12 HOURS PRN
Status: DISCONTINUED | OUTPATIENT
Start: 2020-07-17 | End: 2020-07-17 | Stop reason: HOSPADM

## 2020-07-17 RX ORDER — ALBUTEROL SULFATE 2.5 MG/3ML
2.5 SOLUTION RESPIRATORY (INHALATION) EVERY 4 HOURS PRN
Status: ACTIVE | OUTPATIENT
Start: 2020-07-17 | End: 2020-07-18

## 2020-07-17 RX ORDER — PROTAMINE SULFATE 10 MG/ML
INJECTION, SOLUTION INTRAVENOUS AS NEEDED
Status: DISCONTINUED | OUTPATIENT
Start: 2020-07-17 | End: 2020-07-17 | Stop reason: SURG

## 2020-07-17 RX ORDER — HYDROCODONE BITARTRATE AND ACETAMINOPHEN 7.5; 325 MG/1; MG/1
1 TABLET ORAL EVERY 4 HOURS PRN
Status: DISCONTINUED | OUTPATIENT
Start: 2020-07-17 | End: 2020-07-21 | Stop reason: HOSPADM

## 2020-07-17 RX ORDER — PAPAVERINE HYDROCHLORIDE 30 MG/ML
INJECTION INTRAMUSCULAR; INTRAVENOUS AS NEEDED
Status: DISCONTINUED | OUTPATIENT
Start: 2020-07-17 | End: 2020-07-17 | Stop reason: HOSPADM

## 2020-07-17 RX ORDER — FENTANYL CITRATE 50 UG/ML
INJECTION, SOLUTION INTRAMUSCULAR; INTRAVENOUS
Status: COMPLETED
Start: 2020-07-17 | End: 2020-07-17

## 2020-07-17 RX ORDER — FENTANYL CITRATE 50 UG/ML
INJECTION, SOLUTION INTRAMUSCULAR; INTRAVENOUS AS NEEDED
Status: DISCONTINUED | OUTPATIENT
Start: 2020-07-17 | End: 2020-07-17 | Stop reason: SURG

## 2020-07-17 RX ORDER — AMOXICILLIN 250 MG
2 CAPSULE ORAL 2 TIMES DAILY
Status: DISCONTINUED | OUTPATIENT
Start: 2020-07-17 | End: 2020-07-21 | Stop reason: HOSPADM

## 2020-07-17 RX ORDER — DOBUTAMINE HYDROCHLORIDE 100 MG/100ML
2-20 INJECTION INTRAVENOUS CONTINUOUS PRN
Status: DISCONTINUED | OUTPATIENT
Start: 2020-07-17 | End: 2020-07-19

## 2020-07-17 RX ORDER — ACETAMINOPHEN 325 MG/1
650 TABLET ORAL EVERY 4 HOURS PRN
Status: DISCONTINUED | OUTPATIENT
Start: 2020-07-17 | End: 2020-07-17 | Stop reason: HOSPADM

## 2020-07-17 RX ORDER — CALCIUM CHLORIDE 100 MG/ML
INJECTION INTRAVENOUS; INTRAVENTRICULAR AS NEEDED
Status: DISCONTINUED | OUTPATIENT
Start: 2020-07-17 | End: 2020-07-17 | Stop reason: SURG

## 2020-07-17 RX ORDER — ROCURONIUM BROMIDE 10 MG/ML
INJECTION, SOLUTION INTRAVENOUS AS NEEDED
Status: DISCONTINUED | OUTPATIENT
Start: 2020-07-17 | End: 2020-07-17 | Stop reason: SURG

## 2020-07-17 RX ORDER — FENTANYL CITRATE 50 UG/ML
25 INJECTION, SOLUTION INTRAMUSCULAR; INTRAVENOUS
Status: DISCONTINUED | OUTPATIENT
Start: 2020-07-17 | End: 2020-07-21 | Stop reason: HOSPADM

## 2020-07-17 RX ORDER — POTASSIUM CHLORIDE 750 MG/1
40 CAPSULE, EXTENDED RELEASE ORAL AS NEEDED
Status: DISCONTINUED | OUTPATIENT
Start: 2020-07-17 | End: 2020-07-21 | Stop reason: HOSPADM

## 2020-07-17 RX ORDER — NOREPINEPHRINE BIT/0.9 % NACL 8 MG/250ML
.02-.3 INFUSION BOTTLE (ML) INTRAVENOUS CONTINUOUS PRN
Status: DISCONTINUED | OUTPATIENT
Start: 2020-07-17 | End: 2020-07-19

## 2020-07-17 RX ORDER — SODIUM CHLORIDE 0.9 % (FLUSH) 0.9 %
10 SYRINGE (ML) INJECTION EVERY 12 HOURS SCHEDULED
Status: DISCONTINUED | OUTPATIENT
Start: 2020-07-17 | End: 2020-07-17 | Stop reason: HOSPADM

## 2020-07-17 RX ORDER — SODIUM CHLORIDE 0.9 % (FLUSH) 0.9 %
10 SYRINGE (ML) INJECTION AS NEEDED
Status: DISCONTINUED | OUTPATIENT
Start: 2020-07-17 | End: 2020-07-17 | Stop reason: HOSPADM

## 2020-07-17 RX ORDER — MIDAZOLAM HYDROCHLORIDE 1 MG/ML
2 INJECTION INTRAMUSCULAR; INTRAVENOUS
Status: DISCONTINUED | OUTPATIENT
Start: 2020-07-17 | End: 2020-07-17 | Stop reason: HOSPADM

## 2020-07-17 RX ORDER — NITROGLYCERIN 0.4 MG/1
0.4 TABLET SUBLINGUAL
Status: DISCONTINUED | OUTPATIENT
Start: 2020-07-17 | End: 2020-07-17 | Stop reason: HOSPADM

## 2020-07-17 RX ORDER — ASPIRIN 325 MG
325 TABLET, DELAYED RELEASE (ENTERIC COATED) ORAL DAILY
Status: DISCONTINUED | OUTPATIENT
Start: 2020-07-18 | End: 2020-07-21 | Stop reason: HOSPADM

## 2020-07-17 RX ORDER — NITROGLYCERIN 20 MG/100ML
5-200 INJECTION INTRAVENOUS CONTINUOUS PRN
Status: DISCONTINUED | OUTPATIENT
Start: 2020-07-17 | End: 2020-07-19

## 2020-07-17 RX ORDER — CHLORHEXIDINE GLUCONATE 0.12 MG/ML
15 RINSE ORAL ONCE
Status: COMPLETED | OUTPATIENT
Start: 2020-07-17 | End: 2020-07-17

## 2020-07-17 RX ORDER — POTASSIUM CHLORIDE 29.8 MG/ML
20 INJECTION INTRAVENOUS
Status: DISCONTINUED | OUTPATIENT
Start: 2020-07-17 | End: 2020-07-20

## 2020-07-17 RX ORDER — ASPIRIN 325 MG
325 TABLET ORAL ONCE
Status: COMPLETED | OUTPATIENT
Start: 2020-07-17 | End: 2020-07-17

## 2020-07-17 RX ORDER — DEXMEDETOMIDINE HYDROCHLORIDE 4 UG/ML
.2-1.5 INJECTION, SOLUTION INTRAVENOUS CONTINUOUS PRN
Status: DISCONTINUED | OUTPATIENT
Start: 2020-07-17 | End: 2020-07-19

## 2020-07-17 RX ORDER — SODIUM CHLORIDE 0.9 % (FLUSH) 0.9 %
30 SYRINGE (ML) INJECTION ONCE AS NEEDED
Status: DISCONTINUED | OUTPATIENT
Start: 2020-07-17 | End: 2020-07-18

## 2020-07-17 RX ORDER — CHLORHEXIDINE GLUCONATE 0.12 MG/ML
15 RINSE ORAL EVERY 12 HOURS SCHEDULED
Status: DISCONTINUED | OUTPATIENT
Start: 2020-07-17 | End: 2020-07-19

## 2020-07-17 RX ORDER — ONDANSETRON 2 MG/ML
4 INJECTION INTRAMUSCULAR; INTRAVENOUS EVERY 6 HOURS PRN
Status: DISCONTINUED | OUTPATIENT
Start: 2020-07-17 | End: 2020-07-21 | Stop reason: HOSPADM

## 2020-07-17 RX ORDER — PROPOFOL 10 MG/ML
VIAL (ML) INTRAVENOUS AS NEEDED
Status: DISCONTINUED | OUTPATIENT
Start: 2020-07-17 | End: 2020-07-17 | Stop reason: SURG

## 2020-07-17 RX ORDER — OXYCODONE HYDROCHLORIDE AND ACETAMINOPHEN 5; 325 MG/1; MG/1
2 TABLET ORAL EVERY 4 HOURS PRN
Status: DISCONTINUED | OUTPATIENT
Start: 2020-07-17 | End: 2020-07-21 | Stop reason: HOSPADM

## 2020-07-17 RX ORDER — ACETAMINOPHEN 325 MG/1
650 TABLET ORAL EVERY 6 HOURS PRN
Status: DISCONTINUED | OUTPATIENT
Start: 2020-07-17 | End: 2020-07-21 | Stop reason: HOSPADM

## 2020-07-17 RX ORDER — DOPAMINE HYDROCHLORIDE 160 MG/100ML
2-20 INJECTION, SOLUTION INTRAVENOUS CONTINUOUS PRN
Status: DISCONTINUED | OUTPATIENT
Start: 2020-07-17 | End: 2020-07-19

## 2020-07-17 RX ORDER — LIDOCAINE HYDROCHLORIDE 10 MG/ML
0.5 INJECTION, SOLUTION EPIDURAL; INFILTRATION; INTRACAUDAL; PERINEURAL ONCE AS NEEDED
Status: COMPLETED | OUTPATIENT
Start: 2020-07-17 | End: 2020-07-17

## 2020-07-17 RX ADMIN — ROCURONIUM BROMIDE 100 MG: 10 SOLUTION INTRAVENOUS at 16:27

## 2020-07-17 RX ADMIN — PROPOFOL 35 MCG/KG/MIN: 10 INJECTION, EMULSION INTRAVENOUS at 20:29

## 2020-07-17 RX ADMIN — FENTANYL CITRATE 25 MCG: 0.05 INJECTION, SOLUTION INTRAMUSCULAR; INTRAVENOUS at 23:01

## 2020-07-17 RX ADMIN — SODIUM CHLORIDE, POTASSIUM CHLORIDE, SODIUM LACTATE AND CALCIUM CHLORIDE 9 ML/HR: 600; 310; 30; 20 INJECTION, SOLUTION INTRAVENOUS at 13:40

## 2020-07-17 RX ADMIN — VANCOMYCIN HYDROCHLORIDE 1000 MG: 1 INJECTION, SOLUTION INTRAVENOUS at 14:18

## 2020-07-17 RX ADMIN — PROTAMINE SULFATE 280 MG: 10 INJECTION, SOLUTION INTRAVENOUS at 20:01

## 2020-07-17 RX ADMIN — POTASSIUM CHLORIDE AND DEXTROSE MONOHYDRATE 30 ML/HR: 150; 5 INJECTION, SOLUTION INTRAVENOUS at 21:48

## 2020-07-17 RX ADMIN — ATENOLOL 25 MG: 25 TABLET ORAL at 14:18

## 2020-07-17 RX ADMIN — FENTANYL CITRATE 50 MCG: 50 INJECTION, SOLUTION INTRAMUSCULAR; INTRAVENOUS at 16:27

## 2020-07-17 RX ADMIN — VECURONIUM BROMIDE 2 MG: 1 INJECTION, POWDER, LYOPHILIZED, FOR SOLUTION INTRAVENOUS at 17:57

## 2020-07-17 RX ADMIN — LIDOCAINE HYDROCHLORIDE 60 MG: 20 INJECTION, SOLUTION INFILTRATION; PERINEURAL at 16:27

## 2020-07-17 RX ADMIN — PHENYLEPHRINE HYDROCHLORIDE 100 MCG: 10 INJECTION, SOLUTION INTRAMUSCULAR; INTRAVENOUS; SUBCUTANEOUS at 18:28

## 2020-07-17 RX ADMIN — VECURONIUM BROMIDE 2 MG: 1 INJECTION, POWDER, LYOPHILIZED, FOR SOLUTION INTRAVENOUS at 19:40

## 2020-07-17 RX ADMIN — MIDAZOLAM 2 MG: 1 INJECTION INTRAMUSCULAR; INTRAVENOUS at 16:25

## 2020-07-17 RX ADMIN — SODIUM CHLORIDE: 9 INJECTION, SOLUTION INTRAVENOUS at 16:42

## 2020-07-17 RX ADMIN — AMINOCAPROIC ACID 10 G: 250 INJECTION, SOLUTION INTRAVENOUS at 20:07

## 2020-07-17 RX ADMIN — ATORVASTATIN CALCIUM 40 MG: 40 TABLET, FILM COATED ORAL at 23:09

## 2020-07-17 RX ADMIN — INSULIN HUMAN 2 UNITS/HR: 1 INJECTION, SOLUTION INTRAVENOUS at 22:10

## 2020-07-17 RX ADMIN — MIDAZOLAM 2 MG: 1 INJECTION INTRAMUSCULAR; INTRAVENOUS at 14:20

## 2020-07-17 RX ADMIN — ALBUMIN HUMAN 25 G: 0.05 INJECTION, SOLUTION INTRAVENOUS at 21:49

## 2020-07-17 RX ADMIN — CALCIUM CHLORIDE 1 G: 100 INJECTION INTRAVENOUS; INTRAVENTRICULAR at 19:57

## 2020-07-17 RX ADMIN — FENTANYL CITRATE 200 MCG: 50 INJECTION, SOLUTION INTRAMUSCULAR; INTRAVENOUS at 17:22

## 2020-07-17 RX ADMIN — FENTANYL CITRATE 100 MCG: 50 INJECTION, SOLUTION INTRAMUSCULAR; INTRAVENOUS at 20:17

## 2020-07-17 RX ADMIN — HEPARIN SODIUM 21000 UNITS: 1000 INJECTION, SOLUTION INTRAVENOUS; SUBCUTANEOUS at 18:11

## 2020-07-17 RX ADMIN — FENTANYL CITRATE 50 MCG: 50 INJECTION, SOLUTION INTRAMUSCULAR; INTRAVENOUS at 20:21

## 2020-07-17 RX ADMIN — MIDAZOLAM 1 MG: 1 INJECTION INTRAMUSCULAR; INTRAVENOUS at 18:20

## 2020-07-17 RX ADMIN — PHENYLEPHRINE HYDROCHLORIDE 100 MCG: 10 INJECTION, SOLUTION INTRAMUSCULAR; INTRAVENOUS; SUBCUTANEOUS at 18:26

## 2020-07-17 RX ADMIN — FENTANYL CITRATE 100 MCG: 50 INJECTION, SOLUTION INTRAMUSCULAR; INTRAVENOUS at 17:07

## 2020-07-17 RX ADMIN — MUPIROCIN 1 APPLICATION: 20 OINTMENT TOPICAL at 13:40

## 2020-07-17 RX ADMIN — CHLORHEXIDINE GLUCONATE 0.12% ORAL RINSE 15 ML: 1.2 LIQUID ORAL at 23:09

## 2020-07-17 RX ADMIN — DOCUSATE SODIUM 50MG AND SENNOSIDES 8.6MG 2 TABLET: 8.6; 5 TABLET, FILM COATED ORAL at 23:09

## 2020-07-17 RX ADMIN — OXYCODONE HYDROCHLORIDE AND ACETAMINOPHEN 2 TABLET: 5; 325 TABLET ORAL at 23:09

## 2020-07-17 RX ADMIN — SODIUM CHLORIDE, POTASSIUM CHLORIDE, SODIUM LACTATE AND CALCIUM CHLORIDE: 600; 310; 30; 20 INJECTION, SOLUTION INTRAVENOUS at 16:22

## 2020-07-17 RX ADMIN — CHLORHEXIDINE GLUCONATE 0.12% ORAL RINSE 15 ML: 1.2 LIQUID ORAL at 13:40

## 2020-07-17 RX ADMIN — MIDAZOLAM 1 MG: 1 INJECTION INTRAMUSCULAR; INTRAVENOUS at 19:37

## 2020-07-17 RX ADMIN — NITROGLYCERIN 0.5 MCG/KG/MIN: 20 INJECTION INTRAVENOUS at 17:10

## 2020-07-17 RX ADMIN — ASPIRIN 325 MG ORAL TABLET 325 MG: 325 PILL ORAL at 23:09

## 2020-07-17 RX ADMIN — PROPOFOL 100 MG: 10 INJECTION, EMULSION INTRAVENOUS at 16:27

## 2020-07-17 RX ADMIN — FENTANYL CITRATE 150 MCG: 50 INJECTION, SOLUTION INTRAMUSCULAR; INTRAVENOUS at 16:44

## 2020-07-17 RX ADMIN — EPHEDRINE SULFATE 5 MG: 50 INJECTION INTRAMUSCULAR; INTRAVENOUS; SUBCUTANEOUS at 19:51

## 2020-07-17 RX ADMIN — FENTANYL CITRATE 100 MCG: 50 INJECTION, SOLUTION INTRAMUSCULAR; INTRAVENOUS at 19:51

## 2020-07-17 RX ADMIN — AMINOCAPROIC ACID 10 G: 250 INJECTION, SOLUTION INTRAVENOUS at 16:46

## 2020-07-17 RX ADMIN — HEPARIN SODIUM 7000 UNITS: 1000 INJECTION, SOLUTION INTRAVENOUS; SUBCUTANEOUS at 18:25

## 2020-07-17 RX ADMIN — FAMOTIDINE 20 MG: 20 TABLET ORAL at 14:18

## 2020-07-17 RX ADMIN — SODIUM CHLORIDE 2.5 MG/HR: 0.9 INJECTION, SOLUTION INTRAVENOUS at 17:39

## 2020-07-17 RX ADMIN — LIDOCAINE HYDROCHLORIDE 0.5 ML: 10 INJECTION, SOLUTION EPIDURAL; INFILTRATION; INTRACAUDAL; PERINEURAL at 13:40

## 2020-07-17 RX ADMIN — PROPOFOL 35 MCG/KG/MIN: 10 INJECTION, EMULSION INTRAVENOUS at 21:10

## 2020-07-17 RX ADMIN — VECURONIUM BROMIDE 2 MG: 1 INJECTION, POWDER, LYOPHILIZED, FOR SOLUTION INTRAVENOUS at 18:21

## 2020-07-17 RX ADMIN — MIDAZOLAM 1 MG: 1 INJECTION INTRAMUSCULAR; INTRAVENOUS at 19:51

## 2020-07-17 NOTE — ANESTHESIA PROCEDURE NOTES
Arterial Line      Patient reassessed immediately prior to procedure    Patient location during procedure: pre-op  Start time: 7/17/2020 1:35 AM  Stop Time:7/17/2020 2:43 PM       Line placed for hemodynamic monitoring.  Performed By   Anesthesiologist: Pedro Patel Jr., MD  Preanesthetic Checklist  Completed: patient identified, site marked, surgical consent, pre-op evaluation, timeout performed, IV checked, risks and benefits discussed and monitors and equipment checked  Arterial Line Prep   Sterile Tech: cap, gloves and sterile barriers  Prep: ChloraPrep  Patient monitoring: blood pressure monitoring, continuous pulse oximetry and EKG  Arterial Line Procedure   Laterality:left  Location:  radial artery  Catheter size: 20 G   Guidance: palpation technique  Number of attempts: 1  Successful placement: yes  Post Assessment   Dressing Type: line sutured, occlusive dressing applied, secured with tape, wrist guard applied and biopatch applied.   Complications no  Circ/Move/Sens Assessment: normal and unchanged.   Patient Tolerance: patient tolerated the procedure well with no apparent complications

## 2020-07-17 NOTE — ANESTHESIA PREPROCEDURE EVALUATION
Anesthesia Evaluation     Patient summary reviewed and Nursing notes reviewed   no history of anesthetic complications:  NPO Solid Status: > 8 hours  NPO Liquid Status: > 2 hours           Airway   Mallampati: II  TM distance: >3 FB  Neck ROM: full  No difficulty expected  Dental    (+) upper dentures    Pulmonary - negative pulmonary ROS and normal exam   Cardiovascular - normal exam  Exercise tolerance: poor (<4 METS)    ECG reviewed  PT is on anticoagulation therapy  Patient on routine beta blocker and Beta blocker given within 24 hours of surgery    (+) hypertension, CAD, cardiac stents Drug eluting stent angina, hyperlipidemia,     ROS comment: ECHO 6/26/20:  · Normal left ventricular cavity size and wall thickness noted.  · Left ventricular systolic function is normal. Estimated EF appears to be in the range of 61 - 65%.  · Left ventricular diastolic function is normal.  · Tip of anterior mitral leaflet is mildly thickened ,no vegetations noted.  · No significant valvular heart disease  · There is no evidence of pericardial effusion.        Neuro/Psych- negative ROS  (-) seizures, CVA  GI/Hepatic/Renal/Endo    (+)  GERD, PUD,    (-) liver disease, no renal disease, diabetes    ROS Comment: No h/o of esophageal pathology. Denies difficulty swallowing or h/o food impactions.     Musculoskeletal     Abdominal    Substance History      OB/GYN          Other   arthritis,                    Anesthesia Plan    ASA 4     general   ([place], CVL with [place], MAICOL)  intravenous induction     Anesthetic plan, all risks, benefits, and alternatives have been provided, discussed and informed consent has been obtained with: patient.

## 2020-07-17 NOTE — PROGRESS NOTES
Intensive Care Follow-up     Hospital:  LOS: 0 days   Ms. Veda Enamorado, 55 y.o. female is followed for:   Coronary artery disease involving native coronary artery of native heart with unstable angina pectoris (CMS/HCC)        Subjective   Interval History:  Veda Enamorado is a 55 y.o. female, current smoker, with past medical history of CAD, hypertension, hyperlipidemia, and tobacco abuse. She was admitted 7/11/20-7/13/20 from [facility] having initially experienced severe substernal chest pain which was relieved by 2 SL nitroglycerin. She remained chest pain free over the course of hospitalization and was discharged home. On 12/18/20 she underwent LHC which revealed a 30% distal left main stenosis, 80% LAD stenosis after the takeoff of the first diagonal branch, 90% in-stent restenosis of the diagonal branch, 70% distal LAD stenosis and 50 to 60% proximal first obtuse marginal stenosis. She was scheduled for outpatient CABG today with Dr. Coronado    She underwent a CABG x 3 and is admitted to the ICU on mechanical ventilation postoperatively.     The patient's past medical, surgical and social history were reviewed and updated in Epic as appropriate.        Objective     Infusions:    dexmedetomidine 0.2-1.5 mcg/kg/hr    dextrose 5 % with KCl 20 mEq 30 mL/hr Last Rate: 30 mL/hr (07/17/20 2148)   DOBUTamine 2-20 mcg/kg/min    DOPamine 2-20 mcg/kg/min    EPINEPHrine 0.02-0.3 mcg/kg/min    insulin 0-50 Units/hr Last Rate: 2 Units/hr (07/17/20 2210)   lactated ringers 9 mL/hr Last Rate: 9 mL/hr (07/17/20 1340)   niCARdipine 5-15 mg/hr    nitroglycerin 5-200 mcg/min    norepinephrine 0.02-0.3 mcg/kg/min    phenylephrine 0.5-3 mcg/kg/min    propofol 5-50 mcg/kg/min Last Rate: 35 mcg/kg/min (07/17/20 2110)   sodium chloride 30 mL/hr    vasopressin 0.02-0.1 Units/min      Medications:      Vital Sign Min/Max for last 24 hours  Temp  Min: 96.9 °F (36.1 °C)  Max: 97.6 °F (36.4 °C)   BP  Min: 102/82  Max: 166/76    Pulse  Min: 65  Max: 70   Resp  Min: 12  Max: 16   SpO2  Min: 99 %  Max: 100 %   No data recorded       Input/Output for last 24 hour shift  No intake/output data recorded.   FiO2 (%):  [80 %-100 %] 80 %  S RR:  [12] 12  PEEP/CPAP (cm H2O):  [5 cm H20] 5 cm H20  [place] SUP:  [0 cm H20] 0 cm H20  MAP (cm H2O):  [8.6-8.8] 8.8  Objective    Telemetry:  Normal sinus rhythm.    Constitutional:  No acute distress.  ETT in place on mechanical ventilation.    Eyes: No scleral icterus.   PERRL, EOM intact.    Neck:  Supple, FROM   Cardiovascular: Normal rate, regular and rhythm. Normal heart sounds.  No murmurs, gallop or rub.   Respiratory: No respiratory distress. Normal respiratory effort.  Normal breath sounds  Clear to ascultation.   Chest tubes in place with bloody drainage. No air leak.    Abdominal:  Soft. No masses. Non-tender. No distension. No HSM.   Extremities: No digital cyanosis. No clubbing.  No peripheral edema.   Skin: No rashes, lesions or ulcers   Neurological:   Sedated.            Results from last 7 days   Lab Units 07/17/20 2131 07/17/20 2013 07/17/20  1929  07/13/20  0520 07/12/20  0851   WBC 10*3/mm3 26.90*  --   --   --  6.99 8.12   HEMOGLOBIN g/dL 13.2  --   --   --  11.0* 11.7*   HEMOGLOBIN, POC g/dL  --  7.8* 9.5*   < >  --   --    PLATELETS 10*3/mm3 153  --   --   --  246 266    < > = values in this interval not displayed.     Results from last 7 days   Lab Units 07/17/20 2131 07/17/20  1247 07/13/20  0520 07/12/20  0851 07/11/20  0959   SODIUM mmol/L 135*  --  140 141 142   POTASSIUM mmol/L 3.9 3.5 3.6 4.0 3.2*   CO2 mmol/L 22.0  --  27.0 24.0 25.3   BUN mg/dL 7  --  11 7 6   CREATININE mg/dL 0.62  --  0.81 0.70 0.75   MAGNESIUM mg/dL 3.7*  --   --  2.4 2.5   PHOSPHORUS mg/dL 2.4*  --   --   --   --    GLUCOSE mg/dL 158*  --  92 96 68     Estimated Creatinine Clearance: 96.1 mL/min (by C-G formula based on SCr of 0.62 mg/dL).    Results from last 7 days   Lab Units 07/17/20  8607   PH,  ARTERIAL pH units 7.387   PCO2, ARTERIAL mm Hg 40.3   PO2 ART mm Hg 363.0*       Images:   Imaging Results (Last 24 Hours)     Procedure Component Value Units Date/Time    XR Chest 1 View [[id number]] Collected:  07/17/20 2208     Updated:  07/17/20 2210    Narrative:       CR Chest 1 Vw    INDICATION:   Evaluate lines and tubes after surgery     COMPARISON:    7/11/2020    FINDINGS:  Heart size normal. Lungs are clear. [place] catheter has its tip in the main pulmonary artery. Nasogastric tube tip is in the stomach. Endotracheal tube tip is 2 cm above the leatha. There are 2 right chest tubes and one left chest tube. portable AP  view(s) of the chest.        Impression:       Postoperative lines and tubes are in good position. Lungs are clear    Signer Name: Misael Denson MD   Signed: 7/17/2020 10:08 PM   Workstation Name: [email or web address]    [facility]          I reviewed the patient's results and images.     Assessment/Plan      Ms. Enamorado is a 54yo F with a history of hypertension and smoking who presented with CAD and underwent a CABG x 3 by Dr. Coronado on 7/17/20. She is admitted to the ICU postoperatively on mechanical ventilation.     Impression        Coronary artery disease involving native coronary artery of native heart with unstable angina pectoris (CMS/[facility])    Tobacco abuse    Hyperlipidemia    Essential hypertension       Plan        1. Monitor in the ICU  2. Continue mechanical ventilation and wean to extubation as tolerated.   3. Blood pressure with Cardene as needed  4. Am labs  5. Tobacco cessation education  6. Insulin drip.   7. Monitor chest tube output.       ZION Jacobo ACNP-BC  Pulmonology and Critical Care Medicine    I have personally seen, interviewed and examined the patient and verified all the key components of the history, physical examination, assessment and plan with ZION Ervin ACNP-BC and reviewed the note, which reflects my changes and  contributions.    Samantha Cheung,   Pulmonology and Critical Care Medicine

## 2020-07-17 NOTE — ANESTHESIA PROCEDURE NOTES
Airway  Urgency: elective    Date/Time: 7/17/2020 4:28 PM  Airway not difficult    General Information and Staff    Patient location during procedure: OR  Anesthesiologist: Pedro Paetl Jr., MD    Indications and Patient Condition  Indications for airway management: airway protection    Preoxygenated: yes  MILS not maintained throughout  Mask difficulty assessment: 0 - not attempted    Final Airway Details  Final airway type: endotracheal airway      Successful airway: ETT  Cuffed: yes   Successful intubation technique: direct laryngoscopy  Endotracheal tube insertion site: oral  Blade: [place]  Blade size: 3  ETT size (mm): 8.0  Cormack-Lehane Classification: grade I - full view of glottis  Placement verified by: chest auscultation and capnometry   Measured from: lips  ETT/EBT  to lips (cm): 22  Number of attempts at approach: 1  Assessment: lips, teeth, and gum same as pre-op and atraumatic intubation    Additional Comments  Negative epigastric sounds, Breath sound equal bilaterally with symmetric chest rise and fall

## 2020-07-17 NOTE — ANESTHESIA PROCEDURE NOTES
Central Line      Patient reassessed immediately prior to procedure    Patient location during procedure: OR  Start time: 7/17/2020 4:31 PM  Stop Time:7/17/2020 4:41 PM  Indications: vascular access  Staff  Anesthesiologist: Pedro Patel Jr., MD  Preanesthetic Checklist  Completed: patient identified, site marked, surgical consent, pre-op evaluation, timeout performed, IV checked, risks and benefits discussed and monitors and equipment checked  Central Line Prep  Sterile Tech:cap, gloves, gown, mask and sterile barriers  Prep: chloraprep  Patient monitoring: blood pressure monitoring, continuous pulse oximetry and EKG  Central Line Procedure  Laterality:right  Location:internal jugular  Catheter Type:double lumen  Catheter Size:9 Fr  Guidance:ultrasound guided  PROCEDURE NOTE/ULTRASOUND INTERPRETATION.  Using ultrasound guidance the potential vascular sites for insertion of the catheter were visualized to determine the patency of the vessel to be used for vascular access.  After selecting the appropriate site for insertion, the needle was visualized under ultrasound being inserted into the internal jugular vein, followed by ultrasound confirmation of wire and catheter placement. There were no abnormalities seen on ultrasound; an image was taken; and the patient tolerated the procedure with no complications. Images: still images obtained, printed/placed on chart  Assessment  Post procedure:biopatch applied, line sutured, occlusive dressing applied and secured with tape  Assessement:blood return through all ports, free fluid flow and chest x-ray ordered  Complications:no  Patient Tolerance:patient tolerated the procedure well with no apparent complications

## 2020-07-17 NOTE — INTERVAL H&P NOTE
Pre-Op H&P (See Recent Office Note Attached for Full H&P)    Chief complaint: Chest pain/SOA    7/17/20 preop interval update:  Presented to [facility] ED with CP and subsequently [facility] with admission 7/11/20-7/13/20.  Treated with NTG/Heparin gtt.  EKG/Troponins negative and following transfer, no further CP episodes.  Last CP this AM with NTG x 1 taken. Denies any CP at this time. Here today to undergo CABG with LISE/EVH for SVG    Review of Systems:  General ROS:  no fever, chills, rashes.  No change since last office visit.  No recent sick exposure  Cardiovascular ROS:+ chest pain or dyspnea on exertion  Respiratory ROS: no cough, +shortness of breath, or wheezing.  +smoker    Meds:    No current facility-administered medications on file prior to encounter.      Current Outpatient Medications on File Prior to Encounter   Medication Sig Dispense Refill   • aspirin 81 MG EC tablet Take 81 mg by mouth daily.     • atenolol (TENORMIN) 25 MG tablet Take 1 tablet by mouth Daily. (Patient taking differently: Take 25 mg by mouth Every Morning.) 30 tablet 6   • isosorbide mononitrate (IMDUR) 60 MG 24 hr tablet Take 1 tablet by mouth Daily. (Patient taking differently: Take 60 mg by mouth Every Morning.) 30 tablet 6   • nitroglycerin (NITROSTAT) 0.4 MG SL tablet 1 under the tongue as needed for angina, may repeat q5mins for up three doses (Patient taking differently: Place 0.4 mg under the tongue Every 5 (Five) Minutes As Needed for Chest Pain. 1 under the tongue as needed for angina, may repeat q5mins for up three doses) 100 tablet 3   • pravastatin (PRAVACHOL) 20 MG tablet Take 1 tablet by mouth Daily. (Patient taking differently: Take 20 mg by mouth Every Morning.) 30 tablet 6   • spironolactone (ALDACTONE) 25 MG tablet Take 1 tablet by mouth Daily. (Patient taking differently: Take 25 mg by mouth Every Morning.) 30 tablet 6   • traMADol (ULTRAM) 50 MG tablet Take 50 mg by mouth every 6 (six) hours as needed  "for moderate pain (4-6).     • Alirocumab (Praluent) 75 MG/ML solution auto-injector Inject 1 mL under the skin into the appropriate area as directed Every 14 (Fourteen) Days. 2 mL 5   • fluticasone-salmeterol (ADVAIR) 500-50 MCG/DOSE DISKUS Inhale 1 puff As Needed.     • prasugrel (EFFIENT) 10 MG tablet Take 1 tablet by mouth Daily. (Patient taking differently: Take 10 mg by mouth Daily. Did not stop for surgery) 30 tablet 6       Vital Signs:  /74 (BP Location: Left arm, Patient Position: Sitting)   Pulse 68   Temp 97.6 °F (36.4 °C) (Temporal)   Resp 16   Ht 157.5 cm (62\")   Wt 58.7 kg (129 lb 6.4 oz)   SpO2 99%   BMI 23.67 kg/m²     Physical Exam:    CV:  S1S2 regular rate and rhythm, no murmur               Resp:  Clear to auscultation; respirations regular, even and unlabored    Results Review:     Lab Results   Component Value Date    WBC 6.99 07/13/2020    HGB 11.0 (L) 07/13/2020    HCT 35.2 07/13/2020    MCV 96.7 07/13/2020     07/13/2020    NEUTROABS 2.73 07/13/2020    GLUCOSE 92 07/13/2020    BUN 11 07/13/2020    CREATININE 0.81 07/13/2020    EGFRIFNONA 74 07/13/2020     07/13/2020    K 3.6 07/13/2020     07/13/2020    CO2 27.0 07/13/2020    MG 2.4 07/12/2020    CALCIUM 8.7 07/13/2020    ALBUMIN 3.90 07/12/2020    AST 11 07/12/2020    ALT <5 07/12/2020    BILITOT 0.3 07/12/2020    PTT 75.7 07/12/2020        I reviewed the patient's new clinical results.   TTE:  6/26/20:  Normal left ventricular cavity size and wall thickness noted.  · Left ventricular systolic function is normal. Estimated EF appears to be in the range of 61 - 65%.  · Left ventricular diastolic function is normal.  · Tip of anterior mitral leaflet is mildly thickened ,no vegetations noted.  · No significant valvular heart disease          There is no evidence of pericardial effusion.  6/17/20 Carotid US, bilateral:  No evidence of hemodynamically significant plaques or stenosis within  the carotid system at " this time.  7/5/20 PFT:  FVC 89; FEV1 96%; FEV1/FVC 84.20    Cancer Staging (if applicable)  Cancer Patient: __ yes _x_no __unknown; If yes, clinical stage T:__ N:__M:__, stage group or __N/A    Assessment/Plan:    55-year-old  female with a history of hypertension, hyperlipidemia, coronary artery disease status post stenting and extensive family history of coronary artery disease who presents with left arm pain.  The patient has bifurcating disease in the LAD and diagonal branch.  I discussed this case with Dr. Marcus who felt that the patient was not amenable to percutaneous coronary intervention.  The patient is a reasonable candidate for coronary artery bypass grafting.  The risks and benefits of surgery were discussed with the patient including pain, bleeding, infection, renal failure, stroke and death.  She understood these risks and wished to proceed with surgery.     Sherry Harden, APRN  7/17/2020   13:36

## 2020-07-17 NOTE — ANESTHESIA PROCEDURE NOTES
Procedure Performed: Emergent/Open-Heart Anesthesia MAICOL      Start Time:  7/17/2020 4:44 PM       End Time:      Preanesthesia Checklist:  Patient identified, IV assessed, risks and benefits discussed, monitors and equipment assessed, procedure being performed at surgeon's request and anesthesia consent obtained.    General Procedure Information  Diagnostic Indications for Echo:  assessment of surgical repair  Physician Requesting Echo: Juanjo Coronado MD  Location performed:  OR  Intubated  Bite block not placed  Heart visualized  Probe Insertion:  Easy  Probe Type:  Multiplane  Modalities:  2D only, color flow mapping, continuous wave Doppler and pulse wave Doppler    Echocardiographic and Doppler Measurements    Ventricles    Right Ventricle:  Cavity size normal.  Hypertrophy not present.  Thrombus not present.  Global function normal.    Left Ventricle:  Cavity size normal.  Hypertrophy not present.  Thrombus not present.  Global Function normal.      Ventricular Regional Function:  1- Basal Anteroseptal:  normal  2- Basal Anterior:  normal  3- Basal Anterolateral:  normal  4- Basal Inferolateral:  normal  5- Basal Inferior:  normal  6- Basal Inferoseptal:  normal  7- Mid Anteroseptal:  normal  8- Mid Anterior:  normal  9- Mid Anterolateral:  normal  10- Mid Inferolateral:  normal  11- Mid Inferior:  normal  12- Mid Inferoseptal:  normal  13- Apical Anterior:  normal  14- Apical Lateral:  normal  15- Apical Inferior:  normal  16- Apical Septal:  normal  17- Dale:  normal      Valves    Aortic Valve:  Annulus normal.  Stenosis not present.  Regurgitation absent.  Leaflets normal.  Leaflet motions normal.      Mitral Valve:  Annulus normal.  Stenosis not present.  Regurgitation trace.  Leaflets normal.  Leaflet motions normal.      Tricuspid Valve:  Annulus normal.  Stenosis not present.  Regurgitation trace.  Leaflets normal.  Leaflet motions normal.    Pulmonic Valve:  Annulus normal.  Stenosis not present.   Regurgitation trace.          Aorta    Ascending Aorta:  Size normal.  Dissection not present.  Plaque thickness less than 3 mm.  Mobile plaque not present.    Aortic Arch:  Size normal.  Dissection not present.  Plaque thickness less than 3 mm.  Mobile plaque not present.    Descending Aorta:  Size normal.  Dissection not present.  Plaque thickness less than 3 mm.  Mobile plaque not present.          Atria    Right Atrium:  Size normal.  Spontaneous echo contrast not present.  Thrombus not present.  Tumor not present.    Left Atrium:  Size normal.  Spontaneous echo contrast not present.  Thrombus not present.  Tumor not present.  Left atrial appendage normal.      Septa    Atrial Septum:  Intra-atrial septal morphology normal.      Ventricular Septum:  Intra-ventricular septum morphology normal.          Other Findings  Pericardium:  normal  Pleural Effusion:  none  Pulmonary Arteries:  normal  Pulmonary Venous Flow:  normal    Anesthesia Information  Performed Personally      Echocardiogram Comments:       Diagnostic intraoperative MAICOL performed for CABG.  Surgeon - Dr. Coronado    Baseline Exam:  - LV is normal in size and function with no regional wall motion abnormalities. The LVEF is >60%.  - RV is normal in size with preserved systolic function.  - There are no significant valvular abnormalities.    Post-procedure Exam:  - Interval 3 vessel CABG performed on CPB.  - No inotropic support.  - Biventricular function is stable post-CPB without any new regional wall motion abnormalities.  - No new valvular abnormalities.  - No evidence of dissection in the visualized portions of the aorta.

## 2020-07-18 ENCOUNTER — APPOINTMENT (OUTPATIENT)
Dept: GENERAL RADIOLOGY | Facility: HOSPITAL | Age: 55
End: 2020-07-18

## 2020-07-18 LAB
ALBUMIN SERPL-MCNC: 3.9 G/DL (ref 3.5–5.2)
ALBUMIN SERPL-MCNC: 4.1 G/DL (ref 3.5–5.2)
ANION GAP SERPL CALCULATED.3IONS-SCNC: 10 MMOL/L (ref 5–15)
ANION GAP SERPL CALCULATED.3IONS-SCNC: 9 MMOL/L (ref 5–15)
ARTERIAL PATENCY WRIST A: ABNORMAL
ARTERIAL PATENCY WRIST A: ABNORMAL
ATMOSPHERIC PRESS: ABNORMAL MM[HG]
ATMOSPHERIC PRESS: ABNORMAL MM[HG]
BASE EXCESS BLDA CALC-SCNC: -0.3 MMOL/L (ref 0–2)
BASE EXCESS BLDA CALC-SCNC: -0.4 MMOL/L (ref 0–2)
BDY SITE: ABNORMAL
BDY SITE: ABNORMAL
BODY TEMPERATURE: 37 C
BODY TEMPERATURE: 37 C
BUN SERPL-MCNC: 12 MG/DL (ref 6–20)
BUN SERPL-MCNC: 8 MG/DL (ref 6–20)
BUN/CREAT SERPL: 11.9 (ref 7–25)
BUN/CREAT SERPL: 15.4 (ref 7–25)
CALCIUM SPEC-SCNC: 8.2 MG/DL (ref 8.6–10.5)
CALCIUM SPEC-SCNC: 8.7 MG/DL (ref 8.6–10.5)
CHLORIDE SERPL-SCNC: 107 MMOL/L (ref 98–107)
CHLORIDE SERPL-SCNC: 110 MMOL/L (ref 98–107)
CO2 BLDA-SCNC: 25.2 MMOL/L (ref 22–33)
CO2 BLDA-SCNC: 25.4 MMOL/L (ref 22–33)
CO2 SERPL-SCNC: 23 MMOL/L (ref 22–29)
CO2 SERPL-SCNC: 25 MMOL/L (ref 22–29)
COHGB MFR BLD: 0.6 % (ref 0–2)
COHGB MFR BLD: 0.7 % (ref 0–2)
CREAT SERPL-MCNC: 0.67 MG/DL (ref 0.57–1)
CREAT SERPL-MCNC: 0.78 MG/DL (ref 0.57–1)
DEPRECATED RDW RBC AUTO: 51.7 FL (ref 37–54)
ERYTHROCYTE [DISTWIDTH] IN BLOOD BY AUTOMATED COUNT: 14.6 % (ref 12.3–15.4)
GFR SERPL CREATININE-BSD FRML MDRD: 77 ML/MIN/1.73
GFR SERPL CREATININE-BSD FRML MDRD: 92 ML/MIN/1.73
GLUCOSE BLDC GLUCOMTR-MCNC: 108 MG/DL (ref 70–130)
GLUCOSE BLDC GLUCOMTR-MCNC: 109 MG/DL (ref 70–130)
GLUCOSE BLDC GLUCOMTR-MCNC: 110 MG/DL (ref 70–130)
GLUCOSE BLDC GLUCOMTR-MCNC: 111 MG/DL (ref 70–130)
GLUCOSE BLDC GLUCOMTR-MCNC: 118 MG/DL (ref 70–130)
GLUCOSE BLDC GLUCOMTR-MCNC: 118 MG/DL (ref 70–130)
GLUCOSE BLDC GLUCOMTR-MCNC: 119 MG/DL (ref 70–130)
GLUCOSE BLDC GLUCOMTR-MCNC: 126 MG/DL (ref 70–130)
GLUCOSE BLDC GLUCOMTR-MCNC: 131 MG/DL (ref 70–130)
GLUCOSE BLDC GLUCOMTR-MCNC: 132 MG/DL (ref 70–130)
GLUCOSE BLDC GLUCOMTR-MCNC: 137 MG/DL (ref 70–130)
GLUCOSE BLDC GLUCOMTR-MCNC: 143 MG/DL (ref 70–130)
GLUCOSE BLDC GLUCOMTR-MCNC: 148 MG/DL (ref 70–130)
GLUCOSE BLDC GLUCOMTR-MCNC: 148 MG/DL (ref 70–130)
GLUCOSE BLDC GLUCOMTR-MCNC: 155 MG/DL (ref 70–130)
GLUCOSE BLDC GLUCOMTR-MCNC: 95 MG/DL (ref 70–130)
GLUCOSE SERPL-MCNC: 130 MG/DL (ref 65–99)
GLUCOSE SERPL-MCNC: 141 MG/DL (ref 65–99)
HCO3 BLDA-SCNC: 24.1 MMOL/L (ref 20–26)
HCO3 BLDA-SCNC: 24.2 MMOL/L (ref 20–26)
HCT VFR BLD AUTO: 35.2 % (ref 34–46.6)
HCT VFR BLD CALC: 35.3 %
HCT VFR BLD CALC: 37.3 %
HGB BLD-MCNC: 11.3 G/DL (ref 12–15.9)
HGB BLDA-MCNC: 11.5 G/DL (ref 14–18)
HGB BLDA-MCNC: 12.2 G/DL (ref 14–18)
INHALED O2 CONCENTRATION: 40 %
INHALED O2 CONCENTRATION: 60 %
INR PPP: 1.39 (ref 0.85–1.16)
MAGNESIUM SERPL-MCNC: 3.1 MG/DL (ref 1.6–2.6)
MCH RBC QN AUTO: 31 PG (ref 26.6–33)
MCHC RBC AUTO-ENTMCNC: 32.1 G/DL (ref 31.5–35.7)
MCV RBC AUTO: 96.4 FL (ref 79–97)
METHGB BLD QL: 1.1 % (ref 0–1.5)
METHGB BLD QL: 1.2 % (ref 0–1.5)
MODALITY: ABNORMAL
MODALITY: ABNORMAL
NOTE: ABNORMAL
NOTE: ABNORMAL
OXYHGB MFR BLDV: 97.2 % (ref 94–99)
OXYHGB MFR BLDV: 98.1 % (ref 94–99)
PCO2 BLDA: 37.5 MM HG (ref 35–45)
PCO2 BLDA: 38.9 MM HG (ref 35–45)
PCO2 TEMP ADJ BLD: 37.5 MM HG (ref 35–45)
PCO2 TEMP ADJ BLD: 38.9 MM HG (ref 35–45)
PEEP RESPIRATORY: 5 CM[H2O]
PEEP RESPIRATORY: 5 CM[H2O]
PH BLDA: 7.4 PH UNITS (ref 7.35–7.45)
PH BLDA: 7.42 PH UNITS (ref 7.35–7.45)
PH, TEMP CORRECTED: 7.4 PH UNITS
PH, TEMP CORRECTED: 7.42 PH UNITS
PHOSPHATE SERPL-MCNC: 2 MG/DL (ref 2.5–4.5)
PHOSPHATE SERPL-MCNC: 4.5 MG/DL (ref 2.5–4.5)
PLATELET # BLD AUTO: 153 10*3/MM3 (ref 140–450)
PMV BLD AUTO: 10.2 FL (ref 6–12)
PO2 BLDA: 123 MM HG (ref 83–108)
PO2 BLDA: 218 MM HG (ref 83–108)
PO2 TEMP ADJ BLD: 123 MM HG (ref 83–108)
PO2 TEMP ADJ BLD: 218 MM HG (ref 83–108)
POTASSIUM SERPL-SCNC: 3.5 MMOL/L (ref 3.5–5.2)
POTASSIUM SERPL-SCNC: 4.6 MMOL/L (ref 3.5–5.2)
PROTHROMBIN TIME: 16.7 SECONDS (ref 11.5–14)
RBC # BLD AUTO: 3.65 10*6/MM3 (ref 3.77–5.28)
SET MECH RESP RATE: 17
SODIUM SERPL-SCNC: 141 MMOL/L (ref 136–145)
SODIUM SERPL-SCNC: 143 MMOL/L (ref 136–145)
TOTAL RATE: 17 BREATHS/MINUTE
TOTAL RATE: 23 BREATHS/MINUTE
VENTILATOR MODE: ABNORMAL
VENTILATOR MODE: ABNORMAL
VT ON VENT VENT: 500 ML
VT ON VENT VENT: 500 ML
WBC # BLD AUTO: 20.33 10*3/MM3 (ref 3.4–10.8)

## 2020-07-18 PROCEDURE — 80069 RENAL FUNCTION PANEL: CPT | Performed by: PHYSICIAN ASSISTANT

## 2020-07-18 PROCEDURE — 25010000003 CEFUROXIME SODIUM 1.5 G RECONSTITUTED SOLUTION: Performed by: PHYSICIAN ASSISTANT

## 2020-07-18 PROCEDURE — 85027 COMPLETE CBC AUTOMATED: CPT | Performed by: PHYSICIAN ASSISTANT

## 2020-07-18 PROCEDURE — 85610 PROTHROMBIN TIME: CPT | Performed by: PHYSICIAN ASSISTANT

## 2020-07-18 PROCEDURE — 99024 POSTOP FOLLOW-UP VISIT: CPT | Performed by: THORACIC SURGERY (CARDIOTHORACIC VASCULAR SURGERY)

## 2020-07-18 PROCEDURE — 83735 ASSAY OF MAGNESIUM: CPT | Performed by: PHYSICIAN ASSISTANT

## 2020-07-18 PROCEDURE — 99232 SBSQ HOSP IP/OBS MODERATE 35: CPT | Performed by: INTERNAL MEDICINE

## 2020-07-18 PROCEDURE — 94799 UNLISTED PULMONARY SVC/PX: CPT

## 2020-07-18 PROCEDURE — 93010 ELECTROCARDIOGRAM REPORT: CPT | Performed by: INTERNAL MEDICINE

## 2020-07-18 PROCEDURE — 97162 PT EVAL MOD COMPLEX 30 MIN: CPT

## 2020-07-18 PROCEDURE — 97116 GAIT TRAINING THERAPY: CPT

## 2020-07-18 PROCEDURE — 94640 AIRWAY INHALATION TREATMENT: CPT

## 2020-07-18 PROCEDURE — 25010000002 ONDANSETRON PER 1 MG: Performed by: PHYSICIAN ASSISTANT

## 2020-07-18 PROCEDURE — 25010000002 FENTANYL CITRATE (PF) 100 MCG/2ML SOLUTION: Performed by: PHYSICIAN ASSISTANT

## 2020-07-18 PROCEDURE — 25010000002 MORPHINE PER 10 MG: Performed by: PHYSICIAN ASSISTANT

## 2020-07-18 PROCEDURE — 80069 RENAL FUNCTION PANEL: CPT | Performed by: THORACIC SURGERY (CARDIOTHORACIC VASCULAR SURGERY)

## 2020-07-18 PROCEDURE — 93005 ELECTROCARDIOGRAM TRACING: CPT | Performed by: PHYSICIAN ASSISTANT

## 2020-07-18 PROCEDURE — 82805 BLOOD GASES W/O2 SATURATION: CPT

## 2020-07-18 PROCEDURE — 25010000003 POTASSIUM CHLORIDE PER 2 MEQ: Performed by: PHYSICIAN ASSISTANT

## 2020-07-18 PROCEDURE — 25010000002 KETOROLAC TROMETHAMINE PER 15 MG: Performed by: NURSE PRACTITIONER

## 2020-07-18 PROCEDURE — 71045 X-RAY EXAM CHEST 1 VIEW: CPT

## 2020-07-18 PROCEDURE — 82962 GLUCOSE BLOOD TEST: CPT

## 2020-07-18 PROCEDURE — 94003 VENT MGMT INPAT SUBQ DAY: CPT

## 2020-07-18 RX ORDER — KETOROLAC TROMETHAMINE 30 MG/ML
30 INJECTION, SOLUTION INTRAMUSCULAR; INTRAVENOUS ONCE AS NEEDED
Status: DISCONTINUED | OUTPATIENT
Start: 2020-07-18 | End: 2020-07-18

## 2020-07-18 RX ORDER — NICOTINE POLACRILEX 4 MG
15 LOZENGE BUCCAL
Status: DISCONTINUED | OUTPATIENT
Start: 2020-07-18 | End: 2020-07-21 | Stop reason: HOSPADM

## 2020-07-18 RX ORDER — KETOROLAC TROMETHAMINE 30 MG/ML
30 INJECTION, SOLUTION INTRAMUSCULAR; INTRAVENOUS ONCE AS NEEDED
Status: COMPLETED | OUTPATIENT
Start: 2020-07-18 | End: 2020-07-18

## 2020-07-18 RX ORDER — DEXTROSE MONOHYDRATE 25 G/50ML
25 INJECTION, SOLUTION INTRAVENOUS
Status: DISCONTINUED | OUTPATIENT
Start: 2020-07-18 | End: 2020-07-21 | Stop reason: HOSPADM

## 2020-07-18 RX ADMIN — CEFUROXIME SODIUM 1.5 G: 1.5 INJECTION, POWDER, FOR SOLUTION INTRAVENOUS at 01:08

## 2020-07-18 RX ADMIN — OXYCODONE HYDROCHLORIDE AND ACETAMINOPHEN 2 TABLET: 5; 325 TABLET ORAL at 16:24

## 2020-07-18 RX ADMIN — KETOROLAC TROMETHAMINE 30 MG: 30 INJECTION, SOLUTION INTRAMUSCULAR at 05:06

## 2020-07-18 RX ADMIN — MORPHINE SULFATE 2 MG: 2 INJECTION, SOLUTION INTRAMUSCULAR; INTRAVENOUS at 12:34

## 2020-07-18 RX ADMIN — ONDANSETRON 4 MG: 2 INJECTION INTRAMUSCULAR; INTRAVENOUS at 08:13

## 2020-07-18 RX ADMIN — DOCUSATE SODIUM 50MG AND SENNOSIDES 8.6MG 2 TABLET: 8.6; 5 TABLET, FILM COATED ORAL at 08:09

## 2020-07-18 RX ADMIN — OXYCODONE HYDROCHLORIDE AND ACETAMINOPHEN 2 TABLET: 5; 325 TABLET ORAL at 04:33

## 2020-07-18 RX ADMIN — POTASSIUM PHOSPHATE, MONOBASIC AND POTASSIUM PHOSPHATE, DIBASIC 15 MMOL: 224; 236 INJECTION, SOLUTION, CONCENTRATE INTRAVENOUS at 04:19

## 2020-07-18 RX ADMIN — DOCUSATE SODIUM 50MG AND SENNOSIDES 8.6MG 2 TABLET: 8.6; 5 TABLET, FILM COATED ORAL at 20:23

## 2020-07-18 RX ADMIN — MORPHINE SULFATE 2 MG: 2 INJECTION, SOLUTION INTRAMUSCULAR; INTRAVENOUS at 22:10

## 2020-07-18 RX ADMIN — FENTANYL CITRATE 25 MCG: 0.05 INJECTION, SOLUTION INTRAMUSCULAR; INTRAVENOUS at 00:00

## 2020-07-18 RX ADMIN — OXYCODONE HYDROCHLORIDE AND ACETAMINOPHEN 2 TABLET: 5; 325 TABLET ORAL at 12:34

## 2020-07-18 RX ADMIN — ASPIRIN 325 MG: 325 TABLET, COATED ORAL at 08:09

## 2020-07-18 RX ADMIN — CEFUROXIME SODIUM 1.5 G: 1.5 INJECTION, POWDER, FOR SOLUTION INTRAVENOUS at 10:19

## 2020-07-18 RX ADMIN — BUDESONIDE AND FORMOTEROL FUMARATE DIHYDRATE 2 PUFF: 160; 4.5 AEROSOL RESPIRATORY (INHALATION) at 21:29

## 2020-07-18 RX ADMIN — BUDESONIDE AND FORMOTEROL FUMARATE DIHYDRATE 2 PUFF: 160; 4.5 AEROSOL RESPIRATORY (INHALATION) at 09:03

## 2020-07-18 RX ADMIN — MORPHINE SULFATE 2 MG: 2 INJECTION, SOLUTION INTRAMUSCULAR; INTRAVENOUS at 07:44

## 2020-07-18 RX ADMIN — DEXMEDETOMIDINE HYDROCHLORIDE 0.2 MCG/KG/HR: 400 INJECTION INTRAVENOUS at 00:00

## 2020-07-18 RX ADMIN — Medication 0.02 MCG/KG/MIN: at 09:22

## 2020-07-18 RX ADMIN — HYDROCODONE BITARTRATE AND ACETAMINOPHEN 1 TABLET: 7.5; 325 TABLET ORAL at 10:19

## 2020-07-18 RX ADMIN — ATORVASTATIN CALCIUM 40 MG: 40 TABLET, FILM COATED ORAL at 20:23

## 2020-07-18 RX ADMIN — HYDROCODONE BITARTRATE AND ACETAMINOPHEN 1 TABLET: 7.5; 325 TABLET ORAL at 20:23

## 2020-07-18 RX ADMIN — CEFUROXIME SODIUM 1.5 G: 1.5 INJECTION, POWDER, FOR SOLUTION INTRAVENOUS at 18:57

## 2020-07-18 RX ADMIN — METOPROLOL TARTRATE 12.5 MG: 25 TABLET, FILM COATED ORAL at 20:23

## 2020-07-18 RX ADMIN — MORPHINE SULFATE 2 MG: 2 INJECTION, SOLUTION INTRAMUSCULAR; INTRAVENOUS at 10:31

## 2020-07-18 RX ADMIN — MORPHINE SULFATE 2 MG: 2 INJECTION, SOLUTION INTRAMUSCULAR; INTRAVENOUS at 01:18

## 2020-07-18 RX ADMIN — POTASSIUM CHLORIDE 20 MEQ: 29.8 INJECTION, SOLUTION INTRAVENOUS at 02:25

## 2020-07-18 NOTE — PAYOR COMM NOTE
"Talita Guzman RN  Utilization Review  P: [phone]  F: [phone]    Ref # [id number]  Op note & clinicals attached    Dolly Murry (55 y.o. Female)     Date of Birth Social Security Number Address Home Phone MRN    1965  [address] [place] [address] [phone] [id number]    [employer] Marital Status          Other        Admission Date Admission Type Admitting Provider Attending Provider Department, Room/Bed    7/17/20 Elective Juanjo Coronado MD Chaney, John H, MD [facility] 2HSIC, S259/1    Discharge Date Discharge Disposition Discharge Destination                       Attending Provider:  Juanjo Coronado MD    Allergies:  Bactrim [Sulfamethoxazole-trimethoprim], Ciprofloxacin, Ranexa [Ranolazine Er]    Isolation:  None   Infection:  MRSA (01/10/20)   Code Status:  CPR    Ht:  157.5 cm (62.01\")   Wt:  58.7 kg (129 lb 6.4 oz)    Admission Cmt:  None   Principal Problem:  Coronary artery disease involving native coronary artery of native heart with unstable angina pectoris (CMS/[facility]) [I25.110]                 Active Insurance as of 7/17/2020     Primary Coverage     Payor Plan Insurance Group Employer/Plan Group    [employer] [place] [employer] [place]ICA[place]      Payor Plan Address Payor Plan Phone Number Payor Plan Fax Number Effective Dates    [address] [address] [phone]  9/1/2016 - None Entered    [facility] [id number]       Subscriber Name Subscriber Birth Date Member ID       DOLLY MURRY 1965 [id number]                 Emergency Contacts      (Rel.) Home Phone Work Phone Mobile Phone    Ramesh Murry (Spouse) [phone] -- --    Travis Murry (Son) -- -- [phone]               History & Physical      Sherry Harden APRN at 07/17/20 1336     Attestation signed by Juanjo Coronado MD at 07/17/20 1707    Agree with above.  No interval change.  Plan for CABG.                    Pre-Op H&P (See Recent Office Note Attached for Full " H&P)    Chief complaint: Chest pain/SOA    7/17/20 preop interval update:  Presented to [facility] ED with CP and subsequently transferred to [facility] with admission 7/11/20-7/13/20.  Treated with NTG/Heparin gtt.  EKG/Troponins negative and following transfer, no further CP episodes.  Last CP this AM with NTG x 1 taken. Denies any CP at this time. Here today to undergo CABG with LISE/EVH for SVG    Review of Systems:  General ROS:  no fever, chills, rashes.  No change since last office visit.  No recent sick exposure  Cardiovascular ROS:+ chest pain or dyspnea on exertion  Respiratory ROS: no cough, +shortness of breath, or wheezing.  +smoker    Meds:    No current facility-administered medications on file prior to encounter.      Current Outpatient Medications on File Prior to Encounter   Medication Sig Dispense Refill   • aspirin 81 MG EC tablet Take 81 mg by mouth daily.     • atenolol (TENORMIN) 25 MG tablet Take 1 tablet by mouth Daily. (Patient taking differently: Take 25 mg by mouth Every Morning.) 30 tablet 6   • isosorbide mononitrate (IMDUR) 60 MG 24 hr tablet Take 1 tablet by mouth Daily. (Patient taking differently: Take 60 mg by mouth Every Morning.) 30 tablet 6   • nitroglycerin (NITROSTAT) 0.4 MG SL tablet 1 under the tongue as needed for angina, may repeat q5mins for up three doses (Patient taking differently: Place 0.4 mg under the tongue Every 5 (Five) Minutes As Needed for Chest Pain. 1 under the tongue as needed for angina, may repeat q5mins for up three doses) 100 tablet 3   • pravastatin (PRAVACHOL) 20 MG tablet Take 1 tablet by mouth Daily. (Patient taking differently: Take 20 mg by mouth Every Morning.) 30 tablet 6   • spironolactone (ALDACTONE) 25 MG tablet Take 1 tablet by mouth Daily. (Patient taking differently: Take 25 mg by mouth Every Morning.) 30 tablet 6   • traMADol (ULTRAM) 50 MG tablet Take 50 mg by mouth every 6 (six) hours as needed for moderate pain (4-6).     • Alirocumab (Praluent)  "75 MG/ML solution auto-injector Inject 1 mL under the skin into the appropriate area as directed Every 14 (Fourteen) Days. 2 mL 5   • fluticasone-salmeterol (ADVAIR) 500-50 MCG/DOSE DISKUS Inhale 1 puff As Needed.     • prasugrel (EFFIENT) 10 MG tablet Take 1 tablet by mouth Daily. (Patient taking differently: Take 10 mg by mouth Daily. Did not stop for surgery) 30 tablet 6       Vital Signs:  /74 (BP Location: Left arm, Patient Position: Sitting)   Pulse 68   Temp 97.6 °F (36.4 °C) (Temporal)   Resp 16   Ht 157.5 cm (62\")   Wt 58.7 kg (129 lb 6.4 oz)   SpO2 99%   BMI 23.67 kg/m²      Physical Exam:    CV:  S1S2 regular rate and rhythm, no murmur               Resp:  Clear to auscultation; respirations regular, even and unlabored    Results Review:     Lab Results   Component Value Date    WBC 6.99 07/13/2020    HGB 11.0 (L) 07/13/2020    HCT 35.2 07/13/2020    MCV 96.7 07/13/2020     07/13/2020    NEUTROABS 2.73 07/13/2020    GLUCOSE 92 07/13/2020    BUN 11 07/13/2020    CREATININE 0.81 07/13/2020    EGFRIFNONA 74 07/13/2020     07/13/2020    K 3.6 07/13/2020     07/13/2020    CO2 27.0 07/13/2020    MG 2.4 07/12/2020    CALCIUM 8.7 07/13/2020    ALBUMIN 3.90 07/12/2020    AST 11 07/12/2020    ALT <5 07/12/2020    BILITOT 0.3 07/12/2020    PTT 75.7 07/12/2020        I reviewed the patient's new clinical results.   TTE:  6/26/20:  Normal left ventricular cavity size and wall thickness noted.  · Left ventricular systolic function is normal. Estimated EF appears to be in the range of 61 - 65%.  · Left ventricular diastolic function is normal.  · Tip of anterior mitral leaflet is mildly thickened ,no vegetations noted.  · No significant valvular heart disease          There is no evidence of pericardial effusion.  6/17/20 Carotid US, bilateral:  No evidence of hemodynamically significant plaques or stenosis within  the carotid system at this time.  7/5/20 PFT:  FVC 89; FEV1 96%; FEV1/FVC " 84.20    Cancer Staging (if applicable)  Cancer Patient: __ yes _x_no __unknown; If yes, clinical stage T:__ N:__M:__, stage group or __N/A    Assessment/Plan:    55-year-old  female with a history of hypertension, hyperlipidemia, coronary artery disease status post stenting and extensive family history of coronary artery disease who presents with left arm pain.  The patient has bifurcating disease in the LAD and diagonal branch.  I discussed this case with Dr. Marcus who felt that the patient was not amenable to percutaneous coronary intervention.  The patient is a reasonable candidate for coronary artery bypass grafting.  The risks and benefits of surgery were discussed with the patient including pain, bleeding, infection, renal failure, stroke and death.  She understood these risks and wished to proceed with surgery.     Sherry Harden, APRN  7/17/2020   13:36      Electronically signed by Juanjo Coronado MD at 07/17/20 7190   Source Note          06/17/2020  Patient Information  Veda Enamorado                                                                                          [address] [facility] [address]   1965  'PCP/Referring Physician'  Chiquita Choudhary, APRN  [phone]  No ref. provider found    Chief Complaint   Patient presents with   • Consult     N/P per Dr. Chang for CAD. Pt states that she has been having left arm pain that radiates into the shoulder. complain of head and neck pain. Pt is also very fatigued.        History of Present Illness: 55-year-old  female with a history of hypertension, hyperlipidemia, coronary artery disease status post stenting and extensive family history of coronary artery disease who presents with left arm pain.  The patient notes approximately 3 to 4 days of significant left arm pain.  This happens multiple times a day.  She denies any chest pain, vomiting or diaphoresis.  The patient does have some nausea that she attributes to  "gastroesophageal reflux.  The patient does feel tired \"all the time.\"      Patient Active Problem List   Diagnosis   • Coronary artery disease involving native coronary artery of native heart with unstable angina pectoris (CMS/HCC)   • Tobacco abuse   • Hyperlipidemia   • Essential hypertension   • Visit for wound check   • Mixed hyperlipidemia   • Cellulitis of labia   • Labial abscess   • MRSA (methicillin resistant staph aureus) culture positive     Past Medical History:   Diagnosis Date   • Arthritis    • Back ache    • Coronary artery disease    • GERD (gastroesophageal reflux disease)    • Hyperlipidemia    • Hypertension    • Peptic ulceration      Past Surgical History:   Procedure Laterality Date   • CARDIAC CATHETERIZATION     • CARDIAC CATHETERIZATION N/A 2019    Procedure: Left Heart Cath;  Surgeon: Lazaro Conway MD;  Location: [facility] CATH INVASIVE LOCATION;  Service: Cardiology   • CARDIAC CATHETERIZATION     •  SECTION      x2   • HAND SURGERY      right   • PERINEAL LESION/CYST EXCISION Right 2020    Procedure: I&D ABSCESS;  Surgeon: Leander Cardenas III, MD;  Location: [facility] OR;  Service: Obstetrics/Gynecology       Current Outpatient Medications:   •  Alirocumab (Praluent) 75 MG/ML solution auto-injector, Inject 1 mL under the skin into the appropriate area as directed Every 14 (Fourteen) Days., Disp: 2 mL, Rfl: 5  •  aspirin 81 MG EC tablet, Take 81 mg by mouth daily., Disp: , Rfl:   •  atenolol (TENORMIN) 25 MG tablet, Take 1 tablet by mouth Daily., Disp: 30 tablet, Rfl: 6  •  ferrous sulfate 325 (65 FE) MG tablet, Take 1 tablet by mouth Daily With Breakfast., Disp: 60 tablet, Rfl: 1  •  fluticasone-salmeterol (ADVAIR) 500-50 MCG/DOSE DISKUS, Inhale 1 puff 2 (Two) Times a Day., Disp: , Rfl:   •  isosorbide mononitrate (IMDUR) 60 MG 24 hr tablet, Take 1 tablet by mouth Daily., Disp: 30 tablet, Rfl: 6  •  nitroglycerin (NITROSTAT) 0.4 MG SL tablet, 1 under the tongue " as needed for angina, may repeat q5mins for up three doses (Patient taking differently: Place 0.4 mg under the tongue Every 5 (Five) Minutes As Needed for Chest Pain. 1 under the tongue as needed for angina, may repeat q5mins for up three doses), Disp: 100 tablet, Rfl: 3  •  pantoprazole (PROTONIX) 20 MG EC tablet, Take 20 mg by mouth Daily., Disp: , Rfl:   •  prasugrel (EFFIENT) 10 MG tablet, Take 1 tablet by mouth Daily., Disp: 30 tablet, Rfl: 6  •  pravastatin (PRAVACHOL) 20 MG tablet, Take 1 tablet by mouth Daily., Disp: 30 tablet, Rfl: 6  •  spironolactone (ALDACTONE) 25 MG tablet, Take 1 tablet by mouth Daily., Disp: 30 tablet, Rfl: 6  •  traMADol (ULTRAM) 50 MG tablet, Take 50 mg by mouth every 6 (six) hours as needed for moderate pain (4-6)., Disp: , Rfl:   •  ranolazine (RANEXA) 500 MG 12 hr tablet, Take 1 tablet by mouth 2 (Two) Times a Day., Disp: 60 tablet, Rfl: 5  Allergies   Allergen Reactions   • Bactrim [Sulfamethoxazole-Trimethoprim] Rash   • Ciprofloxacin Other (See Comments)     tremors     Social History     Socioeconomic History   • Marital status:      Spouse name: Not on file   • Number of children: 2   • Years of education: Not on file   • Highest education level: Not on file   Occupational History   • Occupation: Home [employer] Home [employer]     Comment: disability   Tobacco Use   • Smoking status: Current Every Day Smoker     Packs/day: 0.25     Years: 25.00     Pack years: 6.25     Types: Cigarettes, Electronic Cigarette   • Smokeless tobacco: Never Used   • Tobacco comment: Pt states only smoking a few cigs a day, transitioning to E-Cig.   Substance and Sexual Activity   • Alcohol use: No   • Drug use: No   • Sexual activity: Defer     Family History   Problem Relation Age of Onset   • Heart disease Mother    • Heart disease Father    • Heart attack Father    • No Known Problems Sister    • Heart attack Brother    • Heart disease Brother    • Breast cancer Neg Hx      Review of Systems  "  Constitution: Positive for malaise/fatigue and weight loss. Negative for chills, fever and night sweats.   HENT: Positive for congestion. Negative for hearing loss, nosebleeds and odynophagia.    Cardiovascular: Positive for irregular heartbeat and palpitations. Negative for chest pain, claudication, dyspnea on exertion, leg swelling, orthopnea and syncope.   Respiratory: Negative for cough, hemoptysis, shortness of breath and wheezing.    Endocrine: Negative for cold intolerance, heat intolerance, polydipsia, polyphagia and polyuria.   Hematologic/Lymphatic: Bruises/bleeds easily.   Skin: Negative for itching, poor wound healing and rash.   Musculoskeletal: Positive for arthritis, back pain and joint pain. Negative for joint swelling and myalgias.   Gastrointestinal: Positive for nausea. Negative for abdominal pain, constipation, diarrhea, hematemesis, melena and vomiting.   Genitourinary: Negative for dysuria, frequency, hematuria, nocturia and urgency.   Neurological: Positive for loss of balance. Negative for dizziness, light-headedness and numbness.   Psychiatric/Behavioral: Negative for depression and suicidal ideas. The patient is nervous/anxious.    Allergic/Immunologic: Positive for environmental allergies. Negative for HIV exposure.     Vitals:    06/17/20 1227   BP: 158/88   Pulse: 92   Temp: 98.6 °F (37 °C)   TempSrc: Temporal   SpO2: 100%   Weight: 53.5 kg (118 lb)   Height: 157.5 cm (62\")      Physical Exam   Constitutional: She is oriented to person, place, and time. She appears well-developed and well-nourished. No distress.    female who appears stated age   HENT:   Head: Normocephalic and atraumatic.   Eyes: Conjunctivae are normal. No scleral icterus.   Neck: Normal range of motion. No JVD present. Carotid bruit is not present. No tracheal deviation present.   Cardiovascular: Normal rate, regular rhythm and normal heart sounds. Exam reveals no gallop and no friction rub.   No murmur " heard.  Pulmonary/Chest: Effort normal and breath sounds normal. No stridor. No respiratory distress. She has no wheezes. She has no rales.   Abdominal: Soft. She exhibits no distension and no mass. There is no tenderness. There is no rebound and no guarding.   Musculoskeletal: Normal range of motion. She exhibits no edema.   Neurological: She is alert and oriented to person, place, and time.   Skin: Skin is warm and dry. No rash noted. She is not diaphoretic. No erythema.   Psychiatric: She has a normal mood and affect. Her behavior is normal. Judgment and thought content normal.       Labs/Imaging:  -Cardiac catheterization performed 12/18/2019, personally reviewed and discussed with cardiologist Dr. Marcus, demonstrates EF 65%, 30% distal left main stenosis, 80% LAD stenosis after the takeoff of the first diagonal branch, 90% in-stent restenosis of the diagonal branch, 70% distal LAD stenosis and 50 to 60% proximal first obtuse marginal stenosis.    Assessment/Plan:  55-year-old  female with a history of hypertension, hyperlipidemia, coronary artery disease status post stenting and extensive family history of coronary artery disease who presents with left arm pain.  The patient has bifurcating disease in the LAD and diagonal branch.  I discussed this case with Dr. Marcus who felt that the patient was not amenable to percutaneous coronary intervention.  The patient is a reasonable candidate for coronary artery bypass grafting.  The risks and benefits of surgery were discussed with the patient including pain, bleeding, infection, renal failure, stroke and death.  She understood these risks and wished to proceed with surgery.  She will need an echocardiogram to assess her ventricular function and an rule out valvular pathology.  A carotid duplex will also be obtained to rule out significant stenosis in the setting of left main coronary artery disease.  The patient was tearful during this encounter and wanted to  "discuss cardiac surgery with both her  and her children prior to proceeding with surgery.        Patient Active Problem List   Diagnosis   • Coronary artery disease involving native coronary artery of native heart with unstable angina pectoris (CMS/HCC)   • Tobacco abuse   • Hyperlipidemia   • Essential hypertension   • Visit for wound check   • Mixed hyperlipidemia   • Cellulitis of labia   • Labial abscess   • MRSA (methicillin resistant staph aureus) culture positive                        Electronically signed by Juanjo Coronado MD at 06/17/20 1434             Juanjo Coronado MD at 06/17/20 1230          06/17/2020  Patient Information  Veda Enamorado                                                                                          [address] [facility] [address]   1965  'PCP/Referring Physician'  Chiquita Choudhary, APRN  [phone]  No ref. provider found    Chief Complaint   Patient presents with   • Consult     N/P per Dr. Chang for CAD. Pt states that she has been having left arm pain that radiates into the shoulder. complain of head and neck pain. Pt is also very fatigued.        History of Present Illness: 55-year-old  female with a history of hypertension, hyperlipidemia, coronary artery disease status post stenting and extensive family history of coronary artery disease who presents with left arm pain.  The patient notes approximately 3 to 4 days of significant left arm pain.  This happens multiple times a day.  She denies any chest pain, vomiting or diaphoresis.  The patient does have some nausea that she attributes to gastroesophageal reflux.  The patient does feel tired \"all the time.\"      Patient Active Problem List   Diagnosis   • Coronary artery disease involving native coronary artery of native heart with unstable angina pectoris (CMS/HCC)   • Tobacco abuse   • Hyperlipidemia   • Essential hypertension   • Visit for wound check   • Mixed hyperlipidemia   • " Cellulitis of labia   • Labial abscess   • MRSA (methicillin resistant staph aureus) culture positive     Past Medical History:   Diagnosis Date   • Arthritis    • Back ache    • Coronary artery disease    • GERD (gastroesophageal reflux disease)    • Hyperlipidemia    • Hypertension    • Peptic ulceration      Past Surgical History:   Procedure Laterality Date   • CARDIAC CATHETERIZATION     • CARDIAC CATHETERIZATION N/A 2019    Procedure: Left Heart Cath;  Surgeon: Lazaro Conway MD;  Location: [facility] CATH INVASIVE LOCATION;  Service: Cardiology   • CARDIAC CATHETERIZATION     •  SECTION      x2   • HAND SURGERY      right   • PERINEAL LESION/CYST EXCISION Right 2020    Procedure: I&D ABSCESS;  Surgeon: Leander Cardenas III, MD;  Location: [facility] OR;  Service: Obstetrics/Gynecology       Current Outpatient Medications:   •  Alirocumab (Praluent) 75 MG/ML solution auto-injector, Inject 1 mL under the skin into the appropriate area as directed Every 14 (Fourteen) Days., Disp: 2 mL, Rfl: 5  •  aspirin 81 MG EC tablet, Take 81 mg by mouth daily., Disp: , Rfl:   •  atenolol (TENORMIN) 25 MG tablet, Take 1 tablet by mouth Daily., Disp: 30 tablet, Rfl: 6  •  ferrous sulfate 325 (65 FE) MG tablet, Take 1 tablet by mouth Daily With Breakfast., Disp: 60 tablet, Rfl: 1  •  fluticasone-salmeterol (ADVAIR) 500-50 MCG/DOSE DISKUS, Inhale 1 puff 2 (Two) Times a Day., Disp: , Rfl:   •  isosorbide mononitrate (IMDUR) 60 MG 24 hr tablet, Take 1 tablet by mouth Daily., Disp: 30 tablet, Rfl: 6  •  nitroglycerin (NITROSTAT) 0.4 MG SL tablet, 1 under the tongue as needed for angina, may repeat q5mins for up three doses (Patient taking differently: Place 0.4 mg under the tongue Every 5 (Five) Minutes As Needed for Chest Pain. 1 under the tongue as needed for angina, may repeat q5mins for up three doses), Disp: 100 tablet, Rfl: 3  •  pantoprazole (PROTONIX) 20 MG EC tablet, Take 20 mg by mouth Daily., Disp:  , Rfl:   •  prasugrel (EFFIENT) 10 MG tablet, Take 1 tablet by mouth Daily., Disp: 30 tablet, Rfl: 6  •  pravastatin (PRAVACHOL) 20 MG tablet, Take 1 tablet by mouth Daily., Disp: 30 tablet, Rfl: 6  •  spironolactone (ALDACTONE) 25 MG tablet, Take 1 tablet by mouth Daily., Disp: 30 tablet, Rfl: 6  •  traMADol (ULTRAM) 50 MG tablet, Take 50 mg by mouth every 6 (six) hours as needed for moderate pain (4-6)., Disp: , Rfl:   •  ranolazine (RANEXA) 500 MG 12 hr tablet, Take 1 tablet by mouth 2 (Two) Times a Day., Disp: 60 tablet, Rfl: 5  Allergies   Allergen Reactions   • Bactrim [Sulfamethoxazole-Trimethoprim] Rash   • Ciprofloxacin Other (See Comments)     tremors     Social History     Socioeconomic History   • Marital status:      Spouse name: Not on file   • Number of children: 2   • Years of education: Not on file   • Highest education level: Not on file   Occupational History   • Occupation: Home at Home [employer]     Comment: disability   Tobacco Use   • Smoking status: Current Every Day Smoker     Packs/day: 0.25     Years: 25.00     Pack years: 6.25     Types: Cigarettes, Electronic Cigarette   • Smokeless tobacco: Never Used   • Tobacco comment: Pt states only smoking a few cigs a day, transitioning to E-Cig.   Substance and Sexual Activity   • Alcohol use: No   • Drug use: No   • Sexual activity: Defer     Family History   Problem Relation Age of Onset   • Heart disease Mother    • Heart disease Father    • Heart attack Father    • No Known Problems Sister    • Heart attack Brother    • Heart disease Brother    • Breast cancer Neg Hx      Review of Systems   Constitution: Positive for malaise/fatigue and weight loss. Negative for chills, fever and night sweats.   HENT: Positive for congestion. Negative for hearing loss, nosebleeds and odynophagia.    Cardiovascular: Positive for irregular heartbeat and palpitations. Negative for chest pain, claudication, dyspnea on exertion, leg swelling, orthopnea and  "syncope.   Respiratory: Negative for cough, hemoptysis, shortness of breath and wheezing.    Endocrine: Negative for cold intolerance, heat intolerance, polydipsia, polyphagia and polyuria.   Hematologic/Lymphatic: Bruises/bleeds easily.   Skin: Negative for itching, poor wound healing and rash.   Musculoskeletal: Positive for arthritis, back pain and joint pain. Negative for joint swelling and myalgias.   Gastrointestinal: Positive for nausea. Negative for abdominal pain, constipation, diarrhea, hematemesis, melena and vomiting.   Genitourinary: Negative for dysuria, frequency, hematuria, nocturia and urgency.   Neurological: Positive for loss of balance. Negative for dizziness, light-headedness and numbness.   Psychiatric/Behavioral: Negative for depression and suicidal ideas. The patient is nervous/anxious.    Allergic/Immunologic: Positive for environmental allergies. Negative for HIV exposure.     Vitals:    06/17/20 1227   BP: 158/88   Pulse: 92   Temp: 98.6 °F (37 °C)   TempSrc: Temporal   SpO2: 100%   Weight: 53.5 kg (118 lb)   Height: 157.5 cm (62\")      Physical Exam   Constitutional: She is oriented to person, place, and time. She appears well-developed and well-nourished. No distress.    female who appears stated age   HENT:   Head: Normocephalic and atraumatic.   Eyes: Conjunctivae are normal. No scleral icterus.   Neck: Normal range of motion. No JVD present. Carotid bruit is not present. No tracheal deviation present.   Cardiovascular: Normal rate, regular rhythm and normal heart sounds. Exam reveals no gallop and no friction rub.   No murmur heard.  Pulmonary/Chest: Effort normal and breath sounds normal. No stridor. No respiratory distress. She has no wheezes. She has no rales.   Abdominal: Soft. She exhibits no distension and no mass. There is no tenderness. There is no rebound and no guarding.   Musculoskeletal: Normal range of motion. She exhibits no edema.   Neurological: She is alert " and oriented to person, place, and time.   Skin: Skin is warm and dry. No rash noted. She is not diaphoretic. No erythema.   Psychiatric: She has a normal mood and affect. Her behavior is normal. Judgment and thought content normal.       Labs/Imaging:  -Cardiac catheterization performed 12/18/2019, personally reviewed and discussed with cardiologist Dr. Marcus, demonstrates EF 65%, 30% distal left main stenosis, 80% LAD stenosis after the takeoff of the first diagonal branch, 90% in-stent restenosis of the diagonal branch, 70% distal LAD stenosis and 50 to 60% proximal first obtuse marginal stenosis.    Assessment/Plan:  55-year-old  female with a history of hypertension, hyperlipidemia, coronary artery disease status post stenting and extensive family history of coronary artery disease who presents with left arm pain.  The patient has bifurcating disease in the LAD and diagonal branch.  I discussed this case with Dr. Marcus who felt that the patient was not amenable to percutaneous coronary intervention.  The patient is a reasonable candidate for coronary artery bypass grafting.  The risks and benefits of surgery were discussed with the patient including pain, bleeding, infection, renal failure, stroke and death.  She understood these risks and wished to proceed with surgery.  She will need an echocardiogram to assess her ventricular function and an rule out valvular pathology.  A carotid duplex will also be obtained to rule out significant stenosis in the setting of left main coronary artery disease.  The patient was tearful during this encounter and wanted to discuss cardiac surgery with both her  and her children prior to proceeding with surgery.        Patient Active Problem List   Diagnosis   • Coronary artery disease involving native coronary artery of native heart with unstable angina pectoris (CMS/HCC)   • Tobacco abuse   • Hyperlipidemia   • Essential hypertension   • Visit for wound check   •  Mixed hyperlipidemia   • Cellulitis of labia   • Labial abscess   • MRSA (methicillin resistant staph aureus) culture positive                        Electronically signed by Juanjo Coronado MD at 06/17/20 1434          Operative/Procedure Notes (last 48 hours) (Notes from 07/16/20 0838 through 07/18/20 0838)      Juanjo Coronado MD at 07/17/20 1657          CORONARY ARTERY BYPASS WITH INTERNAL MAMMARY ARTERY GRAFT  Progress Note    Veda Enamorado  7/17/2020    Pre-op Diagnosis:   Coronary artery disease involving native coronary artery of native heart with unstable angina pectoris (CMS/HCC) [I25.110]         Post-Op Diagnosis Codes:     * Coronary artery disease involving native coronary artery of native heart with unstable angina pectoris (CMS/HCC) [I25.110]      Procedure/CPT® Codes:      Procedure(s):  MEDIAN STERNOTOMY, CORONARY ARTERY BYPASS X3 WITH  INTERNAL MAMMARY ARTERY GRAFTING, ENDOVASCULAR VEIN HARVESTING OF THE RIGHT GREATER SAPHENOUS VEIN, MAICOL PER ANESTHESIA    Surgeon(s):  Juanjo Coronado MD    Anesthesia: General    Staff:   Circulator: Alyse Lockhart RN; Emily Kevin RN  Perfusionist: Dennis Marquez  Scrub Person: Maile Rich  Nursing Assistant: Kiersten Chou; Ade Canseco  Assistant: Naveed Anthony PA-C; Sharda Crain PA-C    Estimated Blood Loss: 750 mL    Urine Voided: 330 mL    Specimens:                None    Drains:   Chest Tube 3 Anterior Mediastinal (Active)       Urethral Catheter Temperature probe;Latex 16 Fr. (Active)       Y Chest Tube 1 and 2 1 Anterior Mediastinal 28 Fr. 2 Anterior Mediastinal 28 Fr. (Active)       Findings: LIMA--LAD, GSV-->OM, Diag    Complications: None      Juanjo Coronado MD     Date: 7/17/2020  Time: 20:45        Electronically signed by Juanjo Coronado MD at 07/17/20 2045     Juanjo Coronado MD at 07/17/20 1657        DATE OF PROCEDURE: 7/17/2020     PREOPERATIVE DIAGNOSES:  1. Coronary artery disease status  post stenting  2. Unstable angina  3. Hypertension  4. Hyperlipidemia  5. Extensive family history of coronary artery disease  6. Active tobacco abuse    POSTOPERATIVE DIAGNOSES:    1. Coronary artery disease status post stenting  2. Unstable angina  3. Hypertension  4. Hyperlipidemia  5. Extensive family history of coronary artery disease  6. Active tobacco abuse     PROCEDURES PERFORMED:    1. Coronary artery bypass graft x 3  2. Endoscopic vein harvesting (Right greater saphenous vein).       SURGEON: Juanjo Coronado MD       ASSISTANTS:    1. Sharda Crain PA-C  2. Ramesh Anthony PA-C      ANESTHESIA: General endotracheal anesthesia with Dr. Pedro Patel MD     ESTIMATED BLOOD LOSS: 750 mL.       CROSSCLAMP TIME: 68 minutes.       TOTAL CARDIOPULMONARY BYPASS TIME: 87 minutes.       DISTAL BYPASS TARGETS:    1. LIMA to LAD.    2. Greater saphenous vein to OM  3. Greater saphenous vein to D1    INDICATIONS:  55-year-old  female with a history of hypertension, hyperlipidemia, coronary artery disease status post stenting and extensive family history of coronary artery disease who presented with chest and left arm pain.  She was found to have coronary artery disease that was not amenable to PCI.  The patient was felt to be a reasonable candidate for surgical revascularization. The risks and benefits of surgery were discussed with the patient including pain, bleeding, infection, renal failure, stroke and death. The patient understood these risks and wished to proceed with surgery.      DESCRIPTION OF PROCEDURE: The patient was taken to the operating room and placed under general endotracheal anesthesia. A central line, [place]-Deloris catheter, radial arterial line, and Lopez catheter were placed. The patient was prepped and draped in the usual sterile fashion and a timeout was performed, including the patient's name, procedure, consent, beta blockade administration, and antibiotics were verified.    The right  greater saphenous vein was harvested from the groin to below the knee using EVH technique. Subcutaneous tissues were closed with a running 3-0 Vicryl suture and 4-0 Monocryl subcuticular stitch. Simultaneously, a median sternotomy incision was made and electrocautery was utilized to gain access to the sternum. A midline sternotomy was performed after lung desufflation and hemostasis was achieved with electrocautery.    Attention was turned to the left internal mammary artery, which was taken down using electrocautery and small clips. Dissection was performed proximally to the subclavian vein and distally to the bifurcation. The bifurcation was ligated with 2 large clips to each branch and sharply divided. This revealed excellent flow within the mammary artery which was ligated distally using small clips and irrigated with papaverine solution and placed in a soaked Ray-Savage sponge in the left pleural space. The pericardium was opened and stay sutures were placed to create a pericardial well. Next, 3-0 Prolene sutures were placed in the ascending aorta and systemic heparin was administered. Additional cannulation sutures were placed in the right atrial appendage, ascending aorta, and right atrium. After verification of satisfactory activated clotting time, the arterial cannula was placed and connected to the cardiopulmonary bypass circuit after being de-aired. The line was tested and a wrap was performed. The venous cannula was inserted followed by antegrade and retrograde cardioplegia lines. The vein was inspected and found to be smaller in caliber (an appropriate match to her coronary anatomy) and of appropriate quality for bypass grafting. A slit within the pericardial well along the cephalad portion was created for appropriate transition of the mammary pedicle into the mediastinum. Cardiopulmonary bypass was initiated and the patient was allowed to drift in temperature and distal bypass targets were verified.  Bypass flow was dropped and the aortic crossclamp was applied. Cardioplegia was administered in an antegrade fashion with immediate cessation of cardiac activity. There was a marginal septal temperature response. Additional cardioplegia was given via retrograde with an excellent septal temperature response.  The root vent suction was turned on high and the left coronary artery distribution was thoroughly inspected.    The first obtuse marginal branch was 1 mm in diameter with diffuse proximal disease on palpation.  An arteriotomy was made on the mid portion of this vessel before its bifurcation and extended proximally and distally. An end-to-side anastomosis was performed with running 7-0 Prolene suture and tied down. Cardioplegia was given down the anastomosis via hand injection with no evidence of leak and good blood flow. Verification of vein length was obtained by filling the heart and the vein graft was trimmed to the appropriate length. The first diagonal branch was 1 mm in diameter and an arteriotomy was made on the mid portion of this vessel beyond the palpable stent and extended proximally and distally. An end-to-side anastomosis was performed with running 7-0 Prolene suture and tied down. Cardioplegia was given down the anastomosis via hand injection with no evidence of leak and good blood flow. Verification of vein length was obtained by filling the heart and the vein graft was trimmed to the appropriate length. The LAD was inspected and found to have diffuse disease on palpation. An arteriotomy was made on the mid LAD before the last diagonal branch and extended proximally and distally on this 1 mm diameter vessel.  The vessel was probed with a 1 mm probe and found to have severe proximal and distal disease.  The internal mammary artery was then prepared for grafting by ligating the 2 venous branches along the pedicle using small clips. The mammary artery was trimmed proximal to the bifurcation and  beveled for grafting. An end-to-side anastomosis with an 8-0 Prolene suture was constructed and tied down. The bulldog clamp was removed and there was excellent flow within the vessel and adequate filling of the LAD and smaller diagonal branches. The underlying mammary fascia was secured to the epicardium using two 6-0 Prolene sutures. Two aortotomies were then made on the proximal ascending aorta and end-to-side proximal anastomoses were carried out using 6-0 Prolene suture and labeled with a radiopaque washers. A hot shot was given down the ascending aorta after bulldog clamps were applied to the vein grafts. The veins were de-aired and the patient was placed in steep Trendelenburg position. The root vent was turned on high suction and cardiopulmonary bypass flow was turned down with crossclamp subsequently removed. Bypass flows were returned to normal and the bulldog clamps were removed. The heart returned to spontaneous sinus rhythm without fibrillation. The proximal and distal anastomoses were then inspected and found to be hemostatic.   The patient was subsequently weaned from cardiopulmonary bypass and decannulation was successfully carried out. All cannulation sites were reinforced with additional 4-0 Prolene suture and inspected for hemostasis. Doppler examination of bypass grafts revealed excellent flow within the mammary and vein grafts.  Atrial and ventricular pacing wires were placed and secured using 0 silk suture. Three chest tubes were then placed within the left and right pleural spaces and mediastinum. These were secured using a 0 Ethibond suture. The sternum was reapproximated with #7 stainless steel wire and the linea alba was closed with a running 0 Vicryl suture. Subcutaneous tissues were closed with a 2-0 Vicryl suture and the inferior aspect of the incision was closed with additional interrupted 2-0 Vicryl sutures in the dermal layer. The skin was reapproximated with a 4-0 Monocryl  subcuticular stitch and overlying skin glue was applied to the incision. Gauze and tape were applied to the chest tube sites and the patient was subsequently transported to the cardiac ICU in stable condition, intubated.        Electronically signed by Juanjo Coronado MD at 07/18/20 0632          Physician Progress Notes (last 48 hours) (Notes from 07/16/20 0838 through 07/18/20 0838)      Case, Samantha RODRIGUES DO at 07/17/20 0745          Intensive Care Follow-up     Hospital:  LOS: 0 days   Ms. Veda Enamorado, 55 y.o. female is followed for:   Coronary artery disease involving native coronary artery of native heart with unstable angina pectoris (CMS/HCC)     Subjective   Subjective   Interval History:  Veda Enamorado is a 55 y.o. female, current smoker, with past medical history of CAD, hypertension, hyperlipidemia, and tobacco abuse. She was admitted 7/11/20-7/13/20 from [place] having initially experienced severe substernal chest pain which was relieved by 2 SL nitroglycerin. She remained chest pain free over the course of hospitalization and was discharged home. On 12/18/20 she underwent LHC which revealed a 30% distal left main stenosis, 80% LAD stenosis after the takeoff of the first diagonal branch, 90% in-stent restenosis of the diagonal branch, 70% distal LAD stenosis and 50 to 60% proximal first obtuse marginal stenosis. She was scheduled for outpatient CABG today with Dr. Coronado    She underwent a CABG x 3 and is admitted to the ICU on mechanical ventilation postoperatively.     The patient's past medical, surgical and social history were reviewed and updated in Epic as appropriate.     Objective   Objective     Infusions:    dexmedetomidine 0.2-1.5 mcg/kg/hr    dextrose 5 % with KCl 20 mEq 30 mL/hr Last Rate: 30 mL/hr (07/17/20 0819)   DOBUTamine 2-20 mcg/kg/min    DOPamine 2-20 mcg/kg/min    EPINEPHrine 0.02-0.3 mcg/kg/min    insulin 0-50 Units/hr Last Rate: 2 Units/hr (07/17/20 2210)   lactated  ringers 9 mL/hr Last Rate: 9 mL/hr (07/17/20 1340)   niCARdipine 5-15 mg/hr    nitroglycerin 5-200 mcg/min    norepinephrine 0.02-0.3 mcg/kg/min    phenylephrine 0.5-3 mcg/kg/min    propofol 5-50 mcg/kg/min Last Rate: 35 mcg/kg/min (07/17/20 2110)   sodium chloride 30 mL/hr    vasopressin 0.02-0.1 Units/min      Medications:      Vital Sign Min/Max for last 24 hours  Temp  Min: 96.9 °F (36.1 °C)  Max: 97.6 °F (36.4 °C)   BP  Min: 102/82  Max: 166/76   Pulse  Min: 65  Max: 70   Resp  Min: 12  Max: 16   SpO2  Min: 99 %  Max: 100 %   No data recorded       Input/Output for last 24 hour shift  No intake/output data recorded.   FiO2 (%):  [80 %-100 %] 80 %  S RR:  [12] 12  PEEP/CPAP (cm H2O):  [5 cm H20] 5 cm H20  MA SUP:  [0 cm H20] 0 cm H20  MAP (cm H2O):  [8.6-8.8] 8.8  Objective    Telemetry:  Normal sinus rhythm.    Constitutional:  No acute distress.  ETT in place on mechanical ventilation.    Eyes: No scleral icterus.   PERRL, EOM intact.    Neck:  Supple, FROM   Cardiovascular: Normal rate, regular and rhythm. Normal heart sounds.  No murmurs, gallop or rub.   Respiratory: No respiratory distress. Normal respiratory effort.  Normal breath sounds  Clear to ascultation.   Chest tubes in place with bloody drainage. No air leak.    Abdominal:  Soft. No masses. Non-tender. No distension. No HSM.   Extremities: No digital cyanosis. No clubbing.  No peripheral edema.   Skin: No rashes, lesions or ulcers   Neurological:   Sedated.            Results from last 7 days   Lab Units 07/17/20 2131 07/17/20 2013 07/17/20  1929  07/13/20  0520 07/12/20  0851   WBC 10*3/mm3 26.90*  --   --   --  6.99 8.12   HEMOGLOBIN g/dL 13.2  --   --   --  11.0* 11.7*   HEMOGLOBIN, POC g/dL  --  7.8* 9.5*   < >  --   --    PLATELETS 10*3/mm3 153  --   --   --  246 266    < > = values in this interval not displayed.     Results from last 7 days   Lab Units 07/17/20  2131 07/17/20  1247 07/13/20  0520 07/12/20  0851 07/11/20  0959   SODIUM  mmol/L 135*  --  140 141 142   POTASSIUM mmol/L 3.9 3.5 3.6 4.0 3.2*   CO2 mmol/L 22.0  --  27.0 24.0 25.3   BUN mg/dL 7  --  11 7 6   CREATININE mg/dL 0.62  --  0.81 0.70 0.75   MAGNESIUM mg/dL 3.7*  --   --  2.4 2.5   PHOSPHORUS mg/dL 2.4*  --   --   --   --    GLUCOSE mg/dL 158*  --  92 96 68     Estimated Creatinine Clearance: 96.1 mL/min (by C-G formula based on SCr of 0.62 mg/dL).    Results from last 7 days   Lab Units 07/17/20  2137   PH, ARTERIAL pH units 7.387   PCO2, ARTERIAL mm Hg 40.3   PO2 ART mm Hg 363.0*       Images:   Imaging Results (Last 24 Hours)     Procedure Component Value Units Date/Time    XR Chest 1 View [[id number]] Collected:  07/17/20 2208     Updated:  07/17/20 2210    Narrative:       CR Chest 1 Vw    INDICATION:   Evaluate lines and tubes after surgery     COMPARISON:    7/11/2020    FINDINGS:  Heart size normal. Lungs are clear. [place]-Deloris catheter has its tip in the main pulmonary artery. Nasogastric tube tip is in the stomach. Endotracheal tube tip is 2 cm above the leatha. There are 2 right chest tubes and one left chest tube. portable AP  view(s) of the chest.        Impression:       Postoperative lines and tubes are in good position. Lungs are clear    Signer Name: Misael Denson MD   Signed: 7/17/2020 10:08 PM   Workstation Name: [facility]    [facility]          I reviewed the patient's results and images.     Assessment/Plan      Ms. Enamorado is a 56yo F with a history of hypertension and smoking who presented with CAD and underwent a CABG x 3 by Dr. Coronado on 7/17/20. She is admitted to the ICU postoperatively on mechanical ventilation.     Impression        Coronary artery disease involving native coronary artery of native heart with unstable angina pectoris (CMS/HCC)    Tobacco abuse    Hyperlipidemia    Essential hypertension       Plan        1. Monitor in the ICU  2. Continue mechanical ventilation and wean to extubation as tolerated.   3. Blood  pressure with Cardene as needed  4. Am labs  5. Tobacco cessation education  6. Insulin drip.   7. Monitor chest tube output.       ZION Jacobo, PETER-BC  Pulmonology and Critical Care Medicine    I have personally seen, interviewed and examined the patient and verified all the key components of the history, physical examination, assessment and plan with ZION Ervin ACNP-BC and reviewed the note, which reflects my changes and contributions.    Samantha Cheung DO  Pulmonology and Critical Care Medicine       Electronically signed by Samantha Cheung DO at 07/17/20 2310       Consult Notes (last 48 hours) (Notes from 07/16/20 0838 through 07/18/20 0838)    No notes of this type exist for this encounter.

## 2020-07-18 NOTE — ANESTHESIA POSTPROCEDURE EVALUATION
Patient: Veda Enamorado    Procedure Summary     Date:  07/17/20 Room / Location:   [facility]    Anesthesia Start:  1622 Anesthesia Stop:  2115    Procedure:  MEDIAN STERNOTOMY, CORONARY ARTERY BYPASS X3 WITH  INTERNAL MAMMARY ARTERY GRAFTING, ENDOVASCULAR VEIN HARVESTING OF THE RIGHT GREATER SAPHENOUS VEIN, MAICOL PER ANESTHESIA (N/A Chest) Diagnosis:       Coronary artery disease involving native coronary artery of native heart with unstable angina pectoris (CMS/HCC)      Left main coronary artery disease      (Coronary artery disease involving native coronary artery of native heart with unstable angina pectoris (CMS/HCC) [I25.110])      (Left main coronary artery disease [I25.10])    Surgeon:  Juanjo Coronado MD Provider:  Pedro Patel Jr., MD    Anesthesia Type:  general ASA Status:  4          Anesthesia Type: general    Vitals  Vitals Value Taken Time   /75 7/17/2020  9:11 PM   Temp     Pulse 66 7/17/2020  9:17 PM   Resp     SpO2 100 % 7/17/2020  9:17 PM   Vitals shown include unvalidated device data.        Post Anesthesia Care and Evaluation    Patient location during evaluation: ICU  Patient participation: waiting for patient participation  Post-procedure mental status: intubated and sedated.  Pain score: 0  Pain management: adequate  Airway patency: patent  Anesthetic complications: No anesthetic complications  PONV Status: none  Cardiovascular status: acceptable, stable and hemodynamically stable  Respiratory status: acceptable, ETT, ventilator and intubated  Hydration status: acceptable    Comments: Pt transported to ICU with transport monitor and 100% O2 by ambu bag. Report given to RN at the bedside.

## 2020-07-18 NOTE — BRIEF OP NOTE
CORONARY ARTERY BYPASS WITH INTERNAL MAMMARY ARTERY GRAFT  Progress Note    Veda Enamorado  7/17/2020    Pre-op Diagnosis:   Coronary artery disease involving native coronary artery of native heart with unstable angina pectoris (CMS/HCC) [I25.110]         Post-Op Diagnosis Codes:     * Coronary artery disease involving native coronary artery of native heart with unstable angina pectoris (CMS/HCC) [I25.110]      Procedure/CPT® Codes:      Procedure(s):  MEDIAN STERNOTOMY, CORONARY ARTERY BYPASS X3 WITH  INTERNAL MAMMARY ARTERY GRAFTING, ENDOVASCULAR VEIN HARVESTING OF THE RIGHT GREATER SAPHENOUS VEIN, MAICOL PER ANESTHESIA    Surgeon(s):  Juanjo Coronado MD    Anesthesia: General    Staff:   Circulator: Alyse Lockhart RN; Emily Kevin RN  Perfusionist: Dennis Marquez  Scrub Person: Maile Rich  Nursing Assistant: Kiersten Chou; Ade Canseco  Assistant: Naveed Anthony PA-C; Sharda Crain PA-C    Estimated Blood Loss: 750 mL    Urine Voided: 330 mL    Specimens:                None    Drains:   Chest Tube 3 Anterior Mediastinal (Active)       Urethral Catheter Temperature probe;Latex 16 Fr. (Active)       Y Chest Tube 1 and 2 1 Anterior Mediastinal 28 Fr. 2 Anterior Mediastinal 28 Fr. (Active)       Findings: LIMA--LAD, GSV-->OM, Diag    Complications: None      Juanjo Coronado MD     Date: 7/17/2020  Time: 20:45

## 2020-07-18 NOTE — OP NOTE
DATE OF PROCEDURE: 7/17/2020     PREOPERATIVE DIAGNOSES:  1. Coronary artery disease status post stenting  2. Unstable angina  3. Hypertension  4. Hyperlipidemia  5. Extensive family history of coronary artery disease  6. Active tobacco abuse    POSTOPERATIVE DIAGNOSES:    1. Coronary artery disease status post stenting  2. Unstable angina  3. Hypertension  4. Hyperlipidemia  5. Extensive family history of coronary artery disease  6. Active tobacco abuse     PROCEDURES PERFORMED:    1. Coronary artery bypass graft x 3  2. Endoscopic vein harvesting (Right greater saphenous vein).       SURGEON: Juanjo Coronado MD       ASSISTANTS:    1. Sharda Crain PA-C  2. Ramesh Anthony PA-C      ANESTHESIA: General endotracheal anesthesia with Dr. Pedro Patel MD     ESTIMATED BLOOD LOSS: 750 mL.       CROSSCLAMP TIME: 68 minutes.       TOTAL CARDIOPULMONARY BYPASS TIME: 87 minutes.       DISTAL BYPASS TARGETS:    1. LIMA to LAD.    2. Greater saphenous vein to OM  3. Greater saphenous vein to D1    INDICATIONS:  55-year-old  female with a history of hypertension, hyperlipidemia, coronary artery disease status post stenting and extensive family history of coronary artery disease who presented with chest and left arm pain.  She was found to have coronary artery disease that was not amenable to PCI.  The patient was felt to be a reasonable candidate for surgical revascularization. The risks and benefits of surgery were discussed with the patient including pain, bleeding, infection, renal failure, stroke and death. The patient understood these risks and wished to proceed with surgery.      DESCRIPTION OF PROCEDURE: The patient was taken to the operating room and placed under general endotracheal anesthesia. A central line, Savery-Deloris catheter, radial arterial line, and Lopez catheter were placed. The patient was prepped and draped in the usual sterile fashion and a timeout was performed, including the patient's name,  procedure, consent, beta blockade administration, and antibiotics were verified.    The right greater saphenous vein was harvested from the groin to below the knee using EVH technique. Subcutaneous tissues were closed with a running 3-0 Vicryl suture and 4-0 Monocryl subcuticular stitch. Simultaneously, a median sternotomy incision was made and electrocautery was utilized to gain access to the sternum. A midline sternotomy was performed after lung desufflation and hemostasis was achieved with electrocautery.    Attention was turned to the left internal mammary artery, which was taken down using electrocautery and small clips. Dissection was performed proximally to the subclavian vein and distally to the bifurcation. The bifurcation was ligated with 2 large clips to each branch and sharply divided. This revealed excellent flow within the mammary artery which was ligated distally using small clips and irrigated with papaverine solution and placed in a soaked Ray-Savage sponge in the left pleural space. The pericardium was opened and stay sutures were placed to create a pericardial well. Next, 3-0 Prolene sutures were placed in the ascending aorta and systemic heparin was administered. Additional cannulation sutures were placed in the right atrial appendage, ascending aorta, and right atrium. After verification of satisfactory activated clotting time, the arterial cannula was placed and connected to the cardiopulmonary bypass circuit after being de-aired. The line was tested and a wrap was performed. The venous cannula was inserted followed by antegrade and retrograde cardioplegia lines. The vein was inspected and found to be smaller in caliber (an appropriate match to her coronary anatomy) and of appropriate quality for bypass grafting. A slit within the pericardial well along the cephalad portion was created for appropriate transition of the mammary pedicle into the mediastinum. Cardiopulmonary bypass was initiated and  the patient was allowed to drift in temperature and distal bypass targets were verified. Bypass flow was dropped and the aortic crossclamp was applied. Cardioplegia was administered in an antegrade fashion with immediate cessation of cardiac activity. There was a marginal septal temperature response. Additional cardioplegia was given via retrograde with an excellent septal temperature response.  The root vent suction was turned on high and the left coronary artery distribution was thoroughly inspected.    The first obtuse marginal branch was 1 mm in diameter with diffuse proximal disease on palpation.  An arteriotomy was made on the mid portion of this vessel before its bifurcation and extended proximally and distally. An end-to-side anastomosis was performed with running 7-0 Prolene suture and tied down. Cardioplegia was given down the anastomosis via hand injection with no evidence of leak and good blood flow. Verification of vein length was obtained by filling the heart and the vein graft was trimmed to the appropriate length. The first diagonal branch was 1 mm in diameter and an arteriotomy was made on the mid portion of this vessel beyond the palpable stent and extended proximally and distally. An end-to-side anastomosis was performed with running 7-0 Prolene suture and tied down. Cardioplegia was given down the anastomosis via hand injection with no evidence of leak and good blood flow. Verification of vein length was obtained by filling the heart and the vein graft was trimmed to the appropriate length. The LAD was inspected and found to have diffuse disease on palpation. An arteriotomy was made on the mid LAD before the last diagonal branch and extended proximally and distally on this 1 mm diameter vessel.  The vessel was probed with a 1 mm probe and found to have severe proximal and distal disease.  The internal mammary artery was then prepared for grafting by ligating the 2 venous branches along the  pedicle using small clips. The mammary artery was trimmed proximal to the bifurcation and beveled for grafting. An end-to-side anastomosis with an 8-0 Prolene suture was constructed and tied down. The bulldog clamp was removed and there was excellent flow within the vessel and adequate filling of the LAD and smaller diagonal branches. The underlying mammary fascia was secured to the epicardium using two 6-0 Prolene sutures. Two aortotomies were then made on the proximal ascending aorta and end-to-side proximal anastomoses were carried out using 6-0 Prolene suture and labeled with a radiopaque washers. A hot shot was given down the ascending aorta after bulldog clamps were applied to the vein grafts. The veins were de-aired and the patient was placed in steep Trendelenburg position. The root vent was turned on high suction and cardiopulmonary bypass flow was turned down with crossclamp subsequently removed. Bypass flows were returned to normal and the bulldog clamps were removed. The heart returned to spontaneous sinus rhythm without fibrillation. The proximal and distal anastomoses were then inspected and found to be hemostatic.   The patient was subsequently weaned from cardiopulmonary bypass and decannulation was successfully carried out. All cannulation sites were reinforced with additional 4-0 Prolene suture and inspected for hemostasis. Doppler examination of bypass grafts revealed excellent flow within the mammary and vein grafts.  Atrial and ventricular pacing wires were placed and secured using 0 silk suture. Three chest tubes were then placed within the left and right pleural spaces and mediastinum. These were secured using a 0 Ethibond suture. The sternum was reapproximated with #7 stainless steel wire and the linea alba was closed with a running 0 Vicryl suture. Subcutaneous tissues were closed with a 2-0 Vicryl suture and the inferior aspect of the incision was closed with additional interrupted 2-0  Vicryl sutures in the dermal layer. The skin was reapproximated with a 4-0 Monocryl subcuticular stitch and overlying skin glue was applied to the incision. Gauze and tape were applied to the chest tube sites and the patient was subsequently transported to the cardiac ICU in stable condition, intubated.

## 2020-07-18 NOTE — THERAPY EVALUATION
Patient Name: Veda Enamorado  : 1965    MRN: [id number]                              Today's Date: 2020       Admit Date: 2020    Visit Dx:     ICD-10-CM ICD-9-CM   1. CAD in native artery I25.10 414.01     Patient Active Problem List   Diagnosis   • Coronary artery disease involving native coronary artery of native heart with unstable angina pectoris (CMS/HCC)   • Tobacco abuse   • Hyperlipidemia   • Essential hypertension   • Visit for wound check   • Mixed hyperlipidemia   • Cellulitis of labia   • Labial abscess   • MRSA (methicillin resistant staph aureus) culture positive   • Left main coronary artery disease   • Chronic low back pain   • GERD without esophagitis   • Coronary artery disease   • CAD in native artery     Past Medical History:   Diagnosis Date   • Arthritis     back   • Back ache    • Chronic back pain    • Coronary artery disease     s/p 3 stents    • GERD (gastroesophageal reflux disease)    • History of MRSA infection 2020    labia; hospitalized 6 days on IV antibiotics and then went home on po antibiotics    • Hyperlipidemia    • Hypertension    • Peptic ulceration      Past Surgical History:   Procedure Laterality Date   • CARDIAC CATHETERIZATION     • CARDIAC CATHETERIZATION N/A 2019    Procedure: Left Heart Cath;  Surgeon: Lazaro Conway MD;  Location: [facility] INVASIVE LOCATION;  Service: Cardiology   • CARDIAC CATHETERIZATION     • CARDIAC SURGERY     •  SECTION      x2   • HAND SURGERY      right   • PERINEAL LESION/CYST EXCISION Right 2020    Procedure: I&D ABSCESS;  Surgeon: Leander Cardenas III, MD;  Location: [facility] OR;  Service: Obstetrics/Gynecology     General Information     Row Name 20 1357          PT Evaluation Time/Intention    Document Type  evaluation  -ES     Mode of Treatment  individual therapy;physical therapy  -ES     Row Name 20 0939          General Information    Patient Profile Reviewed?  yes   -ES     Prior Level of Function  independent:;all household mobility;community mobility;gait;transfer;bed mobility;ADL's  -ES     Existing Precautions/Restrictions  sternal;oxygen therapy device and L/min  -ES     Barriers to Rehab  none identified  -ES     Row Name 07/18/20 [facility]7          Relationship/Environment    Lives With  spouse  -ES     Row Name 07/18/20 [facility]          Resource/Environmental Concerns    Current Living Arrangements  home/apartment/condo  -ES     Row Name 07/18/20 [facility]7          Home Main Entrance    Number of Stairs, Main Entrance  three  -ES     Row Name 07/18/20 [facility]7          Stairs Within Home, Primary    Number of Stairs, Within Home, Primary  none  -ES     Row Name 07/18/20 [facility]7          Cognitive Assessment/Intervention- PT/OT    Orientation Status (Cognition)  oriented x 4  -ES     Row Name 07/18/20 [facility]7          Safety Issues, Functional Mobility    Safety Issues Affecting Function (Mobility)  safety precautions follow-through/compliance;safety precaution awareness  -ES     Impairments Affecting Function (Mobility)  pain;balance;endurance/activity tolerance;shortness of breath;strength  -ES       User Key  (r) = Recorded By, (t) = Taken By, (c) = Cosigned By    Initials Name Provider Type    ES Tejas, Kiara, PT Physical Therapist        Mobility     Row Name 07/18/20 [facility]7          Bed Mobility Assessment/Treatment    Bed Mobility Assessment/Treatment  rolling left;supine-sit  -ES     Rolling Left [place] (Bed Mobility)  verbal cues;1 person assist;supervision  -ES     Supine-Sit [place] (Bed Mobility)  minimum assist (75% patient effort);1 person assist;verbal cues Verbal cueing for sternal precautions.   -ES     Assistive Device (Bed Mobility)  head of bed elevated  -ES     Row Name 07/18/20 [facility]7          Bed-Chair Transfer    Bed-Chair [place] (Transfers)  1 person assist;verbal cues;contact guard  -ES     Assistive Device (Bed-Chair Transfers)  -- Hand-held assist x1   -ES     Row Name 07/18/20 1357          Sit-Stand Transfer    Sit-Stand [place] (Transfers)  1 person assist;verbal cues;contact guard  -ES     Assistive Device (Sit-Stand Transfers)  -- Gait belt  -ES     Row Name 07/18/20 1357          Gait/Stairs Assessment/Training    28606 - Gait Training Minutes   10  -ES     Gait/Stairs Assessment/Training  gait/ambulation assistive device  -ES     [place] Level (Gait)  minimum assist (75% patient effort);1 person assist;verbal cues  -ES     Assistive Device (Gait)  -- Hand-held assist x1  -ES     Distance in Feet (Gait)  120 feet  -ES     Pattern (Gait)  2-point  -ES     Deviations/Abnormal Patterns (Gait)  bilateral deviations;kenaytta decreased  -ES     Bilateral Gait Deviations  heel strike decreased;forward flexed posture  -ES     Comment (Gait/Stairs)  Patient able to ambulate 120 feet with CGA x1+1 for line management. Patient required verbal cues for upright posture and to maintain eyes open. Patient demonstrated forward flexed posture, decreased kenyatta and decreased heel strike.   -ES       User Key  (r) = Recorded By, (t) = Taken By, (c) = Cosigned By    Initials Name Provider Type    ES Short, Kiara, PT Physical Therapist        Obj/Interventions     Row Name 07/18/20 1357          General ROM    GENERAL ROM COMMENTS  Bilateral UE/LE ROM WFL based on functional observation.   -ES     Row Name 07/18/20 1357          MMT (Manual Muscle Testing)    General MMT Comments  Bilateral UE/LE ROM WFL based on functional observation.   -ES     Row Name 07/18/20 1357          Static Sitting Balance    Level of [place] (Unsupported Sitting, Static Balance)  independent  -ES     Sitting Position (Unsupported Sitting, Static Balance)  sitting on edge of bed  -ES     Time Able to Maintain Position (Unsupported Sitting, Static Balance)  more than 5 minutes  -ES     Row Name 07/18/20 1355          Dynamic Sitting Balance    Level of [place], Reaches Outside  Midline (Sitting, Dynamic Balance)  1 person assist;supervision  -ES     Sitting Position, Reaches Outside Midline (Sitting, Dynamic Balance)  sitting in chair  -ES     Row Name 07/18/20 1357          Static Standing Balance    Level of [place] (Supported Standing, Static Balance)  contact guard assist;1 person assist  -ES     Time Able to Maintain Position (Supported Standing, Static Balance)  1 to 2 minutes  -ES     Assistive Device Utilized (Supported Standing, Static Balance)  -- Hand-held assist x1  -ES     Row Name 07/18/20 1357          Dynamic Standing Balance    Level of [place], Reaches Outside Midline (Standing, Dynamic Balance)  minimal assist, 75% patient effort;1 person assist  -ES     Time Able to Maintain Position, Reaches Outside Midline (Standing, Dynamic Balance)  more than 5 minutes  -ES     Assistive Device Utilized (Supported Standing, Dynamic Balance)  -- Hand-held assist x1  -ES     Row Name 07/18/20 1351          Sensory Assessment/Intervention    Sensory General Assessment  no sensation deficits identified  -ES       User Key  (r) = Recorded By, (t) = Taken By, (c) = Cosigned By    Initials Name Provider Type    ES Short, Kiara, PT Physical Therapist        Goals/Plan     Row Name 07/18/20 2628          Bed Mobility Goal 1 (PT)    Activity/Assistive Device (Bed Mobility Goal 1, PT)  sit to supine/supine to sit  -ES     [place] Level/Cues Needed (Bed Mobility Goal 1, PT)  independent  -ES     Time Frame (Bed Mobility Goal 1, PT)  long term goal (LTG);10 days  -ES     Progress/Outcomes (Bed Mobility Goal 1, PT)  goal ongoing  -ES     Row Name 07/18/20 7773          Transfer Goal 1 (PT)    Activity/Assistive Device (Transfer Goal 1, PT)  sit-to-stand/stand-to-sit  -ES     [place] Level/Cues Needed (Transfer Goal 1, PT)  independent  -ES     Time Frame (Transfer Goal 1, PT)  long term goal (LTG);10 days  -ES     Progress/Outcome (Transfer Goal 1, PT)  goal ongoing  -ES      Row Name 07/18/20 [facility]6          Gait Training Goal 1 (PT)    Activity/Assistive Device (Gait Training Goal 1, PT)  gait (walking locomotion)  -ES     [place] Level (Gait Training Goal 1, PT)  independent  -ES     Distance (Gait Goal 1, PT)  400 feet   -ES     Time Frame (Gait Training Goal 1, PT)  long term goal (LTG);10 days  -ES     Progress/Outcome (Gait Training Goal 1, PT)  goal ongoing  -ES     Row Name 07/18/20 [facility]          Patient Education Goal (PT)    Activity (Patient Education Goal, PT)  Patient will be independent in demonstration of HEP.   -ES     [place]/Cues/Accuracy (Memory Goal 2, PT)  independent;demonstrates adequately  -ES     Time Frame (Patient Education Goal, PT)  long term goal (LTG);10 days  -ES     Progress/Outcome (Patient Education Goal, PT)  goal ongoing  -ES       User Key  (r) = Recorded By, (t) = Taken By, (c) = Cosigned By    Initials Name Provider Type    ES Tejas, Kiara, PT Physical Therapist        Clinical Impression     Row Name 07/18/20 [facility]5          Pain Assessment    Additional Documentation  Pain Scale: FACES Pre/Post-Treatment (Group)  -ES     [facility] Name 07/18/20 [facility]3          Pain Scale: Numbers Pre/Post-Treatment    Pain Location - Orientation  incisional  -ES     Pain Location  chest  -ES     Pain Intervention(s)  Repositioned;Ambulation/increased activity;Splinting  -ES     Row Name 07/18/20 4485          Pain Scale: FACES Pre/Post-Treatment    Pain: FACES Scale, Pretreatment  2-->hurts little bit  -ES     Pain: FACES Scale, Post-Treatment  4-->hurts little more  -ES     Pre/Post Treatment Pain Comment  Patient reporting nausea at end of session.   -ES     Row Name 07/18/20 7409          Plan of Care Review    Plan of Care Reviewed With  patient;spouse  -ES     Outcome Summary  Patient seen for initial PT Evaluation. Patient limited by pain and decreased functional endurance but was able to ambulate 120 feet with Fan x1 and HHA x1. Recommed home with assist at  discharge.   -ES     Row Name 07/18/20 2649          Physical Therapy Clinical Impression    Patient/Family Goals Statement (PT Clinical Impression)  Return home.   -ES     Criteria for Skilled Interventions Met (PT Clinical Impression)  yes  -ES     Rehab Potential (PT Clinical Summary)  good, to achieve stated therapy goals  -ES     Predicted Duration of Therapy (PT)  10 days   -ES     Row Name 07/18/20 4207          Vital Signs    Pre Systolic BP Rehab  133  -ES     Pre Treatment Diastolic BP  67  -ES     Post Systolic BP Rehab  101  -ES     Post Treatment Diastolic BP  60  -ES     Pretreatment Heart Rate (beats/min)  65  -ES     Posttreatment Heart Rate (beats/min)  74  -ES     Pre SpO2 (%)  100  -ES     O2 Delivery Pre Treatment  nasal cannula  -ES     O2 Delivery Intra Treatment  nasal cannula  -ES     Post SpO2 (%)  96  -ES     O2 Delivery Post Treatment  nasal cannula  -ES     Pre Patient Position  Supine  -ES     Intra Patient Position  Standing  -ES     Post Patient Position  Sitting  -ES     Row Name 07/18/20 5134          Positioning and Restraints    Pre-Treatment Position  in bed  -ES     Post Treatment Position  chair  -ES     In Chair  reclined;call light within reach;encouraged to call for assist;with family/caregiver;with other staff;legs elevated  -ES       User Key  (r) = Recorded By, (t) = Taken By, (c) = Cosigned By    Initials Name Provider Type    ES Tejas, Kiara, PT Physical Therapist        Outcome Measures     Row Name 07/18/20 0246          How much help from another person do you currently need...    Turning from your back to your side while in flat bed without using bedrails?  4  -ES     Moving from lying on back to sitting on the side of a flat bed without bedrails?  3  -ES     Moving to and from a bed to a chair (including a wheelchair)?  3  -ES     Standing up from a chair using your arms (e.g., wheelchair, bedside chair)?  3  -ES     Climbing 3-5 steps with a railing?  3  -ES     To  walk in hospital room?  3  -ES     AM-[facility] 6 Clicks Score (PT)  19  -ES     Row Name 07/18/20 1357          Functional Assessment    Outcome Measure Options  AM-PAC 6 Clicks Basic Mobility (PT)  -ES       User Key  (r) = Recorded By, (t) = Taken By, (c) = Cosigned By    Initials Name Provider Type    Kiara Blunt PT Physical Therapist        Physical Therapy Education                 Title: PT OT SLP Therapies (In Progress)     Topic: Physical Therapy (In Progress)     Point: Mobility training (In Progress)     Description:   Instruct learner(s) on safety and technique for assisting patient out of bed, chair or wheelchair.  Instruct in the proper use of assistive devices, such as walker, crutches, cane or brace.              Patient Friendly Description:   It's important to get you on your feet again, but we need to do so in a way that is safe for you. Falling has serious consequences, and your personal safety is the most important thing of all.        When it's time to get out of bed, one of us or a family member will sit next to you on the bed to give you support.     If your doctor or nurse tells you to use a walker, crutches, a cane, or a brace, be sure you use it every time you get out of bed, even if you think you don't need it.    Learning Progress Summary           Patient Acceptance, E, NR by ES at 7/18/2020 1357   Significant Other Acceptance, E, NR by ES at 7/18/2020 1357                   Point: Home exercise program (In Progress)     Description:   Instruct learner(s) on appropriate technique for monitoring, assisting and/or progressing patient with therapeutic exercises and activities.              Learning Progress Summary           Patient Acceptance, E, NR by ES at 7/18/2020 1357   Significant Other Acceptance, E, NR by ES at 7/18/2020 1357                   Point: Body mechanics (In Progress)     Description:   Instruct learner(s) on proper positioning and spine alignment for patient and/or  caregiver during mobility tasks and/or exercises.              Learning Progress Summary           Patient Acceptance, E, NR by ES at 7/18/2020 1357   Significant Other Acceptance, E, NR by ES at 7/18/2020 1357                   Point: Precautions (In Progress)     Description:   Instruct learner(s) on prescribed precautions during mobility and gait tasks              Learning Progress Summary           Patient Acceptance, E, NR by ES at 7/18/2020 1357   Significant Other Acceptance, E, NR by ES at 7/18/2020 1357                               User Key     Initials Effective Dates Name Provider Type Discipline    ES 06/18/19 -  Tejas Kiara, PT Physical Therapist PT              PT Recommendation and Plan  Planned Therapy Interventions (PT Eval): balance training, bed mobility training, gait training, home exercise program, patient/family education, transfer training, strengthening, other (see comments)(functional endurance training)  Outcome Summary/Treatment Plan (PT)  Anticipated Discharge Disposition (PT): home with assist  Plan of Care Reviewed With: patient, spouse  Progress: improving  Outcome Summary: Patient seen for initial PT Evaluation. Patient limited by pain and decreased functional endurance but was able to ambulate 120 feet with Fan x1 and HHA x1. Recommed home with assist at discharge.      Time Calculation:   PT Charges     Row Name 07/18/20 1357             Time Calculation    Start Time  1357  -ES      PT Received On  07/18/20  -ES      PT Goal Re-Cert Due Date  07/28/20  -ES         Timed Charges    56050 - Gait Training Minutes   10  -ES        User Key  (r) = Recorded By, (t) = Taken By, (c) = Cosigned By    Initials Name Provider Type    ES Kiara Lazaro, PT Physical Therapist        Therapy Charges for Today     Code Description Service Date Service Provider Modifiers Qty    34740083418 HC GAIT TRAINING EA 15 MIN 7/18/2020 Shaquille Lazaroin, PT GP 1    88280320202 HC PT EVAL MOD COMPLEXITY 3  7/18/2020 Kiara Lazaro, PT GP 1          PT G-Codes  Outcome Measure Options: AM-PAC 6 Clicks Basic Mobility (PT)  AM-PAC 6 Clicks Score (PT): 19    Kiara Lazaro, PT  7/18/2020

## 2020-07-18 NOTE — PLAN OF CARE
Problem: Patient Care Overview  Goal: Plan of Care Review  Flowsheets  Taken 7/18/2020 7948  Progress: improving  Taken 7/18/2020 7786  Plan of Care Reviewed With: patient;spouse  Outcome Summary: Patient seen for initial PT Evaluation. Patient limited by pain and decreased functional endurance but was able to ambulate 120 feet with Fan x1 and HHA x1. Recommed home with assist at discharge.

## 2020-07-18 NOTE — PROGRESS NOTES
Intensive Care Follow-up     Hospital:  LOS: 1 day   Ms. Veda Enamorado, 55 y.o. female is followed for:   Coronary artery disease involving native coronary artery of native heart with unstable angina pectoris (CMS/HCC)   Status post coronary bypass grafting with postoperative management of hyperglycemia and respiratory insufficiency     Subjective   Interval History:  The chart has been reviewed.  The patient was extubated without difficulty.  She is complaining of quite a bit of pain in her chest at her incision sites.  She has been taking some very short shallow breaths but has been saturating without difficulty and ventilatory status appears relatively normal.      The patient's past medical, surgical and social history were reviewed and updated in Epic as appropriate.        Objective     Infusions:    dexmedetomidine 0.2-1.5 mcg/kg/hr Last Rate: Stopped (07/18/20 0600)   dextrose 5 % with KCl 20 mEq 30 mL/hr Last Rate: 30 mL/hr (07/17/20 2148)   DOBUTamine 2-20 mcg/kg/min    DOPamine 2-20 mcg/kg/min    EPINEPHrine 0.02-0.3 mcg/kg/min    niCARdipine 5-15 mg/hr    nitroglycerin 5-200 mcg/min    norepinephrine 0.02-0.3 mcg/kg/min Last Rate: 0.02 mcg/kg/min (07/18/20 0922)   phenylephrine 0.5-3 mcg/kg/min    propofol 5-50 mcg/kg/min Last Rate: Stopped (07/17/20 2346)   vasopressin 0.02-0.1 Units/min      Medications:    aspirin 325 mg Oral Daily   atorvastatin 40 mg Oral Nightly   budesonide-formoterol 2 puff Inhalation BID - RT   cefuroxime 1.5 g Intravenous Q8H   chlorhexidine 15 mL Mouth/Throat Q12H   insulin lispro 0-7 Units Subcutaneous TID AC   metoprolol tartrate 12.5 mg Oral Q12H   metoprolol tartrate 2.5 mg Intravenous Q6H   pharmacy consult - MTM  Does not apply Daily   senna-docusate sodium 2 tablet Oral BID       Vital Sign Min/Max for last 24 hours  Temp  Min: 96.9 °F (36.1 °C)  Max: 98.8 °F (37.1 °C)   BP  Min: 75/49  Max: 166/76   Pulse  Min: 63  Max: 80   Resp  Min: 12  Max: 36   SpO2  Min: 95  "%  Max: 100 %   Flow (L/min)  Min: 2  Max: 4       Input/Output for last 24 hour shift  07/17 0701 - 07/18 0700  In: 4390.7 [I.V.:2726.7]  Out: 1710 [Urine:1370]   FiO2 (%):  [40 %-100 %] 40 %  S RR:  [12] 12  PEEP/CPAP (cm H2O):  [5 cm H20] 5 cm H20  UT SUP:  [0 cm H20-10 cm H20] 10 cm H20  MAP (cm H2O):  [8.2-9.8] 9  Objective:  General Appearance:  Uncomfortable, ill-appearing and in no acute distress.    Vital signs: (most recent): Blood pressure 93/61, pulse 63, temperature 98.6 °F (37 °C), temperature source Core, resp. rate 18, height 157.5 cm (62.01\"), weight 58.7 kg (129 lb 6.4 oz), SpO2 100 %.    HEENT: (Right internal jugular introducer)    Lungs:  Normal effort and normal respiratory rate.  (Decreased breath sounds in the bases.  Minimal rales.)  Heart: Normal rate.  Regular rhythm.  S1 normal and S2 normal.  No murmur or friction rub.   Chest: (Status post median sternotomy.  Mediastinal tubes in place.  Wound is dressed and dry.)  Abdomen: Bowel sounds are normal.   There is no abdominal tenderness.     Extremities: Normal range of motion.  There is no deformity or dependent edema.    Neurological: Patient is alert and oriented to person, place and time.    Pupils:  Pupils are equal, round, and reactive to light.  Pupils are equal.   Skin:  Warm and dry.              Results from last 7 days   Lab Units 07/18/20 0127 07/17/20 2131 07/17/20 2013 07/13/20  0520   WBC 10*3/mm3 20.33* 26.90*  --   --  6.99   HEMOGLOBIN g/dL 11.3* 13.2  --   --  11.0*   HEMOGLOBIN, POC g/dL  --   --  7.8*   < >  --    PLATELETS 10*3/mm3 153 153  --   --  246    < > = values in this interval not displayed.     Results from last 7 days   Lab Units 07/18/20 0127 07/17/20 2131 07/17/20  1247 07/13/20  0520 07/12/20  0851   SODIUM mmol/L 143 135*  --  140 141   POTASSIUM mmol/L 3.5 3.9 3.5 3.6 4.0   CO2 mmol/L 23.0 22.0  --  27.0 24.0   BUN mg/dL 8 7  --  11 7   CREATININE mg/dL 0.67 0.62  --  0.81 0.70   MAGNESIUM mg/dL " 3.1* 3.7*  --   --  2.4   PHOSPHORUS mg/dL 2.0* 2.4*  --   --   --    GLUCOSE mg/dL 130* 158*  --  92 96     Estimated Creatinine Clearance: 89 mL/min (by C-G formula based on SCr of 0.67 mg/dL).    Results from last 7 days   Lab Units 07/18/20  0142   PH, ARTERIAL pH units 7.403   PCO2, ARTERIAL mm Hg 38.9   PO2 ART mm Hg 123.0*         I reviewed the patient's results and images.     Assessment/Plan   Impression        Coronary artery disease involving native coronary artery of native heart with unstable angina pectoris (CMS/[facility])    Tobacco abuse    Hyperlipidemia    Essential hypertension    CAD in native artery       Plan        Discontinue insulin drip we will transition over to subcutaneous coverage today.  Mobilize as tolerated.  Pulmonary hygiene has been encouraged.  Pain control as needed.    Plan of care and goals reviewed with mulitdisciplinary/antibiotic stewardship team during rounds.   I discussed the patient's findings and my recommendations with patient and nursing staff         Jasbir Alegre MD, [facility]  Pulmonology and Critical Care Medicine

## 2020-07-18 NOTE — PLAN OF CARE
Problem: Patient Care Overview  Goal: Plan of Care Review  Outcome: Ongoing (interventions implemented as appropriate)  Flowsheets (Taken 7/18/2020 5368)  Progress: improving  Plan of Care Reviewed With: patient  Note:   Pt to 2H from OR s/p CABG x3 at 2110; extubated at 0153 with no complications. Hemodynamics stable overnight on no vasoactive gtts. Adequate UOP and excellent creatinine. Moderate sanguineous MT output. Replacing KPhos this morning. One-time dose of Toradol given for pleuritic pain. Up in chair this morning.

## 2020-07-18 NOTE — PROGRESS NOTES
Veda Enamorado  [id number]  1965    POD# 1 S/P CABG x3    CC: My chest really hurts    Subjective:  Awake alert no new complaints.  Usual and expected musculoskeletal soreness.  Hemodynamics stable vital signs normal.  Chest tube drainage is minimal laboratory studies are stable.      Coronary artery disease involving native coronary artery of native heart with unstable angina pectoris (CMS/HCC)    Tobacco abuse    Hyperlipidemia    Essential hypertension    CAD in native artery      Objective:    Vital Signs:  Temp:  [96.9 °F (36.1 °C)-98.8 °F (37.1 °C)] 98.6 °F (37 °C)  Heart Rate:  [63-80] 63  Resp:  [12-36] 18  BP: ()/(49-87) 93/61  Arterial Line BP: ()/(41-76) 105/51  FiO2 (%):  [40 %-100 %] 40 %    Physical Exam:   General Appearance: alert, appears stated age    Lungs: clear to auscultation    Heart: regular rhythm & normal rate, normal S1, S2,no murmur    Skin:warm and dry    Neuro: normal       Results from last 7 days   Lab Units 07/18/20  0127   WBC 10*3/mm3 20.33*   HEMOGLOBIN g/dL 11.3*   HEMATOCRIT % 35.2   PLATELETS 10*3/mm3 153     Results from last 7 days   Lab Units 07/18/20  0127   SODIUM mmol/L 143   POTASSIUM mmol/L 3.5   CHLORIDE mmol/L 110*   CO2 mmol/L 23.0   BUN mg/dL 8   CREATININE mg/dL 0.67   GLUCOSE mg/dL 130*   CALCIUM mg/dL 8.2*       Imaging Results (Last 24 Hours)     Procedure Component Value Units Date/Time    XR Chest 1 View [[id number]] Collected:  07/18/20 0917     Updated:  07/18/20 1229    Narrative:          EXAMINATION: XR CHEST 1 VW - 07/18/2020     INDICATION: Post-op heart surgery. I25.10-Atherosclerotic heart disease  of native coronary artery without angina pectoris.      COMPARISON: 07/17/2020     FINDINGS: Portable chest reveals heart to be enlarged. Patient is status  post median sternotomy. [place]-Deloris catheter in place. Bilateral chest  tubes in place. No definite pneumothorax. Prominence seen of the  pulmonary vascularity bilaterally.            Impression:       Prominence seen of the pulmonary vascularity. Small right  pleural effusion. Degenerative changes seen within the spine. No new  focal parenchymal opacification present.     DICTATED:   07/18/2020  EDITED/ls :   07/18/2020        XR Chest 1 View [[id number]] Collected:  07/17/20 2208     Updated:  07/17/20 2210    Narrative:       CR Chest 1 Vw    INDICATION:   Evaluate lines and tubes after surgery     COMPARISON:    7/11/2020    FINDINGS:  Heart size normal. Lungs are clear. [place]-Deloris catheter has its tip in the main pulmonary artery. Nasogastric tube tip is in the stomach. Endotracheal tube tip is 2 cm above the leatha. There are 2 right chest tubes and one left chest tube. portable AP  view(s) of the chest.        Impression:       Postoperative lines and tubes are in good position. Lungs are clear    Signer Name: Misael Denson MD   Signed: 7/17/2020 10:08 PM   Workstation Name: [facility]    [facility]           Diagnosis:    Coronary artery disease involving native coronary artery of native heart with unstable angina pectoris (CMS/HCC)    Tobacco abuse    Hyperlipidemia    Essential hypertension    CAD in native artery      Assessment: Stable postoperative course      Plan: DC chest tubes, art line, [place]-Delor[place] catheter, and pacing wires.  I have reviewed, verified, and confirmed the above history and current status.  I have examined the patient and confirmed the above physical findings.     Ishmael Aranda MD  07/18/20  12:44

## 2020-07-18 NOTE — PLAN OF CARE
Problem: Patient Care Overview  Goal: Plan of Care Review  Outcome: Ongoing (interventions implemented as appropriate)  Flowsheets (Taken 7/18/2020 3067)  Progress: improving  Plan of Care Reviewed With: patient; spouse  Note:   Pt doing well today. Neuro: pt oriented x4, moves all extremities. Respiratory: Pt weaned off of Oxygen and is doing well on RA. Mediastinal tubes d/c'd per PA. Incentive spirometer performed.  Pt weak, but tolerating well. Cardiac: [place], and art line d/c'd. Levo started for persistent hypotension. GI/: [place] catheter d/c'd. Pt voided a small amount right after pulling of catheter. Hypoactive bowel sounds, pt tolerating clear liquids well. Pt has had lots of pain issues today, but feels better after mediastinal tubes removed. Pt progressing very well for POD 1.

## 2020-07-19 ENCOUNTER — APPOINTMENT (OUTPATIENT)
Dept: GENERAL RADIOLOGY | Facility: HOSPITAL | Age: 55
End: 2020-07-19

## 2020-07-19 LAB
ANION GAP SERPL CALCULATED.3IONS-SCNC: 6 MMOL/L (ref 5–15)
BUN SERPL-MCNC: 12 MG/DL (ref 6–20)
BUN/CREAT SERPL: 18.2 (ref 7–25)
CALCIUM SPEC-SCNC: 8.3 MG/DL (ref 8.6–10.5)
CHLORIDE SERPL-SCNC: 105 MMOL/L (ref 98–107)
CO2 SERPL-SCNC: 26 MMOL/L (ref 22–29)
CREAT SERPL-MCNC: 0.66 MG/DL (ref 0.57–1)
DEPRECATED RDW RBC AUTO: 56.1 FL (ref 37–54)
ERYTHROCYTE [DISTWIDTH] IN BLOOD BY AUTOMATED COUNT: 15.5 % (ref 12.3–15.4)
GFR SERPL CREATININE-BSD FRML MDRD: 93 ML/MIN/1.73
GLUCOSE BLDC GLUCOMTR-MCNC: 127 MG/DL (ref 70–130)
GLUCOSE BLDC GLUCOMTR-MCNC: 135 MG/DL (ref 70–130)
GLUCOSE BLDC GLUCOMTR-MCNC: 136 MG/DL (ref 70–130)
GLUCOSE SERPL-MCNC: 136 MG/DL (ref 65–99)
HCT VFR BLD AUTO: 18.2 % (ref 34–46.6)
HCT VFR BLD AUTO: 31.2 % (ref 34–46.6)
HGB BLD-MCNC: 5.7 G/DL (ref 12–15.9)
HGB BLD-MCNC: 9.6 G/DL (ref 12–15.9)
MCH RBC QN AUTO: 31.1 PG (ref 26.6–33)
MCHC RBC AUTO-ENTMCNC: 31.3 G/DL (ref 31.5–35.7)
MCV RBC AUTO: 99.5 FL (ref 79–97)
PLATELET # BLD AUTO: 173 10*3/MM3 (ref 140–450)
PMV BLD AUTO: 11.3 FL (ref 6–12)
POTASSIUM SERPL-SCNC: 4.8 MMOL/L (ref 3.5–5.2)
RBC # BLD AUTO: 1.83 10*6/MM3 (ref 3.77–5.28)
SODIUM SERPL-SCNC: 137 MMOL/L (ref 136–145)
WBC # BLD AUTO: 16.67 10*3/MM3 (ref 3.4–10.8)

## 2020-07-19 PROCEDURE — 85027 COMPLETE CBC AUTOMATED: CPT | Performed by: PHYSICIAN ASSISTANT

## 2020-07-19 PROCEDURE — 97530 THERAPEUTIC ACTIVITIES: CPT

## 2020-07-19 PROCEDURE — 85014 HEMATOCRIT: CPT | Performed by: NURSE PRACTITIONER

## 2020-07-19 PROCEDURE — 82962 GLUCOSE BLOOD TEST: CPT

## 2020-07-19 PROCEDURE — 71045 X-RAY EXAM CHEST 1 VIEW: CPT

## 2020-07-19 PROCEDURE — 80048 BASIC METABOLIC PNL TOTAL CA: CPT | Performed by: PHYSICIAN ASSISTANT

## 2020-07-19 PROCEDURE — 25010000003 CEFUROXIME SODIUM 1.5 G RECONSTITUTED SOLUTION: Performed by: PHYSICIAN ASSISTANT

## 2020-07-19 PROCEDURE — 25010000002 ONDANSETRON PER 1 MG: Performed by: PHYSICIAN ASSISTANT

## 2020-07-19 PROCEDURE — 85018 HEMOGLOBIN: CPT | Performed by: NURSE PRACTITIONER

## 2020-07-19 PROCEDURE — 93010 ELECTROCARDIOGRAM REPORT: CPT | Performed by: INTERNAL MEDICINE

## 2020-07-19 PROCEDURE — 94799 UNLISTED PULMONARY SVC/PX: CPT

## 2020-07-19 PROCEDURE — 99232 SBSQ HOSP IP/OBS MODERATE 35: CPT | Performed by: INTERNAL MEDICINE

## 2020-07-19 PROCEDURE — 93005 ELECTROCARDIOGRAM TRACING: CPT | Performed by: PHYSICIAN ASSISTANT

## 2020-07-19 PROCEDURE — 25010000002 MORPHINE PER 10 MG: Performed by: PHYSICIAN ASSISTANT

## 2020-07-19 PROCEDURE — 97116 GAIT TRAINING THERAPY: CPT

## 2020-07-19 RX ORDER — FUROSEMIDE 10 MG/ML
40 INJECTION INTRAMUSCULAR; INTRAVENOUS ONCE
Status: DISCONTINUED | OUTPATIENT
Start: 2020-07-19 | End: 2020-07-19

## 2020-07-19 RX ADMIN — CEFUROXIME SODIUM 1.5 G: 1.5 INJECTION, POWDER, FOR SOLUTION INTRAVENOUS at 09:06

## 2020-07-19 RX ADMIN — ONDANSETRON 4 MG: 2 INJECTION INTRAMUSCULAR; INTRAVENOUS at 10:56

## 2020-07-19 RX ADMIN — HYDROCODONE BITARTRATE AND ACETAMINOPHEN 1 TABLET: 7.5; 325 TABLET ORAL at 04:03

## 2020-07-19 RX ADMIN — METOPROLOL TARTRATE 12.5 MG: 25 TABLET, FILM COATED ORAL at 09:06

## 2020-07-19 RX ADMIN — MORPHINE SULFATE 2 MG: 2 INJECTION, SOLUTION INTRAMUSCULAR; INTRAVENOUS at 04:03

## 2020-07-19 RX ADMIN — CEFUROXIME SODIUM 1.5 G: 1.5 INJECTION, POWDER, FOR SOLUTION INTRAVENOUS at 02:21

## 2020-07-19 RX ADMIN — HYDROCODONE BITARTRATE AND ACETAMINOPHEN 1 TABLET: 7.5; 325 TABLET ORAL at 09:57

## 2020-07-19 RX ADMIN — MORPHINE SULFATE 2 MG: 2 INJECTION, SOLUTION INTRAMUSCULAR; INTRAVENOUS at 01:11

## 2020-07-19 RX ADMIN — BUDESONIDE AND FORMOTEROL FUMARATE DIHYDRATE 2 PUFF: 160; 4.5 AEROSOL RESPIRATORY (INHALATION) at 08:50

## 2020-07-19 RX ADMIN — OXYCODONE HYDROCHLORIDE AND ACETAMINOPHEN 2 TABLET: 5; 325 TABLET ORAL at 14:23

## 2020-07-19 RX ADMIN — ONDANSETRON 4 MG: 2 INJECTION INTRAMUSCULAR; INTRAVENOUS at 20:19

## 2020-07-19 RX ADMIN — MORPHINE SULFATE 2 MG: 2 INJECTION, SOLUTION INTRAMUSCULAR; INTRAVENOUS at 06:27

## 2020-07-19 RX ADMIN — ASPIRIN 325 MG: 325 TABLET, COATED ORAL at 09:06

## 2020-07-19 RX ADMIN — OXYCODONE HYDROCHLORIDE AND ACETAMINOPHEN 2 TABLET: 5; 325 TABLET ORAL at 20:14

## 2020-07-19 RX ADMIN — ATORVASTATIN CALCIUM 40 MG: 40 TABLET, FILM COATED ORAL at 20:14

## 2020-07-19 RX ADMIN — METOPROLOL TARTRATE 12.5 MG: 25 TABLET, FILM COATED ORAL at 20:14

## 2020-07-19 RX ADMIN — OXYCODONE HYDROCHLORIDE AND ACETAMINOPHEN 2 TABLET: 5; 325 TABLET ORAL at 01:11

## 2020-07-19 RX ADMIN — DOCUSATE SODIUM 50MG AND SENNOSIDES 8.6MG 2 TABLET: 8.6; 5 TABLET, FILM COATED ORAL at 09:06

## 2020-07-19 RX ADMIN — DOCUSATE SODIUM 50MG AND SENNOSIDES 8.6MG 2 TABLET: 8.6; 5 TABLET, FILM COATED ORAL at 20:14

## 2020-07-19 RX ADMIN — OXYCODONE HYDROCHLORIDE AND ACETAMINOPHEN 2 TABLET: 5; 325 TABLET ORAL at 06:27

## 2020-07-19 NOTE — ADDENDUM NOTE
Addendum  created 07/19/20 1325 by Pedro Patel Jr., MD    Intraprocedure Blocks edited, Sign clinical note

## 2020-07-19 NOTE — THERAPY TREATMENT NOTE
Patient Name: Veda Enamorado  : 1965    MRN: [id number]                              Today's Date: 2020       Admit Date: 2020    Visit Dx:     ICD-10-CM ICD-9-CM   1. CAD in native artery I25.10 414.01     Patient Active Problem List   Diagnosis   • Coronary artery disease involving native coronary artery of native heart with unstable angina pectoris (CMS/HCC)   • Tobacco abuse   • Hyperlipidemia   • Essential hypertension   • Visit for wound check   • Mixed hyperlipidemia   • Cellulitis of labia   • Labial abscess   • MRSA (methicillin resistant staph aureus) culture positive   • Left main coronary artery disease   • Chronic low back pain   • GERD without esophagitis   • Coronary artery disease   • CAD in native artery     Past Medical History:   Diagnosis Date   • Arthritis     back   • Back ache    • Chronic back pain    • Coronary artery disease     s/p 3 stents    • GERD (gastroesophageal reflux disease)    • History of MRSA infection 2020    labia; hospitalized 6 days on IV antibiotics and then went home on po antibiotics    • Hyperlipidemia    • Hypertension    • Peptic ulceration      Past Surgical History:   Procedure Laterality Date   • CARDIAC CATHETERIZATION     • CARDIAC CATHETERIZATION N/A 2019    Procedure: Left Heart Cath;  Surgeon: Lazaro Conway MD;  Location: [facility] INVASIVE LOCATION;  Service: Cardiology   • CARDIAC CATHETERIZATION     • CARDIAC SURGERY     •  SECTION      x2   • HAND SURGERY      right   • PERINEAL LESION/CYST EXCISION Right 2020    Procedure: I&D ABSCESS;  Surgeon: Leander Cardenas III, MD;  Location: [facility] OR;  Service: Obstetrics/Gynecology     General Information     Row Name 20 1043          PT Evaluation Time/Intention    Document Type  therapy note (daily note)  -ES     Mode of Treatment  physical therapy;individual therapy  -ES     Row Name 20 1043          General Information    Patient Profile  Reviewed?  yes  -ES     Existing Precautions/Restrictions  sternal;oxygen therapy device and L/min;cardiac;fall  -ES     Barriers to Rehab  none identified  -ES     Row Name 07/19/20 1043          Cognitive Assessment/Intervention- PT/OT    Orientation Status (Cognition)  oriented x 4  -ES     Row Name 07/19/20 1043          Safety Issues, Functional Mobility    Safety Issues Affecting Function (Mobility)  safety precautions follow-through/compliance;safety precaution awareness  -ES     Impairments Affecting Function (Mobility)  pain;balance;endurance/activity tolerance;strength  -ES       User Key  (r) = Recorded By, (t) = Taken By, (c) = Cosigned By    Initials Name Provider Type    ES Tejas, Kiara, PT Physical Therapist        Mobility     Row Name 07/19/20 1043          Bed Mobility Assessment/Treatment    Comment (Bed Mobility)  Patient received and left up in chair.   -ES     Row Name 07/19/20 1043          Bed-Chair Transfer    Bed-Chair [place] (Transfers)  contact guard;1 person assist;verbal cues Chair to BSC.  -ES     Row Name 07/19/20 1043          Sit-Stand Transfer    Sit-Stand [place] (Transfers)  1 person assist;verbal cues;contact guard  -ES     Row Name 07/19/20 1043          Gait/Stairs Assessment/Training    69136 - Gait Training Minutes   15  -ES     Gait/Stairs Assessment/Training  gait/ambulation independence  -ES     [place] Level (Gait)  contact guard;1 person assist;verbal cues Progressing to SBA x1.  -ES     Assistive Device (Gait)  -- Initial HHA x1 progressing to no assistive device.  -ES     Distance in Feet (Gait)  140 feet  -ES     Pattern (Gait)  2-point  -ES     Deviations/Abnormal Patterns (Gait)  bilateral deviations;[place] decreased;gait speed decreased  -ES     Bilateral Gait Deviations  heel strike decreased;forward flexed posture  -ES     Comment (Gait/Stairs)  Patient able to ambulate 140 feet with initial CGA x1 progressing to SBA x1 without need for assistive  device. Patient with very guarded posture due to pain and nausea.   -ES       User Key  (r) = Recorded By, (t) = Taken By, (c) = Cosigned By    Initials Name Provider Type    ES Kiara Lazaro, PT Physical Therapist        Obj/Interventions     Row Name 07/19/20 1043          Static Sitting Balance    Level of [place] (Unsupported Sitting, Static Balance)  independent  -ES     Sitting Position (Unsupported Sitting, Static Balance)  sitting in chair  -ES     Time Able to Maintain Position (Unsupported Sitting, Static Balance)  more than 5 minutes  -ES     Row Name 07/19/20 1043          Dynamic Sitting Balance    Level of [place], Reaches Outside Midline (Sitting, Dynamic Balance)  independent  -ES     Sitting Position, Reaches Outside Midline (Sitting, Dynamic Balance)  sitting in chair  -ES     Row Name 07/19/20 1043          Static Standing Balance    Level of [place] (Supported Standing, Static Balance)  contact guard assist;1 person assist  -ES     Time Able to Maintain Position (Supported Standing, Static Balance)  1 to 2 minutes  -ES     Row Name 07/19/20 1043          Dynamic Standing Balance    Level of [place], Reaches Outside Midline (Standing, Dynamic Balance)  1 person assist;contact guard assist  -ES     Time Able to Maintain Position, Reaches Outside Midline (Standing, Dynamic Balance)  more than 5 minutes  -ES       User Key  (r) = Recorded By, (t) = Taken By, (c) = Cosigned By    Initials Name Provider Type    ES Kiara Lazaro, PT Physical Therapist        Goals/Plan    No documentation.       Clinical Impression     [facility] Name 07/19/20 1043          Pain Assessment    Additional Documentation  Pain Scale: FACES Pre/Post-Treatment (Group)  -ES     Row Name 07/19/20 1043          Pain Scale: Numbers Pre/Post-Treatment    Pain Location - Orientation  incisional  -ES     Pain Location  chest  -ES     Pain Intervention(s)  Splinting;Rest;Repositioned;Ambulation/increased activity;Cold  applied  -ES     Row Name 07/19/20 1043          Pain Scale: FACES Pre/Post-Treatment    Pain: FACES Scale, Pretreatment  4-->hurts little more  -ES     Pain: FACES Scale, Post-Treatment  6-->hurts even more  -ES     Pre/Post Treatment Pain Comment  Patient also complaining of nausea and gagging with initial mobility. RN notified and RN administered medications.   -ES     Row Name 07/19/20 1043          Plan of Care Review    Plan of Care Reviewed With  patient  -ES     Progress  improving  -ES     Outcome Summary  Patient able to progress ambulation distance slightly to 140 feet with CGA x1 progressing to SBA x1 without assistive device. Patient is very guarded with mobility throughout session due to pain and nausea. Continue to recommend home with assist.   -ES     Row Name 07/19/20 1043          Vital Signs    Pre Systolic BP Rehab  110  -ES     Pre Treatment Diastolic BP  75  -ES     Post Systolic BP Rehab  112  -ES     Post Treatment Diastolic BP  62  -ES     Pretreatment Heart Rate (beats/min)  58  -ES     Posttreatment Heart Rate (beats/min)  75  -ES     Pre SpO2 (%)  96  -ES     O2 Delivery Pre Treatment  nasal cannula  -ES     O2 Delivery Intra Treatment  nasal cannula  -ES     Post SpO2 (%)  93  -ES     O2 Delivery Post Treatment  nasal cannula  -ES     Pre Patient Position  Sitting  -ES     Intra Patient Position  Standing  -ES     Post Patient Position  Sitting  -ES     Row Name 07/19/20 1043          Positioning and Restraints    Pre-Treatment Position  sitting in chair/recliner  -ES     Post Treatment Position  chair  -ES     In Chair  notified nsg;reclined;call light within reach;encouraged to call for assist;RUE elevated;LUE elevated;legs elevated  -ES       User Key  (r) = Recorded By, (t) = Taken By, (c) = Cosigned By    Initials Name Provider Type    ES Tejas, Kiara, PT Physical Therapist        Outcome Measures     Row Name 07/19/20 1043          How much help from another person do you currently  need...    Turning from your back to your side while in flat bed without using bedrails?  4  -ES     Moving from lying on back to sitting on the side of a flat bed without bedrails?  3  -ES     Moving to and from a bed to a chair (including a wheelchair)?  3  -ES     Standing up from a chair using your arms (e.g., wheelchair, bedside chair)?  3  -ES     Climbing 3-5 steps with a railing?  3  -ES     To walk in hospital room?  3  -ES     AM-PAC 6 Clicks Score (PT)  19  -ES     Row Name 07/19/20 1043          Functional Assessment    Outcome Measure Options  AM-PAC 6 Clicks Basic Mobility (PT)  -ES       User Key  (r) = Recorded By, (t) = Taken By, (c) = Cosigned By    Initials Name Provider Type    Kiara Blunt PT Physical Therapist        Physical Therapy Education                 Title: PT OT SLP Therapies (In Progress)     Topic: Physical Therapy (In Progress)     Point: Mobility training (In Progress)     Description:   Instruct learner(s) on safety and technique for assisting patient out of bed, chair or wheelchair.  Instruct in the proper use of assistive devices, such as walker, crutches, cane or brace.              Patient Friendly Description:   It's important to get you on your feet again, but we need to do so in a way that is safe for you. Falling has serious consequences, and your personal safety is the most important thing of all.        When it's time to get out of bed, one of us or a family member will sit next to you on the bed to give you support.     If your doctor or nurse tells you to use a walker, crutches, a cane, or a brace, be sure you use it every time you get out of bed, even if you think you don't need it.    Learning Progress Summary           Patient Acceptance, D, NR by ES at 7/19/2020 1043    Acceptance, E, NR by ES at 7/18/2020 1357   Significant Other Acceptance, E, NR by ES at 7/18/2020 1357                   Point: Home exercise program (In Progress)     Description:   Instruct  learner(s) on appropriate technique for monitoring, assisting and/or progressing patient with therapeutic exercises and activities.              Learning Progress Summary           Patient Acceptance, D, NR by ES at 7/19/2020 1043    Acceptance, E, NR by ES at 7/18/2020 1357   Significant Other Acceptance, E, NR by ES at 7/18/2020 1357                   Point: Body mechanics (In Progress)     Description:   Instruct learner(s) on proper positioning and spine alignment for patient and/or caregiver during mobility tasks and/or exercises.              Learning Progress Summary           Patient Acceptance, D, NR by ES at 7/19/2020 1043    Acceptance, E, NR by ES at 7/18/2020 1357   Significant Other Acceptance, E, NR by ES at 7/18/2020 1357                   Point: Precautions (In Progress)     Description:   Instruct learner(s) on prescribed precautions during mobility and gait tasks              Learning Progress Summary           Patient Acceptance, D, NR by ES at 7/19/2020 1043    Acceptance, E, NR by ES at 7/18/2020 1357   Significant Other Acceptance, E, NR by ES at 7/18/2020 1357                               User Key     Initials Effective Dates Name Provider Type Discipline     06/18/19 -  Tejas, Kiara, PT Physical Therapist PT              PT Recommendation and Plan  Planned Therapy Interventions (PT Eval): balance training, bed mobility training, gait training, home exercise program, patient/family education, transfer training, strengthening, other (see comments)(functional endurance training)  Outcome Summary/Treatment Plan (PT)  Anticipated Discharge Disposition (PT): home with assist  Plan of Care Reviewed With: patient  Progress: improving  Outcome Summary: Patient able to progress ambulation distance slightly to 140 feet with CGA x1 progressing to SBA x1 without assistive device. Patient is very guarded with mobility throughout session due to pain and nausea. Continue to recommend home with assist.       Time Calculation:   PT Charges     Row Name 07/19/20 1043             Time Calculation    Start Time  1043  -ES      PT Received On  07/19/20  -ES      PT Goal Re-Cert Due Date  07/28/20  -ES         Time Calculation- PT    Total Timed Code Minutes- PT  33 minute(s)  -ES         Timed Charges    96907 - Gait Training Minutes   15  -ES      78052 - PT Therapeutic Activity Minutes  18  -ES        User Key  (r) = Recorded By, (t) = Taken By, (c) = Cosigned By    Initials Name Provider Type    ES Short, Kiara, PT Physical Therapist        Therapy Charges for Today     Code Description Service Date Service Provider Modifiers Qty    95739312969 HC GAIT TRAINING EA 15 MIN 7/18/2020 Short, Kiara, PT GP 1    11685287169 HC PT EVAL MOD COMPLEXITY 3 7/18/2020 Short, Kiara, PT GP 1    40806951644 HC GAIT TRAINING EA 15 MIN 7/19/2020 Short, Kiara, PT GP 1    68920546755 HC PT THERAPEUTIC ACT EA 15 MIN 7/19/2020 Short, Kiara, PT GP 1          PT G-Codes  Outcome Measure Options: AM-PAC 6 Clicks Basic Mobility (PT)  AM-PAC 6 Clicks Score (PT): 19    Kiara Short, PT  7/19/2020

## 2020-07-19 NOTE — PLAN OF CARE
Pt VSS throughout the night. Pain is her main issue which is being treated with prn meds. 2L of O2 added during sleep d/t apnea. Pt only voided 200ml, very concentrated. Ambulated well with little assist. Awaiting am labs, will contitnue to monitor.

## 2020-07-19 NOTE — PROGRESS NOTES
Veda Enamorado  [id number]  1965    POD# 2 S/P CABG x3    CC: My chest is sore    Subjective:  Awake alert no complaints except for the usual musculoskeletal soreness.  Hemodynamics are normal vital signs stable.  Patient ready for telemetry.      Coronary artery disease involving native coronary artery of native heart with unstable angina pectoris (CMS/[facility])    Tobacco abuse    Hyperlipidemia    Essential hypertension    CAD in native artery      Objective:    Vital Signs:  Temp:  [98.5 °F (36.9 °C)-98.9 °F (37.2 °C)] 98.7 °F (37.1 °C)  Heart Rate:  [64-95] 76  Resp:  [16-20] 16  BP: ()/(45-87) 125/68  Arterial Line BP: (92-96)/(52-86) 96/86    Physical Exam:   General Appearance: alert, appears stated age    Lungs: clear to auscultation    Heart: regular rhythm & normal rate, normal S1, S2,no murmur    Skin:warm and dry    Neuro: normal       Results from last 7 days   Lab Units 07/19/20  0614 07/19/20  0401   WBC 10*3/mm3  --  16.67*   HEMOGLOBIN g/dL 9.6* 5.7*   HEMATOCRIT % 31.2* 18.2*   PLATELETS 10*3/mm3  --  173     Results from last 7 days   Lab Units 07/19/20  0358   SODIUM mmol/L 137   POTASSIUM mmol/L 4.8   CHLORIDE mmol/L 105   CO2 mmol/L 26.0   BUN mg/dL 12   CREATININE mg/dL 0.66   GLUCOSE mg/dL 136*   CALCIUM mg/dL 8.3*       Imaging Results (Last 24 Hours)     Procedure Component Value Units Date/Time    XR Chest 1 View [[id number]] Collected:  07/19/20 1001     Updated:  07/19/20 1001    Narrative:          EXAMINATION: XR CHEST 1 VW-      INDICATION: Post-Op Heart Surgery; I25.10-Atherosclerotic heart disease  of native coronary artery without angina pectoris      COMPARISON: 07/18/2020     FINDINGS: [place]-Deloris catheter is been removed. Cardiac and mediastinal  silhouettes within normal limits. Mild increased markings at left lung  base. Small left pleural effusion. Vascular calcification seen within  the thoracic aorta. Chest tubes have been removed with no definite  pneumothorax.            Impression:       Removal of the chest tubes with small bilateral pleural  effusions. Mild increased markings at the lung bases. The [place]-Deloris  catheter is been removed.          XR Chest 1 View [[id number]] Collected:  07/18/20 0917     Updated:  07/18/20 1541    Narrative:          EXAMINATION: XR CHEST 1 VW - 07/18/2020     INDICATION: Post-op heart surgery. I25.10-Atherosclerotic heart disease  of native coronary artery without angina pectoris.      COMPARISON: 07/17/2020     FINDINGS: Portable chest reveals heart to be enlarged. Patient is status  post median sternotomy. [place]-Deloris catheter in place. Bilateral chest  tubes in place. No definite pneumothorax. Prominence seen of the  pulmonary vascularity bilaterally.           Impression:       Prominence seen of the pulmonary vascularity. Small right  pleural effusion. Degenerative changes seen within the spine. No new  focal parenchymal opacification present.     DICTATED:   07/18/2020  EDITED/ls :   07/18/2020      This report was finalized on 7/18/2020 3:37 PM by Dr. Katt Taylor MD.              Diagnosis:    Coronary artery disease involving native coronary artery of native heart with unstable angina pectoris (CMS/HCC)    Tobacco abuse    Hyperlipidemia    Essential hypertension    CAD in native artery      Assessment: Stable postoperative course      Plan: Transfer to telemetry I have reviewed, verified, and confirmed the above history and current status.  I have examined the patient and confirmed the above physical findings.     Ishmael Aranda MD  07/19/20  10:42

## 2020-07-19 NOTE — PROGRESS NOTES
Intensive Care Follow-up     Hospital:  LOS: 2 days   Ms. Veda Enamorado, 55 y.o. female is followed for:   Coronary artery disease involving native coronary artery of native heart with unstable angina pectoris (CMS/HCC)   Followed post CABG for medical needs including hyperglycemia and ventilator weaning     Subjective   Interval History:  The chart has been reviewed.  The patient has done well overnight.  She is much more comfortable today.  She has been able to take food without difficulty.  Mediastinal tubes were removed this morning which is helped her greatly.    The patient's past medical, surgical and social history were reviewed and updated in Epic as appropriate.        Objective     Infusions:    dexmedetomidine 0.2-1.5 mcg/kg/hr Last Rate: Stopped (07/18/20 0600)   dextrose 5 % with KCl 20 mEq 30 mL/hr Last Rate: 30 mL/hr (07/17/20 2148)   niCARdipine 5-15 mg/hr    norepinephrine 0.02-0.3 mcg/kg/min Last Rate: Stopped (07/19/20 0100)   phenylephrine 0.5-3 mcg/kg/min    propofol 5-50 mcg/kg/min Last Rate: Stopped (07/17/20 2346)     Medications:    aspirin 325 mg Oral Daily   atorvastatin 40 mg Oral Nightly   budesonide-formoterol 2 puff Inhalation BID - RT   furosemide 40 mg Intravenous Once   insulin lispro 0-7 Units Subcutaneous TID AC   metoprolol tartrate 12.5 mg Oral Q12H   pharmacy consult - MTM  Does not apply Daily   senna-docusate sodium 2 tablet Oral BID       Vital Sign Min/Max for last 24 hours  Temp  Min: 98.5 °F (36.9 °C)  Max: 98.9 °F (37.2 °C)   BP  Min: 99/61  Max: 139/81   Pulse  Min: 64  Max: 95   Resp  Min: 16  Max: 20   SpO2  Min: 89 %  Max: 100 %   Flow (L/min)  Min: 2  Max: 2       Input/Output for last 24 hour shift  07/18 0701 - 07/19 0700  In: 413 [I.V.:313]  Out: 845 [Urine:800]      Objective:  General Appearance:  In no acute distress, comfortable and well-appearing.    Vital signs: (most recent): Blood pressure 125/68, pulse 76, temperature 98.7 °F (37.1 °C),  "temperature source Oral, resp. rate 16, height 157.5 cm (62.01\"), weight 58.7 kg (129 lb 6.4 oz), SpO2 95 %.    HEENT: (Right internal jugular introducer)    Lungs:  Normal effort and normal respiratory rate.  Breath sounds clear to auscultation.    Heart: Normal rate.  Regular rhythm.  S1 normal and S2 normal.  No murmur or friction rub.   Chest: (Status post median sternotomy.   Wound is dressed and dry.)  Abdomen: Abdomen is soft.  Bowel sounds are normal.   There is no abdominal tenderness.     Extremities: Normal range of motion.  There is no deformity or dependent edema.    Pulses: Distal pulses are intact.    Neurological: Patient is alert and oriented to person, place and time.    Pupils:  Pupils are equal, round, and reactive to light.  Pupils are equal.   Skin:  Warm and dry.              Results from last 7 days   Lab Units 07/19/20  0614 07/19/20  0401 07/18/20  0127 07/17/20  2131   WBC 10*3/mm3  --  16.67* 20.33* 26.90*   HEMOGLOBIN g/dL 9.6* 5.7* 11.3* 13.2   PLATELETS 10*3/mm3  --  173 153 153     Results from last 7 days   Lab Units 07/19/20  0358 07/18/20  1426 07/18/20  0127 07/17/20  2131   SODIUM mmol/L 137 141 143 135*   POTASSIUM mmol/L 4.8 4.6 3.5 3.9   CO2 mmol/L 26.0 25.0 23.0 22.0   BUN mg/dL 12 12 8 7   CREATININE mg/dL 0.66 0.78 0.67 0.62   MAGNESIUM mg/dL  --   --  3.1* 3.7*   PHOSPHORUS mg/dL  --  4.5 2.0* 2.4*   GLUCOSE mg/dL 136* 141* 130* 158*     Estimated Creatinine Clearance: 90.3 mL/min (by C-G formula based on SCr of 0.66 mg/dL).    Results from last 7 days   Lab Units 07/18/20  0142   PH, ARTERIAL pH units 7.403   PCO2, ARTERIAL mm Hg 38.9   PO2 ART mm Hg 123.0*         I reviewed the patient's results and images.     Assessment/Plan   Impression        Coronary artery disease involving native coronary artery of native heart with unstable angina pectoris (CMS/HCC)    Tobacco abuse    Hyperlipidemia    Essential hypertension    CAD in native artery       Plan        Pulmonary " hygiene has been encouraged.  She will continue with mobilization through the unit.  She will require smoking cessation education prior to discharge.  Plan to transition to telemetry today.    Plan of care and goals reviewed with mulitdisciplinary/antibiotic stewardship team during rounds.   I discussed the patient's findings and my recommendations with patient and nursing staff     Jasbir Alegre MD, [facility]  Pulmonology and Critical Care Medicine

## 2020-07-20 LAB
ANION GAP SERPL CALCULATED.3IONS-SCNC: 9 MMOL/L (ref 5–15)
BUN SERPL-MCNC: 16 MG/DL (ref 6–20)
BUN/CREAT SERPL: 28.6 (ref 7–25)
CALCIUM SPEC-SCNC: 8.6 MG/DL (ref 8.6–10.5)
CHLORIDE SERPL-SCNC: 101 MMOL/L (ref 98–107)
CO2 SERPL-SCNC: 26 MMOL/L (ref 22–29)
CREAT SERPL-MCNC: 0.56 MG/DL (ref 0.57–1)
DEPRECATED RDW RBC AUTO: 52.5 FL (ref 37–54)
ERYTHROCYTE [DISTWIDTH] IN BLOOD BY AUTOMATED COUNT: 14.6 % (ref 12.3–15.4)
GFR SERPL CREATININE-BSD FRML MDRD: 113 ML/MIN/1.73
GLUCOSE BLDC GLUCOMTR-MCNC: 122 MG/DL (ref 70–130)
GLUCOSE BLDC GLUCOMTR-MCNC: 138 MG/DL (ref 70–130)
GLUCOSE BLDC GLUCOMTR-MCNC: 156 MG/DL (ref 70–130)
GLUCOSE BLDC GLUCOMTR-MCNC: 99 MG/DL (ref 70–130)
GLUCOSE SERPL-MCNC: 122 MG/DL (ref 65–99)
HCT VFR BLD AUTO: 32.4 % (ref 34–46.6)
HGB BLD-MCNC: 10 G/DL (ref 12–15.9)
MCH RBC QN AUTO: 30.3 PG (ref 26.6–33)
MCHC RBC AUTO-ENTMCNC: 30.9 G/DL (ref 31.5–35.7)
MCV RBC AUTO: 98.2 FL (ref 79–97)
PLATELET # BLD AUTO: 150 10*3/MM3 (ref 140–450)
PMV BLD AUTO: 11 FL (ref 6–12)
POTASSIUM SERPL-SCNC: 4.3 MMOL/L (ref 3.5–5.2)
RBC # BLD AUTO: 3.3 10*6/MM3 (ref 3.77–5.28)
SODIUM SERPL-SCNC: 136 MMOL/L (ref 136–145)
WBC # BLD AUTO: 15.97 10*3/MM3 (ref 3.4–10.8)

## 2020-07-20 PROCEDURE — 99232 SBSQ HOSP IP/OBS MODERATE 35: CPT | Performed by: INTERNAL MEDICINE

## 2020-07-20 PROCEDURE — 85027 COMPLETE CBC AUTOMATED: CPT | Performed by: PHYSICIAN ASSISTANT

## 2020-07-20 PROCEDURE — 97116 GAIT TRAINING THERAPY: CPT

## 2020-07-20 PROCEDURE — 97530 THERAPEUTIC ACTIVITIES: CPT

## 2020-07-20 PROCEDURE — 25010000002 ONDANSETRON PER 1 MG: Performed by: PHYSICIAN ASSISTANT

## 2020-07-20 PROCEDURE — 63710000001 INSULIN LISPRO (HUMAN) PER 5 UNITS: Performed by: PHYSICIAN ASSISTANT

## 2020-07-20 PROCEDURE — 25010000002 AMIODARONE IN DEXTROSE 5% 150-4.21 MG/100ML-% SOLUTION: Performed by: PHYSICIAN ASSISTANT

## 2020-07-20 PROCEDURE — 93010 ELECTROCARDIOGRAM REPORT: CPT | Performed by: INTERNAL MEDICINE

## 2020-07-20 PROCEDURE — 80048 BASIC METABOLIC PNL TOTAL CA: CPT | Performed by: PHYSICIAN ASSISTANT

## 2020-07-20 PROCEDURE — 25010000002 AMIODARONE IN DEXTROSE 5% 360-4.14 MG/200ML-% SOLUTION: Performed by: PHYSICIAN ASSISTANT

## 2020-07-20 PROCEDURE — 94799 UNLISTED PULMONARY SVC/PX: CPT

## 2020-07-20 PROCEDURE — 99254 IP/OBS CNSLTJ NEW/EST MOD 60: CPT | Performed by: INTERNAL MEDICINE

## 2020-07-20 PROCEDURE — 93005 ELECTROCARDIOGRAM TRACING: CPT | Performed by: PHYSICIAN ASSISTANT

## 2020-07-20 PROCEDURE — 82962 GLUCOSE BLOOD TEST: CPT

## 2020-07-20 RX ORDER — AMIODARONE HYDROCHLORIDE 200 MG/1
200 TABLET ORAL EVERY 8 HOURS
Status: DISCONTINUED | OUTPATIENT
Start: 2020-07-21 | End: 2020-07-21

## 2020-07-20 RX ORDER — AMIODARONE HYDROCHLORIDE 200 MG/1
200 TABLET ORAL DAILY
Status: DISCONTINUED | OUTPATIENT
Start: 2020-08-11 | End: 2020-07-21

## 2020-07-20 RX ORDER — HYDROCODONE BITARTRATE AND ACETAMINOPHEN 7.5; 325 MG/1; MG/1
1 TABLET ORAL EVERY 6 HOURS PRN
Qty: 30 TABLET | Refills: 0 | Status: SHIPPED | OUTPATIENT
Start: 2020-07-20 | End: 2022-09-13

## 2020-07-20 RX ORDER — AMIODARONE HYDROCHLORIDE 200 MG/1
200 TABLET ORAL ONCE
Status: DISCONTINUED | OUTPATIENT
Start: 2020-07-21 | End: 2020-07-21

## 2020-07-20 RX ORDER — AMIODARONE HYDROCHLORIDE 200 MG/1
200 TABLET ORAL EVERY 12 HOURS
Status: DISCONTINUED | OUTPATIENT
Start: 2020-07-28 | End: 2020-07-21

## 2020-07-20 RX ADMIN — METOPROLOL TARTRATE 25 MG: 25 TABLET, FILM COATED ORAL at 22:58

## 2020-07-20 RX ADMIN — ASPIRIN 325 MG: 325 TABLET, COATED ORAL at 08:39

## 2020-07-20 RX ADMIN — AMIODARONE HYDROCHLORIDE 150 MG: 1.5 INJECTION, SOLUTION INTRAVENOUS at 13:10

## 2020-07-20 RX ADMIN — AMIODARONE HYDROCHLORIDE 1 MG/MIN: 1.8 INJECTION, SOLUTION INTRAVENOUS at 13:28

## 2020-07-20 RX ADMIN — ONDANSETRON 4 MG: 2 INJECTION INTRAMUSCULAR; INTRAVENOUS at 16:51

## 2020-07-20 RX ADMIN — BUDESONIDE AND FORMOTEROL FUMARATE DIHYDRATE 2 PUFF: 160; 4.5 AEROSOL RESPIRATORY (INHALATION) at 08:35

## 2020-07-20 RX ADMIN — ATORVASTATIN CALCIUM 40 MG: 40 TABLET, FILM COATED ORAL at 19:38

## 2020-07-20 RX ADMIN — INSULIN LISPRO 2 UNITS: 100 INJECTION, SOLUTION INTRAVENOUS; SUBCUTANEOUS at 08:38

## 2020-07-20 RX ADMIN — OXYCODONE HYDROCHLORIDE AND ACETAMINOPHEN 2 TABLET: 5; 325 TABLET ORAL at 16:50

## 2020-07-20 RX ADMIN — DOCUSATE SODIUM 50MG AND SENNOSIDES 8.6MG 2 TABLET: 8.6; 5 TABLET, FILM COATED ORAL at 08:39

## 2020-07-20 RX ADMIN — BUDESONIDE AND FORMOTEROL FUMARATE DIHYDRATE 2 PUFF: 160; 4.5 AEROSOL RESPIRATORY (INHALATION) at 20:43

## 2020-07-20 RX ADMIN — HYDROCODONE BITARTRATE AND ACETAMINOPHEN 1 TABLET: 7.5; 325 TABLET ORAL at 04:17

## 2020-07-20 RX ADMIN — OXYCODONE HYDROCHLORIDE AND ACETAMINOPHEN 2 TABLET: 5; 325 TABLET ORAL at 07:31

## 2020-07-20 RX ADMIN — HYDROCODONE BITARTRATE AND ACETAMINOPHEN 1 TABLET: 7.5; 325 TABLET ORAL at 22:57

## 2020-07-20 RX ADMIN — METOPROLOL TARTRATE 12.5 MG: 25 TABLET, FILM COATED ORAL at 08:39

## 2020-07-20 RX ADMIN — HYDROCODONE BITARTRATE AND ACETAMINOPHEN 1 TABLET: 7.5; 325 TABLET ORAL at 13:49

## 2020-07-20 RX ADMIN — AMIODARONE HYDROCHLORIDE 0.5 MG/MIN: 1.8 INJECTION, SOLUTION INTRAVENOUS at 19:37

## 2020-07-20 RX ADMIN — DOCUSATE SODIUM 50MG AND SENNOSIDES 8.6MG 2 TABLET: 8.6; 5 TABLET, FILM COATED ORAL at 19:37

## 2020-07-20 NOTE — PROGRESS NOTES
[facility] Medicine Services  PROGRESS NOTE    Patient Name: Veda Enamorado  : 1965  MRN: [id number]    Date of Admission: 2020  Primary Care Physician: Chiquita Choudhary APRN    Subjective   Subjective     CC:  Medical management CABG    HPI:  Feels well, symptoms controlled.  Pain controlled.    Review of Systems  No current fevers or chills  No current shortness of breath or cough  No current nausea, vomiting, or diarrhea  No current dysuria or hematuria      Objective   Objective     Vital Signs:   Temp:  [98 °F (36.7 °C)-98.8 °F (37.1 °C)] 98 °F (36.7 °C)  Heart Rate:  [67-92] 79  Resp:  [16-24] 18  BP: (115-161)/(53-95) 143/86        Physical Exam:  Constitutional:Awake, alert  HENT: NCAT, mucous membranes moist, neck supple  Respiratory: Clear to auscultation bilaterally, respiratory effort normal, nonlabored breathing   Cardiovascular: RRR, normal radial pulses  Gastrointestinal: Positive bowel sounds, soft, nontender, nondistended  Musculoskeletal: Normal musculature for age, no lower extremity edema, BMI 23  Psychiatric: Appropriate affect, cooperative, conversational  Neurologic: No slurred speech or facial droop, follows commands, oriented  Skin: No rashes or jaundice, warm      Results Reviewed:  Results from last 7 days   Lab Units 20  0455 20  0614 20  0401 20  0127 20  2131   WBC 10*3/mm3 15.97*  --  16.67* 20.33* 26.90*   HEMOGLOBIN g/dL 10.0* 9.6* 5.7* 11.3* 13.2   HEMATOCRIT % 32.4* 31.2* 18.2* 35.2 40.1   PLATELETS 10*3/mm3 150  --  173 153 153   INR   --   --   --  1.39* 1.41*     Results from last 7 days   Lab Units 20  0455 20  0358 20  1426   SODIUM mmol/L 136 137 141   POTASSIUM mmol/L 4.3 4.8 4.6   CHLORIDE mmol/L 101 105 107   CO2 mmol/L 26.0 26.0 25.0   BUN mg/dL 16 12 12   CREATININE mg/dL 0.56* 0.66 0.78   GLUCOSE mg/dL 122* 136* 141*   CALCIUM mg/dL 8.6 8.3* 8.7     Estimated Creatinine  Clearance: 106.4 mL/min (A) (by C-G formula based on SCr of 0.56 mg/dL (L)).    Microbiology Results Abnormal     None          Imaging Results (Last 24 Hours)     Procedure Component Value Units Date/Time    XR Chest 1 View [[id number]] Collected:  07/19/20 1001     Updated:  07/19/20 1543    Narrative:          EXAMINATION: XR CHEST 1 VW - 07/19/2020     INDICATION: Post-op heart surgery. I25.10-Atherosclerotic heart disease  of native coronary artery without angina pectoris.      COMPARISON: 07/18/2020     FINDINGS: [place] catheter has been removed. Cardiac and mediastinal  silhouettes within normal limits. Mild increased markings at left lung  base. Small left pleural effusion. Vascular calcification seen within  the thoracic aorta. Chest tubes have been removed with no definite  pneumothorax.           Impression:       Removal of the chest tubes with small bilateral pleural  effusions. Mild increased markings at the lung bases. The [place]  catheter is been removed.     DICTATED:   07/19/2020  EDITED/ls :   07/19/2020      This report was finalized on 7/19/2020 3:40 PM by Dr. Katt Taylor MD.             Results for orders placed in visit on 07/17/20   Emergent/Open-Heart Anesthesia MAICOL    Narrative Pedro Patel Jr., MD     7/19/2020  1:24 PM  Procedure Performed: Emergent/Open-Heart Anesthesia MAICOL      Start Time:  7/17/2020 4:44 PM       End Time:      Preanesthesia Checklist:  Patient identified, IV assessed, risks and benefits discussed, monitors   and equipment assessed, procedure being performed at surgeon's request and   anesthesia consent obtained.    General Procedure Information  Diagnostic Indications for Echo:  assessment of surgical repair  Physician Requesting Echo: Juanjo Coronado MD  Location performed:  OR  Intubated  Bite block not placed  Heart visualized  Probe Insertion:  Easy  Probe Type:  Multiplane  Modalities:  2D only, color flow mapping, continuous wave Doppler and    pulse wave Doppler    Echocardiographic and Doppler Measurements    Ventricles    Right Ventricle:  Cavity size normal.  Hypertrophy not present.  Thrombus not present.    Global function normal.    Left Ventricle:  Cavity size normal.  Hypertrophy not present.  Thrombus not present.    Global Function normal.      Ventricular Regional Function:  1- Basal Anteroseptal:  normal  2- Basal Anterior:  normal  3- Basal Anterolateral:  normal  4- Basal Inferolateral:  normal  5- Basal Inferior:  normal  6- Basal Inferoseptal:  normal  7- Mid Anteroseptal:  normal  8- Mid Anterior:  normal  9- Mid Anterolateral:  normal  10- Mid Inferolateral:  normal  11- Mid Inferior:  normal  12- Mid Inferoseptal:  normal  13- Apical Anterior:  normal  14- Apical Lateral:  normal  15- Apical Inferior:  normal  16- Apical Septal:  normal  17- Cameron:  normal      Valves    Aortic Valve:  Annulus normal.  Stenosis not present.  Regurgitation absent.  Leaflets   normal.  Leaflet motions normal.      Mitral Valve:  Annulus normal.  Stenosis not present.  Regurgitation trace.  Leaflets   normal.  Leaflet motions normal.      Tricuspid Valve:  Annulus normal.  Stenosis not present.  Regurgitation trace.  Leaflets   normal.  Leaflet motions normal.    Pulmonic Valve:  Annulus normal.  Stenosis not present.  Regurgitation trace.          Aorta    Ascending Aorta:  Size normal.  Dissection not present.  Plaque thickness less than 3 mm.    Mobile plaque not present.    Aortic Arch:  Size normal.  Dissection not present.  Plaque thickness less than 3 mm.    Mobile plaque not present.    Descending Aorta:  Size normal.  Dissection not present.  Plaque thickness less than 3 mm.    Mobile plaque not present.          Atria    Right Atrium:  Size normal.  Spontaneous echo contrast not present.  Thrombus not   present.  Tumor not present.    Left Atrium:  Size normal.  Spontaneous echo contrast not present.  Thrombus not   present.  Tumor not present.   Left atrial appendage normal.      Septa    Atrial Septum:  Intra-atrial septal morphology normal.      Ventricular Septum:  Intra-ventricular septum morphology normal.          Other Findings  Pericardium:  normal  Pleural Effusion:  none  Pulmonary Arteries:  normal  Pulmonary Venous Flow:  normal    Anesthesia Information  Performed Personally      Echocardiogram Comments:       Diagnostic intraoperative MAICOL performed for CABG.  Surgeon - Dr. Coronado    Baseline Exam:  - LV is normal in size and function with no regional wall motion   abnormalities. The LVEF is >60%.  - RV is normal in size with preserved systolic function.  - There are no significant valvular abnormalities.    Post-procedure Exam:  - Interval 3 vessel CABG performed on CPB.  - No inotropic support.  - Biventricular function is stable post-CPB without any new regional wall   motion abnormalities.  - No new valvular abnormalities.  - No evidence of dissection in the visualized portions of the aorta.          I have reviewed the medications:  Scheduled Meds:  aspirin 325 mg Oral Daily   atorvastatin 40 mg Oral Nightly   budesonide-formoterol 2 puff Inhalation BID - RT   insulin lispro 0-7 Units Subcutaneous TID AC   metoprolol tartrate 12.5 mg Oral Q12H   pharmacy consult - MTM  Does not apply Daily   senna-docusate sodium 2 tablet Oral BID     Continuous Infusions:  dextrose 5 % with KCl 20 mEq 30 mL/hr Last Rate: 30 mL/hr (07/17/20 2148)     PRN Meds:.•  acetaminophen  •  dextrose  •  dextrose  •  fentaNYL citrate (PF)  •  glucagon (human recombinant)  •  HYDROcodone-acetaminophen  •  Morphine  •  ondansetron  •  oxyCODONE-acetaminophen  •  potassium chloride **OR** potassium chloride    Assessment/Plan   Assessment & Plan     Active Hospital Problems    Diagnosis  POA   • **Coronary artery disease involving native coronary artery of native heart with unstable angina pectoris (CMS/HCC) [I25.110]  Yes   • CAD in native artery [I25.10]  Yes   •  Essential hypertension [I10]  Yes   • Mixed hyperlipidemia [E78.2]  Yes   • Tobacco abuse [Z72.0]  Yes      Resolved Hospital Problems   No resolved problems to display.        Brief Hospital Course to date:  Veda Enamorado is a 55 y.o. female status post CABG    Encourage pulmonary hygiene.  Mobilize  Smoking cessation counseled.  Continue pain control, symptoms seem reasonably well controlled at this time.  Continue statin for hyperlipidemia, previous records reviewed and mixed hyperlipidemia seems much improved with current therapy.  Continue combination inhaler   Reportedly possible discharge tomorrow.    DVT Prophylaxis: Deferred to surgery    CODE STATUS:   Code Status and Medical Interventions:   Ordered at: 07/17/20 2122     Level Of Support Discussed With:    Patient     Code Status:    CPR     Medical Interventions (Level of Support Prior to Arrest):    Full         Electronically signed by Uri Kent MD, 07/20/20, 10:52.

## 2020-07-20 NOTE — PROGRESS NOTES
Discharge Planning Assessment  [facility]     Patient Name: Veda Enamorado  MRN: [id number]  Today's Date: 7/20/2020    Admit Date: 7/17/2020    Discharge Needs Assessment     Row Name 07/20/20 0952       Living Environment    Lives With  spouse    Name(s) of Who Lives With Patient  Ramesh Enamorado- spouse    Current Living Arrangements  home/apartment/condo    Primary Care Provided by  self    Provides Primary Care For  no one, unable/limited ability to care for self    Family Caregiver if Needed  spouse    Quality of Family Relationships  involved    Able to Return to Prior Arrangements  yes       Resource/Environmental Concerns    Resource/Environmental Concerns  none    Transportation Concerns  car, none       Transition Planning    Patient/Family Anticipates Transition to  home with family    Patient/Family Anticipated Services at Transition      Transportation Anticipated  family or friend will provide       Discharge Needs Assessment    Concerns to be Addressed  discharge planning    Equipment Currently Used at Home  none    Equipment Needed After Discharge  none    Provided Post Acute Provider List?  N/A    N/A Provider List Comment  No needs noted        Discharge Plan     Row Name 07/20/20 0954       Plan    Plan  Home with family    Patient/Family in Agreement with Plan  yes    Plan Comments  Spoke with pt. at bedside. Lives with spouse in [facility]. No DME used at home. Pt's plan is to dc to home with family. States family can transport.     Final Discharge Disposition Code  01 - home or self-care        Destination      Coordination has not been started for this encounter.      Durable Medical Equipment      Coordination has not been started for this encounter.      Dialysis/Infusion      Coordination has not been started for this encounter.      Home Medical Care      Coordination has not been started for this encounter.      Therapy      Coordination has not been  started for this encounter.      Community Resources      Coordination has not been started for this encounter.          Demographic Summary    No documentation.       Functional Status     Row Name 07/20/20 0952       Functional Status    Usual Activity Tolerance  moderate    Current Activity Tolerance  moderate       Functional Status, IADL    Medications  independent    Meal Preparation  assistive person    Housekeeping  assistive person    Laundry  assistive person    Shopping  assistive person        Psychosocial    No documentation.       Abuse/Neglect    No documentation.       Legal    No documentation.       Substance Abuse    No documentation.       Patient Forms    No documentation.           Yvonne Hayes RN

## 2020-07-20 NOTE — PLAN OF CARE
Problem: Patient Care Overview  Goal: Plan of Care Review  Flowsheets (Taken 7/20/2020 7478)  Outcome Summary: Patient demonstrating slow but progressive improvements in functional mobility this session. Performs transfers with supervision with frequent vc for maintaining sternal precautions, ambulates without AD with supervision x 145', & performs bed mobility with CGA for maintaining sternal precautions. Ambulation distance limited by pain and fatigue. Continue to recommend home with assist at dc.

## 2020-07-20 NOTE — NURSING NOTE
Pt. Referred for Phase II Cardiac Rehab. Staff discussed benefits of exercise, program protocol, and educational material provided. Teach back verified.  Permission granted from patient for staff to fax referral information to outlying program at this time.  Staff faxed referral info to [facility].

## 2020-07-20 NOTE — THERAPY TREATMENT NOTE
Patient Name: Veda Enamorado  : 1965    MRN: [id number]                              Today's Date: 2020       Admit Date: 2020    Visit Dx:     ICD-10-CM ICD-9-CM   1. Coronary artery disease involving native coronary artery of native heart with unstable angina pectoris (CMS/[facility]) I25.110 414.01     411.1   2. CAD in native artery I25.10 414.01     Patient Active Problem List   Diagnosis   • Coronary artery disease involving native coronary artery of native heart with unstable angina pectoris (CMS/[facility])   • Tobacco abuse   • Mixed hyperlipidemia   • Essential hypertension   • Visit for wound check   • Mixed hyperlipidemia   • Cellulitis of labia   • Labial abscess   • MRSA (methicillin resistant staph aureus) culture positive   • Left main coronary artery disease   • Chronic low back pain   • GERD without esophagitis   • Coronary artery disease   • CAD in native artery     Past Medical History:   Diagnosis Date   • Arthritis     back   • Back ache    • Chronic back pain    • Coronary artery disease     s/p 3 stents    • GERD (gastroesophageal reflux disease)    • History of MRSA infection 2020    labia; hospitalized 6 days on IV antibiotics and then went home on po antibiotics    • Hyperlipidemia    • Hypertension    • Peptic ulceration      Past Surgical History:   Procedure Laterality Date   • CARDIAC CATHETERIZATION     • CARDIAC CATHETERIZATION N/A 2019    Procedure: Left Heart Cath;  Surgeon: Lazaro Conway MD;  Location: [facility] INVASIVE LOCATION;  Service: Cardiology   • CARDIAC CATHETERIZATION     • CARDIAC SURGERY     •  SECTION      x2   • CORONARY ARTERY BYPASS GRAFT N/A 2020    Procedure: MEDIAN STERNOTOMY, CORONARY ARTERY BYPASS X3 WITH  INTERNAL MAMMARY ARTERY GRAFTING, ENDOVASCULAR VEIN HARVESTING OF THE RIGHT GREATER SAPHENOUS VEIN, MAICOL PER ANESTHESIA;  Surgeon: Juanjo Coronado MD;  Location: [facility] OR;  Service: Cardiothoracic;   Laterality: N/A;  ST ON LE  VO: 1800  VR: 1814   • HAND SURGERY      right   • PERINEAL LESION/CYST EXCISION Right 2020    Procedure: I&D ABSCESS;  Surgeon: Leander Cardenas III, MD;  Location: [facility];  Service: Obstetrics/Gynecology     General Information     Row Name 20 1324          PT Evaluation Time/Intention    Document Type  therapy note (daily note)  -     Mode of Treatment  physical therapy;individual therapy  -     Row Name 20 1324          General Information    Patient Profile Reviewed?  yes  -     Existing Precautions/Restrictions  sternal;cardiac;fall  -LO     Row Name 20 132          Cognitive Assessment/Intervention- PT/OT    Orientation Status (Cognition)  oriented x 4  -LO     Row Name 20 132          Safety Issues, Functional Mobility    Impairments Affecting Function (Mobility)  pain;balance;endurance/activity tolerance;strength  -       User Key  (r) = Recorded By, (t) = Taken By, (c) = Cosigned By    Initials Name Provider Type    LO Tran Ibanez, PT Physical Therapist        Mobility     Row Name 20 1324          Bed Mobility Assessment/Treatment    Bed Mobility Assessment/Treatment  sit-supine;rolling right  -     Assistive Device (Bed Mobility)  head of bed elevated  -     Comment (Bed Mobility)  Patient instructed through gentle log roll with sternal pillow back into bed  -     Row Name 20 132          Transfer Assessment/Treatment    Comment (Transfers)  vc for sternal precautions  -     Row Name 20 1324          Sit-Stand Transfer    Sit-Stand [place] (Transfers)  1 person assist;verbal cues;contact guard  -     Assistive Device (Sit-Stand Transfers)  -- no AD  -LO     Row Name 20 1324          Gait/Stairs Assessment/Training    Gait/Stairs Assessment/Training  gait/ambulation independence  -     [place] Level (Gait)  1 person assist;verbal cues;supervision  -     Assistive Device (Gait)  -- no  AD  -LO     Distance in Feet (Gait)  145'  -LO     Pattern (Gait)  step-through  -LO     Deviations/Abnormal Patterns (Gait)  bilateral deviations;[place] decreased;gait speed decreased  -LO     Bilateral Gait Deviations  heel strike decreased  -LO     Comment (Gait/Stairs)  supervision for ambulation without AD, patient with slow cautious walking speed with shortened step length  -       User Key  (r) = Recorded By, (t) = Taken By, (c) = Cosigned By    Initials Name Provider Type    Tran Beebe PT Physical Therapist        Obj/Interventions     Row Name 07/20/20 1324          Static Sitting Balance    Level of [place] (Unsupported Sitting, Static Balance)  independent  -LO     Sitting Position (Unsupported Sitting, Static Balance)  sitting in chair  -LO     Time Able to Maintain Position (Unsupported Sitting, Static Balance)  more than 5 minutes  -LO     [facility] Name 07/20/20 1324          Dynamic Sitting Balance    Level of [place], Reaches Outside Midline (Sitting, Dynamic Balance)  independent  -LO     Sitting Position, Reaches Outside Midline (Sitting, Dynamic Balance)  sitting in chair  -LO     Row Name 07/20/20 1324          Static Standing Balance    Level of [place] (Supported Standing, Static Balance)  supervision  -LO     Time Able to Maintain Position (Supported Standing, Static Balance)  45 to 60 seconds  -LO     Assistive Device Utilized (Supported Standing, Static Balance)  -- no AD  -LO     Row Name 07/20/20 1324          Dynamic Standing Balance    Level of [place], Reaches Outside Midline (Standing, Dynamic Balance)  supervision  -LO     Time Able to Maintain Position, Reaches Outside Midline (Standing, Dynamic Balance)  1 to 2 minutes  -LO     Assistive Device Utilized (Supported Standing, Dynamic Balance)  -- no AD  -LO     Row Name 07/20/20 1324          Sensory Assessment/Intervention    Sensory General Assessment  no sensation deficits identified  -       User Key  (r)  = Recorded By, (t) = Taken By, (c) = Cosigned By    Initials Name Provider Type    LO Tran Ibanez, PT Physical Therapist        Goals/Plan    No documentation.       Clinical Impression     Row Name 07/20/20 1324          Pain Assessment    Additional Documentation  Pain Scale: FACES Pre/Post-Treatment (Group)  -LO     Row Name 07/20/20 1324          Pain Scale: Numbers Pre/Post-Treatment    Pain Scale: Numbers, Pretreatment  8/10  -LO     Pain Scale: Numbers, Post-Treatment  8/10  -LO     Pain Location - Orientation  incisional  -LO     Pain Location  chest  -LO     Pain Intervention(s)  Medication (See MAR);Repositioned;Ambulation/increased activity  -LO     Row Name 07/20/20 1324          Plan of Care Review    Plan of Care Reviewed With  patient;spouse  -LO     Progress  improving  -LO     Row Name 07/20/20 1324          Physical Therapy Clinical Impression    Criteria for Skilled Interventions Met (PT Clinical Impression)  yes  -LO     Rehab Potential (PT Clinical Summary)  good, to achieve stated therapy goals  -LO     Row Name 07/20/20 1324          Vital Signs    Pre Systolic BP Rehab  112  -LO     Pre Treatment Diastolic BP  91  -LO     Post Systolic BP Rehab  130  -LO     Post Treatment Diastolic BP  85  -LO     Pretreatment Heart Rate (beats/min)  68  -LO     Posttreatment Heart Rate (beats/min)  71  -LO     Pre SpO2 (%)  95  -LO     O2 Delivery Pre Treatment  room air  -LO     Intra SpO2 (%)  95  -LO     O2 Delivery Intra Treatment  room air  -LO     Post SpO2 (%)  97  -LO     O2 Delivery Post Treatment  room air  -LO     Pre Patient Position  Sitting  -LO     Intra Patient Position  Standing  -LO     Post Patient Position  Supine  -LO     Row Name 07/20/20 1324          Positioning and Restraints    Pre-Treatment Position  sitting in chair/recliner  -LO     Post Treatment Position  bed per patient request  -LO     In Bed  encouraged to call for assist;call light within reach;with family/caregiver;with  nsg  -LO       User Key  (r) = Recorded By, (t) = Taken By, (c) = Cosigned By    Initials Name Provider Type    Tran Beebe PT Physical Therapist        Outcome Measures     Row Name 07/20/20 1324          How much help from another person do you currently need...    Turning from your back to your side while in flat bed without using bedrails?  4  -LO     Moving from lying on back to sitting on the side of a flat bed without bedrails?  3  -LO     Moving to and from a bed to a chair (including a wheelchair)?  3  -LO     Standing up from a chair using your arms (e.g., wheelchair, bedside chair)?  3  -LO     Climbing 3-5 steps with a railing?  3  -LO     To walk in hospital room?  3  -LO     AM-PAC 6 Clicks Score (PT)  19  -LO       User Key  (r) = Recorded By, (t) = Taken By, (c) = Cosigned By    Initials Name Provider Type    Tran Beebe PT Physical Therapist        Physical Therapy Education                 Title: PT OT SLP Therapies (In Progress)     Topic: Physical Therapy (In Progress)     Point: Mobility training (In Progress)     Description:   Instruct learner(s) on safety and technique for assisting patient out of bed, chair or wheelchair.  Instruct in the proper use of assistive devices, such as walker, crutches, cane or brace.              Patient Friendly Description:   It's important to get you on your feet again, but we need to do so in a way that is safe for you. Falling has serious consequences, and your personal safety is the most important thing of all.        When it's time to get out of bed, one of us or a family member will sit next to you on the bed to give you support.     If your doctor or nurse tells you to use a walker, crutches, a cane, or a brace, be sure you use it every time you get out of bed, even if you think you don't need it.    Learning Progress Summary           Patient Acceptance, D, NR by ES at 7/19/2020 1043    Acceptance, E, NR by ES at 7/18/2020 1357   Significant  Other Acceptance, E, NR by ES at 7/18/2020 1357                   Point: Home exercise program (In Progress)     Description:   Instruct learner(s) on appropriate technique for monitoring, assisting and/or progressing patient with therapeutic exercises and activities.              Learning Progress Summary           Patient Acceptance, D, NR by ES at 7/19/2020 1043    Acceptance, E, NR by ES at 7/18/2020 1357   Significant Other Acceptance, E, NR by ES at 7/18/2020 1357                   Point: Body mechanics (In Progress)     Description:   Instruct learner(s) on proper positioning and spine alignment for patient and/or caregiver during mobility tasks and/or exercises.              Learning Progress Summary           Patient Acceptance, D, NR by ES at 7/19/2020 1043    Acceptance, E, NR by ES at 7/18/2020 1357   Significant Other Acceptance, E, NR by ES at 7/18/2020 1357                   Point: Precautions (In Progress)     Description:   Instruct learner(s) on prescribed precautions during mobility and gait tasks              Learning Progress Summary           Patient Acceptance, E,TB, NR by  at 7/20/2020 1324    Comment:  Patient education regarding sternal precautions to maintain for bed mobility and transfers.    Acceptance, D, NR by ES at 7/19/2020 1043    Acceptance, E, NR by ES at 7/18/2020 1357   Significant Other Acceptance, E, NR by ES at 7/18/2020 1357                               User Key     Initials Effective Dates Name Provider Type Discipline     06/18/19 -  Kiara Lazaro, PT Physical Therapist PT     03/11/20 -  Tran Ibanez PT Physical Therapist PT              PT Recommendation and Plan     Outcome Summary/Treatment Plan (PT)  Anticipated Discharge Disposition (PT): home with assist  Plan of Care Reviewed With: patient, spouse  Progress: improving  Outcome Summary: Patient demonstrating slow but progressive improvements in functional mobility this session. Performs transfers with  supervision with frequent vc for maintaining sternal precautions, ambulates without AD with supervision x 145', & performs bed mobility with CGA for maintaining sternal precautions. Ambulation distance limited by pain and fatigue. Continue to recommend home with assist at dc.     Time Calculation:   PT Charges     Row Name 07/20/20 1324             Time Calculation    Start Time  1324  -LO      PT Received On  07/20/20  -LO      PT Goal Re-Cert Due Date  07/28/20  -LO         Timed Charges    00222 - Gait Training Minutes   14  -LO      62080 - PT Therapeutic Activity Minutes  10  -LO        User Key  (r) = Recorded By, (t) = Taken By, (c) = Cosigned By    Initials Name Provider Type    LO Tran Ibanez, PT Physical Therapist        Therapy Charges for Today     Code Description Service Date Service Provider Modifiers Qty    55080168385 HC GAIT TRAINING EA 15 MIN 7/20/2020 Tran Ibanez, PT GP 1    70276723021 HC PT THERAPEUTIC ACT EA 15 MIN 7/20/2020 Tran Ibanez, PT GP 1          PT G-Codes  Outcome Measure Options: AM-PAC 6 Clicks Basic Mobility (PT)  AM-PAC 6 Clicks Score (PT): 19    Tran Ibanez PT  7/20/2020

## 2020-07-20 NOTE — PROGRESS NOTES
Cardiothoracic Surgery Progress Note      POD # 3 s/p CABG x 3     LOS: 3 days      Subjective:  No complaints other than being uncomfortable    Objective:  Vital Signs  Temp:  [98 °F (36.7 °C)-98.8 °F (37.1 °C)] 98 °F (36.7 °C)  Heart Rate:  [67-92] 79  Resp:  [16-24] 18  BP: (115-161)/(53-95) 143/86    Physical Exam:   General Appearance: alert, appears stated age and cooperative   Lungs: clear to auscultation, respirations regular, respirations even and respirations unlabored   Heart: regular rhythm & normal rate, normal S1, S2 and no murmur, no gallop, no rub   Skin: Incision c/d/i     Results:  Results from last 7 days   Lab Units 07/20/20  0455   WBC 10*3/mm3 15.97*   HEMOGLOBIN g/dL 10.0*   HEMATOCRIT % 32.4*   PLATELETS 10*3/mm3 150     Results from last 7 days   Lab Units 07/20/20  0455   SODIUM mmol/L 136   POTASSIUM mmol/L 4.3   CHLORIDE mmol/L 101   CO2 mmol/L 26.0   BUN mg/dL 16   CREATININE mg/dL 0.56*   GLUCOSE mg/dL 122*   CALCIUM mg/dL 8.6       Assessment:  POD # 3 s/p CABG x 3    Plan:  Ambulate  Pulmonary toilet  ASA, statin, BB  D/C home tomorrow    Juanjo Coronado MD  07/20/20  09:20

## 2020-07-20 NOTE — CONSULTS
[facility] at [facility]  Consultation History and Physical  Veda Enamorado  1965      PCP:   Chiquita Choudhary APRN        Date of  Consultation: 7/20/2020 18:10     Reason for Consultation: Postoperative atrial fibrillation    History of Present Illness:  Veda Enamorado  Is a 55 y.o. female with medical history including hypertension, hyperlipidemia, multivessel coronary artery disease.  She is status post three-vessel bypass on 7/17/2020.  She had a LIMA to LAD graft.  And vein graft to OM1 and D1.  Catheterization had showed complex bifurcation disease not amenable to PCI.  She has been having a stable postoperative course.  This morning noticed new onset of palpitations and some mild shortness of breath.  Was found to be in A. fib with RVR.  Amiodarone was ordered but the patient spontaneously converted to sinus with frequent PACs prior to receiving.  At time of my evaluation she is on room air and back to baseline.  No chest pain.  Was anticipating discharge home prior to this episode.  EKG in sinus rhythm does show some ST elevation in lead I and lead II which have been stable and noted previously postoperatively.  Patient is also been mildly hypertensive      Patient Active Problem List    Diagnosis Date Noted   • *Coronary artery disease involving native coronary artery of native heart with unstable angina pectoris (CMS/[facility]) 09/05/2016   • CAD in native artery 07/17/2020   • Coronary artery disease 07/12/2020   • Chronic low back pain 07/11/2020   • GERD without esophagitis 07/11/2020   • Left main coronary artery disease 06/17/2020   • MRSA (methicillin resistant staph aureus) culture positive 01/12/2020   • Cellulitis of labia 01/08/2020   • Labial abscess 01/08/2020   • Mixed hyperlipidemia 12/16/2019     Note Last Updated: 12/16/2019     Added automatically from request for surgery [id number]     • Visit for wound check 02/26/2019   • Essential hypertension 09/18/2018    • Tobacco abuse 09/05/2016   • Mixed hyperlipidemia 09/05/2016       Allergies   Allergen Reactions   • Bactrim [Sulfamethoxazole-Trimethoprim] Rash   • Ciprofloxacin Other (See Comments)     tremors   • Ranexa [Ranolazine Er] Unknown - Low Severity       Social History     Socioeconomic History   • Marital status:      Spouse name: Not on file   • Number of children: 2   • Years of education: Not on file   • Highest education level: Not on file   Occupational History   • Occupation: Homemaker     Comment: disability   Tobacco Use   • Smoking status: Current Every Day Smoker     Packs/day: 0.25     Years: 25.00     Pack years: 6.25     Types: Electronic Cigarette, Cigarettes   • Smokeless tobacco: Never Used   • Tobacco comment: Pt states only smoking a few cigs a day, transitioning to E-Cig.   Substance and Sexual Activity   • Alcohol use: No   • Drug use: No   • Sexual activity: Defer       Family History   Problem Relation Age of Onset   • Heart disease Mother    • Heart disease Father    • Heart attack Father    • No Known Problems Sister    • Heart attack Brother    • Heart disease Brother    • Breast cancer Neg Hx        Current Medications:    Current Facility-Administered Medications:   •  acetaminophen (TYLENOL) tablet 650 mg, 650 mg, Oral, Q6H PRN, Fabrice Brigth PA  •  [COMPLETED] amiodarone in dextrose 5% (NEXTERONE) loading dose 150mg/100mL, 150 mg, Intravenous, Once, 150 mg at 07/20/20 1310 **FOLLOWED BY** amiodarone (NEXTERONE) 360 mg/200 mL (1.8 mg/mL) infusion, 1 mg/min, Intravenous, Continuous, Last Rate: 33.3 mL/hr at 07/20/20 1328, 1 mg/min at 07/20/20 1328 **FOLLOWED BY** amiodarone (NEXTERONE) 360 mg/200 mL (1.8 mg/mL) infusion, 0.5 mg/min, Intravenous, Continuous **FOLLOWED BY** [START ON 7/21/2020] amiodarone (PACERONE) tablet 200 mg, 200 mg, Oral, Once **FOLLOWED BY** [START ON 7/21/2020] amiodarone (PACERONE) tablet 200 mg, 200 mg, Oral, Q8H **FOLLOWED BY** [START ON 7/28/2020]  amiodarone (PACERONE) tablet 200 mg, 200 mg, Oral, Q12H **FOLLOWED BY** [START ON 8/11/2020] amiodarone (PACERONE) tablet 200 mg, 200 mg, Oral, Daily, Jessica Cheung PA  •  aspirin EC tablet 325 mg, 325 mg, Oral, Daily, Fabrice Bright PA, 325 mg at 07/20/20 0839  •  atorvastatin (LIPITOR) tablet 40 mg, 40 mg, Oral, Nightly, Fabrice Bright PA, 40 mg at 07/19/20 2014  •  budesonide-formoterol (SYMBICORT) 160-4.5 MCG/ACT inhaler 2 puff, 2 puff, Inhalation, BID - RT, Fabrice Bright PA, 2 puff at 07/20/20 0835  •  dextrose (D50W) 25 g/ 50mL Intravenous Solution 25 g, 25 g, Intravenous, Q15 Min PRN, Fabrice Bright PA  •  dextrose (GLUTOSE) oral gel 15 g, 15 g, Oral, Q15 Min PRN, Fabrice Bright PA  •  dextrose 5 % with KCl 20 mEq/L infusion, 30 mL/hr, Intravenous, Continuous, Fabrice Bright PA, Last Rate: 30 mL/hr at 07/17/20 2148, 30 mL/hr at 07/17/20 2148  •  fentaNYL citrate (PF) (SUBLIMAZE) injection 25 mcg, 25 mcg, Intravenous, Q1H PRN, Fabrice Bright PA, 25 mcg at 07/18/20 0000  •  glucagon (human recombinant) (GLUCAGEN DIAGNOSTIC) injection 1 mg, 1 mg, Subcutaneous, Q15 Min PRN, Fabrice Bright PA  •  HYDROcodone-acetaminophen (NORCO) 7.5-325 MG per tablet 1 tablet, 1 tablet, Oral, Q4H PRN, Fabrice Bright PA, 1 tablet at 07/20/20 1349  •  insulin lispro (humaLOG) injection 0-7 Units, 0-7 Units, Subcutaneous, TID AC, Fabrice Bright PA, 2 Units at 07/20/20 0838  •  metoprolol tartrate (LOPRESSOR) tablet 25 mg, 25 mg, Oral, Q12H, Randall Marion MD  •  morphine injection 2 mg, 2 mg, Intravenous, Q30 Min PRN, Fabrice Bright PA, 2 mg at 07/19/20 0627  •  ondansetron (ZOFRAN) injection 4 mg, 4 mg, Intravenous, Q6H PRN, Fabrice Bright PA, 4 mg at 07/20/20 1651  •  oxyCODONE-acetaminophen (PERCOCET) 5-325 MG per tablet 2 tablet, 2 tablet, Oral, Q4H PRN, Fabrice Bright PA, 2 tablet at 07/20/20 1650  •  Pharmacy Consult - MT, , Does not apply, Daily, Fabrice Bright PA  •  potassium chloride (MICRO-K)  CR capsule 40 mEq, 40 mEq, Oral, PRN **OR** potassium chloride (KLOR-CON) packet 40 mEq, 40 mEq, Oral, PRN, Fabrice Bright PA  •  sennosides-docusate (PERICOLACE) 8.6-50 MG per tablet 2 tablet, 2 tablet, Oral, BID, Fabrice Bright PA, 2 tablet at 07/20/20 0839    Facility-Administered Medications Ordered in Other Encounters:   •  Chlorhexidine Gluconate Cloth 2 % pads 1 application, 1 application, Topical, Q12H PRN, Bianka Ramírez APRN     Review of Systems   Constitution: Negative for fever.   Cardiovascular: Positive for palpitations. Negative for chest pain, dyspnea on exertion, leg swelling, near-syncope and syncope.   Respiratory: Positive for shortness of breath. Negative for cough.    All other systems reviewed and are negative.      OBJECTIVE:  Vitals:    07/20/20 1300 07/20/20 1400 07/20/20 1500 07/20/20 1700   BP:       BP Location:       Patient Position:       Pulse: 84 74 68 69   Resp:       Temp:       TempSrc:       SpO2:  95% 94% 93%   Weight:       Height:         I/O last 3 completed shifts:  In: 613 [P.O.:200; I.V.:313; IV Piggyback:100]  Out: 800 [Urine:800]  I/O this shift:  In: 200 [P.O.:200]  Out: -   Intake & Output (last 3 days)       07/17 0701 - 07/18 0700 07/18 0701 - 07/19 0700 07/19 0701 - 07/20 0700 07/20 0701 - 07/21 0700    P.O.   200 200    I.V. (mL/kg) 2726.7 (46.5) 313 (5.3)      Blood 760       Other 60       IV Piggyback 844 100      Total Intake(mL/kg) 4390.7 (74.8) 413 (7) 200 (3.4) 200 (3.4)    Urine (mL/kg/hr) 1370 800 (0.6) 350 (0.2)     Chest Tube 340 45      Total Output 1710 845 350     Net +2680.7 -432 -150 +200                     Physical Exam   Constitutional: She is oriented to person, place, and time. She appears well-developed and well-nourished.   Eyes: EOM are normal.   Neck: Neck supple.   Cardiovascular: Normal rate, regular rhythm and normal heart sounds.  Occasional extrasystoles are present. Exam reveals no friction rub.   No murmur heard.  Sternal  incisions healing well   Pulmonary/Chest: Effort normal. She has no rales.   Abdominal: Soft.   Musculoskeletal: She exhibits no edema.   Neurological: She is alert and oriented to person, place, and time.   Skin: Skin is warm and dry.       Diagnostic Data:  Lab Results (last 24 hours)     Procedure Component Value Units Date/Time    POC Glucose Once [[id number]]  (Abnormal) Collected:  07/20/20 1637    Specimen:  Blood Updated:  07/20/20 1641     Glucose 138 mg/dL     POC Glucose Once [[id number]]  (Normal) Collected:  07/20/20 1150    Specimen:  Blood Updated:  07/20/20 1158     Glucose 99 mg/dL     POC Glucose Once [[id number]]  (Abnormal) Collected:  07/20/20 0809    Specimen:  Blood Updated:  07/20/20 0810     Glucose 156 mg/dL     Basic Metabolic Panel [[id number]]  (Abnormal) Collected:  07/20/20 0455    Specimen:  Blood Updated:  07/20/20 0630     Glucose 122 mg/dL      BUN 16 mg/dL      Creatinine 0.56 mg/dL      Sodium 136 mmol/L      Potassium 4.3 mmol/L      Chloride 101 mmol/L      CO2 26.0 mmol/L      Calcium 8.6 mg/dL      eGFR Non African Amer 113 mL/min/1.73      BUN/Creatinine Ratio 28.6     Anion Gap 9.0 mmol/L     Narrative:       GFR Normal >60  Chronic Kidney Disease <60  Kidney Failure <15      CBC (No Diff) [[id number]]  (Abnormal) Collected:  07/20/20 0455    Specimen:  Blood Updated:  07/20/20 0616     WBC 15.97 10*3/mm3      RBC 3.30 10*6/mm3      Hemoglobin 10.0 g/dL      Hematocrit 32.4 %      MCV 98.2 fL      MCH 30.3 pg      MCHC 30.9 g/dL      RDW 14.6 %      RDW-SD 52.5 fl      MPV 11.0 fL      Platelets 150 10*3/mm3         ECG/EMG Results (last 24 hours)     Procedure Component Value Units Date/Time    ECG 12 Lead [[id number]] Collected:  07/20/20 1125     Updated:  07/20/20 1329    Narrative:       Test Reason : Amiodarone Protocol  Blood Pressure : **/** mmHG  Vent. Rate : 143 BPM     Atrial Rate : 178 BPM     P-R Int : 000 ms          QRS Dur : 080 ms      QT Int : 298 ms       P-R-T  Axes : 000 023 111 degrees     QTc Int : 459 ms    Atrial fibrillation with rapid ventricular response  Nonspecific T wave abnormality  Abnormal ECG  When compared with ECG of 19-JUL-2020 08:11,  Atrial fibrillation has replaced Sinus rhythm  Vent. rate has increased BY  69 BPM  T wave inversion now evident in Lateral leads  Confirmed by CHRIS LUONG MD ([id number]) on 7/20/2020 1:29:52 PM    Referred By:             Confirmed By:CHRIS LUONG MD    ECG 12 Lead [[id number]] Collected:  07/20/20 1205     Updated:  07/20/20 1330    Narrative:       Test Reason : Amiodarone Protocol  Blood Pressure : **/** mmHG  Vent. Rate : 083 BPM     Atrial Rate : 083 BPM     P-R Int : 130 ms          QRS Dur : 070 ms      QT Int : 364 ms       P-R-T Axes : 079 014 051 degrees     QTc Int : 427 ms    Sinus rhythm with premature atrial complexes  Otherwise normal ECG  When compared with ECG of 20-JUL-2020 11:25, (Unconfirmed)  Sinus rhythm has replaced Atrial fibrillation  Vent. rate has decreased BY  60 BPM  Nonspecific T wave abnormality has replaced inverted T waves in Lateral  leads  Confirmed by CHRIS LUONG MD ([id number]) on 7/20/2020 1:30:45 PM    Referred By:             Confirmed By:CHRIS LUONG MD        Preop echo:  · Normal left ventricular cavity size and wall thickness noted.  · Left ventricular systolic function is normal. Estimated EF appears to be in the range of 61 - 65%.  · Left ventricular diastolic function is normal.  · Tip of anterior mitral leaflet is mildly thickened ,no vegetations noted.  · No significant valvular heart disease  · There is no evidence of pericardial effusion.      Coronary artery disease involving native coronary artery of native heart with unstable angina pectoris (CMS/HCC)    Tobacco abuse    Mixed hyperlipidemia    Essential hypertension    CAD in native artery      Assessment/Plan:  1.  Multivessel CAD status post three-vessel CABG on 7/17/2020  2.  Hypertension  3.  Hyperlipidemia  4.   Postoperative paroxysmal atrial fibrillation  5.  Tobacco abuse    Plan:  -Continue aspirin and statin for CAD  -Increase metoprolol dose to 25 mg twice daily  -The patient spontaneously converted to sinus rhythm before receiving amiodarone, however was having frequent PACs and ectopy.  Recommended loading on amiodarone per protocol with a short 1 month postoperative course to prevent recurrences.  -Given short and self terminating nature of the episode would not recommend anticoagulation at this time  -Follow-up with cardiology in [place] in 1 month to reevaluate with consideration of Holter monitor once amiodarone taper complete  -If no recurrences overnight would be stable for discharge tomorrow from our standpoint        Randall Marion MD [facility]

## 2020-07-20 NOTE — PLAN OF CARE
Problem: Patient Care Overview  Goal: Plan of Care Review  7/20/2020 0428 by Isabelle Castro RN  Flowsheets (Taken 7/20/2020 0428)  Outcome Summary: Pt refused her last walk last night due to increased pain, PRN pain med given x2 this shift. Pt also complained of nausea, IV Zofran given with relief of symptoms. Pt desat while asleep, 2 L NC applied with O2 sats improving >90%. VSS, will continue to monitor.

## 2020-07-21 VITALS
DIASTOLIC BLOOD PRESSURE: 73 MMHG | OXYGEN SATURATION: 93 % | BODY MASS INDEX: 23.81 KG/M2 | SYSTOLIC BLOOD PRESSURE: 143 MMHG | TEMPERATURE: 98.1 F | HEART RATE: 60 BPM | RESPIRATION RATE: 22 BRPM | HEIGHT: 62 IN | WEIGHT: 129.4 LBS

## 2020-07-21 LAB
ANION GAP SERPL CALCULATED.3IONS-SCNC: 11 MMOL/L (ref 5–15)
BASOPHILS # BLD AUTO: 0.03 10*3/MM3 (ref 0–0.2)
BASOPHILS NFR BLD AUTO: 0.2 % (ref 0–1.5)
BH BB BLOOD EXPIRATION DATE: NORMAL
BH BB BLOOD TYPE BARCODE: 5100
BH BB DISPENSE STATUS: NORMAL
BH BB PRODUCT CODE: NORMAL
BH BB UNIT NUMBER: NORMAL
BUN SERPL-MCNC: 15 MG/DL (ref 6–20)
BUN/CREAT SERPL: 31.9 (ref 7–25)
CALCIUM SPEC-SCNC: 8.2 MG/DL (ref 8.6–10.5)
CHLORIDE SERPL-SCNC: 100 MMOL/L (ref 98–107)
CO2 SERPL-SCNC: 25 MMOL/L (ref 22–29)
CREAT SERPL-MCNC: 0.47 MG/DL (ref 0.57–1)
CROSSMATCH INTERPRETATION: NORMAL
DEPRECATED RDW RBC AUTO: 51.9 FL (ref 37–54)
EOSINOPHIL # BLD AUTO: 0.06 10*3/MM3 (ref 0–0.4)
EOSINOPHIL NFR BLD AUTO: 0.5 % (ref 0.3–6.2)
ERYTHROCYTE [DISTWIDTH] IN BLOOD BY AUTOMATED COUNT: 14.5 % (ref 12.3–15.4)
GFR SERPL CREATININE-BSD FRML MDRD: 138 ML/MIN/1.73
GLUCOSE BLDC GLUCOMTR-MCNC: 114 MG/DL (ref 70–130)
GLUCOSE BLDC GLUCOMTR-MCNC: 119 MG/DL (ref 70–130)
GLUCOSE SERPL-MCNC: 110 MG/DL (ref 65–99)
HCT VFR BLD AUTO: 31.2 % (ref 34–46.6)
HGB BLD-MCNC: 10 G/DL (ref 12–15.9)
IMM GRANULOCYTES # BLD AUTO: 0.05 10*3/MM3 (ref 0–0.05)
IMM GRANULOCYTES NFR BLD AUTO: 0.4 % (ref 0–0.5)
LYMPHOCYTES # BLD AUTO: 1.8 10*3/MM3 (ref 0.7–3.1)
LYMPHOCYTES NFR BLD AUTO: 14 % (ref 19.6–45.3)
MCH RBC QN AUTO: 31.2 PG (ref 26.6–33)
MCHC RBC AUTO-ENTMCNC: 32.1 G/DL (ref 31.5–35.7)
MCV RBC AUTO: 97.2 FL (ref 79–97)
MONOCYTES # BLD AUTO: 1.17 10*3/MM3 (ref 0.1–0.9)
MONOCYTES NFR BLD AUTO: 9.1 % (ref 5–12)
NEUTROPHILS NFR BLD AUTO: 75.8 % (ref 42.7–76)
NEUTROPHILS NFR BLD AUTO: 9.75 10*3/MM3 (ref 1.7–7)
NRBC BLD AUTO-RTO: 0 /100 WBC (ref 0–0.2)
PLATELET # BLD AUTO: 188 10*3/MM3 (ref 140–450)
PMV BLD AUTO: 10.1 FL (ref 6–12)
POTASSIUM SERPL-SCNC: 4.2 MMOL/L (ref 3.5–5.2)
RBC # BLD AUTO: 3.21 10*6/MM3 (ref 3.77–5.28)
SODIUM SERPL-SCNC: 136 MMOL/L (ref 136–145)
UNIT  ABO: NORMAL
UNIT  RH: NORMAL
WBC # BLD AUTO: 12.86 10*3/MM3 (ref 3.4–10.8)

## 2020-07-21 PROCEDURE — 97530 THERAPEUTIC ACTIVITIES: CPT

## 2020-07-21 PROCEDURE — 82962 GLUCOSE BLOOD TEST: CPT

## 2020-07-21 PROCEDURE — 85025 COMPLETE CBC W/AUTO DIFF WBC: CPT | Performed by: THORACIC SURGERY (CARDIOTHORACIC VASCULAR SURGERY)

## 2020-07-21 PROCEDURE — 97110 THERAPEUTIC EXERCISES: CPT

## 2020-07-21 PROCEDURE — 93010 ELECTROCARDIOGRAM REPORT: CPT | Performed by: INTERNAL MEDICINE

## 2020-07-21 PROCEDURE — 94799 UNLISTED PULMONARY SVC/PX: CPT

## 2020-07-21 PROCEDURE — 93005 ELECTROCARDIOGRAM TRACING: CPT | Performed by: PHYSICIAN ASSISTANT

## 2020-07-21 PROCEDURE — 80048 BASIC METABOLIC PNL TOTAL CA: CPT | Performed by: PHYSICIAN ASSISTANT

## 2020-07-21 PROCEDURE — 99232 SBSQ HOSP IP/OBS MODERATE 35: CPT | Performed by: INTERNAL MEDICINE

## 2020-07-21 RX ORDER — ATORVASTATIN CALCIUM 40 MG/1
40 TABLET, FILM COATED ORAL NIGHTLY
Qty: 30 TABLET | Refills: 6 | Status: SHIPPED | OUTPATIENT
Start: 2020-07-21 | End: 2020-07-22

## 2020-07-21 RX ORDER — AMIODARONE HYDROCHLORIDE 200 MG/1
200 TABLET ORAL EVERY 12 HOURS SCHEDULED
Qty: 60 TABLET | Refills: 0 | Status: SHIPPED | OUTPATIENT
Start: 2020-07-21 | End: 2020-08-13

## 2020-07-21 RX ORDER — AMIODARONE HYDROCHLORIDE 200 MG/1
200 TABLET ORAL EVERY 12 HOURS SCHEDULED
Status: DISCONTINUED | OUTPATIENT
Start: 2020-07-21 | End: 2020-07-21 | Stop reason: HOSPADM

## 2020-07-21 RX ADMIN — HYDROCODONE BITARTRATE AND ACETAMINOPHEN 1 TABLET: 7.5; 325 TABLET ORAL at 08:31

## 2020-07-21 RX ADMIN — DOCUSATE SODIUM 50MG AND SENNOSIDES 8.6MG 2 TABLET: 8.6; 5 TABLET, FILM COATED ORAL at 08:32

## 2020-07-21 RX ADMIN — BUDESONIDE AND FORMOTEROL FUMARATE DIHYDRATE 2 PUFF: 160; 4.5 AEROSOL RESPIRATORY (INHALATION) at 07:38

## 2020-07-21 RX ADMIN — AMIODARONE HYDROCHLORIDE 200 MG: 200 TABLET ORAL at 09:05

## 2020-07-21 RX ADMIN — METOPROLOL TARTRATE 25 MG: 25 TABLET, FILM COATED ORAL at 08:31

## 2020-07-21 RX ADMIN — ASPIRIN 325 MG: 325 TABLET, COATED ORAL at 08:31

## 2020-07-21 RX ADMIN — HYDROCODONE BITARTRATE AND ACETAMINOPHEN 1 TABLET: 7.5; 325 TABLET ORAL at 04:47

## 2020-07-21 RX ADMIN — HYDROCODONE BITARTRATE AND ACETAMINOPHEN 1 TABLET: 7.5; 325 TABLET ORAL at 13:03

## 2020-07-21 NOTE — PAYOR COMM NOTE
"Talita Guzman RN  Utilization Review  P: [phone]  F: [phone]    Ref # [id number]  DC date = 7/21/2020    Dolly Murry (55 y.o. Female)     Date of Birth Social Security Number Address Home Phone MRN    1965  [address] [place] [address] [phone] [id number]    Tenriism Marital Status          Other        Admission Date Admission Type Admitting Provider Attending Provider Department, Room/Bed    7/17/20 Elective Juanjo Coronado MD  [facility] 4G, S460/1    Discharge Date Discharge Disposition Discharge Destination        7/21/2020 Home or Self Care              Attending Provider:  (none)   Allergies:  Bactrim [Sulfamethoxazole-trimethoprim], Ciprofloxacin, Ranexa [Ranolazine Er]    Isolation:  None   Infection:  MRSA (01/10/20)   Code Status:  CPR    Ht:  157.5 cm (62.01\")   Wt:  58.7 kg (129 lb 6.4 oz)    Admission Cmt:  None   Principal Problem:  Coronary artery disease involving native coronary artery of native heart with unstable angina pectoris (CMS/[facility]) [I25.110]                 Active Insurance as of 7/17/2020     Primary Coverage     Payor Plan Insurance Group Employer/Plan Group    [employer] [place] [employer] [place]ICA[place]      Payor Plan Address Payor Plan Phone Number Payor Plan Fax Number Effective Dates    [address] [address] [phone]  9/1/2016 - None Entered    [facility] [id number]       Subscriber Name Subscriber Birth Date Member ID       DOLLY MURRY 1965 [id number]                 Emergency Contacts      (Rel.) Home Phone Work Phone Mobile Phone    FeiRamesh (Spouse) [phone] -- --    MurryTravis (Son) -- -- [phone]               Physician Progress Notes (last 48 hours) (Notes from 07/19/20 1435 through 07/21/20 1435)      Randall Marion MD at 07/21/20 0852          [facility] Progress Note     LOS: 4 days   Patient Care Team:  Chiquita Choudhary, " "APRN as PCP - General  Chiquita Choudhary APRN as PCP - Family Medicine  PCP:  Chiquita Choudhary APRN    Chief Complaint: Follow-up atrial fibrillation    Subjective: No further A. fib episodes since yesterday afternoon.  Occasional PACs on the monitor.  Plan for discharge home today.      Review of Systems:   All systems have been reviewed and are negative with the exception of those mentioned above.      Objective:    Vital Sign Min/Max for last 24 hours  Temp  Min: 97.9 °F (36.6 °C)  Max: 98.5 °F (36.9 °C)   BP  Min: 127/73  Max: 147/76   Pulse  Min: 64  Max: 151   Resp  Min: 14  Max: 18   SpO2  Min: 93 %  Max: 97 %   No data recorded   No data recorded     Flowsheet Rows      First Filed Value   Admission Height  157.5 cm (62\") Documented at 07/17/2020 1336   Admission Weight  58.7 kg (129 lb 6.4 oz) Documented at 07/17/2020 1336          Telemetry: Sinus rhythm with occasional PACs      Intake/Output Summary (Last 24 hours) at 7/21/2020 0852  Last data filed at 7/20/2020 1100  Gross per 24 hour   Intake 200 ml   Output --   Net 200 ml     Intake & Output (last 3 days)       07/18 0701 - 07/19 0700 07/19 0701 - 07/20 0700 07/20 0701 - 07/21 0700 07/21 0701 - 07/22 0700    P.O.  200 200     I.V. (mL/kg) 313 (5.3)       Blood        Other        IV Piggyback 100       Total Intake(mL/kg) 413 (7) 200 (3.4) 200 (3.4)     Urine (mL/kg/hr) 800 (0.6) 350 (0.2)      Chest Tube 45       Total Output 845 350      Net -432 -150 +200             Urine Unmeasured Occurrence   4 x            Physical Exam:  Constitutional: NAD  Cardiovascular: Normal rate, regular rhythm and normal heart sounds.  Occasional extrasystoles are present. Exam reveals no friction rub.   No murmur heard.  Sternal incisions healing well   Pulmonary/Chest: Effort normal.  Clear lung sounds  Abdominal: Soft.   Musculoskeletal: She exhibits no edema.         LABS/DIAGNOSTIC DATA:  Results from last 7 days   Lab Units 07/20/20  0455 07/19/20  0614 " 07/19/20  0401 07/18/20  0127   WBC 10*3/mm3 15.97*  --  16.67* 20.33*   HEMOGLOBIN g/dL 10.0* 9.6* 5.7* 11.3*   HEMATOCRIT % 32.4* 31.2* 18.2* 35.2   PLATELETS 10*3/mm3 150  --  173 153     Lab Results   Lab Value Date/Time    TROPONINT <0.010 07/11/2020 1756    TROPONINT <0.010 07/11/2020 1200    TROPONINT <0.010 07/11/2020 0959     Results from last 7 days   Lab Units 07/18/20  0127 07/17/20  2131   INR  1.39* 1.41*   APTT seconds  --  28.4     Results from last 7 days   Lab Units 07/21/20  0439 07/20/20  0455 07/19/20  0358   SODIUM mmol/L 136 136 137   POTASSIUM mmol/L 4.2 4.3 4.8   CHLORIDE mmol/L 100 101 105   CO2 mmol/L 25.0 26.0 26.0   BUN mg/dL 15 16 12   CREATININE mg/dL 0.47* 0.56* 0.66   CALCIUM mg/dL 8.2* 8.6 8.3*   GLUCOSE mg/dL 110* 122* 136*                       Medication Review:     amiodarone 200 mg Oral Q12H   aspirin 325 mg Oral Daily   atorvastatin 40 mg Oral Nightly   budesonide-formoterol 2 puff Inhalation BID - RT   insulin lispro 0-7 Units Subcutaneous TID AC   metoprolol tartrate 25 mg Oral Q12H   pharmacy consult - MTM  Does not apply Daily   senna-docusate sodium 2 tablet Oral BID        dextrose 5 % with KCl 20 mEq 30 mL/hr Last Rate: 30 mL/hr (07/17/20 2148)          Coronary artery disease involving native coronary artery of native heart with unstable angina pectoris (CMS/[facility])    Tobacco abuse    Mixed hyperlipidemia    Essential hypertension    CAD in native artery      Assessment/Plan:  1.  Multivessel CAD status post three-vessel CABG on 7/17/2020  2.  Hypertension  3.  Hyperlipidemia  4.  Postoperative paroxysmal atrial fibrillation  5.  Tobacco abuse     Plan:  -Discharge home today with aspirin 325 mg, atorvastatin 40 mg, metoprolol 25 mg twice daily resume home Praluent and isosorbide  -Can hold Prasugrel at this time given she is now status post bypass without recent PCI  -For atrial fibrillation will discharge on oral amiodarone taper.  200 mg twice daily for 2 weeks.   Change to once daily on 8/320 take for additional 2 weeks then stop  -Given short and self terminating nature of the episode would not recommend anticoagulation at this time  -Follow-up with cardiology in [place] in 1 month to reevaluate with consideration of Holter monitor once amiodarone taper complete  -Stable for discharge home today cardiology portion of med reconciliation completed          Randall Marion MD [facility]  20      Electronically signed by Randall Marion MD at 20 0855     Kathi Ferrara MD at 20 0809              [facility] Medicine Services  PROGRESS NOTE    Patient Name: Veda Enamorado  : 1965  MRN: [id number]    Date of Admission: 2020  Primary Care Physician: Chiquita Choudhary APRN    Subjective   Subjective     CC: Medical management    HPI: No acute events overnight, patient slept well, denies any chest pain.    Review of Systems  Gen- No fevers, chills  CV- No chest pain, palpitations  Resp- No cough, dyspnea  GI- No N/V/D, abd pain    All systems reviewed currently negative except as mentioned in HPI above    Objective   Objective     Vital Signs:   Temp:  [97.9 °F (36.6 °C)-98.5 °F (36.9 °C)] 98.5 °F (36.9 °C)  Heart Rate:  [] 77  Resp:  [14-18] 16  BP: (127-147)/(65-82) 142/65        Physical Exam:  Constitutional: No acute distress, awake, alert  HENT: NCAT, mucous membranes moist  Respiratory: Clear to auscultation bilaterally, respiratory effort normal   Cardiovascular: RRR, no murmurs, rubs, or gallops, palpable pedal pulses bilaterally  Gastrointestinal: Positive bowel sounds, soft, nontender, nondistended  Musculoskeletal: No bilateral ankle edema  Psychiatric: Appropriate affect, cooperative  Neurologic: Oriented x 3, no focal deficits  Skin: No rashes, incision CDI    Results Reviewed:  Results from last 7 days   Lab Units 20  0455 20  0614 20  0401 20  0127 20  2131   WBC 10*3/mm3  15.97*  --  16.67* 20.33* 26.90*   HEMOGLOBIN g/dL 10.0* 9.6* 5.7* 11.3* 13.2   HEMATOCRIT % 32.4* 31.2* 18.2* 35.2 40.1   PLATELETS 10*3/mm3 150  --  173 153 153   INR   --   --   --  1.39* 1.41*     Results from last 7 days   Lab Units 07/21/20  0439 07/20/20  0455 07/19/20  0358   SODIUM mmol/L 136 136 137   POTASSIUM mmol/L 4.2 4.3 4.8   CHLORIDE mmol/L 100 101 105   CO2 mmol/L 25.0 26.0 26.0   BUN mg/dL 15 16 12   CREATININE mg/dL 0.47* 0.56* 0.66   GLUCOSE mg/dL 110* 122* 136*   CALCIUM mg/dL 8.2* 8.6 8.3*     Estimated Creatinine Clearance: 126.8 mL/min (A) (by C-G formula based on SCr of 0.47 mg/dL (L)).    Microbiology Results Abnormal     None          Imaging Results (Last 24 Hours)     ** No results found for the last 24 hours. **          Results for orders placed in visit on 07/17/20   Emergent/Open-Heart Anesthesia MAICOL    Narrative Pedro Patel Jr., MD     7/19/2020  1:24 PM  Procedure Performed: Emergent/Open-Heart Anesthesia MAICOL      Start Time:  7/17/2020 4:44 PM       End Time:      Preanesthesia Checklist:  Patient identified, IV assessed, risks and benefits discussed, monitors   and equipment assessed, procedure being performed at surgeon's request and   anesthesia consent obtained.    General Procedure Information  Diagnostic Indications for Echo:  assessment of surgical repair  Physician Requesting Echo: Juanjo Coronado MD  Location performed:  OR  Intubated  Bite block not placed  Heart visualized  Probe Insertion:  Easy  Probe Type:  Multiplane  Modalities:  2D only, color flow mapping, continuous wave Doppler and   pulse wave Doppler    Echocardiographic and Doppler Measurements    Ventricles    Right Ventricle:  Cavity size normal.  Hypertrophy not present.  Thrombus not present.    Global function normal.    Left Ventricle:  Cavity size normal.  Hypertrophy not present.  Thrombus not present.    Global Function normal.      Ventricular Regional Function:  1- Basal Anteroseptal:   normal  2- Basal Anterior:  normal  3- Basal Anterolateral:  normal  4- Basal Inferolateral:  normal  5- Basal Inferior:  normal  6- Basal Inferoseptal:  normal  7- Mid Anteroseptal:  normal  8- Mid Anterior:  normal  9- Mid Anterolateral:  normal  10- Mid Inferolateral:  normal  11- Mid Inferior:  normal  12- Mid Inferoseptal:  normal  13- Apical Anterior:  normal  14- Apical Lateral:  normal  15- Apical Inferior:  normal  16- Apical Septal:  normal  17- Oneco:  normal      Valves    Aortic Valve:  Annulus normal.  Stenosis not present.  Regurgitation absent.  Leaflets   normal.  Leaflet motions normal.      Mitral Valve:  Annulus normal.  Stenosis not present.  Regurgitation trace.  Leaflets   normal.  Leaflet motions normal.      Tricuspid Valve:  Annulus normal.  Stenosis not present.  Regurgitation trace.  Leaflets   normal.  Leaflet motions normal.    Pulmonic Valve:  Annulus normal.  Stenosis not present.  Regurgitation trace.          Aorta    Ascending Aorta:  Size normal.  Dissection not present.  Plaque thickness less than 3 mm.    Mobile plaque not present.    Aortic Arch:  Size normal.  Dissection not present.  Plaque thickness less than 3 mm.    Mobile plaque not present.    Descending Aorta:  Size normal.  Dissection not present.  Plaque thickness less than 3 mm.    Mobile plaque not present.          Atria    Right Atrium:  Size normal.  Spontaneous echo contrast not present.  Thrombus not   present.  Tumor not present.    Left Atrium:  Size normal.  Spontaneous echo contrast not present.  Thrombus not   present.  Tumor not present.  Left atrial appendage normal.      Septa    Atrial Septum:  Intra-atrial septal morphology normal.      Ventricular Septum:  Intra-ventricular septum morphology normal.          Other Findings  Pericardium:  normal  Pleural Effusion:  none  Pulmonary Arteries:  normal  Pulmonary Venous Flow:  normal    Anesthesia Information  Performed Personally      Echocardiogram  Comments:       Diagnostic intraoperative MAICOL performed for CABG.  Surgeon - Dr. Coronado    Baseline Exam:  - LV is normal in size and function with no regional wall motion   abnormalities. The LVEF is >60%.  - RV is normal in size with preserved systolic function.  - There are no significant valvular abnormalities.    Post-procedure Exam:  - Interval 3 vessel CABG performed on CPB.  - No inotropic support.  - Biventricular function is stable post-CPB without any new regional wall   motion abnormalities.  - No new valvular abnormalities.  - No evidence of dissection in the visualized portions of the aorta.          I have reviewed the medications:  Scheduled Meds:    amiodarone 200 mg Oral Once   Followed by      amiodarone 200 mg Oral Q8H   Followed by      [START ON 7/28/2020] amiodarone 200 mg Oral Q12H   Followed by      [START ON 8/11/2020] amiodarone 200 mg Oral Daily   aspirin 325 mg Oral Daily   atorvastatin 40 mg Oral Nightly   budesonide-formoterol 2 puff Inhalation BID - RT   insulin lispro 0-7 Units Subcutaneous TID AC   metoprolol tartrate 25 mg Oral Q12H   pharmacy consult - MTM  Does not apply Daily   senna-docusate sodium 2 tablet Oral BID     Continuous Infusions:    amiodarone 0.5 mg/min Last Rate: 0.5 mg/min (07/20/20 1937)   dextrose 5 % with KCl 20 mEq 30 mL/hr Last Rate: 30 mL/hr (07/17/20 2148)     PRN Meds:.•  acetaminophen  •  dextrose  •  dextrose  •  fentaNYL citrate (PF)  •  glucagon (human recombinant)  •  HYDROcodone-acetaminophen  •  Morphine  •  ondansetron  •  oxyCODONE-acetaminophen  •  potassium chloride **OR** potassium chloride    Assessment/Plan   Assessment & Plan     Active Hospital Problems    Diagnosis  POA   • **Coronary artery disease involving native coronary artery of native heart with unstable angina pectoris (CMS/HCC) [I25.110]  Yes   • CAD in native artery [I25.10]  Yes   • Essential hypertension [I10]  Yes   • Mixed hyperlipidemia [E78.2]  Yes   • Tobacco abuse  [Z72.0]  Yes      Resolved Hospital Problems   No resolved problems to display.        Brief Hospital Course to date:  Veda Enamorado is a 55 y.o. female with past medical history of hyperlipidemia, hypertension, tobacco abuse, multivessel CAD who is s/p three-vessel bypass 7/17/2020.  Hospital medicine was asked to assist with medical management    Plan  CAD s/p CABG-postop care per CTS, continue aspirin and statin    Postop paroxysmal atrial fibrillation  -Spontaneously converted to NSR  -Cardiology following, currently on metoprolol, she is s/pamnio loading with plans to continue 1 month short course, anticoagulation is not recommended at this time    Leukocytosis-this is likely reactive and is anyway improving, no indication for any further work-up.    Hyperlipidemia-on statin    Tobacco abuse- extensively counseled on cessation    DVT Prophylaxis: Mechanical    Disposition: Per primary team, okay to DC from hospital medicine standpoint.    CODE STATUS:   Code Status and Medical Interventions:   Ordered at: 07/17/20 2122     Level Of Support Discussed With:    Patient     Code Status:    CPR     Medical Interventions (Level of Support Prior to Arrest):    Full       Electronically signed by Kathi Ferrara MD, 07/21/20, 08:40.      Electronically signed by Kathi Ferrara MD at 07/21/20 0841     Juanjo Coronado MD at 07/21/20 0751          Cardiothoracic Surgery Progress Note      POD # 4 s/p CABG x 3     LOS: 4 days      Subjective:  No complaints this morning.  Some incisional pain    Objective:  Vital Signs  Temp:  [97.9 °F (36.6 °C)-98.5 °F (36.9 °C)] 98.5 °F (36.9 °C)  Heart Rate:  [] 70  Resp:  [14-18] 16  BP: (127-147)/(70-82) 147/76    Physical Exam:   General Appearance: alert, appears stated age and cooperative   Lungs: clear to auscultation, respirations regular, respirations even and respirations unlabored   Heart: regular rhythm & normal rate, normal S1, S2 and no murmur, no gallop, no  rub   Skin: Incision c/d/i     Results:    Results from last 7 days   Lab Units 20  0455   WBC 10*3/mm3 15.97*   HEMOGLOBIN g/dL 10.0*   HEMATOCRIT % 32.4*   PLATELETS 10*3/mm3 150     Results from last 7 days   Lab Units 20  0439   SODIUM mmol/L 136   POTASSIUM mmol/L 4.2   CHLORIDE mmol/L 100   CO2 mmol/L 25.0   BUN mg/dL 15   CREATININE mg/dL 0.47*   GLUCOSE mg/dL 110*   CALCIUM mg/dL 8.2*       Assessment:  POD # 4 s/p CABG x 3    Plan:  Ambulate  Pulmonary toilet  ASA, statin, BB  Transition amiodarone to PO as per cardiology  Home when atrial fibrillation regimen finalized    Juanjo Coronado MD  20  07:51          Electronically signed by Juanjo Coronado MD at 20 0752     Uri Kent MD at 20 1052              [facility] Medicine Services  PROGRESS NOTE    Patient Name: Veda Enamorado  : 1965  MRN: [id number]    Date of Admission: 2020  Primary Care Physician: Chiquita Choudhary APRN    Subjective   Subjective     CC:  Medical management CABG    HPI:  Feels well, symptoms controlled.  Pain controlled.    Review of Systems  No current fevers or chills  No current shortness of breath or cough  No current nausea, vomiting, or diarrhea  No current dysuria or hematuria      Objective   Objective     Vital Signs:   Temp:  [98 °F (36.7 °C)-98.8 °F (37.1 °C)] 98 °F (36.7 °C)  Heart Rate:  [67-92] 79  Resp:  [16-24] 18  BP: (115-161)/(53-95) 143/86        Physical Exam:  Constitutional:Awake, alert  HENT: NCAT, mucous membranes moist, neck supple  Respiratory: Clear to auscultation bilaterally, respiratory effort normal, nonlabored breathing   Cardiovascular: RRR, normal radial pulses  Gastrointestinal: Positive bowel sounds, soft, nontender, nondistended  Musculoskeletal: Normal musculature for age, no lower extremity edema, BMI 23  Psychiatric: Appropriate affect, cooperative, conversational  Neurologic: No slurred speech or facial  droop, follows commands, oriented  Skin: No rashes or jaundice, warm      Results Reviewed:  Results from last 7 days   Lab Units 07/20/20  0455 07/19/20  0614 07/19/20  0401 07/18/20  0127 07/17/20  2131   WBC 10*3/mm3 15.97*  --  16.67* 20.33* 26.90*   HEMOGLOBIN g/dL 10.0* 9.6* 5.7* 11.3* 13.2   HEMATOCRIT % 32.4* 31.2* 18.2* 35.2 40.1   PLATELETS 10*3/mm3 150  --  173 153 153   INR   --   --   --  1.39* 1.41*     Results from last 7 days   Lab Units 07/20/20  0455 07/19/20  0358 07/18/20  1426   SODIUM mmol/L 136 137 141   POTASSIUM mmol/L 4.3 4.8 4.6   CHLORIDE mmol/L 101 105 107   CO2 mmol/L 26.0 26.0 25.0   BUN mg/dL 16 12 12   CREATININE mg/dL 0.56* 0.66 0.78   GLUCOSE mg/dL 122* 136* 141*   CALCIUM mg/dL 8.6 8.3* 8.7     Estimated Creatinine Clearance: 106.4 mL/min (A) (by C-G formula based on SCr of 0.56 mg/dL (L)).    Microbiology Results Abnormal     None          Imaging Results (Last 24 Hours)     Procedure Component Value Units Date/Time    XR Chest 1 View [[id number]] Collected:  07/19/20 1001     Updated:  07/19/20 1543    Narrative:          EXAMINATION: XR CHEST 1 VW - 07/19/2020     INDICATION: Post-op heart surgery. I25.10-Atherosclerotic heart disease  of native coronary artery without angina pectoris.      COMPARISON: 07/18/2020     FINDINGS: [place] catheter has been removed. Cardiac and mediastinal  silhouettes within normal limits. Mild increased markings at left lung  base. Small left pleural effusion. Vascular calcification seen within  the thoracic aorta. Chest tubes have been removed with no definite  pneumothorax.           Impression:       Removal of the chest tubes with small bilateral pleural  effusions. Mild increased markings at the lung bases. The [place]  catheter is been removed.     DICTATED:   07/19/2020  EDITED/ls :   07/19/2020      This report was finalized on 7/19/2020 3:40 PM by Dr. Katt Taylor MD.             Results for orders placed in visit on 07/17/20    Emergent/Open-Heart Anesthesia MAICOL    Narrative Pedro Patel Jr., MD     7/19/2020  1:24 PM  Procedure Performed: Emergent/Open-Heart Anesthesia MAICOL      Start Time:  7/17/2020 4:44 PM       End Time:      Preanesthesia Checklist:  Patient identified, IV assessed, risks and benefits discussed, monitors   and equipment assessed, procedure being performed at surgeon's request and   anesthesia consent obtained.    General Procedure Information  Diagnostic Indications for Echo:  assessment of surgical repair  Physician Requesting Echo: Juanjo Coronado MD  Location performed:  OR  Intubated  Bite block not placed  Heart visualized  Probe Insertion:  Easy  Probe Type:  Multiplane  Modalities:  2D only, color flow mapping, continuous wave Doppler and   pulse wave Doppler    Echocardiographic and Doppler Measurements    Ventricles    Right Ventricle:  Cavity size normal.  Hypertrophy not present.  Thrombus not present.    Global function normal.    Left Ventricle:  Cavity size normal.  Hypertrophy not present.  Thrombus not present.    Global Function normal.      Ventricular Regional Function:  1- Basal Anteroseptal:  normal  2- Basal Anterior:  normal  3- Basal Anterolateral:  normal  4- Basal Inferolateral:  normal  5- Basal Inferior:  normal  6- Basal Inferoseptal:  normal  7- Mid Anteroseptal:  normal  8- Mid Anterior:  normal  9- Mid Anterolateral:  normal  10- Mid Inferolateral:  normal  11- Mid Inferior:  normal  12- Mid Inferoseptal:  normal  13- Apical Anterior:  normal  14- Apical Lateral:  normal  15- Apical Inferior:  normal  16- Apical Septal:  normal  17- [place]:  normal      Valves    Aortic Valve:  Annulus normal.  Stenosis not present.  Regurgitation absent.  Leaflets   normal.  Leaflet motions normal.      Mitral Valve:  Annulus normal.  Stenosis not present.  Regurgitation trace.  Leaflets   normal.  Leaflet motions normal.      Tricuspid Valve:  Annulus normal.  Stenosis not present.  Regurgitation  trace.  Leaflets   normal.  Leaflet motions normal.    Pulmonic Valve:  Annulus normal.  Stenosis not present.  Regurgitation trace.          Aorta    Ascending Aorta:  Size normal.  Dissection not present.  Plaque thickness less than 3 mm.    Mobile plaque not present.    Aortic Arch:  Size normal.  Dissection not present.  Plaque thickness less than 3 mm.    Mobile plaque not present.    Descending Aorta:  Size normal.  Dissection not present.  Plaque thickness less than 3 mm.    Mobile plaque not present.          Atria    Right Atrium:  Size normal.  Spontaneous echo contrast not present.  Thrombus not   present.  Tumor not present.    Left Atrium:  Size normal.  Spontaneous echo contrast not present.  Thrombus not   present.  Tumor not present.  Left atrial appendage normal.      Septa    Atrial Septum:  Intra-atrial septal morphology normal.      Ventricular Septum:  Intra-ventricular septum morphology normal.          Other Findings  Pericardium:  normal  Pleural Effusion:  none  Pulmonary Arteries:  normal  Pulmonary Venous Flow:  normal    Anesthesia Information  Performed Personally      Echocardiogram Comments:       Diagnostic intraoperative MAICOL performed for CABG.  Surgeon - Dr. Coronado    Baseline Exam:  - LV is normal in size and function with no regional wall motion   abnormalities. The LVEF is >60%.  - RV is normal in size with preserved systolic function.  - There are no significant valvular abnormalities.    Post-procedure Exam:  - Interval 3 vessel CABG performed on CPB.  - No inotropic support.  - Biventricular function is stable post-CPB without any new regional wall   motion abnormalities.  - No new valvular abnormalities.  - No evidence of dissection in the visualized portions of the aorta.          I have reviewed the medications:  Scheduled Meds:  aspirin 325 mg Oral Daily   atorvastatin 40 mg Oral Nightly   budesonide-formoterol 2 puff Inhalation BID - RT   insulin lispro 0-7 Units  Subcutaneous TID AC   metoprolol tartrate 12.5 mg Oral Q12H   pharmacy consult - MTM  Does not apply Daily   senna-docusate sodium 2 tablet Oral BID     Continuous Infusions:  dextrose 5 % with KCl 20 mEq 30 mL/hr Last Rate: 30 mL/hr (07/17/20 2148)     PRN Meds:.•  acetaminophen  •  dextrose  •  dextrose  •  fentaNYL citrate (PF)  •  glucagon (human recombinant)  •  HYDROcodone-acetaminophen  •  Morphine  •  ondansetron  •  oxyCODONE-acetaminophen  •  potassium chloride **OR** potassium chloride    Assessment/Plan   Assessment & Plan     Active Hospital Problems    Diagnosis  POA   • **Coronary artery disease involving native coronary artery of native heart with unstable angina pectoris (CMS/HCC) [I25.110]  Yes   • CAD in native artery [I25.10]  Yes   • Essential hypertension [I10]  Yes   • Mixed hyperlipidemia [E78.2]  Yes   • Tobacco abuse [Z72.0]  Yes      Resolved Hospital Problems   No resolved problems to display.        Brief Hospital Course to date:  Veda Enamorado is a 55 y.o. female status post CABG    Encourage pulmonary hygiene.  Mobilize  Smoking cessation counseled.  Continue pain control, symptoms seem reasonably well controlled at this time.  Continue statin for hyperlipidemia, previous records reviewed and mixed hyperlipidemia seems much improved with current therapy.  Continue combination inhaler   Reportedly possible discharge tomorrow.    DVT Prophylaxis: Deferred to surgery    CODE STATUS:   Code Status and Medical Interventions:   Ordered at: 07/17/20 2122     Level Of Support Discussed With:    Patient     Code Status:    CPR     Medical Interventions (Level of Support Prior to Arrest):    Full         Electronically signed by Uri Kent MD, 07/20/20, 10:52.        Electronically signed by Uri Kent MD at 07/20/20 1055     Juanjo Coronado MD at 07/20/20 0920          Cardiothoracic Surgery Progress Note      POD # 3 s/p CABG x 3     LOS: 3 days       Subjective:  No complaints other than being uncomfortable    Objective:  Vital Signs  Temp:  [98 °F (36.7 °C)-98.8 °F (37.1 °C)] 98 °F (36.7 °C)  Heart Rate:  [67-92] 79  Resp:  [16-24] 18  BP: (115-161)/(53-95) 143/86    Physical Exam:   General Appearance: alert, appears stated age and cooperative   Lungs: clear to auscultation, respirations regular, respirations even and respirations unlabored   Heart: regular rhythm & normal rate, normal S1, S2 and no murmur, no gallop, no rub   Skin: Incision c/d/i     Results:  Results from last 7 days   Lab Units 07/20/20  0455   WBC 10*3/mm3 15.97*   HEMOGLOBIN g/dL 10.0*   HEMATOCRIT % 32.4*   PLATELETS 10*3/mm3 150     Results from last 7 days   Lab Units 07/20/20  0455   SODIUM mmol/L 136   POTASSIUM mmol/L 4.3   CHLORIDE mmol/L 101   CO2 mmol/L 26.0   BUN mg/dL 16   CREATININE mg/dL 0.56*   GLUCOSE mg/dL 122*   CALCIUM mg/dL 8.6       Assessment:  POD # 3 s/p CABG x 3    Plan:  Ambulate  Pulmonary toilet  ASA, statin, BB  D/C home tomorrow    Juanjo Coronado MD  07/20/20  09:20          Electronically signed by Juanjo Coronado MD at 07/20/20 0921          Consult Notes (last 48 hours) (Notes from 07/19/20 1435 through 07/21/20 1435)      Randall Marion MD at 07/20/20 1810      Consult Orders    1. Inpatient Cardiology Consult [[id number]] ordered by Ganesh Glover PA at 07/20/20 1113                [facility]  Consultation History and Physical  Veda Enamorado  1965      PCP:   Chiquita Choudhary, APRN        Date of  Consultation: 7/20/2020 18:10     Reason for Consultation: Postoperative atrial fibrillation    History of Present Illness:  Veda Enamorado  Is a 55 y.o. female with medical history including hypertension, hyperlipidemia, multivessel coronary artery disease.  She is status post three-vessel bypass on 7/17/2020.  She had a LIMA to LAD graft.  And vein graft to OM1 and D1.  Catheterization had  showed complex bifurcation disease not amenable to PCI.  She has been having a stable postoperative course.  This morning noticed new onset of palpitations and some mild shortness of breath.  Was found to be in A. fib with RVR.  Amiodarone was ordered but the patient spontaneously converted to sinus with frequent PACs prior to receiving.  At time of my evaluation she is on room air and back to baseline.  No chest pain.  Was anticipating discharge home prior to this episode.  EKG in sinus rhythm does show some ST elevation in lead I and lead II which have been stable and noted previously postoperatively.  Patient is also been mildly hypertensive      Patient Active Problem List    Diagnosis Date Noted   • *Coronary artery disease involving native coronary artery of native heart with unstable angina pectoris (CMS/[facility]) 09/05/2016   • CAD in native artery 07/17/2020   • Coronary artery disease 07/12/2020   • Chronic low back pain 07/11/2020   • GERD without esophagitis 07/11/2020   • Left main coronary artery disease 06/17/2020   • MRSA (methicillin resistant staph aureus) culture positive 01/12/2020   • Cellulitis of labia 01/08/2020   • Labial abscess 01/08/2020   • Mixed hyperlipidemia 12/16/2019     Note Last Updated: 12/16/2019     Added automatically from request for surgery [id number]     • Visit for wound check 02/26/2019   • Essential hypertension 09/18/2018   • Tobacco abuse 09/05/2016   • Mixed hyperlipidemia 09/05/2016       Allergies   Allergen Reactions   • Bactrim [Sulfamethoxazole-Trimethoprim] Rash   • Ciprofloxacin Other (See Comments)     tremors   • Ranexa [Ranolazine Er] Unknown - Low Severity       Social History     Socioeconomic History   • Marital status:      Spouse name: Not on file   • Number of children: 2   • Years of education: Not on file   • Highest education level: Not on file   Occupational History   • Occupation: Homemaker     Comment: disability   Tobacco Use   • Smoking status:  Current Every Day Smoker     Packs/day: 0.25     Years: 25.00     Pack years: 6.25     Types: Electronic Cigarette, Cigarettes   • Smokeless tobacco: Never Used   • Tobacco comment: Pt states only smoking a few cigs a day, transitioning to E-Cig.   Substance and Sexual Activity   • Alcohol use: No   • Drug use: No   • Sexual activity: Defer       Family History   Problem Relation Age of Onset   • Heart disease Mother    • Heart disease Father    • Heart attack Father    • No Known Problems Sister    • Heart attack Brother    • Heart disease Brother    • Breast cancer Neg Hx        Current Medications:    Current Facility-Administered Medications:   •  acetaminophen (TYLENOL) tablet 650 mg, 650 mg, Oral, Q6H PRN, Fabrice Bright PA  •  [COMPLETED] amiodarone in dextrose 5% (NEXTERONE) loading dose 150mg/100mL, 150 mg, Intravenous, Once, 150 mg at 07/20/20 1310 **FOLLOWED BY** amiodarone (NEXTERONE) 360 mg/200 mL (1.8 mg/mL) infusion, 1 mg/min, Intravenous, Continuous, Last Rate: 33.3 mL/hr at 07/20/20 1328, 1 mg/min at 07/20/20 1328 **FOLLOWED BY** amiodarone (NEXTERONE) 360 mg/200 mL (1.8 mg/mL) infusion, 0.5 mg/min, Intravenous, Continuous **FOLLOWED BY** [START ON 7/21/2020] amiodarone (PACERONE) tablet 200 mg, 200 mg, Oral, Once **FOLLOWED BY** [START ON 7/21/2020] amiodarone (PACERONE) tablet 200 mg, 200 mg, Oral, Q8H **FOLLOWED BY** [START ON 7/28/2020] amiodarone (PACERONE) tablet 200 mg, 200 mg, Oral, Q12H **FOLLOWED BY** [START ON 8/11/2020] amiodarone (PACERONE) tablet 200 mg, 200 mg, Oral, Daily, Jessica Cheung PA  •  aspirin EC tablet 325 mg, 325 mg, Oral, Daily, Fabrice Bright PA, 325 mg at 07/20/20 0839  •  atorvastatin (LIPITOR) tablet 40 mg, 40 mg, Oral, Nightly, Fabrice Bright PA, 40 mg at 07/19/20 2014  •  budesonide-formoterol (SYMBICORT) 160-4.5 MCG/ACT inhaler 2 puff, 2 puff, Inhalation, BID - RT, Fabrice Bright PA, 2 puff at 07/20/20 0835  •  dextrose (D50W) 25 g/ 50mL Intravenous Solution  25 g, 25 g, Intravenous, Q15 Min PRN, Fabrice Bright PA  •  dextrose (GLUTOSE) oral gel 15 g, 15 g, Oral, Q15 Min PRN, Fabrice Bright PA  •  dextrose 5 % with KCl 20 mEq/L infusion, 30 mL/hr, Intravenous, Continuous, Fabrice Bright PA, Last Rate: 30 mL/hr at 07/17/20 2148, 30 mL/hr at 07/17/20 2148  •  fentaNYL citrate (PF) (SUBLIMAZE) injection 25 mcg, 25 mcg, Intravenous, Q1H PRN, Fabrice Bright PA, 25 mcg at 07/18/20 0000  •  glucagon (human recombinant) (GLUCAGEN DIAGNOSTIC) injection 1 mg, 1 mg, Subcutaneous, Q15 Min PRN, Fabrice Bright PA  •  HYDROcodone-acetaminophen (NORCO) 7.5-325 MG per tablet 1 tablet, 1 tablet, Oral, Q4H PRN, Fabrice Bright PA, 1 tablet at 07/20/20 1349  •  insulin lispro (humaLOG) injection 0-7 Units, 0-7 Units, Subcutaneous, TID AC, Fabrice Bright PA, 2 Units at 07/20/20 0838  •  metoprolol tartrate (LOPRESSOR) tablet 25 mg, 25 mg, Oral, Q12H, Randlal Marion MD  •  morphine injection 2 mg, 2 mg, Intravenous, Q30 Min PRN, Fabrice Bright PA, 2 mg at 07/19/20 0627  •  ondansetron (ZOFRAN) injection 4 mg, 4 mg, Intravenous, Q6H PRN, Fabrice Bright PA, 4 mg at 07/20/20 1651  •  oxyCODONE-acetaminophen (PERCOCET) 5-325 MG per tablet 2 tablet, 2 tablet, Oral, Q4H PRN, Fabrice Bright PA, 2 tablet at 07/20/20 1650  •  Pharmacy Consult - [facility], , Does not apply, Daily, Fabrice Bright PA  •  potassium chloride (MICRO-K) CR capsule 40 mEq, 40 mEq, Oral, PRN **OR** potassium chloride (KLOR-CON) packet 40 mEq, 40 mEq, Oral, PRN, Fabrice Bright PA  •  sennosides-docusate (PERICOLACE) 8.6-50 MG per tablet 2 tablet, 2 tablet, Oral, BID, Fabrice Bright PA, 2 tablet at 07/20/20 0839    Facility-Administered Medications Ordered in Other Encounters:   •  Chlorhexidine Gluconate Cloth 2 % pads 1 application, 1 application, Topical, Q12H PRN, Bianka Ramírez, [facility]     Review of Systems   Constitution: Negative for fever.   Cardiovascular: Positive for palpitations. Negative for chest  pain, dyspnea on exertion, leg swelling, near-syncope and syncope.   Respiratory: Positive for shortness of breath. Negative for cough.    All other systems reviewed and are negative.      OBJECTIVE:  Vitals:    07/20/20 1300 07/20/20 1400 07/20/20 1500 07/20/20 1700   BP:       BP Location:       Patient Position:       Pulse: 84 74 68 69   Resp:       Temp:       TempSrc:       SpO2:  95% 94% 93%   Weight:       Height:         I/O last 3 completed shifts:  In: 613 [P.O.:200; I.V.:313; IV Piggyback:100]  Out: 800 [Urine:800]  I/O this shift:  In: 200 [P.O.:200]  Out: -   Intake & Output (last 3 days)       07/17 0701 - 07/18 0700 07/18 0701 - 07/19 0700 07/19 0701 - 07/20 0700 07/20 0701 - 07/21 0700    P.O.   200 200    I.V. (mL/kg) 2726.7 (46.5) 313 (5.3)      Blood 760       Other 60       IV Piggyback 844 100      Total Intake(mL/kg) 4390.7 (74.8) 413 (7) 200 (3.4) 200 (3.4)    Urine (mL/kg/hr) 1370 800 (0.6) 350 (0.2)     Chest Tube 340 45      Total Output 1710 845 350     Net +2680.7 -432 -150 +200                     Physical Exam   Constitutional: She is oriented to person, place, and time. She appears well-developed and well-nourished.   Eyes: EOM are normal.   Neck: Neck supple.   Cardiovascular: Normal rate, regular rhythm and normal heart sounds.  Occasional extrasystoles are present. Exam reveals no friction rub.   No murmur heard.  Sternal incisions healing well   Pulmonary/Chest: Effort normal. She has no rales.   Abdominal: Soft.   Musculoskeletal: She exhibits no edema.   Neurological: She is alert and oriented to person, place, and time.   Skin: Skin is warm and dry.       Diagnostic Data:  Lab Results (last 24 hours)     Procedure Component Value Units Date/Time    POC Glucose Once [[id number]]  (Abnormal) Collected:  07/20/20 1637    Specimen:  Blood Updated:  07/20/20 1641     Glucose 138 mg/dL     POC Glucose Once [[id number]]  (Normal) Collected:  07/20/20 1150    Specimen:  Blood Updated:   07/20/20 1158     Glucose 99 mg/dL     POC Glucose Once [[id number]]  (Abnormal) Collected:  07/20/20 0809    Specimen:  Blood Updated:  07/20/20 0810     Glucose 156 mg/dL     Basic Metabolic Panel [[id number]]  (Abnormal) Collected:  07/20/20 0455    Specimen:  Blood Updated:  07/20/20 0630     Glucose 122 mg/dL      BUN 16 mg/dL      Creatinine 0.56 mg/dL      Sodium 136 mmol/L      Potassium 4.3 mmol/L      Chloride 101 mmol/L      CO2 26.0 mmol/L      Calcium 8.6 mg/dL      eGFR Non African Amer 113 mL/min/1.73      BUN/Creatinine Ratio 28.6     Anion Gap 9.0 mmol/L     Narrative:       GFR Normal >60  Chronic Kidney Disease <60  Kidney Failure <15      CBC (No Diff) [[id number]]  (Abnormal) Collected:  07/20/20 0455    Specimen:  Blood Updated:  07/20/20 0616     WBC 15.97 10*3/mm3      RBC 3.30 10*6/mm3      Hemoglobin 10.0 g/dL      Hematocrit 32.4 %      MCV 98.2 fL      MCH 30.3 pg      MCHC 30.9 g/dL      RDW 14.6 %      RDW-SD 52.5 fl      MPV 11.0 fL      Platelets 150 10*3/mm3         ECG/EMG Results (last 24 hours)     Procedure Component Value Units Date/Time    ECG 12 Lead [[id number]] Collected:  07/20/20 1125     Updated:  07/20/20 1329    Narrative:       Test Reason : Amiodarone Protocol  Blood Pressure : **/** mmHG  Vent. Rate : 143 BPM     Atrial Rate : 178 BPM     P-R Int : 000 ms          QRS Dur : 080 ms      QT Int : 298 ms       P-R-T Axes : 000 023 111 degrees     QTc Int : 459 ms    Atrial fibrillation with rapid ventricular response  Nonspecific T wave abnormality  Abnormal ECG  When compared with ECG of 19-JUL-2020 08:11,  Atrial fibrillation has replaced Sinus rhythm  Vent. rate has increased BY  69 BPM  T wave inversion now evident in Lateral leads  Confirmed by CHRIS LUONG MD ([id number]) on 7/20/2020 1:29:52 PM    Referred By:             Confirmed By:CHRIS LUONG MD    ECG 12 Lead [[id number]] Collected:  07/20/20 1205     Updated:  07/20/20 1330    Narrative:       Test Reason :  Amiodarone Protocol  Blood Pressure : **/** mmHG  Vent. Rate : 083 BPM     Atrial Rate : 083 BPM     P-R Int : 130 ms          QRS Dur : 070 ms      QT Int : 364 ms       P-R-T Axes : 079 014 051 degrees     QTc Int : 427 ms    Sinus rhythm with premature atrial complexes  Otherwise normal ECG  When compared with ECG of 20-JUL-2020 11:25, (Unconfirmed)  Sinus rhythm has replaced Atrial fibrillation  Vent. rate has decreased BY  60 BPM  Nonspecific T wave abnormality has replaced inverted T waves in Lateral  leads  Confirmed by CHRIS LUONG MD ([id number]) on 7/20/2020 1:30:45 PM    Referred By:             Confirmed By:CHRIS LUONG MD        Preop echo:  · Normal left ventricular cavity size and wall thickness noted.  · Left ventricular systolic function is normal. Estimated EF appears to be in the range of 61 - 65%.  · Left ventricular diastolic function is normal.  · Tip of anterior mitral leaflet is mildly thickened ,no vegetations noted.  · No significant valvular heart disease  · There is no evidence of pericardial effusion.      Coronary artery disease involving native coronary artery of native heart with unstable angina pectoris (CMS/HCC)    Tobacco abuse    Mixed hyperlipidemia    Essential hypertension    CAD in native artery      Assessment/Plan:  1.  Multivessel CAD status post three-vessel CABG on 7/17/2020  2.  Hypertension  3.  Hyperlipidemia  4.  Postoperative paroxysmal atrial fibrillation  5.  Tobacco abuse    Plan:  -Continue aspirin and statin for CAD  -Increase metoprolol dose to 25 mg twice daily  -The patient spontaneously converted to sinus rhythm before receiving amiodarone, however was having frequent PACs and ectopy.  Recommended loading on amiodarone per protocol with a short 1 month postoperative course to prevent recurrences.  -Given short and self terminating nature of the episode would not recommend anticoagulation at this time  -Follow-up with cardiology in [place] in 1 month to reevaluate  with consideration of Holter monitor once amiodarone taper complete  -If no recurrences overnight would be stable for discharge tomorrow from our standpoint        Randall Marion MD [facility]        Electronically signed by Randall Marion MD at 07/20/20 2860

## 2020-07-21 NOTE — PROGRESS NOTES
Cardiothoracic Surgery Progress Note      POD # 4 s/p CABG x 3     LOS: 4 days      Subjective:  No complaints this morning.  Some incisional pain    Objective:  Vital Signs  Temp:  [97.9 °F (36.6 °C)-98.5 °F (36.9 °C)] 98.5 °F (36.9 °C)  Heart Rate:  [] 70  Resp:  [14-18] 16  BP: (127-147)/(70-82) 147/76    Physical Exam:   General Appearance: alert, appears stated age and cooperative   Lungs: clear to auscultation, respirations regular, respirations even and respirations unlabored   Heart: regular rhythm & normal rate, normal S1, S2 and no murmur, no gallop, no rub   Skin: Incision c/d/i     Results:    Results from last 7 days   Lab Units 07/20/20  0455   WBC 10*3/mm3 15.97*   HEMOGLOBIN g/dL 10.0*   HEMATOCRIT % 32.4*   PLATELETS 10*3/mm3 150     Results from last 7 days   Lab Units 07/21/20  0439   SODIUM mmol/L 136   POTASSIUM mmol/L 4.2   CHLORIDE mmol/L 100   CO2 mmol/L 25.0   BUN mg/dL 15   CREATININE mg/dL 0.47*   GLUCOSE mg/dL 110*   CALCIUM mg/dL 8.2*       Assessment:  POD # 4 s/p CABG x 3    Plan:  Ambulate  Pulmonary toilet  ASA, statin, BB  Transition amiodarone to PO as per cardiology  Home when atrial fibrillation regimen finalized    Juanjo Coronado MD  07/21/20  07:51

## 2020-07-21 NOTE — PLAN OF CARE
Problem: Patient Care Overview  Goal: Plan of Care Review  7/21/2020 1133 by Velvet Mejias, PT  Flowsheets (Taken 7/21/2020 1133)  Progress: improving  Plan of Care Reviewed With: patient  Outcome Summary: Performs STS w/CGA, amb 400 ft w/ CGA & 2 stand.rests, + performed cardiac ther exer & instructed in HEP,but limited by incis. pain 8-9/10 & fatigue;decl. stair trial on 3 steps; partially met mobil goals & fully met HEP goal; will d/c home later today w/ spouse's assist

## 2020-07-21 NOTE — PROGRESS NOTES
"[facility] Progress Note     LOS: 4 days   Patient Care Team:  Chiquita Choudhary APRN as PCP - General  Chiquita Choudhary APRN as PCP - Family Medicine  PCP:  Chiquita Choudhary APRN    Chief Complaint: Follow-up atrial fibrillation    Subjective: No further A. fib episodes since yesterday afternoon.  Occasional PACs on the monitor.  Plan for discharge home today.      Review of Systems:   All systems have been reviewed and are negative with the exception of those mentioned above.      Objective:    Vital Sign Min/Max for last 24 hours  Temp  Min: 97.9 °F (36.6 °C)  Max: 98.5 °F (36.9 °C)   BP  Min: 127/73  Max: 147/76   Pulse  Min: 64  Max: 151   Resp  Min: 14  Max: 18   SpO2  Min: 93 %  Max: 97 %   No data recorded   No data recorded     Flowsheet Rows      First Filed Value   Admission Height  157.5 cm (62\") Documented at 07/17/2020 1336   Admission Weight  58.7 kg (129 lb 6.4 oz) Documented at 07/17/2020 1336          Telemetry: Sinus rhythm with occasional PACs      Intake/Output Summary (Last 24 hours) at 7/21/2020 0852  Last data filed at 7/20/2020 1100  Gross per 24 hour   Intake 200 ml   Output --   Net 200 ml     Intake & Output (last 3 days)       07/18 0701 - 07/19 0700 07/19 0701 - 07/20 0700 07/20 0701 - 07/21 0700 07/21 0701 - 07/22 0700    P.O.  200 200     I.V. (mL/kg) 313 (5.3)       Blood        Other        IV Piggyback 100       Total Intake(mL/kg) 413 (7) 200 (3.4) 200 (3.4)     Urine (mL/kg/hr) 800 (0.6) 350 (0.2)      Chest Tube 45       Total Output 845 350      Net -432 -150 +200             Urine Unmeasured Occurrence   4 x            Physical Exam:  Constitutional: NAD  Cardiovascular: Normal rate, regular rhythm and normal heart sounds.  Occasional extrasystoles are present. Exam reveals no friction rub.   No murmur heard.  Sternal incisions healing well   Pulmonary/Chest: Effort normal.  Clear lung sounds  Abdominal: Soft.   Musculoskeletal: She " exhibits no edema.        LABS/DIAGNOSTIC DATA:  Results from last 7 days   Lab Units 07/20/20  0455 07/19/20  0614 07/19/20  0401 07/18/20  0127   WBC 10*3/mm3 15.97*  --  16.67* 20.33*   HEMOGLOBIN g/dL 10.0* 9.6* 5.7* 11.3*   HEMATOCRIT % 32.4* 31.2* 18.2* 35.2   PLATELETS 10*3/mm3 150  --  173 153     Lab Results   Lab Value Date/Time    TROPONINT <0.010 07/11/2020 1756    TROPONINT <0.010 07/11/2020 1200    TROPONINT <0.010 07/11/2020 0959     Results from last 7 days   Lab Units 07/18/20  0127 07/17/20  2131   INR  1.39* 1.41*   APTT seconds  --  28.4     Results from last 7 days   Lab Units 07/21/20  0439 07/20/20  0455 07/19/20  0358   SODIUM mmol/L 136 136 137   POTASSIUM mmol/L 4.2 4.3 4.8   CHLORIDE mmol/L 100 101 105   CO2 mmol/L 25.0 26.0 26.0   BUN mg/dL 15 16 12   CREATININE mg/dL 0.47* 0.56* 0.66   CALCIUM mg/dL 8.2* 8.6 8.3*   GLUCOSE mg/dL 110* 122* 136*                       Medication Review:     amiodarone 200 mg Oral Q12H   aspirin 325 mg Oral Daily   atorvastatin 40 mg Oral Nightly   budesonide-formoterol 2 puff Inhalation BID - RT   insulin lispro 0-7 Units Subcutaneous TID AC   metoprolol tartrate 25 mg Oral Q12H   pharmacy consult - MTM  Does not apply Daily   senna-docusate sodium 2 tablet Oral BID        dextrose 5 % with KCl 20 mEq 30 mL/hr Last Rate: 30 mL/hr (07/17/20 2148)          Coronary artery disease involving native coronary artery of native heart with unstable angina pectoris (CMS/HCC)    Tobacco abuse    Mixed hyperlipidemia    Essential hypertension    CAD in native artery      Assessment/Plan:  1.  Multivessel CAD status post three-vessel CABG on 7/17/2020  2.  Hypertension  3.  Hyperlipidemia  4.  Postoperative paroxysmal atrial fibrillation  5.  Tobacco abuse     Plan:  -Discharge home today with aspirin 325 mg, atorvastatin 40 mg, metoprolol 25 mg twice daily resume home Praluent and isosorbide  -Can hold Prasugrel at this time given she is now status post bypass without  recent PCI  -For atrial fibrillation will discharge on oral amiodarone taper.  200 mg twice daily for 2 weeks.  Change to once daily on 8/320 take for additional 2 weeks then stop  -Given short and self terminating nature of the episode would not recommend anticoagulation at this time  -Follow-up with cardiology in [place] in 1 month to reevaluate with consideration of Holter monitor once amiodarone taper complete  -Stable for discharge home today cardiology portion of med reconciliation completed          Randall Marion MD [facility]  07/21/20

## 2020-07-21 NOTE — THERAPY DISCHARGE NOTE
Patient Name: Veda Enamorado  : 1965    MRN: [id number]                              Today's Date: 2020       Admit Date: 2020    Visit Dx:     ICD-10-CM ICD-9-CM   1. Coronary artery disease involving native coronary artery of native heart with unstable angina pectoris (CMS/[facility]) I25.110 414.01     411.1   2. CAD in native artery I25.10 414.01     Patient Active Problem List   Diagnosis   • Coronary artery disease involving native coronary artery of native heart with unstable angina pectoris (CMS/[facility])   • Tobacco abuse   • Mixed hyperlipidemia   • Essential hypertension   • Visit for wound check   • Mixed hyperlipidemia   • Cellulitis of labia   • Labial abscess   • MRSA (methicillin resistant staph aureus) culture positive   • Left main coronary artery disease   • Chronic low back pain   • GERD without esophagitis   • Coronary artery disease   • CAD in native artery     Past Medical History:   Diagnosis Date   • Arthritis     back   • Back ache    • Chronic back pain    • Coronary artery disease     s/p 3 stents    • GERD (gastroesophageal reflux disease)    • History of MRSA infection 2020    labia; hospitalized 6 days on IV antibiotics and then went home on po antibiotics    • Hyperlipidemia    • Hypertension    • Peptic ulceration      Past Surgical History:   Procedure Laterality Date   • CARDIAC CATHETERIZATION     • CARDIAC CATHETERIZATION N/A 2019    Procedure: Left Heart Cath;  Surgeon: Lazaro Conway MD;  Location: [facility] INVASIVE LOCATION;  Service: Cardiology   • CARDIAC CATHETERIZATION     • CARDIAC SURGERY     •  SECTION      x2   • CORONARY ARTERY BYPASS GRAFT N/A 2020    Procedure: MEDIAN STERNOTOMY, CORONARY ARTERY BYPASS X3 WITH  INTERNAL MAMMARY ARTERY GRAFTING, ENDOVASCULAR VEIN HARVESTING OF THE RIGHT GREATER SAPHENOUS VEIN, MAICOL PER ANESTHESIA;  Surgeon: Juanjo Coronado MD;  Location: [facility] OR;  Service: Cardiothoracic;   Laterality: N/A;  ST ON LE  VO: 1800  VR: 1814   • HAND SURGERY      right   • PERINEAL LESION/CYST EXCISION Right 2020    Procedure: I&D ABSCESS;  Surgeon: Leander Cardenas III, MD;  Location: [facility];  Service: Obstetrics/Gynecology     General Information     Row Name 20 1033          PT Evaluation Time/Intention    Document Type  discharge treatment  -DM     Mode of Treatment  physical therapy  -DM     Row Name 20 1033          General Information    Existing Precautions/Restrictions  cardiac;fall;sternal  -DM       User Key  (r) = Recorded By, (t) = Taken By, (c) = Cosigned By    Initials Name Provider Type    DM Velvet Mejias, PT Physical Therapist        Mobility     Row Name 20 1033          Bed Mobility Assessment/Treatment    Comment (Bed Mobility)  UIC  -DM     Row Name 20 1033          Transfer Assessment/Treatment    Comment (Transfers)  cues for HP,Splinting w/ post op pillow  -DM     Row Name 20 1033          Sit-Stand Transfer    Sit-Stand [place] (Transfers)  verbal cues;contact guard  -DM     Assistive Device (Sit-Stand Transfers)  other (see comments) GT belt  -DM     Row Name 20 1033          Gait/Stairs Assessment/Training    Gait/Stairs Assessment/Training  gait/ambulation independence  -DM     [place] Level (Gait)  verbal cues;contact guard 2 stand.rests (total of 3 1/2 min. gt); onset fatigue, incr. pale appearance;decl. sit rest  -DM     Assistive Device (Gait)  other (see comments) gt belt  -DM     Distance in Feet (Gait)  400  -DM     Pattern (Gait)  step-through  -DM     Deviations/Abnormal Patterns (Gait)  bilateral deviations;base of support, narrow;[place] decreased;stride length decreased;antalgic decr step length;antalgic R; HR to 70  -DM     Bilateral Gait Deviations  heel strike decreased gazes at floor  -DM     Number of Steps (Stairs)  decl. stair trial; reviewed technique for 3 steps w/ pt.verbally  -DM     Comment  (Gait/Stairs)  cues to incr step length & LIMA, focus ahead, PLB  -DM       User Key  (r) = Recorded By, (t) = Taken By, (c) = Cosigned By    Initials Name Provider Type    DM Velvet Mejias, PT Physical Therapist        Obj/Interventions     Row Name 07/21/20 1033          Therapeutic Exercise    Upper Extremity Range of Motion (Therapeutic Exercise)  shoulder flexion/extension, bilateral;shoulder abduction/adduction, bilateral;elbow flexion/extension, bilateral  -DM     Lower Extremity (Therapeutic Exercise)  LAQ (long arc quad), bilateral;marching while seated  -DM     Lower Extremity Range of Motion (Therapeutic Exercise)  ankle dorsiflexion/plantar flexion, bilateral  -DM     Exercise Type (Therapeutic Exercise)  AROM (active range of motion)  -DM     Position (Therapeutic Exercise)  seated  -DM     Sets/Reps (Therapeutic Exercise)  1/10 (1/20 AP/AC)  -DM     Expected Outcome (Therapeutic Exercise)  improve functional stability;improve functional tolerance, single extremity activity  -DM     Comment (Therapeutic Exercise)  ret. to issue cardiac HEP/Instruct  -DM     Row Name 07/21/20 1033          Static Sitting Balance    Level of [place] (Unsupported Sitting, Static Balance)  independent  -DM     Sitting Position (Unsupported Sitting, Static Balance)  sitting in chair  -DM     Time Able to Maintain Position (Unsupported Sitting, Static Balance)  more than 5 minutes  -DM     Comment (Unsupported Sitting, Static Balance)  LE exer  -DM     Row Name 07/21/20 1033          Dynamic Sitting Balance    Level of [place], Reaches Outside Midline (Sitting, Dynamic Balance)  independent  -DM     Sitting Position, Reaches Outside Midline (Sitting, Dynamic Balance)  sitting in chair  -DM     Comment, Reaches Outside Midline (Sitting, Dynamic Balance)  recip scooting  -DM     Row Name 07/21/20 1033          Static Standing Balance    Level of [place] (Supported Standing, Static Balance)  independent  -DM      Time Able to Maintain Position (Supported Standing, Static Balance)  45 to 60 seconds  -DM     Assistive Device Utilized (Supported Standing, Static Balance)  other (see comments) gt belt  -DM     Comment (Supported Standing, Static Balance)  trunk ext; PLB  -DM     Row Name 07/21/20 1033          Dynamic Standing Balance    Level of [place], Reaches Outside Midline (Standing, Dynamic Balance)  supervision  -DM     Time Able to Maintain Position, Reaches Outside Midline (Standing, Dynamic Balance)  1 to 2 minutes  -DM     Assistive Device Utilized (Supported Standing, Dynamic Balance)  other (see comments) gt belt  -DM     Comment, Reaches Outside Midline (Standing, Dynamic Balance)  WS to init sidesteps/backing to chair  -DM       User Key  (r) = Recorded By, (t) = Taken By, (c) = Cosigned By    Initials Name Provider Type    DM Velvet Mejias, PT Physical Therapist        Goals/Plan     Row Name 07/21/20 1033          Bed Mobility Goal 1 (PT)    Activity/Assistive Device (Bed Mobility Goal 1, PT)  sit to supine/supine to sit  -DM     [place] Level/Cues Needed (Bed Mobility Goal 1, PT)  independent  -DM     Time Frame (Bed Mobility Goal 1, PT)  long term goal (LTG);10 days  -DM     Progress/Outcomes (Bed Mobility Goal 1, PT)  goal partially met  -DM     Row Name 07/21/20 1033          Transfer Goal 1 (PT)    Activity/Assistive Device (Transfer Goal 1, PT)  sit-to-stand/stand-to-sit  -DM     [place] Level/Cues Needed (Transfer Goal 1, PT)  independent  -DM     Time Frame (Transfer Goal 1, PT)  long term goal (LTG);10 days  -DM     Progress/Outcome (Transfer Goal 1, PT)  goal partially met  -DM     Row Name 07/21/20 1033          Gait Training Goal 1 (PT)    Activity/Assistive Device (Gait Training Goal 1, PT)  gait (walking locomotion)  -DM     [place] Level (Gait Training Goal 1, PT)  independent  -DM     Distance (Gait Goal 1, PT)  400  -DM     Time Frame (Gait Training Goal 1, PT)  long term  goal (LTG);10 days  -DM     Progress/Outcome (Gait Training Goal 1, PT)  goal partially met still req CGA, w/ standing rests  -DM     Row Name 07/21/20 1033          Patient Education Goal (PT)    Activity (Patient Education Goal, PT)  HEP Exer  -DM     [place]/Cues/Accuracy (Memory Goal 2, PT)  independent;demonstrates adequately  -DM     Time Frame (Patient Education Goal, PT)  long term goal (LTG);10 days  -DM     Progress/Outcome (Patient Education Goal, PT)  goal met  -DM       User Key  (r) = Recorded By, (t) = Taken By, (c) = Cosigned By    Initials Name Provider Type    DM Velvet Mejias, PT Physical Therapist        Clinical Impression     Row Name 07/21/20 1033          Pain Assessment    Additional Documentation  Pain Scale: Numbers Pre/Post-Treatment (Group)  -DM     Row Name 07/21/20 1033          Pain Scale: Numbers Pre/Post-Treatment    Pain Scale: Numbers, Pretreatment  9/10  -DM     Pain Scale: Numbers, Post-Treatment  8/10  -DM     Pain Location - Orientation  incisional  -DM     Pain Location  chest  -DM     Pain Intervention(s)  Repositioned;Elevated;Rest  -DM     Row Name 07/21/20 1033          Vital Signs    Pre Systolic BP Rehab  147  -DM     Pre Treatment Diastolic BP  76  -DM     Post Systolic BP Rehab  143  -DM     Post Treatment Diastolic BP  73  -DM     Pretreatment Heart Rate (beats/min)  62  -DM     Intratreatment Heart Rate (beats/min)  70  -DM     Posttreatment Heart Rate (beats/min)  68  -DM     Pre SpO2 (%)  94  -DM     O2 Delivery Pre Treatment  room air  -DM     Intra SpO2 (%)  93  -DM     O2 Delivery Intra Treatment  room air  -DM     Post SpO2 (%)  94  -DM     O2 Delivery Post Treatment  room air  -DM     Pre Patient Position  Sitting  -DM     Intra Patient Position  Standing  -DM     Post Patient Position  Sitting respirex 500 cc  -DM     Rest Breaks   2  -DM     Row Name 07/21/20 1033          Positioning and Restraints    Pre-Treatment Position  sitting in  chair/recliner  -DM     Post Treatment Position  chair  -DM     In Chair  notified nsg;reclined;call light within reach;encouraged to call for assist;waffle cushion;legs elevated  -DM       User Key  (r) = Recorded By, (t) = Taken By, (c) = Cosigned By    Initials Name Provider Type    Velvet Nava, PT Physical Therapist        Outcome Measures     Row Name 07/21/20 1033          How much help from another person do you currently need...    Turning from your back to your side while in flat bed without using bedrails?  4  -DM     Moving from lying on back to sitting on the side of a flat bed without bedrails?  3  -DM     Moving to and from a bed to a chair (including a wheelchair)?  3  -DM     Standing up from a chair using your arms (e.g., wheelchair, bedside chair)?  3  -DM     Climbing 3-5 steps with a railing?  3  -DM     To walk in hospital room?  3  -DM     AM-PAC 6 Clicks Score (PT)  19  -DM     Row Name 07/21/20 1033          Functional Assessment    Outcome Measure Options  AM-PAC 6 Clicks Basic Mobility (PT)  -DM       User Key  (r) = Recorded By, (t) = Taken By, (c) = Cosigned By    Initials Name Provider Type    Velvet Nava, PT Physical Therapist        Physical Therapy Education                 Title: PT OT SLP Therapies (In Progress)     Topic: Physical Therapy (In Progress)     Point: Mobility training (In Progress)     Description:   Instruct learner(s) on safety and technique for assisting patient out of bed, chair or wheelchair.  Instruct in the proper use of assistive devices, such as walker, crutches, cane or brace.              Patient Friendly Description:   It's important to get you on your feet again, but we need to do so in a way that is safe for you. Falling has serious consequences, and your personal safety is the most important thing of all.        When it's time to get out of bed, one of us or a family member will sit next to you on the bed to give you support.     If your  doctor or nurse tells you to use a walker, crutches, a cane, or a brace, be sure you use it every time you get out of bed, even if you think you don't need it.    Learning Progress Summary           Patient Eager, E,D,H, DU,VU by DM at 7/21/2020 1132    Acceptance, D, NR by ES at 7/19/2020 1043    Acceptance, E, NR by ES at 7/18/2020 1357   Significant Other Acceptance, E, NR by ES at 7/18/2020 1357                   Point: Home exercise program (In Progress)     Description:   Instruct learner(s) on appropriate technique for monitoring, assisting and/or progressing patient with therapeutic exercises and activities.              Learning Progress Summary           Patient Eager, E,D,H, DU,VU by DM at 7/21/2020 1132    Acceptance, D, NR by ES at 7/19/2020 1043    Acceptance, E, NR by ES at 7/18/2020 1357   Significant Other Acceptance, E, NR by ES at 7/18/2020 1357                   Point: Body mechanics (In Progress)     Description:   Instruct learner(s) on proper positioning and spine alignment for patient and/or caregiver during mobility tasks and/or exercises.              Learning Progress Summary           Patient Eager, E,D,H, DU,VU by DM at 7/21/2020 1132    Acceptance, D, NR by ES at 7/19/2020 1043    Acceptance, E, NR by ES at 7/18/2020 1357   Significant Other Acceptance, E, NR by ES at 7/18/2020 1357                   Point: Precautions (In Progress)     Description:   Instruct learner(s) on prescribed precautions during mobility and gait tasks              Learning Progress Summary           Patient Eager, E,D,H, DU,VU by DM at 7/21/2020 1132    Acceptance, E,TB, NR by LO at 7/20/2020 1324    Comment:  Patient education regarding sternal precautions to maintain for bed mobility and transfers.    Acceptance, D, NR by ES at 7/19/2020 1043    Acceptance, E, NR by ES at 7/18/2020 1357   Significant Other Acceptance, E, NR by ES at 7/18/2020 1357                               User Key     Initials Effective  Dates Name Provider Type Discipline    DM 06/19/15 -  Velvet Mejias, PT Physical Therapist PT    ES 06/18/19 -  Kiara Lazaro, PT Physical Therapist PT    LO 03/11/20 -  Tran Ibanez, ROSMERY Physical Therapist PT              PT Recommendation and Plan     Outcome Summary/Treatment Plan (PT)  Anticipated Discharge Disposition (PT): home with assist  Plan of Care Reviewed With: patient  Progress: improving  Outcome Summary: Performs STS w/CGA, amb 400 ft w/ CGA & 2 stand.rests, + performed cardiac ther exer & instructed in HEP,but limited by incis. pain 8-9/10 & fatigue;decl. stair trial on 3 steps; partially met mobil goals & fully met HEP goal; will d/c home later today w/ spouse's assist     Time Calculation:   PT Charges     Row Name 07/21/20 1136             Time Calculation    Start Time  1033  -DM      PT Received On  07/21/20  -DM      PT Goal Re-Cert Due Date  07/28/20  -DM         Time Calculation- PT    Total Timed Code Minutes- PT  41 minute(s)  -DM         Timed Charges    93664 - PT Therapeutic Exercise Minutes  15  -DM      32678 - PT Therapeutic Activity Minutes  26  -DM        User Key  (r) = Recorded By, (t) = Taken By, (c) = Cosigned By    Initials Name Provider Type    DM Velvet Mejias, PT Physical Therapist        Therapy Charges for Today     Code Description Service Date Service Provider Modifiers Qty    27310675619 HC PT THER PROC EA 15 MIN 7/21/2020 Velvet Mejias, PT GP 1    46860615130 HC PT THERAPEUTIC ACT EA 15 MIN 7/21/2020 Velvet Mejias, PT GP 2          PT G-Codes  Outcome Measure Options: AM-PAC 6 Clicks Basic Mobility (PT)  AM-PAC 6 Clicks Score (PT): 19    PT Discharge Summary  Anticipated Discharge Disposition (PT): home with assist    Velvet Mejias, PT  7/21/2020

## 2020-07-22 ENCOUNTER — READMISSION MANAGEMENT (OUTPATIENT)
Dept: CALL CENTER | Facility: HOSPITAL | Age: 55
End: 2020-07-22

## 2020-07-22 RX ORDER — ATORVASTATIN CALCIUM 40 MG/1
40 TABLET, FILM COATED ORAL NIGHTLY
Qty: 30 TABLET | Refills: 6 | Status: SHIPPED | OUTPATIENT
Start: 2020-07-22 | End: 2020-08-13

## 2020-07-22 NOTE — OUTREACH NOTE
Prep Survey      Responses   [facility] patient discharged from?  [place]   Is LACE score < 7 ?  No   Eligibility  Readm Mgmt   Discharge diagnosis  CAD, HLD, HTN, cellulitis of labia, labial abscess, MRSA pos. , S/p CABG x3 with LISE, EVH of RGSV   COVID-19 Test Status  Negative   Does the patient have one of the following disease processes/diagnoses(primary or secondary)?  Cardiothoracic surgery   Does the patient have Home health ordered?  No   Is there a DME ordered?  No   Comments regarding appointments  see AVS   Medication alerts for this patient  see AVS   Prep survey completed?  Yes          Veda Monsalve RN

## 2020-07-27 ENCOUNTER — READMISSION MANAGEMENT (OUTPATIENT)
Dept: CALL CENTER | Facility: HOSPITAL | Age: 55
End: 2020-07-27

## 2020-07-27 NOTE — OUTREACH NOTE
CT Surgery Week 1 Survey      Responses   [facility] patient discharged from?  [place]   Does the patient have one of the following disease processes/diagnoses(primary or secondary)?  Cardiothoracic surgery   Is there a successful TCM telephone encounter documented?  No   Week 1 attempt successful?  Yes   Call start time  1624   Call end time  1633   Discharge diagnosis  CAD, HLD, HTN, cellulitis of labia, labial abscess, MRSA pos. , S/p CABG x3 with LISE, EVH of RGSV   Meds reviewed with patient/caregiver?  Yes   Is the patient having any side effects they believe may be caused by any medication additions or changes?  No   Does the patient have all medications related to this admission filled (includes all antibiotics, pain medications, cardiac medications, etc.)  Yes   Is the patient taking all medications as directed (includes completed medication regime)?  Yes   Does the patient have a primary care provider?   Yes   Does the patient have an appointment scheduled with their C/T surgeon?  Yes   Has the patient kept scheduled appointments due by today?  N/A   Has home health visited the patient within 72 hours of discharge?  N/A   Psychosocial issues?  No   Did the patient receive a copy of their discharge instructions?  Yes   What is the patient's perception of their health status since discharge?  Improving   Is the patient/caregiver able to teach back normal signs of recovery?  Nausea and lack of appetite, Constipation, Pain or discomfort at incisional site   Nursing interventions  Reassured on normal signs of recovery   Is the patient /caregiver able to teach back basic post-op care?  Continue use of incentive spirometry at least 1 week post discharge, Practice 'cough and deep breath', Drive as instructed by MD in discharge instructions, Take showers only when approved by MD-sponge bathe until then, No tub bath, swimming, or hot tub until instructed by MD, Keep incision areas clean, dry and protected, Do  not remove steri-strips   Is the patient/caregiver able to teach back signs and symptoms of incisional infection?  Increased redness, swelling or pain at the incisonal site, Increased drainage or bleeding, Incisional warmth, Pus or odor from incision   Is the patient/caregiver able to teach back steps to recovery at home?  Set small, achievable goals for return to baseline health, Rest and rebuild strength, gradually increase activity, Weigh daily, Eat a well-balance diet   Is the patient/caregiver able to teach back the hierarchy of who to call/visit for symptoms/problems? PCP, Specialist, Home health nurse, Urgent Care, ED, 911  Yes   Week 1 call completed?  Yes   Wrap up additional comments  Doing better.  Patient states that her incision feels tight at times but it is not red, swollen, or draining.            Wendy Cabrera, RN

## 2020-07-28 PROBLEM — I48.0 PAF (PAROXYSMAL ATRIAL FIBRILLATION): Status: ACTIVE | Noted: 2020-07-28

## 2020-07-28 NOTE — PROGRESS NOTES
[facility]  [facility]      07/29/2020         Veda Enamorado  [address] [place] [address]  [unfilled]    1965    Chiquita Choudhary APRN    Veda Enamorado is a 55 y.o. female.      Subjective:     Chief Complaint:  Coronary Artery Disease and Establish Care       HPI   55-year-old female with history of hypertension, hyperlipidemia, tobacco abuse, coronary artery disease who presents today as a hospital referral following CABG.  Patient is status post three-vessel bypass on 7/17/2020.  Patient had A. fib with RVR postop which spontaneously converted to normal sinus rhythm prior to receiving amiodarone.  Her metoprolol was increased to 25 mg twice daily.  She was loaded on amiodarone per protocol and prescribed a short 1 month postoperative course to prevent recurrence.  Overall she is recovering well.  She still has some fatigue, but feels like this improves every day.  She has some mild dyspnea on exertion.  She reports having 2 episodes of palpitations since she has been home but not recently.  She was recently treated for a UTI by her PCP. She denies any lower extremity edema or orthopnea.  Systolic blood pressures are running in the 120s to 140s. She is smoking 1-2 cigarettes a day    Patient Active Problem List   Diagnosis   • Coronary artery disease involving native coronary artery of native heart with unstable angina pectoris (CMS/HCC)   • Tobacco abuse   • Mixed hyperlipidemia   • Essential hypertension   • Visit for wound check   • Mixed hyperlipidemia   • Cellulitis of labia   • Labial abscess   • MRSA (methicillin resistant staph aureus) culture positive   • Left main coronary artery disease   • Chronic low back pain   • GERD without esophagitis   • Coronary artery disease   • CAD in native artery   • PAF (paroxysmal atrial fibrillation) (CMS/HCC)       Past Medical History:   Diagnosis Date   • Arthritis     back   • Back ache    • Chronic back pain    •  Coronary artery disease     s/p 3 stents    • GERD (gastroesophageal reflux disease)    • History of MRSA infection 2020    labia; hospitalized 6 days on IV antibiotics and then went home on po antibiotics    • Hyperlipidemia    • Hypertension    • Peptic ulceration        Past Surgical History:   Procedure Laterality Date   • CARDIAC CATHETERIZATION     • CARDIAC CATHETERIZATION N/A 2019    Procedure: Left Heart Cath;  Surgeon: Lazaro Conway MD;  Location: [facility] CATH INVASIVE LOCATION;  Service: Cardiology   • CARDIAC CATHETERIZATION     • CARDIAC SURGERY     •  SECTION      x2   • CORONARY ARTERY BYPASS GRAFT N/A 2020    Procedure: MEDIAN STERNOTOMY, CORONARY ARTERY BYPASS X3 WITH  INTERNAL MAMMARY ARTERY GRAFTING, ENDOVASCULAR VEIN HARVESTING OF THE RIGHT GREATER SAPHENOUS VEIN, MAICOL PER ANESTHESIA;  Surgeon: Juanjo Coronado MD;  Location: [facility] OR;  Service: Cardiothoracic;  Laterality: N/A;  ST ON LE  VO: 1800  VR: 1814   • HAND SURGERY      right   • PERINEAL LESION/CYST EXCISION Right 2020    Procedure: I&D ABSCESS;  Surgeon: Leander Cardenas III, MD;  Location: [facility];  Service: Obstetrics/Gynecology       Family History   Problem Relation Age of Onset   • Heart disease Mother    • Coronary artery disease Mother    • Heart disease Father    • Heart attack Father    • Hypertension Father    • Stroke Father    • No Known Problems Sister    • Heart attack Brother    • Heart disease Brother    • Heart disease Maternal Grandmother    • Heart attack Maternal Grandmother    • No Known Problems Maternal Grandfather    • Stroke Paternal Grandmother    • Breast cancer Neg Hx        Social History     Socioeconomic History   • Marital status:      Spouse name: Not on file   • Number of children: 2   • Years of education: Not on file   • Highest education level: Not on file   Occupational History   • Occupation: Homemaker     Comment: disability   Tobacco Use   •  Smoking status: Current Every Day Smoker     Packs/day: 0.25     Years: 25.00     Pack years: 6.25     Types: Electronic Cigarette, Cigarettes   • Smokeless tobacco: Never Used   • Tobacco comment: Pt states only smoking a few cigs a day, transitioning to E-Cig.   Substance and Sexual Activity   • Alcohol use: No   • Drug use: No   • Sexual activity: Defer   Social History Narrative    Caffeine? 1 can soda daily       Allergies   Allergen Reactions   • Bactrim [Sulfamethoxazole-Trimethoprim] Rash   • Ciprofloxacin Other (See Comments)     tremors   • Ranexa [Ranolazine Er] Unknown - Low Severity         Current Outpatient Medications:   •  Alirocumab (Praluent) 75 MG/ML solution auto-injector, Inject 1 mL under the skin into the appropriate area as directed Every 14 (Fourteen) Days., Disp: 2 mL, Rfl: 5  •  amiodarone (PACERONE) 200 MG tablet, Take 1 tablet by mouth Every 12 (Twelve) Hours for 14 days. Then take 1 tablet by mouth daily starting 8/3/20, Disp: 60 tablet, Rfl: 0  •  aspirin 325 MG EC tablet, Take 1 tablet by mouth Daily., Disp: 30 tablet, Rfl: 6  •  atorvastatin (LIPITOR) 40 MG tablet, Take 1 tablet by mouth Every Night., Disp: 30 tablet, Rfl: 6  •  fluticasone-salmeterol (ADVAIR) 500-50 MCG/DOSE DISKUS, Inhale 1 puff As Needed., Disp: , Rfl:   •  HYDROcodone-acetaminophen (NORCO) 7.5-325 MG per tablet, Take 1 tablet by mouth Every 6 (Six) Hours As Needed for Pain ., Disp: 30 tablet, Rfl: 0  •  isosorbide mononitrate (IMDUR) 60 MG 24 hr tablet, Take 1 tablet by mouth Daily. (Patient taking differently: Take 60 mg by mouth Every Morning.), Disp: 30 tablet, Rfl: 6  •  metoprolol tartrate (LOPRESSOR) 25 MG tablet, Take 1 tablet by mouth Every 12 (Twelve) Hours., Disp: 60 tablet, Rfl: 6  •  nitroglycerin (NITROSTAT) 0.4 MG SL tablet, 1 under the tongue as needed for angina, may repeat q5mins for up three doses (Patient taking differently: Place 0.4 mg under the tongue Every 5 (Five) Minutes As Needed for  Chest Pain. 1 under the tongue as needed for angina, may repeat q5mins for up three doses), Disp: 100 tablet, Rfl: 3  •  spironolactone (ALDACTONE) 25 MG tablet, Take 1 tablet by mouth Daily. (Patient taking differently: Take 25 mg by mouth Every Morning.), Disp: 30 tablet, Rfl: 6  No current facility-administered medications for this visit.     Facility-Administered Medications Ordered in Other Visits:   •  Chlorhexidine Gluconate Cloth 2 % pads 1 application, 1 application, Topical, Q12H PRN, Bianka Ramírez APRN    The following portions of the patient's history were reviewed today and updated as appropriate: allergies, current medications, past family history, past medical history, past social history, past surgical history and problem list     Review of Systems   Constitution: Positive for malaise/fatigue. Negative for chills and fever.   HENT: Negative.    Eyes: Negative.    Cardiovascular: Positive for dyspnea on exertion and palpitations. Negative for chest pain, claudication, cyanosis, irregular heartbeat, leg swelling, near-syncope, orthopnea, paroxysmal nocturnal dyspnea and syncope.   Respiratory: Negative for cough, shortness of breath and snoring.    Endocrine: Negative.    Hematologic/Lymphatic: Does not bruise/bleed easily.   Skin: Negative for poor wound healing.   Musculoskeletal: Negative.    Gastrointestinal: Negative for abdominal pain, heartburn, hematemesis, melena, nausea and vomiting.   Genitourinary: Negative.  Negative for hematuria.   Neurological: Positive for weakness.   Psychiatric/Behavioral: The patient is nervous/anxious.    Allergic/Immunologic: Negative.        Objective:     Vitals:    07/29/20 1428 07/29/20 1430 07/29/20 1431   BP: 152/65 142/63 138/65   BP Location: Left arm Left arm Right arm   Patient Position: Standing Sitting Sitting   Cuff Size: Adult Adult Adult   Pulse: 74 74 75   Resp:   20   Temp:   98.2 °F (36.8 °C)   TempSrc:   Temporal   SpO2:  99% 99%   Weight:  "54.5 kg (120 lb 3 oz)  54.5 kg (120 lb 3 oz)   Height:   157.5 cm (62\")       Body mass index is 21.98 kg/m².    Physical Exam   Constitutional: She is oriented to person, place, and time. She appears well-developed and well-nourished. No distress.   HENT:   Head: Normocephalic.   Eyes: Pupils are equal, round, and reactive to light. Conjunctivae are normal.   Neck: Neck supple. No JVD present. No thyromegaly present.   Cardiovascular: Normal rate, regular rhythm, normal heart sounds and intact distal pulses. Exam reveals no gallop and no friction rub.   No murmur heard.  Pulmonary/Chest: Effort normal and breath sounds normal. No respiratory distress. She has no wheezes. She has no rales. She exhibits no tenderness.   Abdominal: Soft. Bowel sounds are normal.   Musculoskeletal: Normal range of motion. She exhibits no edema.   Neurological: She is alert and oriented to person, place, and time.   Skin: Skin is warm and dry.   Midsternal incision is clean dry and intact.  MT sites are clean dry and intact with sutures present.  EVH site also clean dry intact with no erythema. Mild hematoma noted proximal to site   Psychiatric: She has a normal mood and affect. Her behavior is normal. Thought content normal.   Vitals reviewed.      Lab and Diagnostic Review:  Lab Results   Component Value Date    WBC 12.86 (H) 07/21/2020    HGB 10.0 (L) 07/21/2020    HCT 31.2 (L) 07/21/2020    MCV 97.2 (H) 07/21/2020     07/21/2020     Lab Results   Component Value Date    GLUCOSE 110 (H) 07/21/2020    CALCIUM 8.2 (L) 07/21/2020     07/21/2020    K 4.2 07/21/2020    CO2 25.0 07/21/2020     07/21/2020    BUN 15 07/21/2020    CREATININE 0.47 (L) 07/21/2020    EGFRIFNONA 138 07/21/2020    BCR 31.9 (H) 07/21/2020    ANIONGAP 11.0 07/21/2020     Carotid duplex 6/26/20  IMPRESSION:  Impression:     No evidence of hemodynamically significant plaques or stenosis within  the carotid system at this time.    Echo " 6/26/20  · Normal left ventricular cavity size and wall thickness noted.  · Left ventricular systolic function is normal. Estimated EF appears to be in the range of 61 - 65%.  · Left ventricular diastolic function is normal.  · Tip of anterior mitral leaflet is mildly thickened ,no vegetations noted.  · No significant valvular heart disease  · There is no evidence of pericardial effusion.  EKG: Normal sinus rhythm with sinus arrhythmia, QTc 466.  Baseline artifact noted    Assessment and Plan:   1. PAF (paroxysmal atrial fibrillation) (post op)  Currently in normal sinus rhythm  Continue amiodarone 200 mg twice daily, decrease to 200 mg daily on Monday  Keep follow-up with primary cardiologist and consider Holter monitor after discontinuing amiodarone to determine if there is any A. fib recurrence and need for anticoagulation  - ECG 12 Lead; Future    2. Coronary artery disease involving native coronary artery of native heart without angina pectoris  S/p CABG x 3 7/17 with appropriate recovery  Continue aspirin, beta-blocker and statin    3. Essential hypertension  Controlled  Continue to monitor and call if systolic blood pressures consistently greater than 140    4. Mixed hyperlipidemia  Continue statin    5. Tobacco use  Cessation strongly encouraged.  Discussed risks of ongoing tobacco abuse    Keep scheduled follow-up with primary cardiologist on 8/13  Follow-up with CT surgery in 6 weeks          It has been a pleasure to participate in the care of this patient.  Patient was instructed to call the [facility] with any questions, concerns, or worsening symptoms.    *Please note that portions of this note were completed with a voice recognition program. Efforts were made to edit the dictations, but occasionally words are mistranscribed.

## 2020-07-29 ENCOUNTER — OFFICE VISIT (OUTPATIENT)
Dept: CARDIOLOGY | Facility: HOSPITAL | Age: 55
End: 2020-07-29

## 2020-07-29 ENCOUNTER — HOSPITAL ENCOUNTER (OUTPATIENT)
Dept: CARDIOLOGY | Facility: HOSPITAL | Age: 55
Discharge: HOME | End: 2020-07-29
Admitting: NURSE PRACTITIONER

## 2020-07-29 VITALS
HEART RATE: 75 BPM | RESPIRATION RATE: 20 BRPM | BODY MASS INDEX: 22.12 KG/M2 | TEMPERATURE: 98.2 F | HEIGHT: 62 IN | OXYGEN SATURATION: 99 % | DIASTOLIC BLOOD PRESSURE: 65 MMHG | WEIGHT: 120.19 LBS | SYSTOLIC BLOOD PRESSURE: 138 MMHG

## 2020-07-29 DIAGNOSIS — Z72.0 TOBACCO USE: ICD-10-CM

## 2020-07-29 DIAGNOSIS — I25.10 CORONARY ARTERY DISEASE INVOLVING NATIVE CORONARY ARTERY OF NATIVE HEART WITHOUT ANGINA PECTORIS: ICD-10-CM

## 2020-07-29 DIAGNOSIS — E78.2 MIXED HYPERLIPIDEMIA: ICD-10-CM

## 2020-07-29 DIAGNOSIS — I48.0 PAF (PAROXYSMAL ATRIAL FIBRILLATION) (HCC): Primary | ICD-10-CM

## 2020-07-29 DIAGNOSIS — I10 ESSENTIAL HYPERTENSION: ICD-10-CM

## 2020-07-29 DIAGNOSIS — I48.0 PAF (PAROXYSMAL ATRIAL FIBRILLATION) (HCC): ICD-10-CM

## 2020-07-29 PROCEDURE — 93010 ELECTROCARDIOGRAM REPORT: CPT | Performed by: INTERNAL MEDICINE

## 2020-07-29 PROCEDURE — 93005 ELECTROCARDIOGRAM TRACING: CPT | Performed by: NURSE PRACTITIONER

## 2020-07-29 PROCEDURE — 99214 OFFICE O/P EST MOD 30 MIN: CPT | Performed by: NURSE PRACTITIONER

## 2020-07-29 NOTE — DISCHARGE SUMMARY
"5       CTS Discharge Summary    Patient Care Team:  Chiquita Choudhary APRN as PCP - General  Chiquita Choudhary APRN as PCP - Family Medicine      Date of Admission: 7/17/2020 10:27 AM  Date of Discharge: 7/21/2020    Discharge Diagnosis  Past Medical History:   Diagnosis Date   • Arthritis     back   • Back ache    • Chronic back pain    • Coronary artery disease     s/p 3 stents    • GERD (gastroesophageal reflux disease)    • History of MRSA infection 01/2020    labia; hospitalized 6 days on IV antibiotics and then went home on po antibiotics    • Hyperlipidemia    • Hypertension    • Peptic ulceration          Coronary artery disease involving native coronary artery of native heart with unstable angina pectoris (CMS/[facility])    Tobacco abuse    Mixed hyperlipidemia    Essential hypertension    CAD in native artery      History of Present Illness  55-year-old  female with a history of hypertension, hyperlipidemia, coronary artery disease status post stenting and extensive family history of coronary artery disease who presents with left arm pain. The patient notes approximately 3 to 4 days of significant left arm pain.  This happens multiple times a day.  She denies any chest pain, vomiting or diaphoresis.  The patient does have some nausea that she attributes to gastroesophageal reflux.  The patient does feel tired \"all the time.\"      Hospital Course  The patient is a 55-year-old  female who was admitted on 7/17/2020 and underwent coronary artery bypass grafting x3 performed by Dr. Juanjo Coronado.  The patient tolerated the procedure well without any immediate complications and was transferred to the intensive care unit in stable condition.  On postoperative day #1, patient was doing well having previously been extubated.  The patient had the [place] catheter, arterial line, temporary epicardial pacing wires and chest tubes removed without difficulty.  On postoperative day #2, the patient continued to " participate in his care and was ready for transfer to telemetry.  Over the course of the next several days, the patient continued to make slow but steady progress working with physical therapy and the nursing staff to ambulate in the hallway and improve his functional status.  On 7/20/2020, the patient did experience postoperative paroxysmal atrial fibrillation with conversion after being initiated on IV amiodarone.  Patient was transitioned to oral amiodarone per cardiology.  On 7/21/2020, the patient met criteria for discharge and was discharged home without difficulty.    Procedures Performed  Procedure(s):  MEDIAN STERNOTOMY, CORONARY ARTERY BYPASS X3 WITH  INTERNAL MAMMARY ARTERY GRAFTING, ENDOVASCULAR VEIN HARVESTING OF THE RIGHT GREATER SAPHENOUS VEIN, MAICOL PER ANESTHESIA       Consults:   Consults     Date and Time Order Name Status Description    7/20/2020 1113 Inpatient Cardiology Consult Completed         Intensivist    Discharge Medications     Discharge Medications      New Medications      Instructions Start Date   amiodarone 200 MG tablet  Commonly known as:  PACERONE   200 mg, Oral, Every 12 Hours Scheduled      atorvastatin 40 MG tablet  Commonly known as:  LIPITOR   40 mg, Oral, Nightly      metoprolol tartrate 25 MG tablet  Commonly known as:  LOPRESSOR   25 mg, Oral, Every 12 Hours Scheduled         Changes to Medications      Instructions Start Date   aspirin 325 MG EC tablet  What changed:    · medication strength  · how much to take   325 mg, Oral, Daily      HYDROcodone-acetaminophen 7.5-325 MG per tablet  Commonly known as:  NORCO  What changed:    · when to take this  · reasons to take this  Notes to patient:  For pain   1 tablet, Oral, Every 6 Hours PRN      isosorbide mononitrate 60 MG 24 hr tablet  Commonly known as:  IMDUR  What changed:  when to take this  Notes to patient:  To lower your blood pressure and for chest pain   60 mg, Oral, Daily      nitroglycerin 0.4 MG SL  tablet  Commonly known as:  NITROSTAT  What changed:    · how much to take  · how to take this  · when to take this  · reasons to take this  Notes to patient:  For chest pain   1 under the tongue as needed for angina, may repeat q5mins for up three doses      spironolactone 25 MG tablet  Commonly known as:  ALDACTONE  What changed:  when to take this  Notes to patient:  To decrease fluid and swelling   25 mg, Oral, Daily         Continue These Medications      Instructions Start Date   fluticasone-salmeterol 500-50 MCG/DOSE DISKUS  Commonly known as:  ADVAIR  Notes to patient:  For breathing   1 puff, Inhalation, As Needed      Praluent 75 MG/ML solution auto-injector  Generic drug:  Alirocumab  Notes to patient:  To lower your cholesterol   1 mL, Subcutaneous, Every 14 Days         Stop These Medications    atenolol 25 MG tablet  Commonly known as:  TENORMIN     prasugrel 10 MG tablet  Commonly known as:  EFFIENT     pravastatin 20 MG tablet  Commonly known as:  PRAVACHOL     traMADol 50 MG tablet  Commonly known as:  ULTRAM            Discharge Diet:   Diet Instructions     Diet: Cardiac, Consistent Carbohydrate      Discharge Diet:   Cardiac  Consistent Carbohydrate             Activity at Discharge:   Activity Instructions     Activity as Tolerated      Other Activity Instructions      Activity Instructions: No lifting greater than 10 pounds for 4 weeks and no driving for 4 weeks or while taking narcotics.        Do not drive while taking narcotics    Follow-up Appointments  Future Appointments   Date Time Provider Department Center   7/29/2020  2:15 PM Yvette Jacinto APRN [facility]   8/13/2020  2:30 PM Lazaro Conway MD [facility] None   9/2/2020 11:45 AM Bianka Ramírez APRN [facility] None   9/24/2020  1:45 PM Lzaaro Conway MD [facility] None          RACIEL Jimenez  07/29/20  10:13

## 2020-07-30 ENCOUNTER — HOSPITAL ENCOUNTER (OUTPATIENT)
Dept: CARDIOLOGY | Facility: HOSPITAL | Age: 55
Discharge: HOME | End: 2020-07-30
Admitting: NURSE PRACTITIONER

## 2020-07-30 DIAGNOSIS — I48.0 PAF (PAROXYSMAL ATRIAL FIBRILLATION) (HCC): ICD-10-CM

## 2020-07-30 PROCEDURE — 93005 ELECTROCARDIOGRAM TRACING: CPT | Performed by: NURSE PRACTITIONER

## 2020-08-04 ENCOUNTER — TELEPHONE (OUTPATIENT)
Dept: CARDIAC REHAB | Facility: HOSPITAL | Age: 55
End: 2020-08-04

## 2020-08-04 DIAGNOSIS — Z95.1 S/P CABG (CORONARY ARTERY BYPASS GRAFT): Primary | ICD-10-CM

## 2020-08-05 ENCOUNTER — READMISSION MANAGEMENT (OUTPATIENT)
Dept: CALL CENTER | Facility: HOSPITAL | Age: 55
End: 2020-08-05

## 2020-08-05 ENCOUNTER — HOSPITAL ENCOUNTER (OUTPATIENT)
Dept: GENERAL RADIOLOGY | Facility: HOSPITAL | Age: 55
Discharge: HOME | End: 2020-08-05
Admitting: THORACIC SURGERY (CARDIOTHORACIC VASCULAR SURGERY)

## 2020-08-05 ENCOUNTER — OFFICE VISIT (OUTPATIENT)
Dept: CARDIAC SURGERY | Facility: CLINIC | Age: 55
End: 2020-08-05

## 2020-08-05 VITALS
OXYGEN SATURATION: 100 % | BODY MASS INDEX: 21.53 KG/M2 | HEIGHT: 62 IN | SYSTOLIC BLOOD PRESSURE: 112 MMHG | HEART RATE: 75 BPM | WEIGHT: 117 LBS | TEMPERATURE: 98.7 F | DIASTOLIC BLOOD PRESSURE: 62 MMHG

## 2020-08-05 DIAGNOSIS — Z95.1 S/P CABG (CORONARY ARTERY BYPASS GRAFT): Primary | ICD-10-CM

## 2020-08-05 DIAGNOSIS — Z95.1 S/P CABG (CORONARY ARTERY BYPASS GRAFT): ICD-10-CM

## 2020-08-05 PROCEDURE — 71046 X-RAY EXAM CHEST 2 VIEWS: CPT | Performed by: RADIOLOGY

## 2020-08-05 PROCEDURE — 71046 X-RAY EXAM CHEST 2 VIEWS: CPT

## 2020-08-05 PROCEDURE — 99024 POSTOP FOLLOW-UP VISIT: CPT | Performed by: NURSE PRACTITIONER

## 2020-08-05 NOTE — PROGRESS NOTES
[facility] Cardiothoracic Surgery Follow-Up Note    Name:  Veda Enamorado  MRN Number:  [id number]  Date of Encounter:  08/05/2020    Referred By:  No ref. provider found  PCP:  Chiquita Choudhary APRN    Chief Complaint:    Chief Complaint   Patient presents with   • Post-op Follow-up     Hospital follow-up s/p CABGx3 7/17/20. Patient is still fairly weak since surgery.       History of Present Illness:    Ms. Veda Enamorado is a pleasant 55 y.o. female with a history of hypertension, hyperlipidemia, GERD and coronary artery disease status post stenting and s/p CABG x 3 with EVH per Dr. Coronado 7/17/20 who returns the office today for postoperative exam.  The patient denies incisional pain, chest pain, shortness of breath or difficulty with ambulation.  She does have some chest wall tenderness and soreness which makes it difficult to use her upper extremities.  She states that she still feels fairly fatigued since the surgery.      Review of Systems:  Review of Systems   Constitution: Negative for chills, fever and malaise/fatigue.   Eyes: Negative for visual disturbance.   Cardiovascular: Positive for leg swelling. Negative for chest pain and dyspnea on exertion.   Respiratory: Negative for hemoptysis.    Skin: Negative for poor wound healing.   Gastrointestinal: Negative for abdominal pain.   Neurological: Negative for weakness.   Psychiatric/Behavioral: Negative for altered mental status.       Past Medical History:    Past Medical History:   Diagnosis Date   • Arthritis     back   • Back ache    • Chronic back pain    • Coronary artery disease     s/p 3 stents    • GERD (gastroesophageal reflux disease)    • History of MRSA infection 01/2020    labia; hospitalized 6 days on IV antibiotics and then went home on po antibiotics    • Hyperlipidemia    • Hypertension    • Peptic ulceration        Past Surgical History:    Past Surgical History:   Procedure Laterality Date   • CARDIAC CATHETERIZATION      • CARDIAC CATHETERIZATION N/A 2019    Procedure: Left Heart Cath;  Surgeon: Lazaro Conway MD;  Location:  [facility] CATH INVASIVE LOCATION;  Service: Cardiology   • CARDIAC CATHETERIZATION     • CARDIAC SURGERY     •  SECTION      x2   • CORONARY ARTERY BYPASS GRAFT N/A 2020    Procedure: MEDIAN STERNOTOMY, CORONARY ARTERY BYPASS X3 WITH  INTERNAL MAMMARY ARTERY GRAFTING, ENDOVASCULAR VEIN HARVESTING OF THE RIGHT GREATER SAPHENOUS VEIN, MAICOL PER ANESTHESIA;  Surgeon: Juanjo Coronado MD;  Location: [facility] OR;  Service: Cardiothoracic;  Laterality: N/A;  ST ON LE  VO: 1800  VR: 1814   • HAND SURGERY      right   • PERINEAL LESION/CYST EXCISION Right 2020    Procedure: I&D ABSCESS;  Surgeon: Leander Cardenas III, MD;  Location: [facility] OR;  Service: Obstetrics/Gynecology       Patient Active Problem List   Diagnosis   • Coronary artery disease involving native coronary artery of native heart with unstable angina pectoris (CMS/HCC)   • Tobacco abuse   • Mixed hyperlipidemia   • Essential hypertension   • Visit for wound check   • Mixed hyperlipidemia   • Cellulitis of labia   • Labial abscess   • MRSA (methicillin resistant staph aureus) culture positive   • Left main coronary artery disease   • Chronic low back pain   • GERD without esophagitis   • Coronary artery disease   • CAD in native artery   • PAF (paroxysmal atrial fibrillation) (CMS/HCC)     Social History     Tobacco Use   • Smoking status: Current Every Day Smoker     Packs/day: 0.25     Years: 25.00     Pack years: 6.25     Types: Electronic Cigarette, Cigarettes   • Smokeless tobacco: Never Used   • Tobacco comment: Pt states only smoking a few cigs a day, transitioning to E-Cig.   Substance Use Topics   • Alcohol use: No   • Drug use: No     Family History   Problem Relation Age of Onset   • Heart disease Mother    • Coronary artery disease Mother    • Heart disease Father    • Heart attack Father    • Hypertension  Father    • Stroke Father    • No Known Problems Sister    • Heart attack Brother    • Heart disease Brother    • Heart disease Maternal Grandmother    • Heart attack Maternal Grandmother    • No Known Problems Maternal Grandfather    • Stroke Paternal Grandmother    • Breast cancer Neg Hx        Medications:      Current Outpatient Medications:   •  Alirocumab (Praluent) 75 MG/ML solution auto-injector, Inject 1 mL under the skin into the appropriate area as directed Every 14 (Fourteen) Days., Disp: 2 mL, Rfl: 5  •  amiodarone (PACERONE) 200 MG tablet, Take 1 tablet by mouth Every 12 (Twelve) Hours for 14 days. Then take 1 tablet by mouth daily starting 8/3/20, Disp: 60 tablet, Rfl: 0  •  aspirin 325 MG EC tablet, Take 1 tablet by mouth Daily., Disp: 30 tablet, Rfl: 6  •  atorvastatin (LIPITOR) 40 MG tablet, Take 1 tablet by mouth Every Night., Disp: 30 tablet, Rfl: 6  •  fluticasone-salmeterol (ADVAIR) 500-50 MCG/DOSE DISKUS, Inhale 1 puff As Needed., Disp: , Rfl:   •  HYDROcodone-acetaminophen (NORCO) 7.5-325 MG per tablet, Take 1 tablet by mouth Every 6 (Six) Hours As Needed for Pain ., Disp: 30 tablet, Rfl: 0  •  isosorbide mononitrate (IMDUR) 60 MG 24 hr tablet, Take 1 tablet by mouth Daily. (Patient taking differently: Take 60 mg by mouth Every Morning.), Disp: 30 tablet, Rfl: 6  •  metoprolol tartrate (LOPRESSOR) 25 MG tablet, Take 1 tablet by mouth Every 12 (Twelve) Hours., Disp: 60 tablet, Rfl: 6  •  nitroglycerin (NITROSTAT) 0.4 MG SL tablet, 1 under the tongue as needed for angina, may repeat q5mins for up three doses (Patient taking differently: Place 0.4 mg under the tongue Every 5 (Five) Minutes As Needed for Chest Pain. 1 under the tongue as needed for angina, may repeat q5mins for up three doses), Disp: 100 tablet, Rfl: 3  •  spironolactone (ALDACTONE) 25 MG tablet, Take 1 tablet by mouth Daily. (Patient taking differently: Take 25 mg by mouth Every Morning.), Disp: 30 tablet, Rfl: 6  No current  "facility-administered medications for this visit.     Facility-Administered Medications Ordered in Other Visits:   •  Chlorhexidine Gluconate Cloth 2 % pads 1 application, 1 application, Topical, Q12H LASHAEN, Bianka Ramírez APRN    Allergies:  Allergies   Allergen Reactions   • Bactrim [Sulfamethoxazole-Trimethoprim] Rash   • Ciprofloxacin Other (See Comments)     tremors   • Ranexa [Ranolazine Er] Unknown - Low Severity       Physical Exam:  Vital Signs:    Vitals:    08/05/20 0920   BP: 112/62   BP Location: Left arm   Patient Position: Sitting   Pulse: 75   Temp: 98.7 °F (37.1 °C)   SpO2: 100%   Weight: 53.1 kg (117 lb)   Height: 157.5 cm (62\")       Physical Exam  Gen- NAD, pleasant, cooperative  CV- Regular rate and rhythm, no murmur, gallop or rub  Pulm- Clear to auscultation bilateral without wheeze or rhonchi  GI- Soft, normoactive bowel sounds, non-tender  Ext- Without edema left lower extremity, with trace edema right lower extremity  Incision- Well approximated midsternal, MT sites and EVH site with no erythema, drainage or dehiscence noted.  Neuro- CN II- XII grossly intact, tongue midline, voice normal.    Labs/Imaging:  Chest x-ray, [facility], 8/5/2020  Impression:  Personally reviewed, small bilateral pleural effusions noted.    Assessment / Plan:  Ms. Veda Enamorado is a pleasant 55 y.o. female with a history of hypertension, hyperlipidemia, GERD and coronary artery disease status post stenting and s/p CABG x 3 with EVH per Dr. Coronado 7/17/20 who returns the office today for postoperative exam.  The patient is having stable postoperative course.  Her incisions are healing well, her sternum is stable and chest x-ray obtained in the office today is satisfactory.  The patient was instructed that she may begin driving at 6 weeks postop as long as she is not experiencing any dizziness or using pain medication.  I have outlined the physical activity suitable for each benchmark between 6 " weeks, 3 months in 1 year.  Sutures were removed in the office today from her mediastinal chest tube sites.  The patient is scheduled to see her cardiologist, Dr. Conway 8/13/20 for follow up.  We will facilitate a referral for cardiac rehab for this patient as I feel that she would benefit clinically in regaining her strength.  The patient was advised of the importance of smoking cessation.  At this time, we will plan to see her on an as-needed basis.  Should she have any future questions or concerns, she will contact our office.    Patient Education:  Post-Op Education:  I reviewed the patient's sternal precautions and outlined the physical activity suitable for each benchmark at the 6-week 3-month and 1 year intervals.  Wound Care:  Patient educated regarding care of post-operative wounds.  Patient is to wash with plain soap and water.  Patient is not to use salves on the incision sites.  Patient instructed regarding the signs and symptoms of infection and when to call the office.    Follow Up:  As needed    Please note, this document was produced using voice recognition software.    ZION Linda  [facility] Cardiothoracic Surgery

## 2020-08-05 NOTE — OUTREACH NOTE
CT Surgery Week 2 Survey      Responses   [facility] patient discharged from?  [place]   Does the patient have one of the following disease processes/diagnoses(primary or secondary)?  Cardiothoracic surgery   Week 2 attempt successful?  Yes   Call start time  1330   Call end time  1336   Discharge diagnosis  CAD, HLD, HTN, cellulitis of labia, labial abscess, MRSA pos. , S/p CABG x3 with LISE, EVH of RGSV   Person spoke with today (if not patient) and relationship  Arliss-spouse and patient    Meds reviewed with patient/caregiver?  Yes   Is the patient taking all medications as directed (includes completed medication regime)?  Yes   Comments regarding appointments  Appt with Yvette Jacinto on 7/29/20,  cardiology appt on 8/13/20   Does the patient have an appointment scheduled with their C/T surgeon?  Yes [8/5/20]   Comments regarding PCP  Pt had phone visit with PCP after d/c   Has the patient kept scheduled appointments due by today?  Yes   Nursing interventions  Reviewed instructions with patient   What is the patient's perception of their health status since discharge?  Improving   Nursing interventions  Nurse provided patient education   Is the patient/caregiver able to teach back normal signs of recovery?  Nausea and lack of appetite   Nursing interventions  Reassured on normal signs of recovery   Is the patient/caregiver able to teach back signs and symptoms of incisional infection?  Fever   Is the patient/caregiver able to teach back steps to recovery at home?  Eat a well-balance diet, Weigh daily, Rest and rebuild strength, gradually increase activity   Is the patient /caregiver able to teach back the importance of cardiac rehab?  Yes   If the patient is a current smoker, are they able to teach back resources for cessation?  Smoking cessation medications [Pt is still smoking a little]   Week 2 call completed?  Yes          Louisa Domínguez RN

## 2020-08-11 ENCOUNTER — TELEPHONE (OUTPATIENT)
Dept: CARDIAC REHAB | Facility: HOSPITAL | Age: 55
End: 2020-08-11

## 2020-08-13 ENCOUNTER — OFFICE VISIT (OUTPATIENT)
Dept: CARDIOLOGY | Facility: CLINIC | Age: 55
End: 2020-08-13

## 2020-08-13 ENCOUNTER — READMISSION MANAGEMENT (OUTPATIENT)
Dept: CALL CENTER | Facility: HOSPITAL | Age: 55
End: 2020-08-13

## 2020-08-13 VITALS
SYSTOLIC BLOOD PRESSURE: 121 MMHG | HEART RATE: 76 BPM | DIASTOLIC BLOOD PRESSURE: 67 MMHG | WEIGHT: 116 LBS | BODY MASS INDEX: 21.35 KG/M2 | HEIGHT: 62 IN

## 2020-08-13 DIAGNOSIS — E78.2 MIXED HYPERLIPIDEMIA: ICD-10-CM

## 2020-08-13 DIAGNOSIS — I25.110 CORONARY ARTERY DISEASE INVOLVING NATIVE CORONARY ARTERY OF NATIVE HEART WITH UNSTABLE ANGINA PECTORIS (HCC): ICD-10-CM

## 2020-08-13 DIAGNOSIS — R00.2 PALPITATIONS: Primary | ICD-10-CM

## 2020-08-13 DIAGNOSIS — I10 ESSENTIAL HYPERTENSION: ICD-10-CM

## 2020-08-13 PROCEDURE — 99214 OFFICE O/P EST MOD 30 MIN: CPT | Performed by: INTERNAL MEDICINE

## 2020-08-13 RX ORDER — AMIODARONE HYDROCHLORIDE 200 MG/1
200 TABLET ORAL 2 TIMES DAILY
Qty: 30 TABLET | Refills: 5 | Status: SHIPPED | OUTPATIENT
Start: 2020-08-13 | End: 2020-11-17

## 2020-08-13 RX ORDER — PRAVASTATIN SODIUM 20 MG
20 TABLET ORAL DAILY
Qty: 30 TABLET | Refills: 5 | Status: SHIPPED | OUTPATIENT
Start: 2020-08-13 | End: 2020-11-17

## 2020-08-13 RX ORDER — PRAVASTATIN SODIUM 20 MG
20 TABLET ORAL DAILY
COMMUNITY
End: 2020-08-13 | Stop reason: SDUPTHER

## 2020-08-13 RX ORDER — AMIODARONE HYDROCHLORIDE 200 MG/1
200 TABLET ORAL 2 TIMES DAILY
COMMUNITY
End: 2020-08-13 | Stop reason: SDUPTHER

## 2020-08-13 NOTE — OUTREACH NOTE
CT Surgery Week 3 Survey      Responses   [facility] patient discharged from?  [place]   Does the patient have one of the following disease processes/diagnoses(primary or secondary)?  Cardiothoracic surgery   Week 3 attempt successful?  Yes   Call start time  1032   Call end time  1051   Discharge diagnosis  CAD, HLD, HTN, cellulitis of labia, labial abscess, MRSA pos. , S/p CABG x3 with LISE, EVH of RGSV   Meds reviewed with patient/caregiver?  Yes   Is the patient taking all medications as directed (includes completed medication regime)?  Yes   Does the patient have a primary care provider?   Yes   Does the patient have an appointment scheduled with their C/T surgeon?  Yes   Has the patient kept scheduled appointments due by today?  Yes   Psychosocial issues?  Yes   Psychosocial comments  Feeling down at times, upset that family/friends think she should feel all better when she doesn't , not wanting to answer phone calls for that reason   Nursing interventions  Reviewed instructions with patient   What is the patient's perception of their health status since discharge?  Improving   Nursing interventions  Nurse provided patient education   Is the patient/caregiver able to teach back normal signs of recovery?  Nausea and lack of appetite, Depression or irritability   Nursing interventions  Reassured on normal signs of recovery   Is the patient/caregiver able to teach back steps to recovery at home?  Rest and rebuild strength, gradually increase activity, Set small, achievable goals for return to baseline health, Eat a well-balance diet, Make a list of questions for surgeon's appointment   Is the patient/caregiver able to teach back the hierarchy of who to call/visit for symptoms/problems? PCP, Specialist, Home health nurse, Urgent Care, ED, 911  Yes   Additional teach back comments  Phone number reiterated for call to Call Center   Week 3 call completed?  Yes          Veda Barragan RN

## 2020-08-17 ENCOUNTER — TELEPHONE (OUTPATIENT)
Dept: CARDIOLOGY | Facility: CLINIC | Age: 55
End: 2020-08-17

## 2020-08-17 NOTE — TELEPHONE ENCOUNTER
Patient is wearing an event monitor and had atrial fibrillation with RVR up to a rate of 120 on monitor.  I discussed this case with Dr. Conway.  We will increase her metoprolol to 50 mg twice daily, add Eliquis and decrease her aspirin to 81 mg daily.    I called the patient and there was no answer.

## 2020-08-19 ENCOUNTER — DOCUMENTATION (OUTPATIENT)
Dept: CARDIAC REHAB | Facility: HOSPITAL | Age: 55
End: 2020-08-19

## 2020-08-19 NOTE — PROGRESS NOTES
Spoke with patient she is interested in participating in the program but is having some medical issues and transportation issues at this time.  She would like to wait and see Dr Chang about her AFIB issue and what her event monitor shows before startring. I will call patient back at a later date to schedule.

## 2020-08-21 ENCOUNTER — READMISSION MANAGEMENT (OUTPATIENT)
Dept: CALL CENTER | Facility: HOSPITAL | Age: 55
End: 2020-08-21

## 2020-08-25 RX ORDER — METOPROLOL TARTRATE 50 MG/1
50 TABLET, FILM COATED ORAL 2 TIMES DAILY
Qty: 180 TABLET | Refills: 3 | Status: SHIPPED | OUTPATIENT
Start: 2020-08-25 | End: 2020-12-17

## 2020-08-25 RX ORDER — ASPIRIN 81 MG/1
81 TABLET ORAL DAILY
Qty: 30 TABLET | Refills: 5 | Status: SHIPPED | OUTPATIENT
Start: 2020-08-25 | End: 2020-09-24

## 2020-08-26 ENCOUNTER — TELEPHONE (OUTPATIENT)
Dept: CARDIOLOGY | Facility: CLINIC | Age: 55
End: 2020-08-26

## 2020-08-26 NOTE — TELEPHONE ENCOUNTER
Per Sharda and Dr. Chang I called the patient yesterday regarding her serious events from her event monitor. Dr. Chang is going to increase the metoprolol to 50 mg once daily, start her on Eliquis and decrease her aspirin to 81 mg. She was understanding and did write it down. She had also stated that she was having leg cramps and was taking pravastatin 20 mg. I told her I was discuss this with Sharda and give her a call back.     After discussing with Sharda she had decided to have Veda stop the pravastatin for two weeks and then on the beginning of week 2 start coq10. After that she said if her leg cramps had stopped she would start her on Crestor 20 mg and if her leg cramps did not subside we would start her on Crestor 10 mg and that the statin would not be the cause of her leg cramps.     I called Veda back and was able to go over this with her and she did write it down and I was able to make sure she was comfortable with it. She did have a question regarding her blood pressure and was concerned if the extra dose would drop her bp down. I did tell her to go ahead and keep a bp log and to check her bp in the am 30 minutes after she takes her first dose of metoprolol 25 mg and then again in the evening before she takes her second dose of metoprolol 25 mg and then again 30 minutes after that. I also told her to write down her pulse. I told her I would call in a few days to follow up with how she is doing/feeling. She was understanding.

## 2020-08-27 ENCOUNTER — TELEPHONE (OUTPATIENT)
Dept: CARDIOLOGY | Facility: CLINIC | Age: 55
End: 2020-08-27

## 2020-08-27 NOTE — TELEPHONE ENCOUNTER
Veda called in today stating she was passing blood and having pain during urination. I told her I would speak with Sharda and see what she wanted her to do. Per Sharda she said for her to call her PCP and do a work up with them to rule out a UTI or kidney stone and to stop the eliquis until further notice. Veda stated she wasn't sure if she would be able to go to the doctor or not and I said that if that was not a good option that she could maybe go to the ED but that she would need to do a workup so we could figure out her medication changes. She said she would see if her  could take her to the ED when he finished his shift at work. I told her to still call her PCP and she if they would be able to get in her this evening or tomorrow to save a trip of coming to the hospital. She said she would figure it out and call us back.

## 2020-09-14 ENCOUNTER — CLINICAL SUPPORT NO REQUIREMENTS (OUTPATIENT)
Dept: CARDIOLOGY | Facility: CLINIC | Age: 55
End: 2020-09-14

## 2020-09-14 DIAGNOSIS — R00.2 PALPITATIONS: Primary | ICD-10-CM

## 2020-09-14 PROCEDURE — 93272 ECG/REVIEW INTERPRET ONLY: CPT | Performed by: INTERNAL MEDICINE

## 2020-09-14 PROCEDURE — 93270 REMOTE 30 DAY ECG REV/REPORT: CPT | Performed by: INTERNAL MEDICINE

## 2020-10-13 ENCOUNTER — TELEPHONE (OUTPATIENT)
Dept: CARDIOLOGY | Facility: CLINIC | Age: 55
End: 2020-10-13

## 2020-11-17 ENCOUNTER — OFFICE VISIT (OUTPATIENT)
Dept: CARDIOLOGY | Facility: CLINIC | Age: 55
End: 2020-11-17

## 2020-11-17 ENCOUNTER — LAB (OUTPATIENT)
Dept: LAB | Facility: HOSPITAL | Age: 55
End: 2020-11-17

## 2020-11-17 VITALS
HEART RATE: 60 BPM | OXYGEN SATURATION: 97 % | SYSTOLIC BLOOD PRESSURE: 150 MMHG | DIASTOLIC BLOOD PRESSURE: 75 MMHG | BODY MASS INDEX: 23.19 KG/M2 | WEIGHT: 126 LBS | HEIGHT: 62 IN

## 2020-11-17 DIAGNOSIS — E78.2 MIXED HYPERLIPIDEMIA: ICD-10-CM

## 2020-11-17 DIAGNOSIS — Z95.1 S/P CABG (CORONARY ARTERY BYPASS GRAFT): ICD-10-CM

## 2020-11-17 DIAGNOSIS — I10 ESSENTIAL HYPERTENSION: ICD-10-CM

## 2020-11-17 DIAGNOSIS — I48.0 PAF (PAROXYSMAL ATRIAL FIBRILLATION) (HCC): ICD-10-CM

## 2020-11-17 DIAGNOSIS — I25.10 CAD IN NATIVE ARTERY: ICD-10-CM

## 2020-11-17 DIAGNOSIS — M79.604 PAIN IN BOTH LOWER EXTREMITIES: ICD-10-CM

## 2020-11-17 DIAGNOSIS — M79.605 PAIN IN BOTH LOWER EXTREMITIES: ICD-10-CM

## 2020-11-17 DIAGNOSIS — I48.0 PAF (PAROXYSMAL ATRIAL FIBRILLATION) (HCC): Primary | ICD-10-CM

## 2020-11-17 LAB
CHOLEST SERPL-MCNC: 153 MG/DL (ref 0–200)
HDLC SERPL-MCNC: 66 MG/DL (ref 40–60)
LDLC SERPL CALC-MCNC: 62 MG/DL (ref 0–100)
LDLC/HDLC SERPL: 0.88 {RATIO}
TRIGL SERPL-MCNC: 146 MG/DL (ref 0–150)
TSH SERPL DL<=0.05 MIU/L-ACNC: 2.42 UIU/ML (ref 0.27–4.2)
VLDLC SERPL-MCNC: 25 MG/DL (ref 5–40)

## 2020-11-17 PROCEDURE — 36415 COLL VENOUS BLD VENIPUNCTURE: CPT

## 2020-11-17 PROCEDURE — 99214 OFFICE O/P EST MOD 30 MIN: CPT | Performed by: INTERNAL MEDICINE

## 2020-11-17 PROCEDURE — 80061 LIPID PANEL: CPT

## 2020-11-17 PROCEDURE — 93000 ELECTROCARDIOGRAM COMPLETE: CPT | Performed by: INTERNAL MEDICINE

## 2020-11-17 PROCEDURE — 84443 ASSAY THYROID STIM HORMONE: CPT

## 2020-11-17 RX ORDER — ROSUVASTATIN CALCIUM 10 MG/1
10 TABLET, COATED ORAL DAILY
COMMUNITY
End: 2020-12-08 | Stop reason: SDUPTHER

## 2020-11-17 RX ORDER — TRAMADOL HYDROCHLORIDE 50 MG/1
50 TABLET ORAL EVERY 6 HOURS PRN
COMMUNITY
Start: 2020-08-25 | End: 2023-02-20

## 2020-11-17 RX ORDER — ASPIRIN 81 MG/1
81 TABLET, CHEWABLE ORAL DAILY
COMMUNITY
End: 2021-02-03 | Stop reason: SDUPTHER

## 2020-11-17 RX ORDER — AMIODARONE HYDROCHLORIDE 200 MG/1
200 TABLET ORAL DAILY
COMMUNITY
End: 2020-12-24

## 2020-12-04 ENCOUNTER — DOCUMENTATION (OUTPATIENT)
Dept: CARDIAC REHAB | Facility: HOSPITAL | Age: 55
End: 2020-12-04

## 2020-12-08 ENCOUNTER — HOSPITAL ENCOUNTER (OUTPATIENT)
Dept: GENERAL RADIOLOGY | Facility: HOSPITAL | Age: 55
Discharge: HOME | End: 2020-12-08
Admitting: NURSE PRACTITIONER

## 2020-12-08 ENCOUNTER — TRANSCRIBE ORDERS (OUTPATIENT)
Dept: ADMINISTRATIVE | Facility: HOSPITAL | Age: 55
End: 2020-12-08

## 2020-12-08 DIAGNOSIS — M79.605 PAIN IN BOTH LOWER EXTREMITIES: ICD-10-CM

## 2020-12-08 DIAGNOSIS — R07.89 OTHER CHEST PAIN: ICD-10-CM

## 2020-12-08 DIAGNOSIS — M79.604 PAIN IN BOTH LOWER EXTREMITIES: ICD-10-CM

## 2020-12-08 DIAGNOSIS — G89.18 OTHER ACUTE POSTPROCEDURAL PAIN: ICD-10-CM

## 2020-12-08 DIAGNOSIS — I25.110 CORONARY ARTERY DISEASE INVOLVING NATIVE CORONARY ARTERY OF NATIVE HEART WITH UNSTABLE ANGINA PECTORIS (HCC): ICD-10-CM

## 2020-12-08 DIAGNOSIS — R07.89 OTHER CHEST PAIN: Primary | ICD-10-CM

## 2020-12-08 PROCEDURE — 71046 X-RAY EXAM CHEST 2 VIEWS: CPT | Performed by: RADIOLOGY

## 2020-12-08 PROCEDURE — 71046 X-RAY EXAM CHEST 2 VIEWS: CPT

## 2020-12-08 RX ORDER — SPIRONOLACTONE 25 MG/1
25 TABLET ORAL EVERY MORNING
Qty: 30 TABLET | Refills: 5 | Status: SHIPPED | OUTPATIENT
Start: 2020-12-08 | End: 2021-06-10

## 2020-12-08 RX ORDER — ROSUVASTATIN CALCIUM 10 MG/1
10 TABLET, COATED ORAL DAILY
Qty: 30 TABLET | Refills: 5 | Status: SHIPPED | OUTPATIENT
Start: 2020-12-08 | End: 2021-01-18 | Stop reason: SINTOL

## 2020-12-08 RX ORDER — ISOSORBIDE MONONITRATE 60 MG/1
60 TABLET, EXTENDED RELEASE ORAL EVERY MORNING
Qty: 30 TABLET | Refills: 5 | Status: SHIPPED | OUTPATIENT
Start: 2020-12-08 | End: 2021-06-10

## 2020-12-08 NOTE — TELEPHONE ENCOUNTER
Veda has called back and I have been able to go over lab results with her per Dr. Chang these were normal. She is aware and understands. I also explained that her refills had been routed to Dr. Chang and we were still waiting on those to be sent in.

## 2020-12-17 ENCOUNTER — TREATMENT (OUTPATIENT)
Dept: CARDIAC REHAB | Facility: HOSPITAL | Age: 55
End: 2020-12-17

## 2020-12-17 VITALS
DIASTOLIC BLOOD PRESSURE: 60 MMHG | BODY MASS INDEX: 24.27 KG/M2 | SYSTOLIC BLOOD PRESSURE: 162 MMHG | RESPIRATION RATE: 14 BRPM | HEART RATE: 51 BPM | OXYGEN SATURATION: 99 % | HEIGHT: 62 IN | WEIGHT: 131.9 LBS

## 2020-12-17 DIAGNOSIS — Z95.1 S/P CABG (CORONARY ARTERY BYPASS GRAFT): Primary | ICD-10-CM

## 2020-12-17 PROCEDURE — 93798 PHYS/QHP OP CAR RHAB W/ECG: CPT

## 2020-12-17 RX ORDER — FLUTICASONE PROPIONATE 50 MCG
1 SPRAY, SUSPENSION (ML) NASAL DAILY
COMMUNITY
Start: 2020-11-23 | End: 2022-01-04

## 2020-12-17 RX ORDER — ASPIRIN 81 MG/1
81 TABLET, COATED ORAL DAILY
COMMUNITY
Start: 2020-11-16 | End: 2021-01-04 | Stop reason: SDUPTHER

## 2020-12-17 NOTE — PROGRESS NOTES
Cardiac Rehab Initial Assessment      Name: Veda Enamorado  :1965 Allergies:Bactrim [sulfamethoxazole-trimethoprim], Ciprofloxacin, and Ranexa [ranolazine er]   MRN: [id number] 55 y.o. Physician: Chiquita Cohudhary APRN   Primary Diagnosis:    Diagnosis Plan   1. S/P CABG (coronary artery bypass graft)      Event Date: 20 Specialist: Lazaro Conway                      Ext 3038   Secondary Diagnosis: na Risk Stratification:Moderate Risk Note Author: Nicolle Vasquez RN     Cardiovascular History: Previous PCI     EXERCISE AT HOME  no    N/A    EF: >60%      Source: MAICOL          Ambulatory Status:Independent  Ambulatory Fall Risk Assessed on Initial Visit: yes 6 Minute Walk Pre- Cardiac Rehab:  Distance:464ft      RPE:15  Max. HR: 77       SPO2:98    MET: 1.7  Resting BP: 158/70 LA, 162/60 RA    Peak BP: 148/62  Recovery BP: 158/62  Comments: Patient tolerated 6 mwt well, no distress noted no complaints voiced denied chest pain pressure and SOA      NUTRITION  Lipids:yes If yes, labs as follows;  Total: No components found for: CHOLESTEROL  HDL:   HDL Cholesterol   Date Value Ref Range Status   2020 66 (H) 40 - 60 mg/dL Final    Lipids continued:  LDL:  LDL Cholesterol    Date Value Ref Range Status   2020 62 0 - 100 mg/dL Final     Triglyceride: No components found for: TRIGLYCERIDE   Weight Management:                 Weight: 131.9 lb  Height: 62 in                                   BMI: Body mass index is 24.12 kg/m².  Waist Circumference: 29in  inches   Alcohol Use: none Diabetes:No    Last HGBA1C with date if applicable:No components found for: A1C         SOCIAL HISTORY  Social History     Socioeconomic History   • Marital status:      Spouse name: Not on file   • Number of children: 2   • Years of education: Not on file   • Highest education level: Not on file   Occupational History   • Occupation: Homemaker     Comment: disability   Tobacco Use   • Smoking status: Current  Every Day Smoker     Packs/day: 0.25     Years: 25.00     Pack years: 6.25     Types: Electronic Cigarette, Cigarettes   • Smokeless tobacco: Never Used   • Tobacco comment: Pt states only smoking a few cigs a day, transitioning to E-Cig.   Substance and Sexual Activity   • Alcohol use: No   • Drug use: No   • Sexual activity: Defer   Social History Narrative    Caffeine? 1 can soda daily       Educational Level (choose one that applies) high school diploma/GED Learning Barriers:Ready to Learn    Family Support:yes    Living Arrangement: lives with their spouse    Risk Factors: Stress  Yes, Clinical Depression  No, Heredity  Yes If Yes father and mother and Hyperlipidemia  Yes     Tobacco Adjunct: N/A  Patient stated she quit in July 2020      Comorbidities: na     PSYCHOSOCIAL  Clinical Depression: no    Stress: yes     Assess presence or absence of depression using a valid screening tool: yes    PHQ screening 4       PHYSICAL ASSESSMENT  Influenza vaccine: no  Pneumococcal vaccine: yes          Angina: no    Describe angina scale of 0 - 4: 0 = none    Today are you having incisional pain? No. If, Yes, Scale: na    Incision intact no drainage, light pink scar noted    Today are you having any other pain? No. If, Yes, Scale: na     Diagnosed with Hypertension:yes    Heart Sounds: S1 S2 no rubs or murmurs noted     Lung Sounds: normal air entry, lungs clear to auscultation         Assessment: Pt tolerated 6MWT well, NAD noted, no complaints voiced,  skin warm, pink and dry, resp within normal limits and even, denies chest discomfort, chest pressure ,SOA .  Monitor shows SB-NSR, V/S WDL ,see Trace documentation for exercise data.  Dr. Chang physician immediately available     Pt educated on Cardiac Rehab Program,  warm up, stretching, use of RPE scale, application of heart monitor, home exercise and exercise guidelines, pre exercise meal, Diabetic guidelines for Cardiac Rehab,  Dietary information provided by Yanet  Renny (dietary) Steps to your health,  s/s to report ,Cleaning and use of equipment, see Epic for all other education completed with patient today.  Verbal teach back verified   Orthopedic Problems: lower back, stated she wears a brace at times    Are you being hurt, hit, or frightened by anyone at home or in your life? no    Are you being neglected by a caregiver? N/A Shoulder flexibility/Range of motion: Average     Recommended arm activity: Any    Chair sit and reach within: 9 inches   Leg flexibility: Average    Leg Strength/Balance/Five times sit to stand: 20 seconds.   na  Chose one: Fall Risk    Recommended stretching: Standing    Assessment: Patient stated she has not been taking her evening dose of metoprolol  Because she feels funny when she lays down. She stated she had told Dr. Chang and he encouraged her to follow medication directions but she she cant take it.  She has plus 1 edema at ankle level she stated they swell more at night.  She stated  It seems like her legs hurt her more lately and she just props them up for relief.     Family attends [place]: no Time of arrival: 1100  Time of departure: 1225     Patient Goals: build strength and endurance         12/17/2020  12:14 JULIÁN Vasquez RN

## 2020-12-17 NOTE — PATIENT INSTRUCTIONS
Pre-Exercise Meal Plan  Eating certain foods and drinking fluids before exercising or physical activity can give you more energy and can help your muscles recover after activity. Your pre-exercise meal plan depends on:  · Your personal needs.  · Recommendations from your health care provider or a diet and nutrition specialist (dietitian).  · Any health conditions you have that affect what you can and cannot eat.  · How long your exercise session will be.  · What times you plan to eat and exercise.  What are tips for following this plan?    Reading food labels  · Avoid foods that have added sugar, fat, and salt (sodium).  · Avoid foods that are high in fiber.  Meal planning  · In general, you should eat your pre-exercise meal 3-4 hours before you exercise. If you eat more than 3-4 hours before exercising, you should eat more calories. Your meal should have enough calories so that you will not be hungry before you exercise.  · Include carbohydrates and a moderate amount of lean protein in your pre-exercise meal.  Lifestyle  30-60 minutes before you will be exercising:  · Stop eating.  · Only drink fluids, such as:  ? Water.  ? Sports drinks.  ? Fruit juice.  ? Sports gels.  General information  · Eat foods that you are familiar with and that your body digests without problems. Doing this can help you avoid stomach problems while exercising.  · Drink enough fluids to keep you well-hydrated before, during, and after exercising. Good choices include:  ? Water.  ? Sports drinks. These are only recommended if you exercise for more than 60 minutes at a time.  What foods should I eat?    Fruits  Banana. Apple. Berries. Melon. Raisins.  Vegetables  Celery. Carrots. Baked potato.  Grains  Pasta. Bagel. Toast. Oatmeal.  Meats and other proteins  Fish. Poultry. Lean meat. Nut butters.  Dairy  Low-fat or fat-free dairy products, such as milk or yogurt.  Other foods  Foods that have both carbohydrates and protein, such  as:  · Bagel with peanut butter.  · Turkey sandwich.  · Oatmeal with fruit and nuts.  The items listed above may not be a complete list of recommended foods and beverages. Contact a dietitian for more information.  What foods should I avoid?  Vegetables  Onions. Cauliflower. Cabbage. Leafy greens. Fried vegetables, including french fries.  Grains  Crackers. Pretzels. Donuts. Pastries.  Meats and other proteins  Fatty cuts of meat. Sausage. Abdalla. Fried meats. Dried beans. Legumes.  Dairy  Full-fat milk or yogurt. Cream. Sour cream. Ice cream.  Beverages  Carbonated soft drinks. Coffee. Black tea. Hot chocolate.  Sweets and desserts  Cakes. Cookies. Candy.  Other foods  Processed or pre-made foods. Pizza. Fried food. Fast food.  The items listed above may not be a complete list of foods and beverages to avoid. Contact a dietitian for more information.  Summary  · The best time to eat a pre-exercise meal depends on your personal needs and when you plan to exercise. In general, you should eat 3-4 hours before you exercise.  · Your pre-exercise meal should include carbohydrates, fluids, and lean proteins. Choose familiar foods that are easy to digest.  · Avoid foods that are high in salt (sodium), sugar, fat, or fiber in your pre-exercise meal.  This information is not intended to replace advice given to you by your health care provider. Make sure you discuss any questions you have with your health care provider.  Document Revised: 03/01/2019 Document Reviewed: 11/13/2018  [employer] Patient Education © 2020 [employer]    Managing Anxiety, Adult  After being diagnosed with an anxiety disorder, you may be relieved to know why you have felt or behaved a certain way. You may also feel overwhelmed about the treatment ahead and what it will mean for your life. With care and support, you can manage this condition and recover from it.  How to manage lifestyle changes  Managing stress and anxiety    Stress is your body's  reaction to life changes and events, both good and bad. Most stress will last just a few hours, but stress can be ongoing and can lead to more than just stress. Although stress can play a major role in anxiety, it is not the same as anxiety. Stress is usually caused by something external, such as a deadline, test, or competition. Stress normally passes after the triggering event has ended.   Anxiety is caused by something internal, such as imagining a terrible outcome or worrying that something will go wrong that will devastate you. Anxiety often does not go away even after the triggering event is over, and it can become long-term (chronic) worry. It is important to understand the differences between stress and anxiety and to manage your stress effectively so that it does not lead to an anxious response.  Talk with your health care provider or a counselor to learn more about reducing anxiety and stress. He or she may suggest tension reduction techniques, such as:  · Music therapy. This can include creating or listening to music that you enjoy and that inspires you.  · Mindfulness-based meditation. This involves being aware of your normal breaths while not trying to control your breathing. It can be done while sitting or walking.  · Centering prayer. This involves focusing on a word, phrase, or sacred image that means something to you and brings you peace.  · Deep breathing. To do this, expand your stomach and inhale slowly through your nose. Hold your breath for 3-5 seconds. Then exhale slowly, letting your stomach muscles relax.  · Self-talk. This involves identifying thought patterns that lead to anxiety reactions and changing those patterns.  · Muscle relaxation. This involves tensing muscles and then relaxing them.  Choose a tension reduction technique that suits your lifestyle and personality. These techniques take time and practice. Set aside 5-15 minutes a day to do them. Therapists can offer counseling and  training in these techniques. The training to help with anxiety may be covered by some insurance plans. Other things you can do to manage stress and anxiety include:  · Keeping a stress/anxiety diary. This can help you learn what triggers your reaction and then learn ways to manage your response.  · Thinking about how you react to certain situations. You may not be able to control everything, but you can control your response.  · Making time for activities that help you relax and not feeling guilty about spending your time in this way.  · Visual imagery and yoga can help you stay calm and relax.    Medicines  Medicines can help ease symptoms. Medicines for anxiety include:  · Anti-anxiety drugs.  · Antidepressants.  Medicines are often used as a primary treatment for anxiety disorder. Medicines will be prescribed by a health care provider. When used together, medicines, psychotherapy, and tension reduction techniques may be the most effective treatment.  Relationships  Relationships can play a big part in helping you recover. Try to spend more time connecting with trusted friends and family members. Consider going to couples counseling, taking family education classes, or going to family therapy. Therapy can help you and others better understand your condition.  How to recognize changes in your anxiety  Everyone responds differently to treatment for anxiety. Recovery from anxiety happens when symptoms decrease and stop interfering with your daily activities at home or work. This may mean that you will start to:  · Have better concentration and focus. Worry will interfere less in your daily thinking.  · Sleep better.  · Be less irritable.  · Have more energy.  · Have improved memory.  It is important to recognize when your condition is getting worse. Contact your health care provider if your symptoms interfere with home or work and you feel like your condition is not improving.  Follow these instructions at  home:  Activity  · Exercise. Most adults should do the following:  ? Exercise for at least 150 minutes each week. The exercise should increase your heart rate and make you sweat (moderate-intensity exercise).  ? Strengthening exercises at least twice a week.  · Get the right amount and quality of sleep. Most adults need 7-9 hours of sleep each night.  Lifestyle    · Eat a healthy diet that includes plenty of vegetables, fruits, whole grains, low-fat dairy products, and lean protein. Do not eat a lot of foods that are high in solid fats, added sugars, or salt.  · Make choices that simplify your life.  · Do not use any products that contain nicotine or tobacco, such as cigarettes, e-cigarettes, and chewing tobacco. If you need help quitting, ask your health care provider.  · Avoid caffeine, alcohol, and certain over-the-counter cold medicines. These may make you feel worse. Ask your pharmacist which medicines to avoid.  General instructions  · Take over-the-counter and prescription medicines only as told by your health care provider.  · Keep all follow-up visits as told by your health care provider. This is important.  Where to find support  You can get help and support from these sources:  · Self-help groups.  · Online and community organizations.  · A trusted spiritual leader.  · Couples counseling.  · Family education classes.  · Family therapy.  Where to find more information  You may find that joining a support group helps you deal with your anxiety. The following sources can help you locate counselors or support groups near you:  · Mental Health [place]: www.mentalhealthamerica.net  · Anxiety and Depression Association of [place] (ADAA): www.adaa.org  · National [place] on Mental Illness ([place]): www.leonardo.org  Contact a health care provider if you:  · Have a hard time staying focused or finishing daily tasks.  · Spend many hours a day feeling worried about everyday life.  · Become exhausted by worry.  · Start to  have headaches, feel tense, or have nausea.  · Urinate more than normal.  · Have diarrhea.  Get help right away if you have:  · A racing heart and shortness of breath.  · Thoughts of hurting yourself or others.  If you ever feel like you may hurt yourself or others, or have thoughts about taking your own life, get help right away. You can go to your nearest emergency department or call:  · Your local emergency services (911 in the [place]  · A suicide crisis helpline, such as the National Suicide Prevention Lifeline at 1-410.602.4273. This is open 24 hours a day.  Summary  · Taking steps to learn and use tension reduction techniques can help calm you and help prevent triggering an anxiety reaction.  · When used together, medicines, psychotherapy, and tension reduction techniques may be the most effective treatment.  · Family, friends, and partners can play a big part in helping you recover from an anxiety disorder.  This information is not intended to replace advice given to you by your health care provider. Make sure you discuss any questions you have with your health care provider.  Document Revised: 05/19/2020 Document Reviewed: 05/19/2020  [employer] Patient Education © 2020 [employer]    Managing Stress, Adult  Feeling a certain amount of stress is normal. Stress helps our body and mind get ready to deal with the demands of life. Stress hormones can motivate you to do well at work and meet your responsibilities. However severe or long-lasting (chronic) stress can affect your mental and physical health. Chronic stress puts you at higher risk for anxiety, depression, and other health problems like digestive problems, muscle aches, heart disease, high blood pressure, and stroke.  What are the causes?  Common causes of stress include:  · Demands from work, such as deadlines, feeling overworked, or having long hours.  · Pressures at home, such as money issues, disagreements with a spouse, or parenting  issues.  · Pressures from major life changes, such as divorce, moving, loss of a loved one, or chronic illness.  You may be at higher risk for stress-related problems if you do not get enough sleep, are in poor health, do not have emotional support, or have a mental health disorder like anxiety or depression.  How to recognize stress  Stress can make you:  · Have trouble sleeping.  · Feel sad, anxious, irritable, or overwhelmed.  · Lose your appetite.  · Overeat or want to eat unhealthy foods.  · Want to use drugs or alcohol.  Stress can also cause physical symptoms, such as:  · Sore, tense muscles, especially in the shoulders and neck.  · Headaches.  · Trouble breathing.  · A faster heart rate.  · Stomach pain, nausea, or vomiting.  · Diarrhea or constipation.  · Trouble concentrating.  Follow these instructions at home:  Lifestyle  · Identify the source of your stress and your reaction to it. See a therapist who can help you change your reactions.  · When there are stressful events:  ? Talk about it with family, friends, or co-workers.  ? Try to think realistically about stressful events and not ignore them or overreact.  ? Try to find the positives in a stressful situation and not focus on the negatives.  ? Cut back on responsibilities at work and home, if possible. Ask for help from friends or family members if you need it.  · Find ways to cope with stress, such as:  ? Meditation.  ? Deep breathing.  ? Yoga or august chi.  ? Progressive muscle relaxation.  ? Doing art, playing music, or reading.  ? Making time for fun activities.  ? Spending time with family and friends.  · Get support from family, friends, or spiritual resources.  Eating and drinking  · Eat a healthy diet. This includes:  ? Eating foods that are high in fiber, such as beans, whole grains, and fresh fruits and vegetables.  ? Limiting foods that are high in fat and processed sugars, such as fried and sweet foods.  · Do not skip meals or  overeat.  · Drink enough fluid to keep your urine pale yellow.  Alcohol use  · Do not drink alcohol if:  ? Your health care provider tells you not to drink.  ? You are pregnant, may be pregnant, or are planning to become pregnant.  · Drinking alcohol is a way some people try to ease their stress. This can be dangerous, so if you drink alcohol:  ? Limit how much you use to:  § 0-1 drink a day for women.  § 0-2 drinks a day for men.  ? Be aware of how much alcohol is in your drink. In the [place] one drink equals one 12 oz bottle of beer (355 mL), one 5 oz glass of wine (148 mL), or one 1½ oz glass of hard liquor (44 mL).  Activity    · Include 30 minutes of exercise in your daily schedule. Exercise is a good stress reducer.  · Include time in your day for an activity that you find relaxing. Try taking a walk, going on a bike ride, reading a book, or listening to music.  · Schedule your time in a way that lowers stress, and keep a consistent schedule. Prioritize what is most important to get done.  General instructions  · Get enough sleep. Try to go to sleep and get up at about the same time every day.  · Take over-the-counter and prescription medicines only as told by your health care provider.  · Do not use any products that contain nicotine or tobacco, such as cigarettes, e-cigarettes, and chewing tobacco. If you need help quitting, ask your health care provider.  · Do not use drugs or smoke to cope with stress.  · Keep all follow-up visits as told by your health care provider. This is important.  Where to find support  · Talk with your health care provider about stress management or finding a support group.  · Find a therapist to work with you on your stress management techniques.  Contact a health care provider if:  · Your stress symptoms get worse.  · You are unable to manage your stress at home.  · You are struggling to stop using drugs or alcohol.  Get help right away if:  · You may be a danger to yourself or  others.  · You have any thoughts of death or suicide.  If you ever feel like you may hurt yourself or others, or have thoughts about taking your own life, get help right away. You can go to your nearest emergency department or call:  · Your local emergency services (911 in the [place]  · A suicide crisis helpline, such as the National Suicide Prevention Lifeline at 1-422.282.4284. This is open 24 hours a day.  Summary  · Feeling a certain amount of stress is normal, but severe or long-lasting (chronic) stress can affect your mental and physical health.  · Chronic stress can put you at higher risk for anxiety, depression, and other health problems like digestive problems, muscle aches, heart disease, high blood pressure, and stroke.  · You may be at higher risk for stress-related problems if you do not get enough sleep, are in poor health, lack emotional support, or have a mental health disorder like anxiety or depression.  · Identify the source of your stress and your reaction to it. Try talking about stressful events with family, friends, or co-workers, finding a coping method, or getting support from spiritual resources.  · If you need more help, talk with your health care provider about finding a support group or a mental health therapist.  This information is not intended to replace advice given to you by your health care provider. Make sure you discuss any questions you have with your health care provider.  Document Revised: 07/15/2020 Document Reviewed: 07/15/2020  [employer] Patient Education © 2020 [employer]    Major Depressive Disorder, Adult  Major depressive disorder (MDD) is a mental health condition. MDD often makes you feel sad, hopeless, or helpless. MDD can also cause symptoms in your body. MDD can affect your:  · Work.  · School.  · Relationships.  · Other normal activities.  MDD can range from mild to very bad. It may occur once (single episode MDD). It can also occur many times (recurrent  MDD).  The main symptoms of MDD often include:  · Feeling sad, depressed, or irritable most of the time.  · Loss of interest.  MDD symptoms also include:  · Sleeping too much or too little.  · Eating too much or too little.  · A change in your weight.  · Feeling tired (fatigue) or having low energy.  · Feeling worthless.  · Feeling guilty.  · Trouble making decisions.  · Trouble thinking clearly.  · Thoughts of suicide or harming others.  · Feeling weak.  · Feeling agitated.  · Keeping yourself from being around other people (isolation).  Follow these instructions at home:  Activity  · Do these things as told by your doctor:  ? Go back to your normal activities.  ? Exercise regularly.  ? Spend time outdoors.  Alcohol  · Talk with your doctor about how alcohol can affect your antidepressant medicines.  · Do not drink alcohol. Or, limit how much alcohol you drink.  ? This means no more than 1 drink a day for nonpregnant women and 2 drinks a day for men. One drink equals one of these:  § 12 oz of beer.  § 5 oz of wine.  § 1½ oz of hard liquor.  General instructions  · Take over-the-counter and prescription medicines only as told by your doctor.  · Eat a healthy diet.  · Get plenty of sleep.  · Find activities that you enjoy. Make time to do them.  · Think about joining a support group. Your doctor may be able to suggest a group for you.  · Keep all follow-up visits as told by your doctor. This is important.  Where to find more information:  · National [place] on Mental Illness:  ? www.izabella.org  · [place] National [place] of Mental Health:  ? www.nimh.nih.gov  · National Suicide Prevention Lifeline:  ? 1-126.470.3668. This is free, 24-hour help.  Contact a doctor if:  · Your symptoms get worse.  · You have new symptoms.  Get help right away if:  · You self-harm.  · You see, hear, taste, smell, or feel things that are not present (hallucinate).  If you ever feel like you may hurt yourself or others, or have thoughts  "about taking your own life, get help right away. You can go to your nearest emergency department or call:  · Your local emergency services (911 in the [place]  · A suicide crisis helpline, such as the National Suicide Prevention Lifeline:  ? 4-524-207-9466. This is open 24 hours a day.  This information is not intended to replace advice given to you by your health care provider. Make sure you discuss any questions you have with your health care provider.  Document Revised: 11/30/2018 Document Reviewed: 09/03/2017  [employer] Patient Education © 2020 [employer]    Managing Your Hypertension  Hypertension is commonly called high blood pressure. This is when the force of your blood pressing against the walls of your arteries is too strong. Arteries are blood vessels that carry blood from your heart throughout your body. Hypertension forces the heart to work harder to pump blood, and may cause the arteries to become narrow or stiff. Having untreated or uncontrolled hypertension can cause heart attack, stroke, kidney disease, and other problems.  What are blood pressure readings?  A blood pressure reading consists of a higher number over a lower number. Ideally, your blood pressure should be below 120/80. The first (\"top\") number is called the systolic pressure. It is a measure of the pressure in your arteries as your heart beats. The second (\"bottom\") number is called the diastolic pressure. It is a measure of the pressure in your arteries as the heart relaxes.  What does my blood pressure reading mean?  Blood pressure is classified into four stages. Based on your blood pressure reading, your health care provider may use the following stages to determine what type of treatment you need, if any. Systolic pressure and diastolic pressure are measured in a unit called mm Hg.  Normal  · Systolic pressure: below 120.  · Diastolic pressure: below 80.  Elevated  · Systolic pressure: 120-129.  · Diastolic pressure: below " 80.  Hypertension stage 1  · Systolic pressure: 130-139.  · Diastolic pressure: 80-89.  Hypertension stage 2  · Systolic pressure: 140 or above.  · Diastolic pressure: 90 or above.  What health risks are associated with hypertension?  Managing your hypertension is an important responsibility. Uncontrolled hypertension can lead to:  · A heart attack.  · A stroke.  · A weakened blood vessel (aneurysm).  · Heart failure.  · Kidney damage.  · Eye damage.  · Metabolic syndrome.  · Memory and concentration problems.  What changes can I make to manage my hypertension?  Hypertension can be managed by making lifestyle changes and possibly by taking medicines. Your health care provider will help you make a plan to bring your blood pressure within a normal range.  Eating and drinking    · Eat a diet that is high in fiber and potassium, and low in salt (sodium), added sugar, and fat. An example eating plan is called the DASH (Dietary Approaches to Stop Hypertension) diet. To eat this way:  ? Eat plenty of fresh fruits and vegetables. Try to fill half of your plate at each meal with fruits and vegetables.  ? Eat whole grains, such as whole wheat pasta, brown rice, or whole grain bread. Fill about one quarter of your plate with whole grains.  ? Eat low-fat diary products.  ? Avoid fatty cuts of meat, processed or cured meats, and poultry with skin. Fill about one quarter of your plate with lean proteins such as fish, chicken without skin, beans, eggs, and tofu.  ? Avoid premade and processed foods. These tend to be higher in sodium, added sugar, and fat.  · Reduce your daily sodium intake. Most people with hypertension should eat less than 1,500 mg of sodium a day.  · Limit alcohol intake to no more than 1 drink a day for nonpregnant women and 2 drinks a day for men. One drink equals 12 oz of beer, 5 oz of wine, or 1½ oz of hard liquor.  Lifestyle  · Work with your health care provider to maintain a healthy body weight, or to  lose weight. Ask what an ideal weight is for you.  · Get at least 30 minutes of exercise that causes your heart to beat faster (aerobic exercise) most days of the week. Activities may include walking, swimming, or biking.  · Include exercise to strengthen your muscles (resistance exercise), such as weight lifting, as part of your weekly exercise routine. Try to do these types of exercises for 30 minutes at least 3 days a week.  · Do not use any products that contain nicotine or tobacco, such as cigarettes and e-cigarettes. If you need help quitting, ask your health care provider.  · Control any long-term (chronic) conditions you have, such as high cholesterol or diabetes.  Monitoring  · Monitor your blood pressure at home as told by your health care provider. Your personal target blood pressure may vary depending on your medical conditions, your age, and other factors.  · Have your blood pressure checked regularly, as often as told by your health care provider.  Working with your health care provider  · Review all the medicines you take with your health care provider because there may be side effects or interactions.  · Talk with your health care provider about your diet, exercise habits, and other lifestyle factors that may be contributing to hypertension.  · Visit your health care provider regularly. Your health care provider can help you create and adjust your plan for managing hypertension.  Will I need medicine to control my blood pressure?  Your health care provider may prescribe medicine if lifestyle changes are not enough to get your blood pressure under control, and if:  · Your systolic blood pressure is 130 or higher.  · Your diastolic blood pressure is 80 or higher.  Take medicines only as told by your health care provider. Follow the directions carefully. Blood pressure medicines must be taken as prescribed. The medicine does not work as well when you skip doses. Skipping doses also puts you at risk for  problems.  Contact a health care provider if:  · You think you are having a reaction to medicines you have taken.  · You have repeated (recurrent) headaches.  · You feel dizzy.  · You have swelling in your ankles.  · You have trouble with your vision.  Get help right away if:  · You develop a severe headache or confusion.  · You have unusual weakness or numbness, or you feel faint.  · You have severe pain in your chest or abdomen.  · You vomit repeatedly.  · You have trouble breathing.  Summary  · Hypertension is when the force of blood pumping through your arteries is too strong. If this condition is not controlled, it may put you at risk for serious complications.  · Your personal target blood pressure may vary depending on your medical conditions, your age, and other factors. For most people, a normal blood pressure is less than 120/80.  · Hypertension is managed by lifestyle changes, medicines, or both. Lifestyle changes include weight loss, eating a healthy, low-sodium diet, exercising more, and limiting alcohol.  This information is not intended to replace advice given to you by your health care provider. Make sure you discuss any questions you have with your health care provider.  Document Revised: 04/10/2020 Document Reviewed: 11/15/2017  [employer] Patient Education © 2020 [employer]    Heart-Healthy Eating Plan  Heart-healthy meal planning includes:  · Eating less unhealthy fats.  · Eating more healthy fats.  · Making other changes in your diet.  Talk with your doctor or a diet specialist (dietitian) to create an eating plan that is right for you.  What is my plan?  Your doctor may recommend an eating plan that includes:  · Total fat: ______% or less of total calories a day.  · Saturated fat: ______% or less of total calories a day.  · Cholesterol: less than _________mg a day.  What are tips for following this plan?  Cooking  Avoid frying your food. Try to bake, boil, grill, or broil it instead. You can  also reduce fat by:  · Removing the skin from poultry.  · Removing all visible fats from meats.  · Steaming vegetables in water or broth.  Meal planning    · At meals, divide your plate into four equal parts:  ? Fill one-half of your plate with vegetables and green salads.  ? Fill one-fourth of your plate with whole grains.  ? Fill one-fourth of your plate with lean protein foods.  · Eat 4-5 servings of vegetables per day. A serving of vegetables is:  ? 1 cup of raw or cooked vegetables.  ? 2 cups of raw leafy greens.  · Eat 4-5 servings of fruit per day. A serving of fruit is:  ? 1 medium whole fruit.  ? ¼ cup of dried fruit.  ? ½ cup of fresh, frozen, or canned fruit.  ? ½ cup of 100% fruit juice.  · Eat more foods that have soluble fiber. These are apples, broccoli, carrots, beans, peas, and barley. Try to get 20-30 g of fiber per day.  · Eat 4-5 servings of nuts, legumes, and seeds per week:  ? 1 serving of dried beans or legumes equals ½ cup after being cooked.  ? 1 serving of nuts is ¼ cup.  ? 1 serving of seeds equals 1 tablespoon.  General information  · Eat more home-cooked food. Eat less restaurant, buffet, and fast food.  · Limit or avoid alcohol.  · Limit foods that are high in starch and sugar.  · Avoid fried foods.  · Lose weight if you are overweight.  · Keep track of how much salt (sodium) you eat. This is important if you have high blood pressure. Ask your doctor to tell you more about this.  · Try to add vegetarian meals each week.  Fats  · Choose healthy fats. These include olive oil and canola oil, flaxseeds, walnuts, almonds, and seeds.  · Eat more omega-3 fats. These include salmon, mackerel, sardines, tuna, flaxseed oil, and ground flaxseeds. Try to eat fish at least 2 times each week.  · Check food labels. Avoid foods with trans fats or high amounts of saturated fat.  · Limit saturated fats.  ? These are often found in animal products, such as meats, butter, and cream.  ? These are also found  in plant foods, such as palm oil, palm kernel oil, and coconut oil.  · Avoid foods with partially hydrogenated oils in them. These have trans fats. Examples are stick margarine, some tub margarines, cookies, crackers, and other baked goods.  What foods can I eat?  Fruits  All fresh, canned (in natural juice), or frozen fruits.  Vegetables  Fresh or frozen vegetables (raw, steamed, roasted, or grilled). Green salads.  Grains  Most grains. Choose whole wheat and whole grains most of the time. Rice and pasta, including brown rice and pastas made with whole wheat.  Meats and other proteins  Lean, well-trimmed beef, veal, pork, and lamb. Chicken and turkey without skin. All fish and shellfish. Wild duck, rabbit, pheasant, and venison. Egg whites or low-cholesterol egg substitutes. Dried beans, peas, lentils, and tofu. Seeds and most nuts.  Dairy  Low-fat or nonfat cheeses, including ricotta and mozzarella. Skim or 1% milk that is liquid, powdered, or evaporated. Buttermilk that is made with low-fat milk. Nonfat or low-fat yogurt.  Fats and oils  Non-hydrogenated (trans-free) margarines. Vegetable oils, including soybean, sesame, sunflower, olive, peanut, safflower, corn, canola, and cottonseed. Salad dressings or mayonnaise made with a vegetable oil.  Beverages  Mineral water. Coffee and tea. Diet carbonated beverages.  Sweets and desserts  Sherbet, gelatin, and fruit ice. Small amounts of dark chocolate.  Limit all sweets and desserts.  Seasonings and condiments  All seasonings and condiments.  The items listed above may not be a complete list of foods and drinks you can eat. Contact a dietitian for more options.  What foods should I avoid?  Fruits  Canned fruit in heavy syrup. Fruit in cream or butter sauce. Fried fruit. Limit coconut.  Vegetables  Vegetables cooked in cheese, cream, or butter sauce. Fried vegetables.  Grains  Breads that are made with saturated or trans fats, oils, or whole milk. Croissants. Sweet  rolls. Donuts. High-fat crackers, such as cheese crackers.  Meats and other proteins  Fatty meats, such as hot dogs, ribs, sausage, ruelas, rib-eye roast or steak. High-fat deli meats, such as salami and bologna. Caviar. Domestic duck and goose. Organ meats, such as liver.  Dairy  Cream, sour cream, cream cheese, and creamed cottage cheese. Whole-milk cheeses. Whole or 2% milk that is liquid, evaporated, or condensed. Whole buttermilk. Cream sauce or high-fat cheese sauce. Yogurt that is made from whole milk.  Fats and oils  Meat fat, or shortening. Cocoa butter, hydrogenated oils, palm oil, coconut oil, palm kernel oil. Solid fats and shortenings, including ruelas fat, salt pork, lard, and butter. Nondairy cream substitutes. Salad dressings with cheese or sour cream.  Beverages  Regular sodas and juice drinks with added sugar.  Sweets and desserts  Frosting. Pudding. Cookies. Cakes. Pies. Milk chocolate or white chocolate. Buttered syrups. Full-fat ice cream or ice cream drinks.  The items listed above may not be a complete list of foods and drinks to avoid. Contact a dietitian for more information.  Summary  · Heart-healthy meal planning includes eating less unhealthy fats, eating more healthy fats, and making other changes in your diet.  · Eat a balanced diet. This includes fruits and vegetables, low-fat or nonfat dairy, lean protein, nuts and legumes, whole grains, and heart-healthy oils and fats.  This information is not intended to replace advice given to you by your health care provider. Make sure you discuss any questions you have with your health care provider.  Document Revised: 02/21/2019 Document Reviewed: 01/25/2019  [employer] Patient Education © 2020 [employer]    COVID-19  COVID-19 is a respiratory infection that is caused by a virus called severe acute respiratory syndrome coronavirus 2 (SARS-CoV-2). The disease is also known as coronavirus disease or novel coronavirus. In some people, the virus may  not cause any symptoms. In others, it may cause a serious infection. The infection can get worse quickly and can lead to complications, such as:  · Pneumonia, or infection of the lungs.  · Acute respiratory distress syndrome or ARDS. This is a condition in which fluid build-up in the lungs prevents the lungs from filling with air and passing oxygen into the blood.  · Acute respiratory failure. This is a condition in which there is not enough oxygen passing from the lungs to the body or when carbon dioxide is not passing from the lungs out of the body.  · Sepsis or septic shock. This is a serious bodily reaction to an infection.  · Blood clotting problems.  · Secondary infections due to bacteria or fungus.  · Organ failure. This is when your body's organs stop working.  The virus that causes COVID-19 is contagious. This means that it can spread from person to person through droplets from coughs and sneezes (respiratory secretions).  What are the causes?  This illness is caused by a virus. You may catch the virus by:  · Breathing in droplets from an infected person. Droplets can be spread by a person breathing, speaking, singing, coughing, or sneezing.  · Touching something, like a table or a doorknob, that was exposed to the virus (contaminated) and then touching your mouth, nose, or eyes.  What increases the risk?  Risk for infection  You are more likely to be infected with this virus if you:  · Are within 6 feet (2 meters) of a person with COVID-19.  · Provide care for or live with a person who is infected with COVID-19.  · Spend time in crowded indoor spaces or live in shared housing.  Risk for serious illness  You are more likely to become seriously ill from the virus if you:  · Are 50 years of age or older. The higher your age, the more you are at risk for serious illness.  · Live in a nursing home or long-term care facility.  · Have cancer.  · Have a long-term (chronic) disease such as:  ? Chronic lung disease,  "including chronic obstructive pulmonary disease or asthma.  ? A long-term disease that lowers your body's ability to fight infection (immunocompromised).  ? Heart disease, including heart failure, a condition in which the arteries that lead to the heart become narrow or blocked (coronary artery disease), a disease which makes the heart muscle thick, weak, or stiff (cardiomyopathy).  ? Diabetes.  ? Chronic kidney disease.  ? Sickle cell disease, a condition in which red blood cells have an abnormal \"sickle\" shape.  ? Liver disease.  · Are obese.  What are the signs or symptoms?  Symptoms of this condition can range from mild to severe. Symptoms may appear any time from 2 to 14 days after being exposed to the virus. They include:  · A fever or chills.  · A cough.  · Difficulty breathing.  · Headaches, body aches, or muscle aches.  · Runny or stuffy (congested) nose.  · A sore throat.  · New loss of taste or smell.  Some people may also have stomach problems, such as nausea, vomiting, or diarrhea.  Other people may not have any symptoms of COVID-19.  How is this diagnosed?  This condition may be diagnosed based on:  · Your signs and symptoms, especially if:  ? You live in an area with a COVID-19 outbreak.  ? You recently traveled to or from an area where the virus is common.  ? You provide care for or live with a person who was diagnosed with COVID-19.  ? You were exposed to a person who was diagnosed with COVID-19.  · A physical exam.  · Lab tests, which may include:  ? Taking a sample of fluid from the back of your nose and throat (nasopharyngeal fluid), your nose, or your throat using a swab.  ? A sample of mucus from your lungs (sputum).  ? Blood tests.  · Imaging tests, which may include, X-rays, CT scan, or ultrasound.  How is this treated?  At present, there is no medicine to treat COVID-19. Medicines that treat other diseases are being used on a trial basis to see if they are effective against COVID-19.  Your " health care provider will talk with you about ways to treat your symptoms. For most people, the infection is mild and can be managed at home with rest, fluids, and over-the-counter medicines.  Treatment for a serious infection usually takes places in a hospital intensive care unit (ICU). It may include one or more of the following treatments. These treatments are given until your symptoms improve.  · Receiving fluids and medicines through an IV.  · Supplemental oxygen. Extra oxygen is given through a tube in the nose, a face mask, or a mota.  · Positioning you to lie on your stomach (prone position). This makes it easier for oxygen to get into the lungs.  · Continuous positive airway pressure (CPAP) or bi-level positive airway pressure (BPAP) machine. This treatment uses mild air pressure to keep the airways open. A tube that is connected to a motor delivers oxygen to the body.  · Ventilator. This treatment moves air into and out of the lungs by using a tube that is placed in your windpipe.  · Tracheostomy. This is a procedure to create a hole in the neck so that a breathing tube can be inserted.  · Extracorporeal membrane oxygenation (ECMO). This procedure gives the lungs a chance to recover by taking over the functions of the heart and lungs. It supplies oxygen to the body and removes carbon dioxide.  Follow these instructions at home:  Lifestyle  · If you are sick, stay home except to get medical care. Your health care provider will tell you how long to stay home. Call your health care provider before you go for medical care.  · Rest at home as told by your health care provider.  · Do not use any products that contain nicotine or tobacco, such as cigarettes, e-cigarettes, and chewing tobacco. If you need help quitting, ask your health care provider.  · Return to your normal activities as told by your health care provider. Ask your health care provider what activities are safe for you.  General  instructions  · Take over-the-counter and prescription medicines only as told by your health care provider.  · Drink enough fluid to keep your urine pale yellow.  · Keep all follow-up visits as told by your health care provider. This is important.  How is this prevented?    There is no vaccine to help prevent COVID-19 infection. However, there are steps you can take to protect yourself and others from this virus.  To protect yourself:   · Do not travel to areas where COVID-19 is a risk. The areas where COVID-19 is reported change often. To identify high-risk areas and travel restrictions, check the CDC travel website: wwwnc.cdc.gov/travel/notices  · If you live in, or must travel to, an area where COVID-19 is a risk, take precautions to avoid infection.  ? Stay away from people who are sick.  ? Wash your hands often with soap and water for 20 seconds. If soap and water are not available, use an alcohol-based hand .  ? Avoid touching your mouth, face, eyes, or nose.  ? Avoid going out in public, follow guidance from your state and local health authorities.  ? If you must go out in public, wear a cloth face covering or face mask. Make sure your mask covers your nose and mouth.  ? Avoid crowded indoor spaces. Stay at least 6 feet (2 meters) away from others.  ? Disinfect objects and surfaces that are frequently touched every day. This may include:  § Counters and tables.  § Doorknobs and light switches.  § Sinks and faucets.  § Electronics, such as phones, remote controls, keyboards, computers, and tablets.  To protect others:  If you have symptoms of COVID-19, take steps to prevent the virus from spreading to others.  · If you think you have a COVID-19 infection, contact your health care provider right away. Tell your health care team that you think you may have a COVID-19 infection.  · Stay home. Leave your house only to seek medical care. Do not use public transport.  · Do not travel while you are  sick.  · Wash your hands often with soap and water for 20 seconds. If soap and water are not available, use alcohol-based hand .  · Stay away from other members of your household. Let healthy household members care for children and pets, if possible. If you have to care for children or pets, wash your hands often and wear a mask. If possible, stay in your own room, separate from others. Use a different bathroom.  · Make sure that all people in your household wash their hands well and often.  · Cough or sneeze into a tissue or your sleeve or elbow. Do not cough or sneeze into your hand or into the air.  · Wear a cloth face covering or face mask. Make sure your mask covers your nose and mouth.  Where to find more information  · Centers for Disease Control and Prevention: www.cdc.gov/coronavirus/2019-ncov/index.html  · World Health Organization: www.who.int/health-topics/coronavirus  Contact a health care provider if:  · You live in or have traveled to an area where COVID-19 is a risk and you have symptoms of the infection.  · You have had contact with someone who has COVID-19 and you have symptoms of the infection.  Get help right away if:  · You have trouble breathing.  · You have pain or pressure in your chest.  · You have confusion.  · You have bluish lips and fingernails.  · You have difficulty waking from sleep.  · You have symptoms that get worse.  These symptoms may represent a serious problem that is an emergency. Do not wait to see if the symptoms will go away. Get medical help right away. Call your local emergency services (911 in the [place] Do not drive yourself to the hospital. Let the emergency medical personnel know if you think you have COVID-19.  Summary  · COVID-19 is a respiratory infection that is caused by a virus. It is also known as coronavirus disease or novel coronavirus. It can cause serious infections, such as pneumonia, acute respiratory distress syndrome, acute respiratory failure,  or sepsis.  · The virus that causes COVID-19 is contagious. This means that it can spread from person to person through droplets from breathing, speaking, singing, coughing, or sneezing.  · You are more likely to develop a serious illness if you are 50 years of age or older, have a weak immune system, live in a nursing home, or have chronic disease.  · There is no medicine to treat COVID-19. Your health care provider will talk with you about ways to treat your symptoms.  · Take steps to protect yourself and others from infection. Wash your hands often and disinfect objects and surfaces that are frequently touched every day. Stay away from people who are sick and wear a mask if you are sick.  This information is not intended to replace advice given to you by your health care provider. Make sure you discuss any questions you have with your health care provider.  Document Revised: 10/16/2020 Document Reviewed: 01/23/2020  [employer] Patient Education © 2020 [employer]    High Cholesterol    High cholesterol is a condition in which the blood has high levels of a white, waxy, fat-like substance (cholesterol). The human body needs small amounts of cholesterol. The liver makes all the cholesterol that the body needs. Extra (excess) cholesterol comes from the food that we eat.  Cholesterol is carried from the liver by the blood through the blood vessels. If you have high cholesterol, deposits (plaques) may build up on the walls of your blood vessels (arteries). Plaques make the arteries narrower and stiffer. Cholesterol plaques increase your risk for heart attack and stroke. Work with your health care provider to keep your cholesterol levels in a healthy range.  What increases the risk?  This condition is more likely to develop in people who:  · Eat foods that are high in animal fat (saturated fat) or cholesterol.  · Are overweight.  · Are not getting enough exercise.  · Have a family history of high cholesterol.  What are  "the signs or symptoms?  There are no symptoms of this condition.  How is this diagnosed?  This condition may be diagnosed from the results of a blood test.  · If you are older than age 20, your health care provider may check your cholesterol every 4-6 years.  · You may be checked more often if you already have high cholesterol or other risk factors for heart disease.  The blood test for cholesterol measures:  · \"Bad\" cholesterol (LDL cholesterol). This is the main type of cholesterol that causes heart disease. The desired level for LDL is less than 100.  · \"Good\" cholesterol (HDL cholesterol). This type helps to protect against heart disease by cleaning the arteries and carrying the LDL away. The desired level for HDL is 60 or higher.  · Triglycerides. These are fats that the body can store or burn for energy. The desired number for triglycerides is lower than 150.  · Total cholesterol. This is a measure of the total amount of cholesterol in your blood, including LDL cholesterol, HDL cholesterol, and triglycerides. A healthy number is less than 200.  How is this treated?  This condition is treated with diet changes, lifestyle changes, and medicines.  Diet changes  · This may include eating more whole grains, fruits, vegetables, nuts, and fish.  · This may also include cutting back on red meat and foods that have a lot of added sugar.  Lifestyle changes  · Changes may include getting at least 40 minutes of aerobic exercise 3 times a week. Aerobic exercises include walking, biking, and swimming. Aerobic exercise along with a healthy diet can help you maintain a healthy weight.  · Changes may also include quitting smoking.  Medicines  · Medicines are usually given if diet and lifestyle changes have failed to reduce your cholesterol to healthy levels.  · Your health care provider may prescribe a statin medicine. Statin medicines have been shown to reduce cholesterol, which can reduce the risk of heart disease.  Follow " these instructions at home:  Eating and drinking  If told by your health care provider:  · Eat chicken (without skin), fish, veal, shellfish, ground turkey breast, and round or loin cuts of red meat.  · Do not eat fried foods or fatty meats, such as hot dogs and salami.  · Eat plenty of fruits, such as apples.  · Eat plenty of vegetables, such as broccoli, potatoes, and carrots.  · Eat beans, peas, and lentils.  · Eat grains such as barley, rice, couscous, and bulgur wheat.  · Eat pasta without cream sauces.  · Use skim or nonfat milk, and eat low-fat or nonfat yogurt and cheeses.  · Do not eat or drink whole milk, cream, ice cream, egg yolks, or hard cheeses.  · Do not eat stick margarine or tub margarines that contain trans fats (also called partially hydrogenated oils).  · Do not eat saturated tropical oils, such as coconut oil and palm oil.  · Do not eat cakes, cookies, crackers, or other baked goods that contain trans fats.    General instructions  · Exercise as directed by your health care provider. Increase your activity level with activities such as gardening, walking, and taking the stairs.  · Take over-the-counter and prescription medicines only as told by your health care provider.  · Do not use any products that contain nicotine or tobacco, such as cigarettes and e-cigarettes. If you need help quitting, ask your health care provider.  · Keep all follow-up visits as told by your health care provider. This is important.  Contact a health care provider if:  · You are struggling to maintain a healthy diet or weight.  · You need help to start on an exercise program.  · You need help to stop smoking.  Get help right away if:  · You have chest pain.  · You have trouble breathing.  This information is not intended to replace advice given to you by your health care provider. Make sure you discuss any questions you have with your health care provider.  Document Revised: 12/21/2018 Document Reviewed:  06/17/2017  Elsevier Patient Education © 2020 [employer]

## 2020-12-18 ENCOUNTER — TELEPHONE (OUTPATIENT)
Dept: CARDIOLOGY | Facility: CLINIC | Age: 55
End: 2020-12-18

## 2020-12-18 NOTE — TELEPHONE ENCOUNTER
Patient notified to stop Metoprolol tartrate 25 mg.  Will continue to monitor heart rate in cardiac rehab.  12- @ 10:08 am/jose

## 2020-12-21 ENCOUNTER — TREATMENT (OUTPATIENT)
Dept: CARDIAC REHAB | Facility: HOSPITAL | Age: 55
End: 2020-12-21

## 2020-12-21 VITALS — SYSTOLIC BLOOD PRESSURE: 151 MMHG | HEART RATE: 60 BPM | DIASTOLIC BLOOD PRESSURE: 74 MMHG

## 2020-12-21 DIAGNOSIS — Z95.1 S/P CABG (CORONARY ARTERY BYPASS GRAFT): ICD-10-CM

## 2020-12-21 PROCEDURE — 93798 PHYS/QHP OP CAR RHAB W/ECG: CPT

## 2020-12-21 NOTE — PROGRESS NOTES
Pt attended phase II visit.  Tolerated exercise well, NAD noted, no complaints voiced, skin warm, pink, dry, resp nl and even, denies chest discomfort, denies SOA.   Monitor shows NSR, V/S WDL, see [place] documentation for exercise data.  Dr. Adam Canseco physician immediately available     Patient attended first day of exercise. Educated on warm up, cool down, cleaning and use of equipment, signs and symptoms to report, Cardiac Rehab Protocol, Applying the heart monitor.

## 2020-12-23 ENCOUNTER — APPOINTMENT (OUTPATIENT)
Dept: CARDIAC REHAB | Facility: HOSPITAL | Age: 55
End: 2020-12-23

## 2020-12-24 RX ORDER — AMIODARONE HYDROCHLORIDE 200 MG/1
200 TABLET ORAL DAILY
Qty: 30 TABLET | Refills: 5 | Status: SHIPPED | OUTPATIENT
Start: 2020-12-24 | End: 2021-01-04

## 2020-12-28 ENCOUNTER — TREATMENT (OUTPATIENT)
Dept: CARDIAC REHAB | Facility: HOSPITAL | Age: 55
End: 2020-12-28

## 2020-12-28 VITALS — SYSTOLIC BLOOD PRESSURE: 118 MMHG | DIASTOLIC BLOOD PRESSURE: 62 MMHG | HEART RATE: 62 BPM

## 2020-12-28 DIAGNOSIS — Z95.1 S/P CABG (CORONARY ARTERY BYPASS GRAFT): ICD-10-CM

## 2020-12-28 PROCEDURE — 93798 PHYS/QHP OP CAR RHAB W/ECG: CPT

## 2020-12-28 NOTE — PROGRESS NOTES
Pt attended phase II visit.  Tolerated exercise well, NAD noted, no complaints voiced, skin warm, pink, dry, resp nl and even, denies chest discomfort, denies SOA.   Monitor shows NSR, V/S WDL, see [place] documentation for exercise data.  Dr. Chang physician immediately available

## 2020-12-30 ENCOUNTER — APPOINTMENT (OUTPATIENT)
Dept: CARDIAC REHAB | Facility: HOSPITAL | Age: 55
End: 2020-12-30

## 2020-12-31 ENCOUNTER — TREATMENT (OUTPATIENT)
Dept: CARDIAC REHAB | Facility: HOSPITAL | Age: 55
End: 2020-12-31

## 2020-12-31 VITALS — OXYGEN SATURATION: 98 % | SYSTOLIC BLOOD PRESSURE: 138 MMHG | HEART RATE: 63 BPM | DIASTOLIC BLOOD PRESSURE: 72 MMHG

## 2020-12-31 DIAGNOSIS — Z95.1 S/P CABG (CORONARY ARTERY BYPASS GRAFT): ICD-10-CM

## 2020-12-31 PROCEDURE — 93798 PHYS/QHP OP CAR RHAB W/ECG: CPT

## 2020-12-31 NOTE — PROGRESS NOTES
Pt attended phase II visit.  Tolerated exercise well, NAD noted, no complaints voiced, skin warm, pink, dry, resp nl and even, denies chest discomfort, denies SOA.   Monitor shows NSR, V/S WDL, see [place] documentation for exercise data.  Dr. Chang physician immediately available

## 2021-01-04 ENCOUNTER — TREATMENT (OUTPATIENT)
Dept: CARDIAC REHAB | Facility: HOSPITAL | Age: 56
End: 2021-01-04

## 2021-01-04 ENCOUNTER — LAB (OUTPATIENT)
Dept: LAB | Facility: HOSPITAL | Age: 56
End: 2021-01-04

## 2021-01-04 ENCOUNTER — OFFICE VISIT (OUTPATIENT)
Dept: CARDIOLOGY | Facility: CLINIC | Age: 56
End: 2021-01-04

## 2021-01-04 VITALS
HEIGHT: 62 IN | SYSTOLIC BLOOD PRESSURE: 165 MMHG | HEART RATE: 57 BPM | WEIGHT: 135 LBS | OXYGEN SATURATION: 97 % | DIASTOLIC BLOOD PRESSURE: 77 MMHG | BODY MASS INDEX: 24.84 KG/M2

## 2021-01-04 VITALS — HEART RATE: 58 BPM | SYSTOLIC BLOOD PRESSURE: 148 MMHG | OXYGEN SATURATION: 98 % | DIASTOLIC BLOOD PRESSURE: 66 MMHG

## 2021-01-04 DIAGNOSIS — I25.10 CORONARY ARTERY DISEASE INVOLVING NATIVE CORONARY ARTERY OF NATIVE HEART WITHOUT ANGINA PECTORIS: Chronic | ICD-10-CM

## 2021-01-04 DIAGNOSIS — Z95.1 S/P CABG (CORONARY ARTERY BYPASS GRAFT): ICD-10-CM

## 2021-01-04 DIAGNOSIS — Z72.0 TOBACCO ABUSE: Chronic | ICD-10-CM

## 2021-01-04 DIAGNOSIS — E78.2 MIXED HYPERLIPIDEMIA: Chronic | ICD-10-CM

## 2021-01-04 DIAGNOSIS — I48.0 PAF (PAROXYSMAL ATRIAL FIBRILLATION) (HCC): ICD-10-CM

## 2021-01-04 DIAGNOSIS — I10 ESSENTIAL HYPERTENSION: Chronic | ICD-10-CM

## 2021-01-04 DIAGNOSIS — R60.9 SWELLING: Primary | ICD-10-CM

## 2021-01-04 DIAGNOSIS — R00.1 BRADYCARDIA: ICD-10-CM

## 2021-01-04 LAB
ALBUMIN SERPL-MCNC: 3.95 G/DL (ref 3.5–5.2)
ALBUMIN/GLOB SERPL: 1.1 G/DL
ALP SERPL-CCNC: 145 U/L (ref 39–117)
ALT SERPL W P-5'-P-CCNC: 10 U/L (ref 1–33)
ANION GAP SERPL CALCULATED.3IONS-SCNC: 9.5 MMOL/L (ref 5–15)
AST SERPL-CCNC: 18 U/L (ref 1–32)
BILIRUB SERPL-MCNC: <0.2 MG/DL (ref 0–1.2)
BUN SERPL-MCNC: 8 MG/DL (ref 6–20)
BUN/CREAT SERPL: 10.7 (ref 7–25)
CALCIUM SPEC-SCNC: 9 MG/DL (ref 8.6–10.5)
CHLORIDE SERPL-SCNC: 106 MMOL/L (ref 98–107)
CO2 SERPL-SCNC: 26.5 MMOL/L (ref 22–29)
CREAT SERPL-MCNC: 0.75 MG/DL (ref 0.57–1)
DEPRECATED RDW RBC AUTO: 51.7 FL (ref 37–54)
ERYTHROCYTE [DISTWIDTH] IN BLOOD BY AUTOMATED COUNT: 16.7 % (ref 12.3–15.4)
GFR SERPL CREATININE-BSD FRML MDRD: 81 ML/MIN/1.73
GLOBULIN UR ELPH-MCNC: 3.6 GM/DL
GLUCOSE SERPL-MCNC: 97 MG/DL (ref 65–99)
HCT VFR BLD AUTO: 30.1 % (ref 34–46.6)
HGB BLD-MCNC: 9.2 G/DL (ref 12–15.9)
MCH RBC QN AUTO: 25.8 PG (ref 26.6–33)
MCHC RBC AUTO-ENTMCNC: 30.6 G/DL (ref 31.5–35.7)
MCV RBC AUTO: 84.6 FL (ref 79–97)
NT-PROBNP SERPL-MCNC: 2766 PG/ML (ref 0–900)
PLATELET # BLD AUTO: 265 10*3/MM3 (ref 140–450)
PMV BLD AUTO: 9.5 FL (ref 6–12)
POTASSIUM SERPL-SCNC: 3.1 MMOL/L (ref 3.5–5.2)
PROT SERPL-MCNC: 7.5 G/DL (ref 6–8.5)
RBC # BLD AUTO: 3.56 10*6/MM3 (ref 3.77–5.28)
SODIUM SERPL-SCNC: 142 MMOL/L (ref 136–145)
WBC # BLD AUTO: 9.62 10*3/MM3 (ref 3.4–10.8)

## 2021-01-04 PROCEDURE — 83880 ASSAY OF NATRIURETIC PEPTIDE: CPT | Performed by: NURSE PRACTITIONER

## 2021-01-04 PROCEDURE — 36415 COLL VENOUS BLD VENIPUNCTURE: CPT | Performed by: NURSE PRACTITIONER

## 2021-01-04 PROCEDURE — 80053 COMPREHEN METABOLIC PANEL: CPT | Performed by: NURSE PRACTITIONER

## 2021-01-04 PROCEDURE — 99214 OFFICE O/P EST MOD 30 MIN: CPT | Performed by: NURSE PRACTITIONER

## 2021-01-04 PROCEDURE — 85027 COMPLETE CBC AUTOMATED: CPT | Performed by: NURSE PRACTITIONER

## 2021-01-04 PROCEDURE — 93798 PHYS/QHP OP CAR RHAB W/ECG: CPT

## 2021-01-04 RX ORDER — POTASSIUM CHLORIDE 750 MG/1
10 TABLET, FILM COATED, EXTENDED RELEASE ORAL DAILY
Qty: 5 TABLET | Refills: 0 | Status: SHIPPED | OUTPATIENT
Start: 2021-01-04 | End: 2021-01-18

## 2021-01-04 RX ORDER — FUROSEMIDE 20 MG/1
20 TABLET ORAL DAILY
Qty: 5 TABLET | Refills: 0 | Status: SHIPPED | OUTPATIENT
Start: 2021-01-04 | End: 2021-01-18

## 2021-01-04 NOTE — PROGRESS NOTES
"Chief Complaint  Coronary Artery Disease, Atrial Fibrillation, Fatigue, and Leg Swelling    Subjective          Veda Enamorado presents to [facility] CARDIOLOGY for acute complaint of lower extremity swelling and low heart rate.    Ms. Enamorado has a past medical history significant for coronary artery disease, status post multiple percutaneous revascularizations, last being in February 2015 when she underwent successful PCI ZURI to the LAD and diagonal branches for angina, coronary artery bypass graft x3 on 7/17/2020, LIMA to LAD, greater saphenous vein to OM, and greater saphenous vein to D1, hyperlipidemia and hypertension.    History of Present Illness    Pt reports LE swelling in the last 4-6 weeks, she states that it has waxed and waned during this time.  Swelling is usually down in the morning.  She reports that she has mild intermittent shortness of breath.  She also reports slow heart rate.  Sleeping on flat bed.  Denies PND.  Denies cough.  She reports fatigue.  Patient was participating in cardiac rehab today when ESTELLA Valverde noted her lower extremity swelling and spoke to Ms. Enamorado about it, she recommended that patient be seen in the office today.    With regard to bradycardia, patient is essentially asymptomatic with heart rate in the 50s.      Pt stopped rosuvastatin because of leg pain about 4 weeks ago.      Objective     Vital Signs:   /77 (BP Location: Right arm, Patient Position: Sitting)   Pulse 57   Ht 157.5 cm (62\")   Wt 61.2 kg (135 lb)   SpO2 97%   BMI 24.69 kg/m²       Physical Exam  Constitutional:       Appearance: Normal appearance. She is well-developed.   HENT:      Head: Normocephalic and atraumatic.   Eyes:      Pupils: Pupils are equal, round, and reactive to light.   Neck:      Vascular: No JVD.   Cardiovascular:      Rate and Rhythm: Normal rate and regular rhythm.      Heart sounds: No murmur. No friction rub. No gallop.       Comments: Mild " bilateral lower extremity swelling  Pulmonary:      Effort: Pulmonary effort is normal. No respiratory distress.      Breath sounds: Normal breath sounds. No wheezing or rales.   Skin:     General: Skin is warm and dry.   Neurological:      Mental Status: She is alert.   Psychiatric:         Mood and Affect: Mood normal.         Behavior: Behavior normal.          Result Review :                     Assessment and Plan    Problem List Items Addressed This Visit        Other    Coronary artery disease involving native coronary artery of native heart without angina pectoris (Chronic)    Tobacco abuse (Chronic)    Mixed hyperlipidemia (Chronic)    Essential hypertension (Chronic)    Relevant Medications    furosemide (LASIX) 20 MG tablet    PAF (paroxysmal atrial fibrillation) (CMS/[facility])    Overview     Post op afib after CABG 7/2020           Other Visit Diagnoses     Swelling    -  Primary    Relevant Orders    BNP (Completed)    Adult Transthoracic Echo Complete W/ Cont if Necessary Per Protocol    Comprehensive Metabolic Panel (Completed)    CBC (No Diff) (Completed)    Bradycardia                Follow Up     With regard to lower extremity swelling, I have ordered labs today.  I have ordered a BNP, CBC and comprehensive metabolic panel.  Patient was given a prescription for both Lasix and potassium for replacement.  She is to take these medications for total of 5 days.  I have ordered an echocardiogram to evaluate LVEF.    With regard to bradycardia, patient's heart rate is in the 50s and she is asymptomatic.  At this time I will not make any changes to her metoprolol, however I have asked her to check her pulse before she takes this medication and told her not to take it if it is less than 60.    Coronary artery disease is stable.  Patient is to continue aspirin, metoprolol, isosorbide mononitrate.      Hypertension is not well controlled.  I have given the patient Lasix today and will reevaluate for escalation of  meds once this acute episode of swelling has resolved.    With regard to mixed hyperlipidemia.  Her last lipid panel was 11/17/2020 it showed a total cholesterol of 153, triglycerides 146 and LDL cholesterol 62.  Since she was having myalgias on statin, again I will let her lower extremity swelling resolve and restart on a low dose of atorvastatin.    Return in about 7 weeks (around 2/22/2021).     Patient was given instructions and counseling regarding her condition or for health maintenance advice. Please see specific information pulled into the AVS if appropriate.

## 2021-01-04 NOTE — PROGRESS NOTES
Pt attended phase II visit.  Tolerated exercise well, NAD noted, no complaints voiced, skin warm, pink, dry, resp nl and even, denies chest discomfort, denies SOA.   Monitor shows NSR, V/S WDL, see [place] documentation for exercise data.  Dr. Rivera  physician immediately available     Patient came in with increased blood pressure and new onset edema in legs pitting plus 2 . She stated she was not feeling well the last few night , stated she thinks her heart rate drops low. I explained how to check pulse and ask her to do that for me until she comes back. I also called cardiology and Sharda will see her today.  She knows to go to ER if Chest pain, SOA , Pressure or any symptoms that is not normal for her.

## 2021-01-05 PROBLEM — I10 ESSENTIAL HYPERTENSION: Chronic | Status: ACTIVE | Noted: 2018-09-18

## 2021-01-06 ENCOUNTER — TREATMENT (OUTPATIENT)
Dept: CARDIAC REHAB | Facility: HOSPITAL | Age: 56
End: 2021-01-06

## 2021-01-06 VITALS — HEART RATE: 53 BPM | SYSTOLIC BLOOD PRESSURE: 142 MMHG | OXYGEN SATURATION: 98 % | DIASTOLIC BLOOD PRESSURE: 68 MMHG

## 2021-01-06 DIAGNOSIS — Z95.1 S/P CABG (CORONARY ARTERY BYPASS GRAFT): ICD-10-CM

## 2021-01-06 PROCEDURE — 93798 PHYS/QHP OP CAR RHAB W/ECG: CPT

## 2021-01-06 NOTE — PROGRESS NOTES
Pt attended phase II visit.  Tolerated exercise well, NAD noted, no complaints voiced, skin warm, pink, dry, resp nl and even, denies chest discomfort, denies SOA.   Monitor shows NSR, V/S WDL, see [place] documentation for exercise data.  Dr. Rivera  physician immediately available     Patient stated she is feeling better and has slept better since seeing Sharda. Her edema has improved greatly in her legs. No pitting at this time. I spoke with her about the importance of taking her cholesterol medication and she stated she will try it again. I told her before she stops any medication she needed to speak with her doctor.  She stated she would

## 2021-01-11 ENCOUNTER — TREATMENT (OUTPATIENT)
Dept: CARDIAC REHAB | Facility: HOSPITAL | Age: 56
End: 2021-01-11

## 2021-01-11 VITALS — SYSTOLIC BLOOD PRESSURE: 136 MMHG | HEART RATE: 55 BPM | DIASTOLIC BLOOD PRESSURE: 66 MMHG | OXYGEN SATURATION: 98 %

## 2021-01-11 DIAGNOSIS — Z95.1 S/P CABG (CORONARY ARTERY BYPASS GRAFT): ICD-10-CM

## 2021-01-11 PROCEDURE — 93798 PHYS/QHP OP CAR RHAB W/ECG: CPT

## 2021-01-11 NOTE — PROGRESS NOTES
Pt attended phase II visit.  Tolerated exercise well, NAD noted, no complaints voiced, skin warm, pink, dry, resp nl and even, denies chest discomfort, denies SOA.   Monitor shows NSR, V/S WDL, see [place] documentation for exercise data.  Dr. Chang physician immediately available

## 2021-01-13 ENCOUNTER — TREATMENT (OUTPATIENT)
Dept: CARDIAC REHAB | Facility: HOSPITAL | Age: 56
End: 2021-01-13

## 2021-01-13 VITALS — DIASTOLIC BLOOD PRESSURE: 78 MMHG | OXYGEN SATURATION: 98 % | SYSTOLIC BLOOD PRESSURE: 142 MMHG | HEART RATE: 62 BPM

## 2021-01-13 DIAGNOSIS — Z95.1 S/P CABG (CORONARY ARTERY BYPASS GRAFT): ICD-10-CM

## 2021-01-13 PROCEDURE — 93798 PHYS/QHP OP CAR RHAB W/ECG: CPT

## 2021-01-13 NOTE — PROGRESS NOTES
Pt attended phase II visit.  Tolerated exercise well, NAD noted, no complaints voiced, skin warm, pink, dry, resp nl and even, denies chest discomfort, denies SOA.   Monitor shows NSR, V/S WDL, see [place] documentation for exercise data.  Dr. Chang physician immediately available

## 2021-01-18 ENCOUNTER — OFFICE VISIT (OUTPATIENT)
Dept: CARDIOLOGY | Facility: CLINIC | Age: 56
End: 2021-01-18

## 2021-01-18 ENCOUNTER — TREATMENT (OUTPATIENT)
Dept: CARDIAC REHAB | Facility: HOSPITAL | Age: 56
End: 2021-01-18

## 2021-01-18 VITALS
HEART RATE: 65 BPM | HEIGHT: 62 IN | BODY MASS INDEX: 24.11 KG/M2 | OXYGEN SATURATION: 97 % | DIASTOLIC BLOOD PRESSURE: 76 MMHG | SYSTOLIC BLOOD PRESSURE: 134 MMHG | WEIGHT: 131 LBS

## 2021-01-18 VITALS — SYSTOLIC BLOOD PRESSURE: 126 MMHG | OXYGEN SATURATION: 98 % | DIASTOLIC BLOOD PRESSURE: 76 MMHG | HEART RATE: 60 BPM

## 2021-01-18 DIAGNOSIS — I10 ESSENTIAL HYPERTENSION: Chronic | ICD-10-CM

## 2021-01-18 DIAGNOSIS — I25.10 CORONARY ARTERY DISEASE INVOLVING NATIVE CORONARY ARTERY OF NATIVE HEART WITHOUT ANGINA PECTORIS: Primary | Chronic | ICD-10-CM

## 2021-01-18 DIAGNOSIS — R60.9 SWELLING: ICD-10-CM

## 2021-01-18 DIAGNOSIS — E78.2 MIXED HYPERLIPIDEMIA: Chronic | ICD-10-CM

## 2021-01-18 DIAGNOSIS — Z95.1 S/P CABG (CORONARY ARTERY BYPASS GRAFT): ICD-10-CM

## 2021-01-18 DIAGNOSIS — Z87.891 FORMER CIGARETTE SMOKER: ICD-10-CM

## 2021-01-18 PROCEDURE — 99214 OFFICE O/P EST MOD 30 MIN: CPT | Performed by: NURSE PRACTITIONER

## 2021-01-18 PROCEDURE — 93798 PHYS/QHP OP CAR RHAB W/ECG: CPT

## 2021-01-18 RX ORDER — NITROFURANTOIN 25; 75 MG/1; MG/1
100 CAPSULE ORAL 2 TIMES DAILY
Qty: 14 CAPSULE | Refills: 0 | Status: SHIPPED | OUTPATIENT
Start: 2021-01-18 | End: 2022-01-04

## 2021-01-18 RX ORDER — FUROSEMIDE 20 MG/1
10 TABLET ORAL DAILY
Qty: 15 TABLET | Refills: 11 | Status: SHIPPED | OUTPATIENT
Start: 2021-01-18 | End: 2022-01-11

## 2021-01-18 RX ORDER — POTASSIUM CHLORIDE 750 MG/1
10 TABLET, FILM COATED, EXTENDED RELEASE ORAL DAILY
Qty: 30 TABLET | Refills: 11 | Status: SHIPPED | OUTPATIENT
Start: 2021-01-18 | End: 2022-01-11

## 2021-01-18 RX ORDER — PRAVASTATIN SODIUM 20 MG
20 TABLET ORAL DAILY
Qty: 30 TABLET | Refills: 5 | Status: SHIPPED | OUTPATIENT
Start: 2021-01-18 | End: 2021-08-19

## 2021-01-18 NOTE — PROGRESS NOTES
"Chief Complaint  Atrial Fibrillation, Fatigue, and Coronary Artery Disease    Subjective          Veda Enamorado presents to [facility] CARDIOLOG[facility] for follow-up of her complaint of lower extremity swelling.    She has a past medical history significant for coronary artery disease, status post multiple percutaneous revascularizations, last being February 2015 when she underwent successful PCI ZURI to the LAD and diagonal branches for angina, coronary artery bypass graft x3 on 7/17/2020, LIMA to LAD, greater saphenous vein to OM and greater saphenous vein to D1, hyperlipidemia and hypertension.    History of Present Illness    At her last visit Ms. Enamorado reported lower extremity swelling.  She was given a prescription for a 5-day course of Lasix with potassium replacement.  An echocardiogram was ordered to evaluate LVEF.  The echo has not yet been completed.    At today's visit the patient states that her swelling has gone down and that she feels much better.  She denies any chest pain or palpitations.  She does report that she feels fatigued and states that she has burning with urination as well as pelvic pressure.  She believes she has a urinary tract infection.    Objective     Vital Signs:   /76 (BP Location: Left arm)   Pulse 65   Ht 157.5 cm (62\")   Wt 59.4 kg (131 lb)   SpO2 97%   BMI 23.96 kg/m²       Physical Exam  Constitutional:       Appearance: Normal appearance. She is well-developed.   HENT:      Head: Normocephalic and atraumatic.   Eyes:      Pupils: Pupils are equal, round, and reactive to light.   Neck:      Vascular: No JVD.   Cardiovascular:      Rate and Rhythm: Normal rate and regular rhythm.      Heart sounds: No murmur. No friction rub. No gallop.    Pulmonary:      Effort: Pulmonary effort is normal. No respiratory distress.      Breath sounds: Normal breath sounds. No wheezing or rales.   Skin:     General: Skin is warm and dry.   Neurological:      Mental " Status: She is alert and oriented to person, place, and time.   Psychiatric:         Mood and Affect: Mood normal.         Behavior: Behavior normal.          Result Review :     CMP    CMP 7/20/20 7/21/20 1/4/21   BUN 16 15 8   Creatinine 0.56 (A) 0.47 (A) 0.75   eGFR Non African Am 113 138 81   Sodium 136 136 142   Potassium 4.3 4.2 3.1 (A)   Chloride 101 100 106   Calcium 8.6 8.2 (A) 9.0   Albumin   3.95   Total Bilirubin   <0.2   Alkaline Phosphatase   145 (A)   AST (SGOT)   18   ALT (SGPT)   10   (A) Abnormal value            CBC    CBC 7/20/20 7/21/20 1/4/21   WBC 15.97 (A) 12.86 (A) 9.62   RBC 3.30 (A) 3.21 (A) 3.56 (A)   Hemoglobin 10.0 (A) 10.0 (A) 9.2 (A)   Hematocrit 32.4 (A) 31.2 (A) 30.1 (A)   MCV 98.2 (A) 97.2 (A) 84.6   MCH 30.3 31.2 25.8 (A)   MCHC 30.9 (A) 32.1 30.6 (A)   RDW 14.6 14.5 16.7 (A)   Platelets 150 188 265   (A) Abnormal value            Lipid Panel    Lipid Panel 3/2/20 11/17/20   Triglycerides 104 146   HDL Cholesterol 48 66 (A)   VLDL Cholesterol 20.8 25   LDL Cholesterol  47 62   LDL/HDL Ratio 0.98 0.88   (A) Abnormal value            Data reviewed: Cardiology studies BNP              Assessment and Plan {CC Problem List  Visit Diagnosis  ROS  Review (Popup)  Health Maintenance  Quality  BestPractice  Medications  SmartSets  SnapShot Encounters  Media :23}   Problem List Items Addressed This Visit        Other    Coronary artery disease involving native coronary artery of native heart without angina pectoris - Primary (Chronic)    Essential hypertension (Chronic)    Mixed hyperlipidemia (Chronic)    Tobacco abuse (Chronic)      Other Visit Diagnoses     Swelling                Follow Up     Swelling has resolved.    CAD is stable.  Continue aspirin, metoprolol.  Continue Imdur    With regard to hypertension, I have asked the patient to start taking Lasix 10 mg daily.  Will recheck creatinine and potassium level in 4 to 6 weeks.    With regard to dyslipidemia, the patient  has not been taking any statin due to myalgias.  She states that she did well on the statin that was given to her before her surgery.  Review of the record shows that she was on pravastatin.  I have reordered this medication for her.  I have asked her to start it right away.  We will redraw labs in approximately 6 weeks.    Labs dated 1/4/2021 were reviewed with the patient today.    Patient to return to clinic in 5 weeks for appointment with Dr. Conway.    Patient was given instructions and counseling regarding her condition or for health maintenance advice. Please see specific information pulled into the AVS if appropriate.

## 2021-01-18 NOTE — PROGRESS NOTES
Pt attended phase II visit.  Tolerated exercise well, NAD noted, no complaints voiced, skin warm, pink, dry, resp nl and even, denies chest discomfort, denies SOA.   Monitor shows NSR, V/S WDL, see [place] documentation for exercise data.   physician immediately available

## 2021-01-19 ENCOUNTER — HOSPITAL ENCOUNTER (OUTPATIENT)
Dept: CARDIOLOGY | Facility: HOSPITAL | Age: 56
Discharge: HOME | End: 2021-01-19
Admitting: NURSE PRACTITIONER

## 2021-01-19 DIAGNOSIS — R60.9 SWELLING: ICD-10-CM

## 2021-01-19 PROCEDURE — 93306 TTE W/DOPPLER COMPLETE: CPT

## 2021-01-19 PROCEDURE — 93306 TTE W/DOPPLER COMPLETE: CPT | Performed by: INTERNAL MEDICINE

## 2021-01-20 ENCOUNTER — APPOINTMENT (OUTPATIENT)
Dept: CARDIAC REHAB | Facility: HOSPITAL | Age: 56
End: 2021-01-20

## 2021-01-21 LAB
BH CV ECHO MEAS - % IVS THICK: 27.3 %
BH CV ECHO MEAS - % LVPW THICK: 76.9 %
BH CV ECHO MEAS - ACS: 2 CM
BH CV ECHO MEAS - AO MAX PG: 11.7 MMHG
BH CV ECHO MEAS - AO MEAN PG: 5 MMHG
BH CV ECHO MEAS - AO ROOT AREA (BSA CORRECTED): 1.8
BH CV ECHO MEAS - AO ROOT AREA: 6.2 CM^2
BH CV ECHO MEAS - AO ROOT DIAM: 2.8 CM
BH CV ECHO MEAS - AO V2 MAX: 171 CM/SEC
BH CV ECHO MEAS - AO V2 MEAN: 102 CM/SEC
BH CV ECHO MEAS - AO V2 VTI: 31 CM
BH CV ECHO MEAS - BSA(HAYCOCK): 1.6 M^2
BH CV ECHO MEAS - BSA: 1.6 M^2
BH CV ECHO MEAS - BZI_BMI: 24 KILOGRAMS/M^2
BH CV ECHO MEAS - BZI_METRIC_HEIGHT: 157.5 CM
BH CV ECHO MEAS - BZI_METRIC_WEIGHT: 59.4 KG
BH CV ECHO MEAS - EDV(CUBED): 103.8 ML
BH CV ECHO MEAS - EDV(MOD-SP4): 62.9 ML
BH CV ECHO MEAS - EDV(TEICH): 102.4 ML
BH CV ECHO MEAS - EF(CUBED): 73.7 %
BH CV ECHO MEAS - EF(MOD-SP4): 77.9 %
BH CV ECHO MEAS - EF(TEICH): 65.5 %
BH CV ECHO MEAS - ESV(CUBED): 27.3 ML
BH CV ECHO MEAS - ESV(MOD-SP4): 13.9 ML
BH CV ECHO MEAS - ESV(TEICH): 35.3 ML
BH CV ECHO MEAS - FS: 36 %
BH CV ECHO MEAS - IVS/LVPW: 0.93
BH CV ECHO MEAS - IVSD: 0.97 CM
BH CV ECHO MEAS - IVSS: 1.2 CM
BH CV ECHO MEAS - LA DIMENSION: 3.8 CM
BH CV ECHO MEAS - LA/AO: 1.4
BH CV ECHO MEAS - LV DIASTOLIC VOL/BSA (35-75): 39.4 ML/M^2
BH CV ECHO MEAS - LV MASS(C)D: 165.2 GRAMS
BH CV ECHO MEAS - LV MASS(C)DI: 103.4 GRAMS/M^2
BH CV ECHO MEAS - LV MASS(C)S: 164.9 GRAMS
BH CV ECHO MEAS - LV MASS(C)SI: 103.3 GRAMS/M^2
BH CV ECHO MEAS - LV SYSTOLIC VOL/BSA (12-30): 8.7 ML/M^2
BH CV ECHO MEAS - LVIDD: 4.7 CM
BH CV ECHO MEAS - LVIDS: 3 CM
BH CV ECHO MEAS - LVLD AP4: 6.3 CM
BH CV ECHO MEAS - LVLS AP4: 5.7 CM
BH CV ECHO MEAS - LVOT AREA (M): 3.1 CM^2
BH CV ECHO MEAS - LVOT AREA: 3.1 CM^2
BH CV ECHO MEAS - LVOT DIAM: 2 CM
BH CV ECHO MEAS - LVPWD: 1 CM
BH CV ECHO MEAS - LVPWS: 1.8 CM
BH CV ECHO MEAS - MV A MAX VEL: 38.3 CM/SEC
BH CV ECHO MEAS - MV E MAX VEL: 72.7 CM/SEC
BH CV ECHO MEAS - MV E/A: 1.9
BH CV ECHO MEAS - PA ACC TIME: 0.09 SEC
BH CV ECHO MEAS - PA PR(ACCEL): 40.8 MMHG
BH CV ECHO MEAS - SI(AO): 119.5 ML/M^2
BH CV ECHO MEAS - SI(CUBED): 47.9 ML/M^2
BH CV ECHO MEAS - SI(MOD-SP4): 30.7 ML/M^2
BH CV ECHO MEAS - SI(TEICH): 42 ML/M^2
BH CV ECHO MEAS - SV(AO): 190.9 ML
BH CV ECHO MEAS - SV(CUBED): 76.6 ML
BH CV ECHO MEAS - SV(MOD-SP4): 49 ML
BH CV ECHO MEAS - SV(TEICH): 67.1 ML
LV EF 2D ECHO EST: 60 %
MAXIMAL PREDICTED HEART RATE: 166 BPM
STRESS TARGET HR: 141 BPM

## 2021-01-25 ENCOUNTER — APPOINTMENT (OUTPATIENT)
Dept: CARDIAC REHAB | Facility: HOSPITAL | Age: 56
End: 2021-01-25

## 2021-01-27 ENCOUNTER — APPOINTMENT (OUTPATIENT)
Dept: CARDIAC REHAB | Facility: HOSPITAL | Age: 56
End: 2021-01-27

## 2021-01-28 ENCOUNTER — TELEPHONE (OUTPATIENT)
Dept: CARDIOLOGY | Facility: CLINIC | Age: 56
End: 2021-01-28

## 2021-01-28 NOTE — TELEPHONE ENCOUNTER
----- Message from ZION Drake sent at 1/21/2021  6:11 PM EST -----  Please call patient.  Normal results.    Thanks, Sharda

## 2021-01-29 ENCOUNTER — DOCUMENTATION (OUTPATIENT)
Dept: CARDIAC REHAB | Facility: HOSPITAL | Age: 56
End: 2021-01-29

## 2021-01-29 NOTE — PROGRESS NOTES
Patient has been scheduled for a dietary consult on 4/28/21 at1 pm.   Staff has explained the benefits of this consult.   Patient has been given an appointment card with the date , time, and a contact number if for some reason it the consult needs to be changed or cancelled.

## 2021-02-01 ENCOUNTER — TREATMENT (OUTPATIENT)
Dept: CARDIAC REHAB | Facility: HOSPITAL | Age: 56
End: 2021-02-01

## 2021-02-01 VITALS — HEART RATE: 66 BPM | DIASTOLIC BLOOD PRESSURE: 64 MMHG | SYSTOLIC BLOOD PRESSURE: 128 MMHG | OXYGEN SATURATION: 98 %

## 2021-02-01 DIAGNOSIS — Z95.1 S/P CABG (CORONARY ARTERY BYPASS GRAFT): ICD-10-CM

## 2021-02-01 PROCEDURE — 93798 PHYS/QHP OP CAR RHAB W/ECG: CPT

## 2021-02-01 NOTE — PROGRESS NOTES
Pt attended phase II visit.  Tolerated exercise well, NAD noted, no complaints voiced, skin warm, pink, dry, resp nl and even, denies chest discomfort, denies SOA.   Monitor shows NSR, V/S WDL, see [place] documentation for exercise data.   physician immediately available

## 2021-02-03 ENCOUNTER — TREATMENT (OUTPATIENT)
Dept: CARDIAC REHAB | Facility: HOSPITAL | Age: 56
End: 2021-02-03

## 2021-02-03 VITALS — DIASTOLIC BLOOD PRESSURE: 80 MMHG | SYSTOLIC BLOOD PRESSURE: 128 MMHG | OXYGEN SATURATION: 98 % | HEART RATE: 68 BPM

## 2021-02-03 DIAGNOSIS — Z95.1 S/P CABG (CORONARY ARTERY BYPASS GRAFT): ICD-10-CM

## 2021-02-03 PROCEDURE — 93798 PHYS/QHP OP CAR RHAB W/ECG: CPT

## 2021-02-03 RX ORDER — ASPIRIN 81 MG/1
81 TABLET, COATED ORAL DAILY
COMMUNITY
Start: 2021-01-22

## 2021-02-03 NOTE — PROGRESS NOTES
Pt attended phase II visit.  Tolerated exercise well, NAD noted, no complaints voiced, skin warm, pink, dry, resp nl and even, denies chest discomfort, denies SOA.   Monitor shows NSR, V/S WDL, see [place] documentation for exercise data.   physician immediately available       Patient stated her blood pressure runs higher in the mornings and she wakes up with a headache, she ask if Dr Chang could see her next week when he comes back. I spoke with Hope she scheduled her for 1 pm Monday. I told patient to keep her blood pressure until then and bring with her. If she has any chest pain, pressure , SOA that is usual or anything that is unusual for her to come to ER.    Patient stated she bought a new blood pressure cuff I told her to bring it in on next visit and we would compare with a manual blood pressure.

## 2021-02-08 ENCOUNTER — TELEPHONE (OUTPATIENT)
Dept: CARDIOLOGY | Facility: CLINIC | Age: 56
End: 2021-02-08

## 2021-02-08 ENCOUNTER — TREATMENT (OUTPATIENT)
Dept: CARDIAC REHAB | Facility: HOSPITAL | Age: 56
End: 2021-02-08

## 2021-02-08 ENCOUNTER — OFFICE VISIT (OUTPATIENT)
Dept: CARDIOLOGY | Facility: CLINIC | Age: 56
End: 2021-02-08

## 2021-02-08 ENCOUNTER — LAB (OUTPATIENT)
Dept: LAB | Facility: HOSPITAL | Age: 56
End: 2021-02-08

## 2021-02-08 VITALS
DIASTOLIC BLOOD PRESSURE: 75 MMHG | WEIGHT: 133.4 LBS | SYSTOLIC BLOOD PRESSURE: 132 MMHG | HEIGHT: 62 IN | HEART RATE: 73 BPM | BODY MASS INDEX: 24.55 KG/M2 | OXYGEN SATURATION: 98 %

## 2021-02-08 VITALS — OXYGEN SATURATION: 98 % | DIASTOLIC BLOOD PRESSURE: 74 MMHG | SYSTOLIC BLOOD PRESSURE: 132 MMHG | HEART RATE: 72 BPM

## 2021-02-08 DIAGNOSIS — I10 ESSENTIAL HYPERTENSION: ICD-10-CM

## 2021-02-08 DIAGNOSIS — I25.10 CAD IN NATIVE ARTERY: ICD-10-CM

## 2021-02-08 DIAGNOSIS — I48.0 PAF (PAROXYSMAL ATRIAL FIBRILLATION) (HCC): ICD-10-CM

## 2021-02-08 DIAGNOSIS — E78.2 MIXED HYPERLIPIDEMIA: ICD-10-CM

## 2021-02-08 DIAGNOSIS — Z95.1 S/P CABG (CORONARY ARTERY BYPASS GRAFT): ICD-10-CM

## 2021-02-08 DIAGNOSIS — I25.10 CAD IN NATIVE ARTERY: Primary | ICD-10-CM

## 2021-02-08 PROCEDURE — 36415 COLL VENOUS BLD VENIPUNCTURE: CPT

## 2021-02-08 PROCEDURE — 93798 PHYS/QHP OP CAR RHAB W/ECG: CPT

## 2021-02-08 PROCEDURE — 84443 ASSAY THYROID STIM HORMONE: CPT

## 2021-02-08 PROCEDURE — 99214 OFFICE O/P EST MOD 30 MIN: CPT | Performed by: INTERNAL MEDICINE

## 2021-02-08 RX ORDER — LISINOPRIL 5 MG/1
5 TABLET ORAL DAILY
Qty: 30 TABLET | Refills: 5 | Status: SHIPPED | OUTPATIENT
Start: 2021-02-08 | End: 2021-02-24

## 2021-02-08 RX ORDER — LISINOPRIL 5 MG/1
5 TABLET ORAL DAILY
COMMUNITY
End: 2021-02-08 | Stop reason: SDUPTHER

## 2021-02-08 RX ORDER — LISINOPRIL 10 MG/1
10 TABLET ORAL DAILY
COMMUNITY
End: 2021-02-08 | Stop reason: SDUPTHER

## 2021-02-08 NOTE — PROGRESS NOTES
"Chief Complaint  Coronary Artery Disease, Hypertension, and Dyslipidemia    Subjective         Veda Enamorado presents to [facility] CARDIOLOGY for follow up cardiac rehab    History of Present Illness   She presents today with headache and neck pain. Onset about 1 week ago.   States that she has stopped taking Eliquis BID. Thought it may be making her feel bad taking it BID. States after taking her medications she starts to feel bad. No complaints of palpitations, dizziness, chest pain, or shortness of breath.   Hypertension:  The patient presents with a follow up of essential hypertension.  The patient states she has been unstable with her blood pressure control since last visit. States that it runs up in the 150's systolic at home in the AM  CAD:  The patient states that she has been stable with her coronary artery disease since last visit. Patient is currently asymptomatic.     Status post PCI ZURI to the LAD and diagonal branches for angina, coronary artery bypass graft x3 on 7/17/2020, LIMA to LAD, greater saphenous vein to OM and greater saphenous vein to D1  .  By report, there is good compliance with treatement, good tolerance of treatment and good symptom control.  Dyslipidemia:  The patient states that it has been stable.   Denies any complications with statins.   Paroxsymal atrial fib is stable.  No complaints of episodes.  Medications:  The patient is not adherent with her medications.  The chart, PMH, FMH, social history, PSH, and medications were reviewed today. Problems above addressed this visit.    Objective     Vital Signs:   /75 (BP Location: Left arm)   Pulse 73   Ht 157.5 cm (62\")   Wt 60.5 kg (133 lb 6.4 oz)   SpO2 98%   BMI 24.40 kg/m²       Physical Exam  Constitutional:       General: She is not in acute distress.     Appearance: Normal appearance.   HENT:      Head: Normocephalic.      Nose: Nose normal.      Mouth/Throat:      Pharynx: Oropharynx is clear. "   Eyes:      Pupils: Pupils are equal, round, and reactive to light.   Neck:      Musculoskeletal: Neck supple.      Vascular: No carotid bruit.   Cardiovascular:      Rate and Rhythm: Normal rate and regular rhythm.      Pulses: Normal pulses.   Pulmonary:      Effort: Pulmonary effort is normal. No respiratory distress.      Breath sounds: Normal breath sounds.   Abdominal:      General: Bowel sounds are normal.   Skin:     Findings: No bruising.   Neurological:      General: No focal deficit present.      Mental Status: She is alert and oriented to person, place, and time.   Psychiatric:         Mood and Affect: Mood normal.         Behavior: Behavior normal.         Judgment: Judgment normal.          Result Review :  Data reviewed: Cardiology studies 02/08/2021         Assessment  Headache and neck pain  Essential hypertension  Dyslipidemia  CAD with status post PCI ZURI to the LAD and diagonal branches for angina, coronary artery bypass graft x3 on 7/17/2020, LIMA to LAD, greater saphenous vein to OM and greater saphenous vein to D1    Paroxysmal atrial fib    Plan  Add Lisinopril 5 mg daily  Keep a log of BP and call the office in about 10 days If continues to have pain will proceed with a stress test  Follow up as scheduled    Problem List Items Addressed This Visit        Cardiac and Vasculature    Mixed hyperlipidemia (Chronic)    Relevant Orders    TSH (Completed)    Essential hypertension (Chronic)    Relevant Orders    TSH (Completed)    CAD in native artery - Primary    Relevant Orders    TSH (Completed)    PAF (paroxysmal atrial fibrillation) (CMS/[facility])    Overview     Post op afib after CABG 7/2020         Relevant Orders    TSH (Completed)            Follow Up     Return for As scheduled.  Patient was given instructions and counseling regarding her condition or for health maintenance advice. Please see specific information pulled into the AVS if appropriate.       This document has been electronically  signed by Louisa Sarmiento MA February 24, 2021 17:35 EST        Lazaro Conway MD, [facility]  Interventional Cardiology    MARYJANE Ferrell, acting as scribe for Lazaro Conway MD, [facility]    02/24/21  17:35 EST

## 2021-02-08 NOTE — PROGRESS NOTES
Pt attended phase II visit.  Tolerated exercise well, NAD noted, no complaints voiced, skin warm, pink, dry, resp nl and even, denies chest discomfort, denies SOA.   Monitor shows NSR, V/S WDL, see [place] documentation for exercise data.  Dr. Chang physician immediately available

## 2021-02-09 LAB — TSH SERPL DL<=0.05 MIU/L-ACNC: 1.36 UIU/ML (ref 0.27–4.2)

## 2021-02-10 ENCOUNTER — APPOINTMENT (OUTPATIENT)
Dept: CARDIAC REHAB | Facility: HOSPITAL | Age: 56
End: 2021-02-10

## 2021-02-10 ENCOUNTER — DOCUMENTATION (OUTPATIENT)
Dept: CARDIAC REHAB | Facility: HOSPITAL | Age: 56
End: 2021-02-10

## 2021-02-22 ENCOUNTER — TREATMENT (OUTPATIENT)
Dept: CARDIAC REHAB | Facility: HOSPITAL | Age: 56
End: 2021-02-22

## 2021-02-22 VITALS — SYSTOLIC BLOOD PRESSURE: 138 MMHG | DIASTOLIC BLOOD PRESSURE: 78 MMHG | OXYGEN SATURATION: 98 % | HEART RATE: 76 BPM

## 2021-02-22 DIAGNOSIS — Z95.1 S/P CABG (CORONARY ARTERY BYPASS GRAFT): ICD-10-CM

## 2021-02-22 PROCEDURE — 93798 PHYS/QHP OP CAR RHAB W/ECG: CPT

## 2021-02-22 NOTE — PROGRESS NOTES
Pt attended phase II visit.  Tolerated exercise well, NAD noted, no complaints voiced, skin warm, pink, dry, resp nl and even, denies chest discomfort, denies SOA.   Monitor shows NSR, V/S WDL, see [place] documentation for exercise data.  Dr. Chang physician immediately available

## 2021-02-24 ENCOUNTER — APPOINTMENT (OUTPATIENT)
Dept: CARDIAC REHAB | Facility: HOSPITAL | Age: 56
End: 2021-02-24

## 2021-02-24 ENCOUNTER — TREATMENT (OUTPATIENT)
Dept: CARDIAC REHAB | Facility: HOSPITAL | Age: 56
End: 2021-02-24

## 2021-02-24 ENCOUNTER — OFFICE VISIT (OUTPATIENT)
Dept: CARDIOLOGY | Facility: CLINIC | Age: 56
End: 2021-02-24

## 2021-02-24 VITALS — OXYGEN SATURATION: 98 % | SYSTOLIC BLOOD PRESSURE: 138 MMHG | DIASTOLIC BLOOD PRESSURE: 60 MMHG | HEART RATE: 60 BPM

## 2021-02-24 VITALS
OXYGEN SATURATION: 99 % | DIASTOLIC BLOOD PRESSURE: 72 MMHG | WEIGHT: 133.8 LBS | SYSTOLIC BLOOD PRESSURE: 145 MMHG | HEART RATE: 57 BPM | BODY MASS INDEX: 24.62 KG/M2 | HEIGHT: 62 IN

## 2021-02-24 DIAGNOSIS — R00.2 PALPITATIONS: ICD-10-CM

## 2021-02-24 DIAGNOSIS — Z95.1 S/P CABG (CORONARY ARTERY BYPASS GRAFT): ICD-10-CM

## 2021-02-24 DIAGNOSIS — I48.0 PAF (PAROXYSMAL ATRIAL FIBRILLATION) (HCC): Primary | ICD-10-CM

## 2021-02-24 PROCEDURE — 93798 PHYS/QHP OP CAR RHAB W/ECG: CPT

## 2021-02-24 PROCEDURE — 99214 OFFICE O/P EST MOD 30 MIN: CPT | Performed by: INTERNAL MEDICINE

## 2021-02-24 RX ORDER — LISINOPRIL 20 MG/1
20 TABLET ORAL DAILY
COMMUNITY
End: 2021-02-24 | Stop reason: SDUPTHER

## 2021-02-24 RX ORDER — LISINOPRIL 20 MG/1
20 TABLET ORAL DAILY
Qty: 30 TABLET | Refills: 5 | Status: SHIPPED | OUTPATIENT
Start: 2021-02-24 | End: 2021-02-24

## 2021-02-24 RX ORDER — LISINOPRIL 20 MG/1
20 TABLET ORAL DAILY
Qty: 30 TABLET | Refills: 5 | Status: SHIPPED | OUTPATIENT
Start: 2021-02-24 | End: 2021-10-22

## 2021-02-24 NOTE — PROGRESS NOTES
Pt attended phase II visit.  Tolerated exercise well, NAD noted, no complaints voiced, skin warm, pink, dry, resp nl and even, denies chest discomfort, denies SOA.   Monitor shows NSR, V/S WDL, see [place] documentation for exercise data.  Dr. Chang physician immediately available

## 2021-03-01 ENCOUNTER — TREATMENT (OUTPATIENT)
Dept: CARDIAC REHAB | Facility: HOSPITAL | Age: 56
End: 2021-03-01

## 2021-03-01 VITALS — OXYGEN SATURATION: 98 % | DIASTOLIC BLOOD PRESSURE: 70 MMHG | HEART RATE: 60 BPM | SYSTOLIC BLOOD PRESSURE: 122 MMHG

## 2021-03-01 DIAGNOSIS — Z95.1 S/P CABG (CORONARY ARTERY BYPASS GRAFT): ICD-10-CM

## 2021-03-01 PROCEDURE — 93798 PHYS/QHP OP CAR RHAB W/ECG: CPT

## 2021-03-01 NOTE — PROGRESS NOTES
Pt attended phase II visit.  Tolerated exercise well, NAD noted, no complaints voiced, skin warm, pink, dry, resp nl and even, denies chest discomfort, denies SOA.   Monitor shows NSR, V/S WDL, see [place] documentation for exercise data.  Dr. Rivera  physician immediately available

## 2021-03-03 ENCOUNTER — TREATMENT (OUTPATIENT)
Dept: CARDIAC REHAB | Facility: HOSPITAL | Age: 56
End: 2021-03-03

## 2021-03-03 VITALS — HEART RATE: 62 BPM | SYSTOLIC BLOOD PRESSURE: 122 MMHG | DIASTOLIC BLOOD PRESSURE: 60 MMHG | OXYGEN SATURATION: 98 %

## 2021-03-03 DIAGNOSIS — Z95.1 S/P CABG (CORONARY ARTERY BYPASS GRAFT): ICD-10-CM

## 2021-03-03 PROCEDURE — 93798 PHYS/QHP OP CAR RHAB W/ECG: CPT

## 2021-03-03 NOTE — PROGRESS NOTES
Pt attended phase II visit.  Tolerated exercise well, NAD noted, no complaints voiced, skin warm, pink, dry, resp nl and even, denies chest discomfort, denies SOA.   Monitor shows NSR, V/S WDL, see [place] documentation for exercise data.  Dr. Rivera  physician immediately available

## 2021-03-08 ENCOUNTER — APPOINTMENT (OUTPATIENT)
Dept: CARDIAC REHAB | Facility: HOSPITAL | Age: 56
End: 2021-03-08

## 2021-03-10 ENCOUNTER — APPOINTMENT (OUTPATIENT)
Dept: CARDIAC REHAB | Facility: HOSPITAL | Age: 56
End: 2021-03-10

## 2021-03-11 NOTE — PROGRESS NOTES
"Chief Complaint  Coronary Artery Disease, Atrial Fibrillation, and Hypertension    Subjective       Veda Enamorado presents to [facility] CARDIOLOGY for follow up  History of Present Illness   Palpitations:  Intermittent. Described as pounding.  Exertional and non- exertional Associated symptoms neck and HA  Intermittent shortness of breath. No edema noted today.   Hypertension:  The patient presents with a follow up of essential hypertension.  The patient states she has been unstable with her blood pressure control since last visit. Despite change in medications.  CAD:  The patient states that she has been stable with her coronary artery disease since last visit. Patient is currently asymptomatic.     PCI ZURI to the LAD and diagonal branches for angina, coronary artery bypass graft x3 on 7/17/2020, LIMA to LAD,   By report, there is good compliance with treatement, good tolerance of treatment and good symptom control.  Medications:  The patient is adherent with her medications.  The chart, PMH, FMH, social history, PSH, and medications were reviewed today. Problems above addressed this visit.    Objective     Vital Signs:   /72 (BP Location: Right arm)   Pulse 57   Ht 157.5 cm (62\")   Wt 60.7 kg (133 lb 12.8 oz)   SpO2 99%   BMI 24.47 kg/m²       Physical Exam  Constitutional:       General: She is not in acute distress.     Appearance: Normal appearance.   HENT:      Head: Normocephalic.      Nose: Nose normal.      Mouth/Throat:      Pharynx: Oropharynx is clear.   Eyes:      Pupils: Pupils are equal, round, and reactive to light.   Neck:      Musculoskeletal: Neck supple.      Vascular: No carotid bruit.   Cardiovascular:      Rate and Rhythm: Normal rate and regular rhythm.      Pulses: Normal pulses.      Heart sounds: Normal heart sounds.   Pulmonary:      Effort: Pulmonary effort is normal.      Breath sounds: Normal breath sounds.   Abdominal:      General: Bowel sounds are " normal.      Palpations: Abdomen is soft.   Musculoskeletal:         General: No swelling.   Skin:     Findings: No bruising.   Neurological:      General: No focal deficit present.      Mental Status: She is alert and oriented to person, place, and time.   Psychiatric:         Mood and Affect: Mood normal.         Behavior: Behavior normal.         Judgment: Judgment normal.          Result Review :       Data reviewed: Cardiology studies 02/24/2021       Assessment  Shortness of breath  Palpitations associated with HA and neck pain  Paroxysmal atrial fib  CAD status post PCI ZURI to the LAD and diagonal branches for angina, coronary artery bypass graft x3 on 7/17/2020, HIGGINS to LAD,   Dyslipidemia    Plan  Increase Lisinopril to 20 mg  14 day event monitor  Follow up in 3 months     Problem List Items Addressed This Visit        Cardiac and Vasculature    PAF (paroxysmal atrial fibrillation) (CMS/[facility]) - Primary    Overview     Post op afib after CABG 7/2020         Relevant Orders    Cardiac Event Monitor      Other Visit Diagnoses     Palpitations        Relevant Orders    Cardiac Event Monitor            Follow Up     Return in about 3 months (around 5/24/2021).  Patient was given instructions and counseling regarding her condition or for health maintenance advice. Please see specific information pulled into the AVS if appropriate.       Lazaro Conway MD, [facility]  Interventional Cardiology    MARYJANE Ferrell, acting as scribe for Lazaro Conway MD, FACC    02/24/21  17:02 EST   [Normal] : normal rate, regular rhythm, normal S1 and S2 and no murmur heard

## 2021-03-15 ENCOUNTER — LAB (OUTPATIENT)
Dept: LAB | Facility: HOSPITAL | Age: 56
End: 2021-03-15

## 2021-03-15 ENCOUNTER — APPOINTMENT (OUTPATIENT)
Dept: CARDIAC REHAB | Facility: HOSPITAL | Age: 56
End: 2021-03-15

## 2021-03-15 ENCOUNTER — TRANSCRIBE ORDERS (OUTPATIENT)
Dept: ADMINISTRATIVE | Facility: HOSPITAL | Age: 56
End: 2021-03-15

## 2021-03-15 DIAGNOSIS — Z11.59 SCREENING EXAMINATION FOR POLIOMYELITIS: ICD-10-CM

## 2021-03-15 DIAGNOSIS — Z11.59 SCREENING EXAMINATION FOR POLIOMYELITIS: Primary | ICD-10-CM

## 2021-03-15 PROCEDURE — C9803 HOPD COVID-19 SPEC COLLECT: HCPCS

## 2021-03-15 PROCEDURE — U0004 COV-19 TEST NON-CDC HGH THRU: HCPCS

## 2021-03-16 LAB — SARS-COV-2 RNA NOSE QL NAA+PROBE: NOT DETECTED

## 2021-03-17 ENCOUNTER — APPOINTMENT (OUTPATIENT)
Dept: CARDIAC REHAB | Facility: HOSPITAL | Age: 56
End: 2021-03-17

## 2021-03-18 ENCOUNTER — TELEPHONE (OUTPATIENT)
Dept: CARDIOLOGY | Facility: CLINIC | Age: 56
End: 2021-03-18

## 2021-03-18 NOTE — TELEPHONE ENCOUNTER
Patient called and stated that she was not feeling well and as a result had not been to cardiac rehab lately or been able to come in and have event monitor placed. She informed Katelin when she was able to resume cardiac rehab she would stop by office and have us put monitor on.    *tested for COVID 19 on 3/15/21 however was not detected.

## 2021-03-19 RX ORDER — APIXABAN 5 MG/1
TABLET, FILM COATED ORAL
Qty: 60 TABLET | Refills: 5 | Status: SHIPPED | OUTPATIENT
Start: 2021-03-19 | End: 2021-09-27

## 2021-03-22 ENCOUNTER — APPOINTMENT (OUTPATIENT)
Dept: CARDIAC REHAB | Facility: HOSPITAL | Age: 56
End: 2021-03-22

## 2021-03-22 ENCOUNTER — DOCUMENTATION (OUTPATIENT)
Dept: CARDIAC REHAB | Facility: HOSPITAL | Age: 56
End: 2021-03-22

## 2021-03-22 NOTE — PROGRESS NOTES
Patient called to say she was still sick. We discussed her insurance time limitation for cardiac rehab . She has completed 16 of the 36 sessions and stated she will exercise at home.  I told her I will try to get her more time if she if would like me too. She stated right now she feels so bad she just cant come in. She stated she was diagnosed with a sinus infection and she is doesn't want to do anything right now . She is to call me back when she feels better

## 2021-03-24 ENCOUNTER — APPOINTMENT (OUTPATIENT)
Dept: CARDIAC REHAB | Facility: HOSPITAL | Age: 56
End: 2021-03-24

## 2021-03-29 ENCOUNTER — APPOINTMENT (OUTPATIENT)
Dept: CARDIAC REHAB | Facility: HOSPITAL | Age: 56
End: 2021-03-29

## 2021-03-31 ENCOUNTER — APPOINTMENT (OUTPATIENT)
Dept: CARDIAC REHAB | Facility: HOSPITAL | Age: 56
End: 2021-03-31

## 2021-04-05 ENCOUNTER — APPOINTMENT (OUTPATIENT)
Dept: CARDIAC REHAB | Facility: HOSPITAL | Age: 56
End: 2021-04-05

## 2021-04-07 ENCOUNTER — APPOINTMENT (OUTPATIENT)
Dept: CARDIAC REHAB | Facility: HOSPITAL | Age: 56
End: 2021-04-07

## 2021-04-12 ENCOUNTER — APPOINTMENT (OUTPATIENT)
Dept: CARDIAC REHAB | Facility: HOSPITAL | Age: 56
End: 2021-04-12

## 2021-04-14 ENCOUNTER — APPOINTMENT (OUTPATIENT)
Dept: CARDIAC REHAB | Facility: HOSPITAL | Age: 56
End: 2021-04-14

## 2021-04-19 ENCOUNTER — APPOINTMENT (OUTPATIENT)
Dept: CARDIAC REHAB | Facility: HOSPITAL | Age: 56
End: 2021-04-19

## 2021-04-21 ENCOUNTER — APPOINTMENT (OUTPATIENT)
Dept: CARDIAC REHAB | Facility: HOSPITAL | Age: 56
End: 2021-04-21

## 2021-05-11 RX ORDER — NITROGLYCERIN 0.4 MG/1
TABLET SUBLINGUAL
Qty: 25 TABLET | Refills: 11 | Status: SHIPPED | OUTPATIENT
Start: 2021-05-11 | End: 2022-09-12

## 2021-05-20 NOTE — PROGRESS NOTES
[facility] Medicine Services  PROGRESS NOTE    Patient Name: Veda Enamorado  : 1965  MRN: [id number]    Date of Admission: 2020  Primary Care Physician: Chiquita Choudhary APRN    Subjective   Subjective     CC: Medical management    HPI: No acute events overnight, patient slept well, denies any chest pain.    Review of Systems  Gen- No fevers, chills  CV- No chest pain, palpitations  Resp- No cough, dyspnea  GI- No N/V/D, abd pain    All systems reviewed currently negative except as mentioned in HPI above    Objective   Objective     Vital Signs:   Temp:  [97.9 °F (36.6 °C)-98.5 °F (36.9 °C)] 98.5 °F (36.9 °C)  Heart Rate:  [] 77  Resp:  [14-18] 16  BP: (127-147)/(65-82) 142/65        Physical Exam:  Constitutional: No acute distress, awake, alert  HENT: NCAT, mucous membranes moist  Respiratory: Clear to auscultation bilaterally, respiratory effort normal   Cardiovascular: RRR, no murmurs, rubs, or gallops, palpable pedal pulses bilaterally  Gastrointestinal: Positive bowel sounds, soft, nontender, nondistended  Musculoskeletal: No bilateral ankle edema  Psychiatric: Appropriate affect, cooperative  Neurologic: Oriented x 3, no focal deficits  Skin: No rashes, incision CDI    Results Reviewed:  Results from last 7 days   Lab Units 20  0455 20  0614 20  0401 20  0127 20  2131   WBC 10*3/mm3 15.97*  --  16.67* 20.33* 26.90*   HEMOGLOBIN g/dL 10.0* 9.6* 5.7* 11.3* 13.2   HEMATOCRIT % 32.4* 31.2* 18.2* 35.2 40.1   PLATELETS 10*3/mm3 150  --  173 153 153   INR   --   --   --  1.39* 1.41*     Results from last 7 days   Lab Units 20  0439 20  0455 20  0358   SODIUM mmol/L 136 136 137   POTASSIUM mmol/L 4.2 4.3 4.8   CHLORIDE mmol/L 100 101 105   CO2 mmol/L 25.0 26.0 26.0   BUN mg/dL 15 16 12   CREATININE mg/dL 0.47* 0.56* 0.66   GLUCOSE mg/dL 110* 122* 136*   CALCIUM mg/dL 8.2* 8.6 8.3*     Estimated Creatinine Clearance:  126.8 mL/min (A) (by C-G formula based on SCr of 0.47 mg/dL (L)).    Microbiology Results Abnormal     None          Imaging Results (Last 24 Hours)     ** No results found for the last 24 hours. **          Results for orders placed in visit on 07/17/20   Emergent/Open-Heart Anesthesia MAICOL    Narrative Pedro Patel Jr., MD     7/19/2020  1:24 PM  Procedure Performed: Emergent/Open-Heart Anesthesia MAICOL      Start Time:  7/17/2020 4:44 PM       End Time:      Preanesthesia Checklist:  Patient identified, IV assessed, risks and benefits discussed, monitors   and equipment assessed, procedure being performed at surgeon's request and   anesthesia consent obtained.    General Procedure Information  Diagnostic Indications for Echo:  assessment of surgical repair  Physician Requesting Echo: Juanjo Coronado MD  Location performed:  OR  Intubated  Bite block not placed  Heart visualized  Probe Insertion:  Easy  Probe Type:  Multiplane  Modalities:  2D only, color flow mapping, continuous wave Doppler and   pulse wave Doppler    Echocardiographic and Doppler Measurements    Ventricles    Right Ventricle:  Cavity size normal.  Hypertrophy not present.  Thrombus not present.    Global function normal.    Left Ventricle:  Cavity size normal.  Hypertrophy not present.  Thrombus not present.    Global Function normal.      Ventricular Regional Function:  1- Basal Anteroseptal:  normal  2- Basal Anterior:  normal  3- Basal Anterolateral:  normal  4- Basal Inferolateral:  normal  5- Basal Inferior:  normal  6- Basal Inferoseptal:  normal  7- Mid Anteroseptal:  normal  8- Mid Anterior:  normal  9- Mid Anterolateral:  normal  10- Mid Inferolateral:  normal  11- Mid Inferior:  normal  12- Mid Inferoseptal:  normal  13- Apical Anterior:  normal  14- Apical Lateral:  normal  15- Apical Inferior:  normal  16- Apical Septal:  normal  17- Clover:  normal      Valves    Aortic Valve:  Annulus normal.  Stenosis not present.  Regurgitation  absent.  Leaflets   normal.  Leaflet motions normal.      Mitral Valve:  Annulus normal.  Stenosis not present.  Regurgitation trace.  Leaflets   normal.  Leaflet motions normal.      Tricuspid Valve:  Annulus normal.  Stenosis not present.  Regurgitation trace.  Leaflets   normal.  Leaflet motions normal.    Pulmonic Valve:  Annulus normal.  Stenosis not present.  Regurgitation trace.          Aorta    Ascending Aorta:  Size normal.  Dissection not present.  Plaque thickness less than 3 mm.    Mobile plaque not present.    Aortic Arch:  Size normal.  Dissection not present.  Plaque thickness less than 3 mm.    Mobile plaque not present.    Descending Aorta:  Size normal.  Dissection not present.  Plaque thickness less than 3 mm.    Mobile plaque not present.          Atria    Right Atrium:  Size normal.  Spontaneous echo contrast not present.  Thrombus not   present.  Tumor not present.    Left Atrium:  Size normal.  Spontaneous echo contrast not present.  Thrombus not   present.  Tumor not present.  Left atrial appendage normal.      Septa    Atrial Septum:  Intra-atrial septal morphology normal.      Ventricular Septum:  Intra-ventricular septum morphology normal.          Other Findings  Pericardium:  normal  Pleural Effusion:  none  Pulmonary Arteries:  normal  Pulmonary Venous Flow:  normal    Anesthesia Information  Performed Personally      Echocardiogram Comments:       Diagnostic intraoperative MAICOL performed for CABG.  Surgeon - Dr. Coronado    Baseline Exam:  - LV is normal in size and function with no regional wall motion   abnormalities. The LVEF is >60%.  - RV is normal in size with preserved systolic function.  - There are no significant valvular abnormalities.    Post-procedure Exam:  - Interval 3 vessel CABG performed on CPB.  - No inotropic support.  - Biventricular function is stable post-CPB without any new regional wall   motion abnormalities.  - No new valvular abnormalities.  - No evidence of  dissection in the visualized portions of the aorta.          I have reviewed the medications:  Scheduled Meds:    amiodarone 200 mg Oral Once   Followed by      amiodarone 200 mg Oral Q8H   Followed by      [START ON 7/28/2020] amiodarone 200 mg Oral Q12H   Followed by      [START ON 8/11/2020] amiodarone 200 mg Oral Daily   aspirin 325 mg Oral Daily   atorvastatin 40 mg Oral Nightly   budesonide-formoterol 2 puff Inhalation BID - RT   insulin lispro 0-7 Units Subcutaneous TID AC   metoprolol tartrate 25 mg Oral Q12H   pharmacy consult - MTM  Does not apply Daily   senna-docusate sodium 2 tablet Oral BID     Continuous Infusions:    amiodarone 0.5 mg/min Last Rate: 0.5 mg/min (07/20/20 1937)   dextrose 5 % with KCl 20 mEq 30 mL/hr Last Rate: 30 mL/hr (07/17/20 2148)     PRN Meds:.•  acetaminophen  •  dextrose  •  dextrose  •  fentaNYL citrate (PF)  •  glucagon (human recombinant)  •  HYDROcodone-acetaminophen  •  Morphine  •  ondansetron  •  oxyCODONE-acetaminophen  •  potassium chloride **OR** potassium chloride    Assessment/Plan   Assessment & Plan     Active Hospital Problems    Diagnosis  POA   • **Coronary artery disease involving native coronary artery of native heart with unstable angina pectoris (CMS/HCC) [I25.110]  Yes   • CAD in native artery [I25.10]  Yes   • Essential hypertension [I10]  Yes   • Mixed hyperlipidemia [E78.2]  Yes   • Tobacco abuse [Z72.0]  Yes      Resolved Hospital Problems   No resolved problems to display.        Brief Hospital Course to date:  Veda Enamorado is a 55 y.o. female with past medical history of hyperlipidemia, hypertension, tobacco abuse, multivessel CAD who is s/p three-vessel bypass 7/17/2020.  Hospital medicine was asked to assist with medical management    Plan  CAD s/p CABG-postop care per CTS, continue aspirin and statin    Postop paroxysmal atrial fibrillation  -Spontaneously converted to NSR  -Cardiology following, currently on metoprolol, she is s/pamsamo  loading with plans to continue 1 month short course, anticoagulation is not recommended at this time    Leukocytosis-this is likely reactive and is anyway improving, no indication for any further work-up.    Hyperlipidemia-on statin    Tobacco abuse- extensively counseled on cessation    DVT Prophylaxis: Mechanical    Disposition: Per primary team, okay to DC from hospital medicine standpoint.    CODE STATUS:   Code Status and Medical Interventions:   Ordered at: 07/17/20 2122     Level Of Support Discussed With:    Patient     Code Status:    CPR     Medical Interventions (Level of Support Prior to Arrest):    Full       Electronically signed by Kathi Ferrara MD, 07/21/20, 08:40.     HHA assists as needed

## 2021-06-10 DIAGNOSIS — M79.604 PAIN IN BOTH LOWER EXTREMITIES: ICD-10-CM

## 2021-06-10 DIAGNOSIS — M79.605 PAIN IN BOTH LOWER EXTREMITIES: ICD-10-CM

## 2021-06-10 DIAGNOSIS — I25.110 CORONARY ARTERY DISEASE INVOLVING NATIVE CORONARY ARTERY OF NATIVE HEART WITH UNSTABLE ANGINA PECTORIS (HCC): ICD-10-CM

## 2021-06-10 RX ORDER — ISOSORBIDE MONONITRATE 60 MG/1
60 TABLET, EXTENDED RELEASE ORAL EVERY MORNING
Qty: 90 TABLET | Refills: 3 | Status: SHIPPED | OUTPATIENT
Start: 2021-06-10 | End: 2021-06-18

## 2021-06-10 RX ORDER — SPIRONOLACTONE 25 MG/1
25 TABLET ORAL EVERY MORNING
Qty: 90 TABLET | Refills: 3 | Status: SHIPPED | OUTPATIENT
Start: 2021-06-10 | End: 2021-10-29 | Stop reason: SINTOL

## 2021-06-18 ENCOUNTER — OFFICE VISIT (OUTPATIENT)
Dept: CARDIOLOGY | Facility: CLINIC | Age: 56
End: 2021-06-18

## 2021-06-18 VITALS
OXYGEN SATURATION: 100 % | HEART RATE: 57 BPM | DIASTOLIC BLOOD PRESSURE: 70 MMHG | HEIGHT: 62 IN | BODY MASS INDEX: 25.98 KG/M2 | SYSTOLIC BLOOD PRESSURE: 145 MMHG | WEIGHT: 141.2 LBS

## 2021-06-18 DIAGNOSIS — E78.2 MIXED HYPERLIPIDEMIA: ICD-10-CM

## 2021-06-18 DIAGNOSIS — I25.10 CORONARY ARTERY DISEASE INVOLVING NATIVE CORONARY ARTERY OF NATIVE HEART WITHOUT ANGINA PECTORIS: Primary | ICD-10-CM

## 2021-06-18 DIAGNOSIS — I48.0 PAF (PAROXYSMAL ATRIAL FIBRILLATION) (HCC): ICD-10-CM

## 2021-06-18 DIAGNOSIS — I10 ESSENTIAL HYPERTENSION: ICD-10-CM

## 2021-06-18 PROCEDURE — 99214 OFFICE O/P EST MOD 30 MIN: CPT | Performed by: INTERNAL MEDICINE

## 2021-06-18 RX ORDER — RANOLAZINE 500 MG/1
500 TABLET, EXTENDED RELEASE ORAL 2 TIMES DAILY
Qty: 60 TABLET | Refills: 5 | Status: SHIPPED | OUTPATIENT
Start: 2021-06-18 | End: 2021-10-22 | Stop reason: SINTOL

## 2021-06-18 RX ORDER — ISOSORBIDE MONONITRATE 60 MG/1
60 TABLET, EXTENDED RELEASE ORAL DAILY
COMMUNITY
End: 2021-06-18 | Stop reason: SDUPTHER

## 2021-06-18 RX ORDER — LORATADINE 10 MG/1
10 TABLET ORAL AS NEEDED
COMMUNITY
Start: 2021-05-24

## 2021-06-18 RX ORDER — ISOSORBIDE MONONITRATE 60 MG/1
90 TABLET, EXTENDED RELEASE ORAL DAILY
Qty: 180 TABLET | Refills: 3 | Status: SHIPPED | OUTPATIENT
Start: 2021-06-18 | End: 2021-10-22 | Stop reason: DRUGHIGH

## 2021-06-18 RX ORDER — ASPIRIN 325 MG/1
TABLET, FILM COATED ORAL
COMMUNITY
Start: 2021-05-26 | End: 2021-06-18

## 2021-06-18 NOTE — PROGRESS NOTES
"Chief Complaint  Coronary Artery Disease, Atrial Fibrillation, Hypertension, and Hyperlipidemia    Subjective        Veda Enamorado presents to [facility] CARDIOLOGY for hospital follow up 6-2-2021  History of Present Illness   Chest pain:  Intermittent.  Described as squeezing. Left arm pain  Exertional  Non-exertional. Associated symptoms Fatigue. Just started 1 week ago.   No palpitations, shortness of breath or syncope  Hypertension:  The patient presents with a follow up of essential hypertension.  The patient states she has been stable with her blood pressure control since last visit. No significant interval events.  CAD:  . Patient is currently asymptomatic. Status post PCI ZURI to the LAD and diagonal branches for angina, coronary artery bypass graft x3 on 7/17/2020, LIMA to LAD.  By report, there is good compliance with treatement, good tolerance of treatment and good symptom control.  Medications:  The patient is adherent with her medications.  Paroxysmal Atrial fib:  Rate controlled. Currently in normal sinus rhythm. Chronically anticoagulated with Eliquis. No episodes of bleeding .  The chart, PMH, FMH, social history, PSH, and medications were reviewed today. Problems above addressed this visit.    Objective     Vital Signs:   /70 (BP Location: Left arm)   Pulse 57   Ht 157.5 cm (62\")   Wt 64 kg (141 lb 3.2 oz)   SpO2 100%   BMI 25.83 kg/m²       Physical Exam  Constitutional:       General: She is not in acute distress.     Appearance: Normal appearance.   HENT:      Head: Normocephalic.      Nose: Nose normal.      Mouth/Throat:      Pharynx: Oropharynx is clear.   Eyes:      Pupils: Pupils are equal, round, and reactive to light.   Neck:      Vascular: No carotid bruit.   Cardiovascular:      Rate and Rhythm: Normal rate and regular rhythm.      Pulses: Normal pulses.      Heart sounds: Normal heart sounds. No murmur heard.     Pulmonary:      Effort: Pulmonary effort " is normal. No respiratory distress.      Breath sounds: Normal breath sounds. No wheezing.   Abdominal:      General: Bowel sounds are normal.      Palpations: Abdomen is soft.   Musculoskeletal:      Cervical back: Neck supple.      Right lower leg: Edema present.      Left lower leg: Edema present.   Skin:     General: Skin is warm and dry.   Neurological:      General: No focal deficit present.      Mental Status: She is alert and oriented to person, place, and time.   Psychiatric:         Mood and Affect: Mood normal.         Behavior: Behavior normal.         Judgment: Judgment normal.          Result Review :  {Data reviewed: Cardiology studies 06-       Assessment  CAD status post CABG   Essential hypertension  Paroxysmal atrial fib chronically anticoagulated with Eliquis and EC ASA  Dyslipidemia is stable    Plan   Decrease EC ASA to 81 mg daily  Increase Imdur to 90 mg and start taking Ranexa 500 mg BID Encouraged her to start exercising more and wear compression stocking  Encouraged her to start an exercise program and walk at least minutes a day.   Follow up in 3 months      Problem List Items Addressed This Visit        Cardiac and Vasculature    Coronary artery disease involving native coronary artery of native heart without angina pectoris - Primary (Chronic)    Relevant Medications    isosorbide mononitrate (IMDUR) 60 MG 24 hr tablet    Mixed hyperlipidemia (Chronic)    Essential hypertension (Chronic)    PAF (paroxysmal atrial fibrillation) (CMS/[facility])    Overview     Post op afib after CABG 7/2020         Relevant Medications    isosorbide mononitrate (IMDUR) 60 MG 24 hr tablet            Follow Up     Return in about 3 months (around 9/18/2021).  Patient was given instructions and counseling regarding her condition or for health maintenance advice. Please see specific information pulled into the AVS if appropriate.               Lazaro Conway MD, [facility]  Interventional Cardiology    Louisa  MARYJANE Sarmiento, acting as scribe for Lazaro Golden Conway MD, [facility]    06/18/21  11:30 EDT

## 2021-07-19 NOTE — TELEPHONE ENCOUNTER
----- Message from Lazaro Conway MD sent at 12/6/2020  2:21 PM EST -----  Please call patient to and tell him his test was normal.   Thanks.   No

## 2021-08-09 RX ORDER — LISINOPRIL 5 MG/1
TABLET ORAL
Qty: 90 TABLET | Refills: 3 | Status: SHIPPED | OUTPATIENT
Start: 2021-08-09 | End: 2021-10-22

## 2021-08-19 ENCOUNTER — TRANSCRIBE ORDERS (OUTPATIENT)
Dept: ADMINISTRATIVE | Facility: HOSPITAL | Age: 56
End: 2021-08-19

## 2021-08-19 DIAGNOSIS — Z01.818 OTHER SPECIFIED PRE-OPERATIVE EXAMINATION: Primary | ICD-10-CM

## 2021-08-19 RX ORDER — PRAVASTATIN SODIUM 20 MG
TABLET ORAL
Qty: 30 TABLET | Refills: 3 | Status: SHIPPED | OUTPATIENT
Start: 2021-08-19 | End: 2021-12-16

## 2021-09-22 ENCOUNTER — OFFICE VISIT (OUTPATIENT)
Dept: CARDIOLOGY | Facility: CLINIC | Age: 56
End: 2021-09-22

## 2021-09-22 VITALS
WEIGHT: 145 LBS | DIASTOLIC BLOOD PRESSURE: 73 MMHG | SYSTOLIC BLOOD PRESSURE: 124 MMHG | OXYGEN SATURATION: 99 % | HEIGHT: 62 IN | HEART RATE: 68 BPM | BODY MASS INDEX: 26.68 KG/M2

## 2021-09-22 DIAGNOSIS — I48.0 PAF (PAROXYSMAL ATRIAL FIBRILLATION) (HCC): ICD-10-CM

## 2021-09-22 DIAGNOSIS — I10 ESSENTIAL HYPERTENSION: ICD-10-CM

## 2021-09-22 DIAGNOSIS — E78.2 MIXED HYPERLIPIDEMIA: ICD-10-CM

## 2021-09-22 DIAGNOSIS — I25.10 CORONARY ARTERY DISEASE INVOLVING NATIVE CORONARY ARTERY OF NATIVE HEART WITHOUT ANGINA PECTORIS: Primary | ICD-10-CM

## 2021-09-22 PROCEDURE — 99213 OFFICE O/P EST LOW 20 MIN: CPT | Performed by: INTERNAL MEDICINE

## 2021-09-27 RX ORDER — APIXABAN 5 MG/1
TABLET, FILM COATED ORAL
Qty: 60 TABLET | Refills: 5 | Status: SHIPPED | OUTPATIENT
Start: 2021-09-27 | End: 2022-06-28

## 2021-10-22 ENCOUNTER — TELEPHONE (OUTPATIENT)
Dept: CARDIOLOGY | Facility: CLINIC | Age: 56
End: 2021-10-22

## 2021-10-22 RX ORDER — LISINOPRIL 2.5 MG/1
2.5 TABLET ORAL DAILY
Qty: 30 TABLET | Refills: 11 | Status: SHIPPED | OUTPATIENT
Start: 2021-10-22 | End: 2021-10-29 | Stop reason: SINTOL

## 2021-10-22 RX ORDER — ISOSORBIDE MONONITRATE 30 MG/1
30 TABLET, EXTENDED RELEASE ORAL NIGHTLY
Qty: 30 TABLET | Refills: 11 | Status: SHIPPED | OUTPATIENT
Start: 2021-10-22 | End: 2022-02-23

## 2021-10-22 NOTE — TELEPHONE ENCOUNTER
Pt called to say that she had been feeling very fatigued.  She had checked her blood pressure and noted that it was on the low side.  Yesterday evening her blood pressure was 88/60.  She reports dizziness and light headedness.  Her BP has been running in the low 100s or below.      I have asked her to decrease her lisinopril and Imdur.  I have also asked her to take her Imdur in the evening.  She is to call next week to tell us how she is doing.      She has stated understanding.

## 2021-10-22 NOTE — TELEPHONE ENCOUNTER
Patient called stating that BP was 88/60 on 10-.    This morning 10- it was 121/80 and at 9:30 am it was 107/65.  She is concerned about it dropping low in the evening.

## 2021-10-29 ENCOUNTER — TELEPHONE (OUTPATIENT)
Dept: CARDIOLOGY | Facility: CLINIC | Age: 56
End: 2021-10-29

## 2021-10-29 NOTE — TELEPHONE ENCOUNTER
Called patient.  She reports that she is still has trouble with her blood pressure being very low.  She feels weak and fatigued.  Her last BP was 96/63.      She verify that she had decreased her isosorbide mononitrate to 30 mg.  She states that after a few days of taking it at night she felt like she probably needed in the morning and she switched back to morning dose.    She verified that she was taking lisinopril 2.5 mg daily.    I have asked her to stop her lisinopril and stop her Aldactone.  She is to decrease her metoprolol tartrate to 12 and half milligrams twice daily.  She repeated these instructions to me.    We will check with her again next week.

## 2021-12-02 ENCOUNTER — TELEPHONE (OUTPATIENT)
Dept: CARDIOLOGY | Facility: CLINIC | Age: 56
End: 2021-12-02

## 2021-12-02 NOTE — TELEPHONE ENCOUNTER
"Patient called stating that her bp had been running low. Pt states that at 10:00am this morning her bp was 109/75 and that was the lowest it had been. Pt complains of having low energy and feeling \"rough\" and \"slugish\" since Sunday. I spoke with Sharda and she advise me to tell patient that her BP was good and to go to her PCP to see if she has a viral virus. Pt v/u.   "

## 2021-12-16 RX ORDER — PRAVASTATIN SODIUM 20 MG
TABLET ORAL
Qty: 30 TABLET | Refills: 3 | Status: SHIPPED | OUTPATIENT
Start: 2021-12-16 | End: 2022-06-29

## 2021-12-28 ENCOUNTER — OFFICE VISIT (OUTPATIENT)
Dept: CARDIOLOGY | Facility: CLINIC | Age: 56
End: 2021-12-28

## 2021-12-28 ENCOUNTER — LAB (OUTPATIENT)
Dept: LAB | Facility: HOSPITAL | Age: 56
End: 2021-12-28

## 2021-12-28 VITALS
SYSTOLIC BLOOD PRESSURE: 145 MMHG | BODY MASS INDEX: 27.23 KG/M2 | HEIGHT: 62 IN | DIASTOLIC BLOOD PRESSURE: 77 MMHG | OXYGEN SATURATION: 98 % | HEART RATE: 79 BPM | WEIGHT: 148 LBS

## 2021-12-28 DIAGNOSIS — I25.118 CORONARY ARTERY DISEASE OF NATIVE ARTERY OF NATIVE HEART WITH STABLE ANGINA PECTORIS (HCC): Primary | ICD-10-CM

## 2021-12-28 DIAGNOSIS — I48.0 PAF (PAROXYSMAL ATRIAL FIBRILLATION): ICD-10-CM

## 2021-12-28 DIAGNOSIS — E78.2 MIXED HYPERLIPIDEMIA: ICD-10-CM

## 2021-12-28 DIAGNOSIS — I10 ESSENTIAL HYPERTENSION: ICD-10-CM

## 2021-12-28 DIAGNOSIS — I25.118 CORONARY ARTERY DISEASE OF NATIVE ARTERY OF NATIVE HEART WITH STABLE ANGINA PECTORIS: ICD-10-CM

## 2021-12-28 DIAGNOSIS — I25.10 CORONARY ARTERY DISEASE INVOLVING NATIVE CORONARY ARTERY OF NATIVE HEART WITHOUT ANGINA PECTORIS: ICD-10-CM

## 2021-12-28 PROCEDURE — 84443 ASSAY THYROID STIM HORMONE: CPT

## 2021-12-28 PROCEDURE — 83880 ASSAY OF NATRIURETIC PEPTIDE: CPT

## 2021-12-28 PROCEDURE — 36415 COLL VENOUS BLD VENIPUNCTURE: CPT

## 2021-12-28 PROCEDURE — 80053 COMPREHEN METABOLIC PANEL: CPT

## 2021-12-28 PROCEDURE — 99214 OFFICE O/P EST MOD 30 MIN: CPT | Performed by: INTERNAL MEDICINE

## 2021-12-28 PROCEDURE — 80061 LIPID PANEL: CPT

## 2021-12-28 PROCEDURE — 84479 ASSAY OF THYROID (T3 OR T4): CPT

## 2021-12-28 PROCEDURE — 84436 ASSAY OF TOTAL THYROXINE: CPT

## 2021-12-28 NOTE — PROGRESS NOTES
[facility] CARDIOLOGY  [address] [facility] [place] [address]  Phone: [phone]  Fax: [phone]    2021    Chief Complaint   Patient presents with   • Follow-up     patient states she has been alot more tired   • Edema   • Tingling     patinenst staes right leg will tingle         History:   Veda Enamorado is a 56 y.o. female seen in followup, during her last conversation with our nurse practitioner her dose of the lisinopril and Aldactone was discontinued because patient reported hypotension being symptomatic with dizziness.  Today in the office her blood pressure is 140 over 90s.  She denies any chest pain.  She has mild lower extremity edema likely from varicose veins.  She also takes her Lasix as needed along with potassium supplement for her diastolic CHF.      Past Medical History:   Diagnosis Date   • Arthritis     back   • Back ache    • Chronic back pain    • Coronary artery disease     s/p 3 stents    • GERD (gastroesophageal reflux disease)    • History of MRSA infection 2020    labia; hospitalized 6 days on IV antibiotics and then went home on po antibiotics    • Hyperlipidemia    • Hypertension    • Peptic ulceration        Past Surgical History:   Procedure Laterality Date   • CARDIAC CATHETERIZATION     • CARDIAC CATHETERIZATION N/A 2019    Procedure: Left Heart Cath;  Surgeon: Lazaro Conway MD;  Location: [facility] INVASIVE LOCATION;  Service: Cardiology   • CARDIAC CATHETERIZATION     • CARDIAC SURGERY     •  SECTION      x2   • CORONARY ARTERY BYPASS GRAFT N/A 2020    Procedure: MEDIAN STERNOTOMY, CORONARY ARTERY BYPASS X3 WITH  INTERNAL MAMMARY ARTERY GRAFTING, ENDOVASCULAR VEIN HARVESTING OF THE RIGHT GREATER SAPHENOUS VEIN, MAICOL PER ANESTHESIA;  Surgeon: Juanjo Coronado MD;  Location: [facility] OR;  Service: Cardiothoracic;  Laterality: N/A;  ST ON LE  VO: 1800  VR: 1814   • HAND SURGERY      right   •  PERINEAL LESION/CYST EXCISION Right 1/9/2020    Procedure: I&D ABSCESS;  Surgeon: Leander Cardenas III, MD;  Location: [facility];  Service: Obstetrics/Gynecology        Past Social History:  Social History     Socioeconomic History   • Marital status:    • Number of children: 2   Tobacco Use   • Smoking status: Former Smoker     Packs/day: 1.00     Years: 25.00     Pack years: 25.00     Types: Cigarettes   • Smokeless tobacco: Never Used   Substance and Sexual Activity   • Alcohol use: No   • Drug use: No   • Sexual activity: Defer       Past Family History:  Family History   Problem Relation Age of Onset   • Heart disease Mother    • Coronary artery disease Mother    • Heart disease Father    • Heart attack Father    • Hypertension Father    • Stroke Father    • No Known Problems Sister    • Heart attack Brother    • Heart disease Brother    • Heart disease Maternal Grandmother    • Heart attack Maternal Grandmother    • No Known Problems Maternal Grandfather    • Stroke Paternal Grandmother    • Breast cancer Neg Hx        Review of Systems:   ROS       Current Outpatient Medications   Medication Sig Dispense Refill   • Aspirin Low Dose 81 MG EC tablet Take 81 mg by mouth Daily.     • Eliquis 5 MG tablet tablet TAKE 1 TABLET BY MOUTH 2 (TWO) TIMES A DAY FOR 30 DAYS. 60 tablet 5   • fluticasone (FLONASE) 50 MCG/ACT nasal spray 1 spray by Each Nare route Daily.     • furosemide (Lasix) 20 MG tablet Take 1/2 tablet by mouth Daily. 15 tablet 11   • HYDROcodone-acetaminophen (NORCO) 7.5-325 MG per tablet Take 1 tablet by mouth Every 6 (Six) Hours As Needed for Pain . 30 tablet 0   • isosorbide mononitrate (IMDUR) 30 MG 24 hr tablet Take 1 tablet by mouth Every Night. 30 tablet 11   • loratadine (CLARITIN) 10 MG tablet Take 10 mg by mouth Daily.     • metoprolol tartrate (LOPRESSOR) 25 MG tablet Take 0.5 tablets by mouth 2 (Two) Times a Day. 30 tablet 11   • nitroglycerin (NITROSTAT) 0.4 MG SL tablet TAKE 1 AT  "ONSET OF CHEST PAIN,MAY RPT IN 5MIN, MAX 3 DOSES IN 24 HRS 25 tablet 11   • potassium chloride 10 MEQ CR tablet Take 1 tablet by mouth Daily. 30 tablet 11   • pravastatin (PRAVACHOL) 20 MG tablet TAKE ONE TABLET BY MOUTH DAILY 30 tablet 3   • traMADol (ULTRAM) 50 MG tablet Take 50 mg by mouth Every 6 (Six) Hours As Needed.     • nitrofurantoin, macrocrystal-monohydrate, (Macrobid) 100 MG capsule Take 1 capsule by mouth 2 (Two) Times a Day. 14 capsule 0     No current facility-administered medications for this visit.     Facility-Administered Medications Ordered in Other Visits   Medication Dose Route Frequency Provider Last Rate Last Admin   • Chlorhexidine Gluconate Cloth 2 % pads 1 application  1 application Topical Q12H PRN Bianka Ramírez APRN            Allergies   Allergen Reactions   • Bactrim [Sulfamethoxazole-Trimethoprim] Rash   • Ciprofloxacin Other (See Comments)     tremors   • Ranexa [Ranolazine Er] Unknown - Low Severity       Objective     /77   Pulse 79   Ht 157.5 cm (62\")   Wt 67.1 kg (148 lb)   SpO2 98%   BMI 27.07 kg/m²     Physical Exam       DATA:   Results for orders placed during the hospital encounter of 01/19/21    Adult Transthoracic Echo Complete W/ Cont if Necessary Per Protocol    Interpretation Summary  · Estimated left ventricular EF = 60% Left ventricular ejection fraction appears to be 56 - 60%. Left ventricular systolic function is normal.  · Left ventricular diastolic function was normal.  · No significant valvular abnormality  · No pericardial effusion  · No change since prev echo of 6/26/20   Results for orders placed during the hospital encounter of 11/25/19    Stress Test With Myocardial Perfusion One Day    Interpretation Summary  · Breast attenuation artifact is present.  · Left ventricular ejection fraction is normal (Calculated EF = 64%).  · Myocardial perfusion imaging indicates a medium-sized, mild-to-moderate area of ischemia located in the anterior wall.  · " Impressions are consistent with an intermediate risk study.  · Findings consistent with an abnormal ECG stress test.  · Symptoms of nausea with Lexiscan may be non-specific, and mild ST depression noted in inferolateral leads.  · Partially reversible anterior defect may also represent breast tissue artifact however may represent ischemia.  · Clinical correlation is required.   Results for orders placed during the hospital encounter of 11/25/19    Stress Test With Myocardial Perfusion One Day    Interpretation Summary  · Breast attenuation artifact is present.  · Left ventricular ejection fraction is normal (Calculated EF = 64%).  · Myocardial perfusion imaging indicates a medium-sized, mild-to-moderate area of ischemia located in the anterior wall.  · Impressions are consistent with an intermediate risk study.  · Findings consistent with an abnormal ECG stress test.  · Symptoms of nausea with Lexiscan may be non-specific, and mild ST depression noted in inferolateral leads.  · Partially reversible anterior defect may also represent breast tissue artifact however may represent ischemia.  · Clinical correlation is required.   Results for orders placed during the hospital encounter of 12/18/19    Cardiac Catheterization/Vascular Study    Narrative  CARDIAC CATHETERIZATION / INTERVENTION REPORT            DATE OF PROCEDURE: 12/18/2019      INDICATION FOR PROCEDURE: Abnormal stress test      PRE PROCEDURE DIAGNOSIS:  CAD s/p LAD /D1 PCI in past  CCS class 2  Abnormal stress test with anterolateral mild to moderate ischemia    POST PROCEDURE DIAGNOSIS:  CAD LAD/Dx bifurcation lesion cool 1,1,1 with LAD just after takeoff of Dx had 80%, Dx itself has 90% instent restenosis, diffuse type3, and also severe 90% stenosis just distal to stent edge, and LAD before Dx also had 70% stenosis, mid LAD beyond this bifurcation also had 60-70% tubular stenosis. OM1 branch also had proximal 50-60% stenosis, and Native lcx after OM1  branch is small artery with mild diffuse disease. RCA Proximal had 40% , mid 40% and distal mild 20% diffuse stenosis, PDA ostial had diffuse 40%, PL no significant stenosis.      Face to face mdoerate conscious  sedation time :      COMPLICATIONS : None    Specimens collected : None    PROCEDURE PERFORMED:    1. Selective right and left Coronary Angiogram  2. Left heart catheretization  3. Left Ventriculography    Description of the procedure:  Prior to the procedure risk, benefits and possible alternative were discussed with the patient and informed consent was obtained. Patient was brought to cardiac cath lab table in post absorbtive state. Patient was prepped and drape in usual sterile fashion. IV Versed and Fentanyl was used for moderate sedation. 2% Lidocaine was used for topical anesthesia. R radial arterial site was prepped and a micropuncture needle was used to access the artery and a 5 F slender sheath was placed. 2.5 mg of Verapamil and 200 mcg of NTG was given through the sheath intra arterial and 5000 units of Heparin was given once the catheter crossed the aortic arch.    5 F TIG 4 catheters was used for right and left coronary angiogram and 5 F pigtail catheter was used for Left heart hemodynamics and left ventriculography. All the catheters were exchanged over 0.035 wire. The R radial arterial sheath was removed and TR band was applied and immediate and complete hemostasis was achieved. The patient tolerate the entire procedure well without any immediate known complications.    Coronary anatomy findings:    LM: Is a large calibre vessel , normal take off from left cusp, divides into LAD and Lcx. Distal had mild 30% tubular stenosis.    LAD:  LAD just after takeoff of Dx had 80%, Dx itself has 90% instent restenosis, diffuse type3, and also severe 90% stenosis just distal to stent edge, and LAD before Dx also had 70% stenosis, mid LAD beyond this bifurcation also had 60-70% tubular stenosis.    LCX:   OM1 branch also had proximal 50-60% stenosis, and Native lcx after OM1 branch is small artery with mild diffuse disease.    RCA: Large calibre, dominant artery, normal take off from right cusp.. RCA Proximal had 40% , mid 40% and distal mild 20% diffuse stenosis, PDA ostial had diffuse 40%, PL no significant stenosis.      Left Ventriculography:    LV systolic function was normal with visual estimated EF of 65%. No wall motion abnormalities.  No significant mitral regurgitation noted.    LVEDP: 14 mmHg  No gradient across the aortic valve on pull back.            Final Impression:  CAD LAD/Dx bifurcation lesion cool 1,1,1 with LAD just after takeoff of Dx had 80%, Dx itself has 90% instent restenosis, diffuse type3, and also severe 90% stenosis just distal to stent edge, and LAD before Dx also had 70% stenosis, mid LAD beyond this bifurcation also had 60-70% tubular stenosis. OM1 branch also had proximal 50-60% stenosis, and Native lcx after OM1 branch is small artery with mild diffuse disease. RCA Proximal had 40% , mid 40% and distal mild 20% diffuse stenosis, PDA ostial had diffuse 40%, PL no significant stenosis.  Normal LV systolic function      Recommendations:  Pt has complex LAD /Dx bifurcation lesion with severe in stent restenosis of D1, discussed with Interventionalist at [facility] regarding PCI options vs CABG for better long term patency, pt initially was not interested in CABG but after explaining her complexity of lesions she was open to , but wanted to have another opinion, so we will refer to Dr Marcus at [facility] and have her discuss about the options.  She is not having more than CCS II angina now on medications, will continue with same.          Lazaro Conway MD, [facility]  Interventional Cardiology    12/30/19  8:49 PM    Procedures     Lab Results   Component Value Date    CHOL 153 11/17/2020    CHOL 116 03/02/2020    CHOL 175 12/16/2019    CHLPL 215 (H) 04/11/2016     Lab Results   Component Value Date     TRIG 146 11/17/2020    TRIG 104 03/02/2020    TRIG 122 12/16/2019     Lab Results   Component Value Date    HDL 66 (H) 11/17/2020    HDL 48 03/02/2020    HDL 48 12/16/2019     Lab Results   Component Value Date    LDL 62 11/17/2020    LDL 47 03/02/2020     (H) 12/16/2019       Lab Results   Component Value Date    TSH 1.360 02/08/2021               Invalid input(s): LABALBU, PROT        Assessment and Plan    1. Coronary artery disease of native artery of native heart with stable angina pectoris (HCC)  She probably has a mild component of diastolic CHF, but she is on orthopneic, her lower extremity edema is very minimal and likely from varicose veins, she did had a question about her BNP being elevated and explained that since she has structural heart disease its can be mildly elevated chronically.  I also told her that if her potassium and renal functions are stable I will probably switch her Lasix to Aldactone as her blood pressure is in the 140s range.  - Comprehensive Metabolic Panel; Future  - Thyroid Panel With TSH; Future  - BNP; Future          Recommended increase activity to 30 minutes of walking daily, most days of the week.    No follow-ups on file.    Thank you for allowing me to participate in the care of Veda Enamorado. Feel free to contact me directly with any further questions or concerns.          Lazaro Conway MD, [facility]  Interventional Cardiology

## 2021-12-29 LAB
ALBUMIN SERPL-MCNC: 4.3 G/DL (ref 3.5–5.2)
ALBUMIN/GLOB SERPL: 1.5 G/DL
ALP SERPL-CCNC: 125 U/L (ref 39–117)
ALT SERPL W P-5'-P-CCNC: 7 U/L (ref 1–33)
ANION GAP SERPL CALCULATED.3IONS-SCNC: 11.9 MMOL/L (ref 5–15)
AST SERPL-CCNC: 20 U/L (ref 1–32)
BILIRUB SERPL-MCNC: <0.2 MG/DL (ref 0–1.2)
BUN SERPL-MCNC: 7 MG/DL (ref 6–20)
BUN/CREAT SERPL: 8 (ref 7–25)
CALCIUM SPEC-SCNC: 9.3 MG/DL (ref 8.6–10.5)
CHLORIDE SERPL-SCNC: 104 MMOL/L (ref 98–107)
CHOLEST SERPL-MCNC: 230 MG/DL (ref 0–200)
CO2 SERPL-SCNC: 24.1 MMOL/L (ref 22–29)
CREAT SERPL-MCNC: 0.87 MG/DL (ref 0.57–1)
GFR SERPL CREATININE-BSD FRML MDRD: 68 ML/MIN/1.73
GLOBULIN UR ELPH-MCNC: 2.8 GM/DL
GLUCOSE SERPL-MCNC: 102 MG/DL (ref 65–99)
HDLC SERPL-MCNC: 47 MG/DL (ref 40–60)
LDLC SERPL CALC-MCNC: 129 MG/DL (ref 0–100)
LDLC/HDLC SERPL: 2.6 {RATIO}
NT-PROBNP SERPL-MCNC: 710.1 PG/ML (ref 0–900)
POTASSIUM SERPL-SCNC: 3.7 MMOL/L (ref 3.5–5.2)
PROT SERPL-MCNC: 7.1 G/DL (ref 6–8.5)
SODIUM SERPL-SCNC: 140 MMOL/L (ref 136–145)
T-UPTAKE NFR SERPL: 1.17 TBI (ref 0.8–1.3)
T4 SERPL-MCNC: 6.23 MCG/DL (ref 4.5–11.7)
TRIGL SERPL-MCNC: 303 MG/DL (ref 0–150)
TSH SERPL DL<=0.05 MIU/L-ACNC: 0.65 UIU/ML (ref 0.27–4.2)
VLDLC SERPL-MCNC: 54 MG/DL (ref 5–40)

## 2022-01-03 ENCOUNTER — TELEPHONE (OUTPATIENT)
Dept: CARDIOLOGY | Facility: CLINIC | Age: 57
End: 2022-01-03

## 2022-01-03 ENCOUNTER — DOCUMENTATION (OUTPATIENT)
Dept: CARDIOLOGY | Facility: CLINIC | Age: 57
End: 2022-01-03

## 2022-01-03 DIAGNOSIS — E78.00 HIGH CHOLESTEROL: Primary | ICD-10-CM

## 2022-01-03 RX ORDER — SPIRONOLACTONE 25 MG/1
12.5 TABLET ORAL DAILY
Qty: 90 TABLET | Refills: 3 | Status: SHIPPED | OUTPATIENT
Start: 2022-01-03 | End: 2023-01-09

## 2022-01-03 NOTE — TELEPHONE ENCOUNTER
Pt called and gave me her BP readings for the past few days. Pt states that her lasix is making her feel bad.     Per dr patel, call in spironolactone 12.5mg     Keep checking bp and if it goes below 100 on top to stop taking it..     pt v/u

## 2022-01-04 ENCOUNTER — SPECIALTY PHARMACY (OUTPATIENT)
Dept: PHARMACY | Facility: HOSPITAL | Age: 57
End: 2022-01-04

## 2022-01-04 RX ORDER — ALIROCUMAB 150 MG/ML
300 INJECTION, SOLUTION SUBCUTANEOUS
Qty: 2 ML | Refills: 5 | Status: SHIPPED | OUTPATIENT
Start: 2022-01-04 | End: 2022-06-29 | Stop reason: SDUPTHER

## 2022-01-04 NOTE — PROGRESS NOTES
Pharmacy Note  Medication:  Praluent  Dose: 300mg every 4 weeks  Referring Provider: Dr. Chang  Start Date: TBD  Last Injection: July 2020   PA status/Time Period: TBD    Diagnosis Details:     Established CVD (with primary hyperlipidemia):  ACS +  Hx of MI +  Stable/unstable angina +  Coronary or other arterial revascularization +    Past Medical History:   Diagnosis Date   • Arthritis     back   • Back ache    • Chronic back pain    • Coronary artery disease     s/p 3 stents    • GERD (gastroesophageal reflux disease)    • History of MRSA infection 01/2020    labia; hospitalized 6 days on IV antibiotics and then went home on po antibiotics    • Hyperlipidemia    • Hypertension    • Peptic ulceration      Social History     Socioeconomic History   • Marital status:    • Number of children: 2   Tobacco Use   • Smoking status: Former Smoker     Packs/day: 1.00     Years: 25.00     Pack years: 25.00     Types: Cigarettes   • Smokeless tobacco: Never Used   Substance and Sexual Activity   • Alcohol use: No   • Drug use: No   • Sexual activity: Defer     Allergies   Allergen Reactions   • Bactrim [Sulfamethoxazole-Trimethoprim] Rash   • Ciprofloxacin Other (See Comments)     tremors   • Ranexa [Ranolazine Er] Unknown - Low Severity     Current Outpatient Medications   Medication Sig Dispense Refill   • Aspirin Low Dose 81 MG EC tablet Take 81 mg by mouth Daily.     • Eliquis 5 MG tablet tablet TAKE 1 TABLET BY MOUTH 2 (TWO) TIMES A DAY FOR 30 DAYS. 60 tablet 5   • HYDROcodone-acetaminophen (NORCO) 7.5-325 MG per tablet Take 1 tablet by mouth Every 6 (Six) Hours As Needed for Pain . 30 tablet 0   • isosorbide mononitrate (IMDUR) 30 MG 24 hr tablet Take 1 tablet by mouth Every Night. 30 tablet 11   • loratadine (CLARITIN) 10 MG tablet Take 10 mg by mouth Daily.     • metoprolol tartrate (LOPRESSOR) 25 MG tablet Take 0.5 tablets by mouth 2 (Two) Times a Day. 30 tablet 11   • nitroglycerin (NITROSTAT) 0.4 MG SL  tablet TAKE 1 AT ONSET OF CHEST PAIN,MAY RPT IN 5MIN, MAX 3 DOSES IN 24 HRS 25 tablet 11   • potassium chloride 10 MEQ CR tablet Take 1 tablet by mouth Daily. 30 tablet 11   • pravastatin (PRAVACHOL) 20 MG tablet TAKE ONE TABLET BY MOUTH DAILY (Patient taking differently: Take 20 mg by mouth Daily.) 30 tablet 3   • spironolactone (ALDACTONE) 25 MG tablet Take 0.5 tablets by mouth Daily. 90 tablet 3   • traMADol (ULTRAM) 50 MG tablet Take 50 mg by mouth Every 6 (Six) Hours As Needed.     • Alirocumab (Praluent) 150 MG/ML injection pen Inject 2 mL under the skin into the appropriate area as directed Every 28 (Twenty-Eight) Days. 2 mL 5   • furosemide (Lasix) 20 MG tablet Take 1/2 tablet by mouth Daily. 15 tablet 11     No current facility-administered medications for this visit.     Facility-Administered Medications Ordered in Other Visits   Medication Dose Route Frequency Provider Last Rate Last Admin   • Chlorhexidine Gluconate Cloth 2 % pads 1 application  1 application Topical Q12H PRN Bianka Ramírez APRN           Labs  Lab Results   Component Value Date    CHOL 230 (H) 12/28/2021    CHLPL 215 (H) 04/11/2016    TRIG 303 (H) 12/28/2021    HDL 47 12/28/2021     (H) 12/28/2021       Assessment     Drug Dates Notes   Current Lipid-lowering Therapy Pravastatin 20mg  Current      Previous Lipid-lowering Therapy Lipitor [id number] myalgias    Praluent [id number] 2020                    Pt was assessed today for PCSK9 inhibitor therapy.  Pt has clinical ASCVD. The patient denies any allergies to the medication or latex and denies pregnancy, breastfeeding, or planning to become pregnant.  Pt is on maximally tolerated statin, pravastatin 20mg Q AM and could not tolerate Lipitor due to myalgias.     Plan    Will order Praluent 300 mg every 28 days and begin the prior authorization, if applicable.  Will see patient in clinic once prior authorization is obtained for injection training and medication counseling and  follow-up with patient as necessary.        Educated Pt to take pravastatin at bedtime.  I have reached out to Dr. Chang to verify if potassium should be stopped, see MTP.     Kaia Stein, [employer]  01/04/22  16:55 EST

## 2022-01-11 ENCOUNTER — DISEASE STATE MANAGEMENT VISIT (OUTPATIENT)
Dept: PHARMACY | Facility: HOSPITAL | Age: 57
End: 2022-01-11

## 2022-01-11 DIAGNOSIS — I25.119 CORONARY ARTERY DISEASE INVOLVING NATIVE CORONARY ARTERY OF NATIVE HEART WITH ANGINA PECTORIS: Primary | ICD-10-CM

## 2022-01-11 NOTE — PROGRESS NOTES
Pharmacy Note  Medication:  Praluent  Dose: 300mg every 4 weeks  Referring Provider: Dr. Chang  Start Date: 1/11/22  Last Injection: January 2022   PA status/Time Period: Current    Diagnosis Details:     Established CVD (with primary hyperlipidemia):  ACS +  Hx of MI +  Stable/unstable angina +  Coronary or other arterial revascularization +    Past Medical History:   Diagnosis Date   • Arthritis     back   • Back ache    • Chronic back pain    • Coronary artery disease     s/p 3 stents    • GERD (gastroesophageal reflux disease)    • History of MRSA infection 01/2020    labia; hospitalized 6 days on IV antibiotics and then went home on po antibiotics    • Hyperlipidemia    • Hypertension    • Peptic ulceration      Social History     Socioeconomic History   • Marital status:    • Number of children: 2   Tobacco Use   • Smoking status: Former Smoker     Packs/day: 1.00     Years: 25.00     Pack years: 25.00     Types: Cigarettes   • Smokeless tobacco: Never Used   Substance and Sexual Activity   • Alcohol use: No   • Drug use: No   • Sexual activity: Defer     Allergies   Allergen Reactions   • Bactrim [Sulfamethoxazole-Trimethoprim] Rash   • Ciprofloxacin Other (See Comments)     tremors   • Ranexa [Ranolazine Er] Unknown - Low Severity     Current Outpatient Medications   Medication Sig Dispense Refill   • Aspirin Low Dose 81 MG EC tablet Take 81 mg by mouth Daily.     • Eliquis 5 MG tablet tablet TAKE 1 TABLET BY MOUTH 2 (TWO) TIMES A DAY FOR 30 DAYS. 60 tablet 5   • HYDROcodone-acetaminophen (NORCO) 7.5-325 MG per tablet Take 1 tablet by mouth Every 6 (Six) Hours As Needed for Pain . 30 tablet 0   • isosorbide mononitrate (IMDUR) 30 MG 24 hr tablet Take 1 tablet by mouth Every Night. 30 tablet 11   • loratadine (CLARITIN) 10 MG tablet Take 10 mg by mouth As Needed.     • metoprolol tartrate (LOPRESSOR) 25 MG tablet Take 0.5 tablets by mouth 2 (Two) Times a Day. 30 tablet 11   • nitroglycerin (NITROSTAT)  0.4 MG SL tablet TAKE 1 AT ONSET OF CHEST PAIN,MAY RPT IN 5MIN, MAX 3 DOSES IN 24 HRS 25 tablet 11   • pravastatin (PRAVACHOL) 20 MG tablet TAKE ONE TABLET BY MOUTH DAILY (Patient taking differently: Take 20 mg by mouth Daily.) 30 tablet 3   • spironolactone (ALDACTONE) 25 MG tablet Take 0.5 tablets by mouth Daily. 90 tablet 3   • traMADol (ULTRAM) 50 MG tablet Take 50 mg by mouth Every 6 (Six) Hours As Needed.     • Alirocumab (Praluent) 150 MG/ML injection pen Inject 2 mL under the skin into the appropriate area as directed Every 28 (Twenty-Eight) Days. 2 mL 5     No current facility-administered medications for this visit.     Facility-Administered Medications Ordered in Other Visits   Medication Dose Route Frequency Provider Last Rate Last Admin   • Chlorhexidine Gluconate Cloth 2 % pads 1 application  1 application Topical Q12H PRN Bianka Ramírez APRN           Labs  Lab Results   Component Value Date    CHOL 230 (H) 12/28/2021    CHLPL 215 (H) 04/11/2016    TRIG 303 (H) 12/28/2021    HDL 47 12/28/2021     (H) 12/28/2021       Assessment     Drug Dates Notes   Current Lipid-lowering Therapy Pravastatin 20mg  Praluent 300 mg  Current  1/11/2022      Previous Lipid-lowering Therapy Lipitor [id number] myalgias    Praluent [id number] 2020                    Pt was assessed today for PCSK9 inhibitor therapy.  Pt has clinical ASCVD. The patient denies any allergies to the medication or latex and denies pregnancy, breastfeeding, or planning to become pregnant.  Pt is on maximally tolerated statin, pravastatin 20mg QHS and could not tolerate Lipitor due to myalgias.     Plan    Initial Education Provided for Praluent/Repatha    Patient seen in the [facility] for initial education and injection training for PCSK9 inhibitors. The patient was introduced to services offered by [facility], including: regular assessments, refill coordination, curbside pick-up or mail order  delivery options, prior authorization maintenance, and financial assistance programs as applicable. The patient was also provided with contact information for the pharmacy team. Welcome information and patient satisfaction survey to be sent by retail team with patient's initial fill.    Patient Instructions    Repatha/Praluent is used to lower LDL, or bad cholesterol, to help reduce your chance of a heart attack or stroke.  You should give your injection once every 28 days.  Your doctor will likely keep you on this medication indefinitely as long as it is working for you and not causing any adverse effects.      This medication should be kept in the refrigerator until you are ready to use it.  Once removed from the refrigerator, it is good at room temperature for 30 days.  Do not leave in your car or expose to extreme heat.  Do not shake or freeze.  Dispose the used syringe/device in a sharps container.     This injection is a subcutaneous injection, which means just under the skin.  It is important to choose an area of your skin that is not tender, bruised, cut or has scars or stretch marks.  You can inject into your abdomen (except for 2 inches around your navel), your thigh or your upper arm.  Do not administer with other drugs.   Rotate injection sites each time you give the injection. Wash your hands prior to giving yourself an injection and use an alcohol wipe to clean the area of the injection and allow to dry prior to injecting.      If you miss a dose, take it as soon as you remember and resume the original schedule if it is within 7 days from the missed dose.  If an every 2 week dose is not administered within 7 days, wait until the next dose on the original schedule.  If a once-monthly dose is not administered within 7 days, administer the dose and start a new schedule based on this date.     Adverse Effects  Reviewed with patient and education on management provided.     • Sore Throat  • Injection site  pain  • Itching or irritation   • Flu-like symptoms  • Signs of an allergic reaction     Adherence and Self-Administration    • Barriers to Patient Adherence and/or Self-Administration: None   • Methods for Supporting Patient Adherence and/or Self-Administration: None Required     The patient has been provided with the following education and any applicable administration techniques (i.e. self-injection) have been demonstrated for the therapies indicated. All questions and concerns have been addressed prior to the patient receiving the medication, and the patient has verbalized understanding of the education and any materials provided.  Additional patient education shall be provided and documented upon request by the patient, provider or payer.      The patient would like to follow-up in clinic for next  from our pharmacy for the next injection. Care Coordinator to set up future refill outreaches, coordinate prescription delivery, and escalate clinical questions to pharmacist.     Patient should receive a lipid panel in 4-12 weeks.  Order has been placed. Will follow up in 6 months or sooner or needed.    Education provided by Marzena Dubois PharmD Candidate.    Thank you,   Kaia Stein PharmD  01/11/22  15:21 EST

## 2022-01-11 NOTE — PROGRESS NOTES
Pharmacy Note  Medication:  Praluent  Dose: 300mg every 4 weeks  Referring Provider: Dr. Chang  Start Date: 1/11/22  Last Injection: January 2022   PA status/Time Period: Current    Diagnosis Details:     Established CVD (with primary hyperlipidemia):  ACS +  Hx of MI +  Stable/unstable angina +  Coronary or other arterial revascularization +    Past Medical History:   Diagnosis Date   • Arthritis     back   • Back ache    • Chronic back pain    • Coronary artery disease     s/p 3 stents    • GERD (gastroesophageal reflux disease)    • History of MRSA infection 01/2020    labia; hospitalized 6 days on IV antibiotics and then went home on po antibiotics    • Hyperlipidemia    • Hypertension    • Peptic ulceration      Social History     Socioeconomic History   • Marital status:    • Number of children: 2   Tobacco Use   • Smoking status: Former Smoker     Packs/day: 1.00     Years: 25.00     Pack years: 25.00     Types: Cigarettes   • Smokeless tobacco: Never Used   Substance and Sexual Activity   • Alcohol use: No   • Drug use: No   • Sexual activity: Defer     Allergies   Allergen Reactions   • Bactrim [Sulfamethoxazole-Trimethoprim] Rash   • Ciprofloxacin Other (See Comments)     tremors   • Ranexa [Ranolazine Er] Unknown - Low Severity     Current Outpatient Medications   Medication Sig Dispense Refill   • Alirocumab (Praluent) 150 MG/ML injection pen Inject 2 mL under the skin into the appropriate area as directed Every 28 (Twenty-Eight) Days. 2 mL 5   • Aspirin Low Dose 81 MG EC tablet Take 81 mg by mouth Daily.     • Eliquis 5 MG tablet tablet TAKE 1 TABLET BY MOUTH 2 (TWO) TIMES A DAY FOR 30 DAYS. 60 tablet 5   • HYDROcodone-acetaminophen (NORCO) 7.5-325 MG per tablet Take 1 tablet by mouth Every 6 (Six) Hours As Needed for Pain . 30 tablet 0   • isosorbide mononitrate (IMDUR) 30 MG 24 hr tablet Take 1 tablet by mouth Every Night. 30 tablet 11   • loratadine (CLARITIN) 10 MG tablet Take 10 mg by mouth  As Needed.     • metoprolol tartrate (LOPRESSOR) 25 MG tablet Take 0.5 tablets by mouth 2 (Two) Times a Day. 30 tablet 11   • nitroglycerin (NITROSTAT) 0.4 MG SL tablet TAKE 1 AT ONSET OF CHEST PAIN,MAY RPT IN 5MIN, MAX 3 DOSES IN 24 HRS 25 tablet 11   • pravastatin (PRAVACHOL) 20 MG tablet TAKE ONE TABLET BY MOUTH DAILY (Patient taking differently: Take 20 mg by mouth Daily.) 30 tablet 3   • spironolactone (ALDACTONE) 25 MG tablet Take 0.5 tablets by mouth Daily. 90 tablet 3   • traMADol (ULTRAM) 50 MG tablet Take 50 mg by mouth Every 6 (Six) Hours As Needed.       No current facility-administered medications for this visit.     Facility-Administered Medications Ordered in Other Visits   Medication Dose Route Frequency Provider Last Rate Last Admin   • Chlorhexidine Gluconate Cloth 2 % pads 1 application  1 application Topical Q12H PRN Bianka Ramírez APRN           Labs  Lab Results   Component Value Date    CHOL 230 (H) 12/28/2021    CHLPL 215 (H) 04/11/2016    TRIG 303 (H) 12/28/2021    HDL 47 12/28/2021     (H) 12/28/2021       Assessment     Drug Dates Notes   Current Lipid-lowering Therapy Pravastatin 20mg  Praluent 300 mg  Current  1/11/2022      Previous Lipid-lowering Therapy Lipitor [id number] myalgias    Praluent [id number] 2020                    Pt was assessed today for PCSK9 inhibitor therapy.  Pt has clinical ASCVD. The patient denies any allergies to the medication or latex and denies pregnancy, breastfeeding, or planning to become pregnant.  Pt is on maximally tolerated statin, pravastatin 20mg QHS and could not tolerate Lipitor due to myalgias.     Plan    Initial Education Provided for Praluent/Repatha    Patient seen in the Medication Management Clinic for initial education and injection training for PCSK9 inhibitors. The patient was introduced to services offered by [facility], including: regular assessments, refill coordination, curbside pick-up or mail order  delivery options, prior authorization maintenance, and financial assistance programs as applicable. The patient was also provided with contact information for the pharmacy team. Welcome information and patient satisfaction survey to be sent by retail team with patient's initial fill.    Patient Instructions    Repatha/Praluent is used to lower LDL, or bad cholesterol, to help reduce your chance of a heart attack or stroke.  You should give your injection once every 28 days.  Your doctor will likely keep you on this medication indefinitely as long as it is working for you and not causing any adverse effects.      This medication should be kept in the refrigerator until you are ready to use it.  Once removed from the refrigerator, it is good at room temperature for 30 days.  Do not leave in your car or expose to extreme heat.  Do not shake or freeze.  Dispose the used syringe/device in a sharps container.     This injection is a subcutaneous injection, which means just under the skin.  It is important to choose an area of your skin that is not tender, bruised, cut or has scars or stretch marks.  You can inject into your abdomen (except for 2 inches around your navel), your thigh or your upper arm.  Do not administer with other drugs.   Rotate injection sites each time you give the injection. Wash your hands prior to giving yourself an injection and use an alcohol wipe to clean the area of the injection and allow to dry prior to injecting.      If you miss a dose, take it as soon as you remember and resume the original schedule if it is within 7 days from the missed dose.  If an every 2 week dose is not administered within 7 days, wait until the next dose on the original schedule.  If a once-monthly dose is not administered within 7 days, administer the dose and start a new schedule based on this date.     Adverse Effects  Reviewed with patient and education on management provided.     • Sore Throat  • Injection site  pain  • Itching or irritation   • Flu-like symptoms  • Signs of an allergic reaction     Adherence and Self-Administration    • Barriers to Patient Adherence and/or Self-Administration: None   • Methods for Supporting Patient Adherence and/or Self-Administration: None Required     The patient has been provided with the following education and any applicable administration techniques (i.e. self-injection) have been demonstrated for the therapies indicated. All questions and concerns have been addressed prior to the patient receiving the medication, and the patient has verbalized understanding of the education and any materials provided.  Additional patient education shall be provided and documented upon request by the patient, provider or payer.      The patient would like to follow-up in clinic for next  from our pharmacy for the next injection. Care Coordinator to set up future refill outreaches, coordinate prescription delivery, and escalate clinical questions to pharmacist.     Patient should receive a lipid panel in 4-12 weeks.  Order has been placed. Will follow up in 6 months or sooner or needed.    Education provided by Jazzmine BernardD Candidate.    Thank you,   Henrietta Goodman PharmD  01/11/22  14:54 EST

## 2022-01-18 ENCOUNTER — TELEPHONE (OUTPATIENT)
Dept: CARDIOLOGY | Facility: CLINIC | Age: 57
End: 2022-01-18

## 2022-01-18 NOTE — TELEPHONE ENCOUNTER
Called patient left V/M    ----- Message from Lazaro Conway MD sent at 1/11/2022  9:29 AM EST -----  Please call the patient regarding her LDL is worse than before and she needs to follow strict diet control, also confirm if she is getting her praluent shots

## 2022-02-03 ENCOUNTER — SPECIALTY PHARMACY (OUTPATIENT)
Dept: PHARMACY | Facility: HOSPITAL | Age: 57
End: 2022-02-03

## 2022-02-03 NOTE — PROGRESS NOTES
Specialty Pharmacy Refill Coordination Note      Name:  Veda Enamorado  :  1965  Date:  2/3/2022         Past Medical History:   Diagnosis Date   • Arthritis     back   • Back ache    • Chronic back pain    • Coronary artery disease     s/p 3 stents    • GERD (gastroesophageal reflux disease)    • History of MRSA infection 2020    labia; hospitalized 6 days on IV antibiotics and then went home on po antibiotics    • Hyperlipidemia    • Hypertension    • Peptic ulceration        Past Surgical History:   Procedure Laterality Date   • CARDIAC CATHETERIZATION     • CARDIAC CATHETERIZATION N/A 2019    Procedure: Left Heart Cath;  Surgeon: Lazaro Conway MD;  Location: [facility] CATH INVASIVE LOCATION;  Service: Cardiology   • CARDIAC CATHETERIZATION     • CARDIAC SURGERY     •  SECTION      x2   • CORONARY ARTERY BYPASS GRAFT N/A 2020    Procedure: MEDIAN STERNOTOMY, CORONARY ARTERY BYPASS X3 WITH  INTERNAL MAMMARY ARTERY GRAFTING, ENDOVASCULAR VEIN HARVESTING OF THE RIGHT GREATER SAPHENOUS VEIN, MAICOL PER ANESTHESIA;  Surgeon: Juanjo Coronado MD;  Location: [facility] OR;  Service: Cardiothoracic;  Laterality: N/A;  ST ON LE  VO: 1800  VR: 1814   • HAND SURGERY      right   • PERINEAL LESION/CYST EXCISION Right 2020    Procedure: I&D ABSCESS;  Surgeon: Leander Cardenas III, MD;  Location: [facility];  Service: Obstetrics/Gynecology       Social History     Socioeconomic History   • Marital status:    • Number of children: 2   Tobacco Use   • Smoking status: Former Smoker     Packs/day: 1.00     Years: 25.00     Pack years: 25.00     Types: Cigarettes   • Smokeless tobacco: Never Used   Substance and Sexual Activity   • Alcohol use: No   • Drug use: No   • Sexual activity: Defer       Family History   Problem Relation Age of Onset   • Heart disease Mother    • Coronary artery disease Mother    • Heart disease Father    • Heart attack Father    • Hypertension  Father    • Stroke Father    • No Known Problems Sister    • Heart attack Brother    • Heart disease Brother    • Heart disease Maternal Grandmother    • Heart attack Maternal Grandmother    • No Known Problems Maternal Grandfather    • Stroke Paternal Grandmother    • Breast cancer Neg Hx        Allergies   Allergen Reactions   • Bactrim [Sulfamethoxazole-Trimethoprim] Rash   • Ciprofloxacin Other (See Comments)     tremors   • Ranexa [Ranolazine Er] Unknown - Low Severity       Current Outpatient Medications   Medication Sig Dispense Refill   • Alirocumab (Praluent) 150 MG/ML injection pen Inject 2 mL under the skin into the appropriate area as directed Every 28 (Twenty-Eight) Days. 2 mL 5   • Aspirin Low Dose 81 MG EC tablet Take 81 mg by mouth Daily.     • Eliquis 5 MG tablet tablet TAKE 1 TABLET BY MOUTH 2 (TWO) TIMES A DAY FOR 30 DAYS. 60 tablet 5   • HYDROcodone-acetaminophen (NORCO) 7.5-325 MG per tablet Take 1 tablet by mouth Every 6 (Six) Hours As Needed for Pain . 30 tablet 0   • isosorbide mononitrate (IMDUR) 30 MG 24 hr tablet Take 1 tablet by mouth Every Night. 30 tablet 11   • loratadine (CLARITIN) 10 MG tablet Take 10 mg by mouth As Needed.     • metoprolol tartrate (LOPRESSOR) 25 MG tablet Take 0.5 tablets by mouth 2 (Two) Times a Day. 30 tablet 11   • nitroglycerin (NITROSTAT) 0.4 MG SL tablet TAKE 1 AT ONSET OF CHEST PAIN,MAY RPT IN 5MIN, MAX 3 DOSES IN 24 HRS 25 tablet 11   • pravastatin (PRAVACHOL) 20 MG tablet TAKE ONE TABLET BY MOUTH DAILY (Patient taking differently: Take 20 mg by mouth Daily.) 30 tablet 3   • spironolactone (ALDACTONE) 25 MG tablet Take 0.5 tablets by mouth Daily. 90 tablet 3   • traMADol (ULTRAM) 50 MG tablet Take 50 mg by mouth Every 6 (Six) Hours As Needed.       No current facility-administered medications for this visit.     Facility-Administered Medications Ordered in Other Visits   Medication Dose Route Frequency Provider Last Rate Last Admin   • Chlorhexidine  Gluconate Cloth 2 % pads 1 application  1 application Topical Q12H PRN Bianka Ramírez APRN             ASSESSMENT/PLAN:      Veda is a 57 y.o. female contacted today regarding refills of one specialty medication(s).    Reviewed and verified with patient:       Specialty medication(s) and dose(s) confirmed: yes    Refill Questions      Most Recent Value   Changes to allergies? No   Changes to medications? No   New conditions since last clinic visit No   Unplanned office visit, urgent care, ED, or hospital admission in the last 4 weeks  No   How does patient/caregiver feel medication is working? Very good   Financial problems or insurance changes  No   If yes, describe changes in insurance or financial issues. None   How many doses of your specialty medications were missed in the last 4 weeks? 0   Why were doses missed? NA   Does this patient require a clinical escalation to a pharmacist? No                     Follow-up: 84 day(s)     Cristóbal Lee, Pharmacy Technician  Specialty Pharmacy Technician

## 2022-02-23 ENCOUNTER — OFFICE VISIT (OUTPATIENT)
Dept: CARDIOLOGY | Facility: CLINIC | Age: 57
End: 2022-02-23

## 2022-02-23 VITALS
DIASTOLIC BLOOD PRESSURE: 79 MMHG | BODY MASS INDEX: 28.34 KG/M2 | SYSTOLIC BLOOD PRESSURE: 160 MMHG | HEIGHT: 62 IN | HEART RATE: 75 BPM | OXYGEN SATURATION: 99 % | WEIGHT: 154 LBS

## 2022-02-23 DIAGNOSIS — I10 ESSENTIAL HYPERTENSION: Chronic | ICD-10-CM

## 2022-02-23 DIAGNOSIS — I20.8 ATYPICAL ANGINA: Primary | ICD-10-CM

## 2022-02-23 DIAGNOSIS — I48.0 PAF (PAROXYSMAL ATRIAL FIBRILLATION): ICD-10-CM

## 2022-02-23 DIAGNOSIS — Z95.1 S/P CABG (CORONARY ARTERY BYPASS GRAFT): ICD-10-CM

## 2022-02-23 DIAGNOSIS — E78.2 MIXED HYPERLIPIDEMIA: Chronic | ICD-10-CM

## 2022-02-23 DIAGNOSIS — I25.10 CORONARY ARTERY DISEASE INVOLVING NATIVE CORONARY ARTERY OF NATIVE HEART WITHOUT ANGINA PECTORIS: Chronic | ICD-10-CM

## 2022-02-23 PROCEDURE — 99214 OFFICE O/P EST MOD 30 MIN: CPT | Performed by: INTERNAL MEDICINE

## 2022-02-23 PROCEDURE — 93000 ELECTROCARDIOGRAM COMPLETE: CPT | Performed by: INTERNAL MEDICINE

## 2022-02-23 RX ORDER — ISOSORBIDE MONONITRATE 30 MG/1
60 TABLET, EXTENDED RELEASE ORAL NIGHTLY
Qty: 30 TABLET | Refills: 11 | Status: SHIPPED | OUTPATIENT
Start: 2022-02-23 | End: 2022-10-13

## 2022-02-23 NOTE — PROGRESS NOTES
"     [facility] CARDIOLOGY  [address] [facility] [place] [address]  Phone: [phone]  Fax: [phone]    2022    Chief Complaint   Patient presents with   • Follow-up     patient states she is having arm & neck pain. Feeling \"off\"         History:   Veda Enamorado is a 57 y.o. female she says she has been having worsening pain in her left arm and hand and neck which is similar to her anginal symptoms prior to her CABG.  Denies any shortness of breath lower extremity edema palpitations.      Past Medical History:   Diagnosis Date   • Arthritis     back   • Back ache    • Chronic back pain    • Coronary artery disease     s/p 3 stents    • GERD (gastroesophageal reflux disease)    • History of MRSA infection 2020    labia; hospitalized 6 days on IV antibiotics and then went home on po antibiotics    • Hyperlipidemia    • Hypertension    • Peptic ulceration        Past Surgical History:   Procedure Laterality Date   • CARDIAC CATHETERIZATION     • CARDIAC CATHETERIZATION N/A 2019    Procedure: Left Heart Cath;  Surgeon: Lazaro Conway MD;  Location: [facility] CATH INVASIVE LOCATION;  Service: Cardiology   • CARDIAC CATHETERIZATION     • CARDIAC SURGERY     •  SECTION      x2   • CORONARY ARTERY BYPASS GRAFT N/A 2020    Procedure: MEDIAN STERNOTOMY, CORONARY ARTERY BYPASS X3 WITH  INTERNAL MAMMARY ARTERY GRAFTING, ENDOVASCULAR VEIN HARVESTING OF THE RIGHT GREATER SAPHENOUS VEIN, MAICOL PER ANESTHESIA;  Surgeon: Juanjo Coronado MD;  Location: [facility] OR;  Service: Cardiothoracic;  Laterality: N/A;  ST ON LE  VO: 1800  VR: 1814   • HAND SURGERY      right   • PERINEAL LESION/CYST EXCISION Right 2020    Procedure: I&D ABSCESS;  Surgeon: Leander Cardenas III, MD;  Location: [facility] OR;  Service: Obstetrics/Gynecology        Past Social History:  Social History     Socioeconomic History   • Marital status:    • Number of children: 2   Tobacco " Use   • Smoking status: Former Smoker     Packs/day: 1.00     Years: 25.00     Pack years: 25.00     Types: Cigarettes   • Smokeless tobacco: Never Used   Vaping Use   • Vaping Use: Former   Substance and Sexual Activity   • Alcohol use: No   • Drug use: No   • Sexual activity: Defer       Past Family History:  Family History   Problem Relation Age of Onset   • Heart disease Mother    • Coronary artery disease Mother    • Heart disease Father    • Heart attack Father    • Hypertension Father    • Stroke Father    • No Known Problems Sister    • Heart attack Brother    • Heart disease Brother    • Heart disease Maternal Grandmother    • Heart attack Maternal Grandmother    • No Known Problems Maternal Grandfather    • Stroke Paternal Grandmother    • Breast cancer Neg Hx        Review of Systems:   ROS       Current Outpatient Medications   Medication Sig Dispense Refill   • Alirocumab (Praluent) 150 MG/ML injection pen Inject 2 mL under the skin into the appropriate area as directed Every 28 (Twenty-Eight) Days. 2 mL 5   • Aspirin Low Dose 81 MG EC tablet Take 81 mg by mouth Daily.     • Eliquis 5 MG tablet tablet TAKE 1 TABLET BY MOUTH 2 (TWO) TIMES A DAY FOR 30 DAYS. 60 tablet 5   • HYDROcodone-acetaminophen (NORCO) 7.5-325 MG per tablet Take 1 tablet by mouth Every 6 (Six) Hours As Needed for Pain . 30 tablet 0   • isosorbide mononitrate (IMDUR) 30 MG 24 hr tablet Take 2 tablets by mouth Every Night. 30 tablet 11   • loratadine (CLARITIN) 10 MG tablet Take 10 mg by mouth As Needed.     • metoprolol tartrate (LOPRESSOR) 25 MG tablet Take 1 tablet by mouth 2 (Two) Times a Day. 30 tablet 11   • nitroglycerin (NITROSTAT) 0.4 MG SL tablet TAKE 1 AT ONSET OF CHEST PAIN,MAY RPT IN 5MIN, MAX 3 DOSES IN 24 HRS 25 tablet 11   • pravastatin (PRAVACHOL) 20 MG tablet TAKE ONE TABLET BY MOUTH DAILY (Patient taking differently: Take 20 mg by mouth Daily.) 30 tablet 3   • spironolactone (ALDACTONE) 25 MG tablet Take 0.5 tablets  "by mouth Daily. 90 tablet 3   • traMADol (ULTRAM) 50 MG tablet Take 50 mg by mouth Every 6 (Six) Hours As Needed.       No current facility-administered medications for this visit.     Facility-Administered Medications Ordered in Other Visits   Medication Dose Route Frequency Provider Last Rate Last Admin   • Chlorhexidine Gluconate Cloth 2 % pads 1 application  1 application Topical Q12H PRN Bianka Ramírez APRN            Allergies   Allergen Reactions   • Bactrim [Sulfamethoxazole-Trimethoprim] Rash   • Ciprofloxacin Other (See Comments)     tremors   • Ranexa [Ranolazine Er] Unknown - Low Severity       Objective     /79   Pulse 75   Ht 157.5 cm (62\")   Wt 69.9 kg (154 lb)   SpO2 99%   BMI 28.17 kg/m²     Physical Exam  Constitutional:       Appearance: She is well-developed.   HENT:      Head: Normocephalic and atraumatic.   Eyes:      Pupils: Pupils are equal, round, and reactive to light.   Neck:      Vascular: No JVD.   Cardiovascular:      Rate and Rhythm: Normal rate and regular rhythm.      Heart sounds: No murmur heard.      Pulmonary:      Effort: Pulmonary effort is normal. No respiratory distress.      Breath sounds: Normal breath sounds. No wheezing.   Abdominal:      General: Bowel sounds are normal.      Palpations: Abdomen is soft.      Tenderness: There is no abdominal tenderness.   Musculoskeletal:         General: Normal range of motion.      Cervical back: Normal range of motion and neck supple.   Skin:     General: Skin is warm and dry.      Capillary Refill: Capillary refill takes less than 2 seconds.      Findings: No erythema.   Neurological:      Mental Status: She is alert and oriented to person, place, and time.   Psychiatric:         Behavior: Behavior normal.            DATA:  Results for orders placed during the hospital encounter of 07/17/20    SCANNED - TELEMETRY     Results for orders placed during the hospital encounter of 01/19/21    Adult Transthoracic Echo Complete " W/ Cont if Necessary Per Protocol    Interpretation Summary  · Estimated left ventricular EF = 60% Left ventricular ejection fraction appears to be 56 - 60%. Left ventricular systolic function is normal.  · Left ventricular diastolic function was normal.  · No significant valvular abnormality  · No pericardial effusion  · No change since prev echo of 6/26/20   Results for orders placed during the hospital encounter of 11/25/19    Stress Test With Myocardial Perfusion One Day    Interpretation Summary  · Breast attenuation artifact is present.  · Left ventricular ejection fraction is normal (Calculated EF = 64%).  · Myocardial perfusion imaging indicates a medium-sized, mild-to-moderate area of ischemia located in the anterior wall.  · Impressions are consistent with an intermediate risk study.  · Findings consistent with an abnormal ECG stress test.  · Symptoms of nausea with Lexiscan may be non-specific, and mild ST depression noted in inferolateral leads.  · Partially reversible anterior defect may also represent breast tissue artifact however may represent ischemia.  · Clinical correlation is required.   Results for orders placed during the hospital encounter of 11/25/19    Stress Test With Myocardial Perfusion One Day    Interpretation Summary  · Breast attenuation artifact is present.  · Left ventricular ejection fraction is normal (Calculated EF = 64%).  · Myocardial perfusion imaging indicates a medium-sized, mild-to-moderate area of ischemia located in the anterior wall.  · Impressions are consistent with an intermediate risk study.  · Findings consistent with an abnormal ECG stress test.  · Symptoms of nausea with Lexiscan may be non-specific, and mild ST depression noted in inferolateral leads.  · Partially reversible anterior defect may also represent breast tissue artifact however may represent ischemia.  · Clinical correlation is required.   Results for orders placed during the hospital encounter of  12/18/19    Cardiac Catheterization/Vascular Study    Narrative  CARDIAC CATHETERIZATION / INTERVENTION REPORT            DATE OF PROCEDURE: 12/18/2019      INDICATION FOR PROCEDURE: Abnormal stress test      PRE PROCEDURE DIAGNOSIS:  CAD s/p LAD /D1 PCI in past  CCS class 2  Abnormal stress test with anterolateral mild to moderate ischemia    POST PROCEDURE DIAGNOSIS:  CAD LAD/Dx bifurcation lesion cool 1,1,1 with LAD just after takeoff of Dx had 80%, Dx itself has 90% instent restenosis, diffuse type3, and also severe 90% stenosis just distal to stent edge, and LAD before Dx also had 70% stenosis, mid LAD beyond this bifurcation also had 60-70% tubular stenosis. OM1 branch also had proximal 50-60% stenosis, and Native lcx after OM1 branch is small artery with mild diffuse disease. RCA Proximal had 40% , mid 40% and distal mild 20% diffuse stenosis, PDA ostial had diffuse 40%, PL no significant stenosis.      Face to face mdoerate conscious  sedation time :      COMPLICATIONS : None    Specimens collected : None    PROCEDURE PERFORMED:    1. Selective right and left Coronary Angiogram  2. Left heart catheretization  3. Left Ventriculography    Description of the procedure:  Prior to the procedure risk, benefits and possible alternative were discussed with the patient and informed consent was obtained. Patient was brought to cardiac cath lab table in post absorbtive state. Patient was prepped and drape in usual sterile fashion. IV Versed and Fentanyl was used for moderate sedation. 2% Lidocaine was used for topical anesthesia. R radial arterial site was prepped and a micropuncture needle was used to access the artery and a 5 F slender sheath was placed. 2.5 mg of Verapamil and 200 mcg of NTG was given through the sheath intra arterial and 5000 units of Heparin was given once the catheter crossed the aortic arch.    5 F TIG 4 catheters was used for right and left coronary angiogram and 5 F pigtail catheter was used  for Left heart hemodynamics and left ventriculography. All the catheters were exchanged over 0.035 wire. The R radial arterial sheath was removed and TR band was applied and immediate and complete hemostasis was achieved. The patient tolerate the entire procedure well without any immediate known complications.    Coronary anatomy findings:    LM: Is a large calibre vessel , normal take off from left cusp, divides into LAD and Lcx. Distal had mild 30% tubular stenosis.    LAD:  LAD just after takeoff of Dx had 80%, Dx itself has 90% instent restenosis, diffuse type3, and also severe 90% stenosis just distal to stent edge, and LAD before Dx also had 70% stenosis, mid LAD beyond this bifurcation also had 60-70% tubular stenosis.    LCX:  OM1 branch also had proximal 50-60% stenosis, and Native lcx after OM1 branch is small artery with mild diffuse disease.    RCA: Large calibre, dominant artery, normal take off from right cusp.. RCA Proximal had 40% , mid 40% and distal mild 20% diffuse stenosis, PDA ostial had diffuse 40%, PL no significant stenosis.      Left Ventriculography:    LV systolic function was normal with visual estimated EF of 65%. No wall motion abnormalities.  No significant mitral regurgitation noted.    LVEDP: 14 mmHg  No gradient across the aortic valve on pull back.            Final Impression:  CAD LAD/Dx bifurcation lesion cool 1,1,1 with LAD just after takeoff of Dx had 80%, Dx itself has 90% instent restenosis, diffuse type3, and also severe 90% stenosis just distal to stent edge, and LAD before Dx also had 70% stenosis, mid LAD beyond this bifurcation also had 60-70% tubular stenosis. OM1 branch also had proximal 50-60% stenosis, and Native lcx after OM1 branch is small artery with mild diffuse disease. RCA Proximal had 40% , mid 40% and distal mild 20% diffuse stenosis, PDA ostial had diffuse 40%, PL no significant stenosis.  Normal LV systolic function      Recommendations:  Pt has complex  LAD /Dx bifurcation lesion with severe in stent restenosis of D1, discussed with Interventionalist at [facility] regarding PCI options vs CABG for better long term patency, pt initially was not interested in CABG but after explaining her complexity of lesions she was open to , but wanted to have another opinion, so we will refer to Dr Marcus at [facility] and have her discuss about the options.  She is not having more than CCS II angina now on medications, will continue with same.          Lazaro Conway MD, [facility]  Interventional Cardiology    12/30/19  8:49 PM      ECG 12 Lead    Date/Time: 2/23/2022 2:51 PM  Performed by: Lazaro Conway MD  Authorized by: Lazaro Conway MD   Comparison: compared with previous ECG   Similar to previous ECG  Rhythm: sinus rhythm  Rate: normal    Clinical impression: normal ECG             Lab Results   Component Value Date    CHOL 230 (H) 12/28/2021    CHOL 153 11/17/2020    CHOL 116 03/02/2020    CHLPL 215 (H) 04/11/2016     Lab Results   Component Value Date    TRIG 303 (H) 12/28/2021    TRIG 146 11/17/2020    TRIG 104 03/02/2020     Lab Results   Component Value Date    HDL 47 12/28/2021    HDL 66 (H) 11/17/2020    HDL 48 03/02/2020     Lab Results   Component Value Date     (H) 12/28/2021    LDL 62 11/17/2020    LDL 47 03/02/2020       Lab Results   Component Value Date    TSH 0.649 12/28/2021               Invalid input(s): LABALBU, PROT        Assessment and Plan     Diagnosis Plan   1. Atypical angina (HCC)  Stress Test With Myocardial Perfusion (1 Day)    Comprehensive Metabolic Panel   2. Mixed hyperlipidemia     3. Essential hypertension     4. Coronary artery disease involving native coronary artery of native heart without angina pectoris     5. S/P CABG (coronary artery bypass graft)     6. PAF (paroxysmal atrial fibrillation) ([facility]ly [facility])             Recommended increase activity to 30 minutes of walking daily, most days of the week    Thank you for  allowing me to participate in the care of Veda Enamorado. Feel free to contact me directly with any further questions or concerns.          Lazaro Conway MD, FACC  Interventional Cardiology

## 2022-02-24 ENCOUNTER — LAB (OUTPATIENT)
Dept: LAB | Facility: HOSPITAL | Age: 57
End: 2022-02-24

## 2022-02-24 ENCOUNTER — HOSPITAL ENCOUNTER (OUTPATIENT)
Dept: CARDIOLOGY | Facility: HOSPITAL | Age: 57
Discharge: HOME | End: 2022-02-24

## 2022-02-24 ENCOUNTER — HOSPITAL ENCOUNTER (OUTPATIENT)
Dept: NUCLEAR MEDICINE | Facility: HOSPITAL | Age: 57
Discharge: HOME | End: 2022-02-24

## 2022-02-24 DIAGNOSIS — I20.8 ATYPICAL ANGINA: ICD-10-CM

## 2022-02-24 DIAGNOSIS — I25.119 CORONARY ARTERY DISEASE INVOLVING NATIVE CORONARY ARTERY OF NATIVE HEART WITH ANGINA PECTORIS: ICD-10-CM

## 2022-02-24 LAB
ALBUMIN SERPL-MCNC: 4 G/DL (ref 3.5–5.2)
ALBUMIN/GLOB SERPL: 1.4 G/DL
ALP SERPL-CCNC: 133 U/L (ref 39–117)
ALT SERPL W P-5'-P-CCNC: <5 U/L (ref 1–33)
ANION GAP SERPL CALCULATED.3IONS-SCNC: 10 MMOL/L (ref 5–15)
AST SERPL-CCNC: 14 U/L (ref 1–32)
BH CV NUCLEAR PRIOR STUDY: 3
BH CV REST NUCLEAR ISOTOPE DOSE: 10.5 MCI
BH CV STRESS BP STAGE 1: NORMAL
BH CV STRESS BP STAGE 2: NORMAL
BH CV STRESS COMMENTS STAGE 1: NORMAL
BH CV STRESS COMMENTS STAGE 2: NORMAL
BH CV STRESS DOSE REGADENOSON STAGE 1: 0.4
BH CV STRESS DURATION MIN STAGE 1: 0
BH CV STRESS DURATION MIN STAGE 2: 4
BH CV STRESS DURATION SEC STAGE 1: 10
BH CV STRESS DURATION SEC STAGE 2: 0
BH CV STRESS HR STAGE 1: 88
BH CV STRESS HR STAGE 2: 70
BH CV STRESS NUCLEAR ISOTOPE DOSE: 30.6 MCI
BH CV STRESS PROTOCOL 1: NORMAL
BH CV STRESS RECOVERY BP: NORMAL MMHG
BH CV STRESS RECOVERY HR: 70 BPM
BH CV STRESS STAGE 1: 1
BH CV STRESS STAGE 2: 2
BILIRUB SERPL-MCNC: 0.2 MG/DL (ref 0–1.2)
BUN SERPL-MCNC: 7 MG/DL (ref 6–20)
BUN/CREAT SERPL: 8.3 (ref 7–25)
CALCIUM SPEC-SCNC: 8.8 MG/DL (ref 8.6–10.5)
CHLORIDE SERPL-SCNC: 106 MMOL/L (ref 98–107)
CHOLEST SERPL-MCNC: 102 MG/DL (ref 0–200)
CO2 SERPL-SCNC: 26 MMOL/L (ref 22–29)
CREAT SERPL-MCNC: 0.84 MG/DL (ref 0.57–1)
GFR SERPL CREATININE-BSD FRML MDRD: 70 ML/MIN/1.73
GLOBULIN UR ELPH-MCNC: 2.9 GM/DL
GLUCOSE SERPL-MCNC: 83 MG/DL (ref 65–99)
HDLC SERPL-MCNC: 50 MG/DL (ref 40–60)
LDLC SERPL CALC-MCNC: 30 MG/DL (ref 0–100)
LDLC/HDLC SERPL: 0.56 {RATIO}
MAXIMAL PREDICTED HEART RATE: 164 BPM
PERCENT MAX PREDICTED HR: 53.66 %
POTASSIUM SERPL-SCNC: 3.9 MMOL/L (ref 3.5–5.2)
PROT SERPL-MCNC: 6.9 G/DL (ref 6–8.5)
SODIUM SERPL-SCNC: 142 MMOL/L (ref 136–145)
STRESS BASELINE BP: NORMAL MMHG
STRESS BASELINE HR: 64 BPM
STRESS PERCENT HR: 63 %
STRESS POST PEAK BP: NORMAL MMHG
STRESS POST PEAK HR: 88 BPM
STRESS TARGET HR: 139 BPM
TRIGL SERPL-MCNC: 121 MG/DL (ref 0–150)
VLDLC SERPL-MCNC: 22 MG/DL (ref 5–40)

## 2022-02-24 PROCEDURE — 0 TECHNETIUM SESTAMIBI: Performed by: INTERNAL MEDICINE

## 2022-02-24 PROCEDURE — 36415 COLL VENOUS BLD VENIPUNCTURE: CPT

## 2022-02-24 PROCEDURE — 78452 HT MUSCLE IMAGE SPECT MULT: CPT | Performed by: INTERNAL MEDICINE

## 2022-02-24 PROCEDURE — 93018 CV STRESS TEST I&R ONLY: CPT | Performed by: INTERNAL MEDICINE

## 2022-02-24 PROCEDURE — 80053 COMPREHEN METABOLIC PANEL: CPT

## 2022-02-24 PROCEDURE — 80061 LIPID PANEL: CPT

## 2022-02-24 PROCEDURE — A9500 TC99M SESTAMIBI: HCPCS | Performed by: INTERNAL MEDICINE

## 2022-02-24 PROCEDURE — 78452 HT MUSCLE IMAGE SPECT MULT: CPT

## 2022-02-24 PROCEDURE — 25010000002 REGADENOSON 0.4 MG/5ML SOLUTION: Performed by: INTERNAL MEDICINE

## 2022-02-24 PROCEDURE — 93017 CV STRESS TEST TRACING ONLY: CPT

## 2022-02-24 RX ADMIN — REGADENOSON 0.4 MG: 0.08 INJECTION, SOLUTION INTRAVENOUS at 08:54

## 2022-02-24 RX ADMIN — TECHNETIUM TC 99M SESTAMIBI 1 DOSE: 1 INJECTION INTRAVENOUS at 08:54

## 2022-02-24 RX ADMIN — TECHNETIUM TC 99M SESTAMIBI 1 DOSE: 1 INJECTION INTRAVENOUS at 08:34

## 2022-03-02 ENCOUNTER — TELEPHONE (OUTPATIENT)
Dept: CARDIOLOGY | Facility: CLINIC | Age: 57
End: 2022-03-02

## 2022-03-02 DIAGNOSIS — I25.110 CORONARY ARTERY DISEASE INVOLVING NATIVE CORONARY ARTERY OF NATIVE HEART WITH UNSTABLE ANGINA PECTORIS: Primary | ICD-10-CM

## 2022-03-02 NOTE — TELEPHONE ENCOUNTER
Spoke with patient about results. She is wanting cath.    ----- Message from Lazaro Conway MD sent at 3/1/2022  9:45 AM EST -----  Please call the patient rand tell her stress test was mildly abnormal, if she still has symptoms we can consider repeating a cath and see how the grafts are.

## 2022-03-07 ENCOUNTER — LAB (OUTPATIENT)
Dept: LAB | Facility: HOSPITAL | Age: 57
End: 2022-03-07

## 2022-03-07 DIAGNOSIS — I25.110 CORONARY ARTERY DISEASE INVOLVING NATIVE CORONARY ARTERY OF NATIVE HEART WITH UNSTABLE ANGINA PECTORIS: ICD-10-CM

## 2022-03-07 LAB — SARS-COV-2 RNA PNL SPEC NAA+PROBE: NOT DETECTED

## 2022-03-07 PROCEDURE — U0004 COV-19 TEST NON-CDC HGH THRU: HCPCS | Performed by: INTERNAL MEDICINE

## 2022-03-07 PROCEDURE — C9803 HOPD COVID-19 SPEC COLLECT: HCPCS | Performed by: INTERNAL MEDICINE

## 2022-03-09 ENCOUNTER — HOSPITAL ENCOUNTER (OUTPATIENT)
Facility: HOSPITAL | Age: 57
Setting detail: HOSPITAL OUTPATIENT SURGERY
Discharge: HOME | End: 2022-03-09
Attending: INTERNAL MEDICINE | Admitting: INTERNAL MEDICINE

## 2022-03-09 VITALS
SYSTOLIC BLOOD PRESSURE: 129 MMHG | HEART RATE: 60 BPM | HEIGHT: 62 IN | OXYGEN SATURATION: 98 % | WEIGHT: 154 LBS | RESPIRATION RATE: 16 BRPM | TEMPERATURE: 98.3 F | DIASTOLIC BLOOD PRESSURE: 68 MMHG | BODY MASS INDEX: 28.34 KG/M2

## 2022-03-09 DIAGNOSIS — I25.110 CORONARY ARTERY DISEASE INVOLVING NATIVE CORONARY ARTERY OF NATIVE HEART WITH UNSTABLE ANGINA PECTORIS: ICD-10-CM

## 2022-03-09 DIAGNOSIS — Z95.1 S/P CABG (CORONARY ARTERY BYPASS GRAFT): Primary | ICD-10-CM

## 2022-03-09 LAB
ANION GAP SERPL CALCULATED.3IONS-SCNC: 8.9 MMOL/L (ref 5–15)
BUN SERPL-MCNC: 10 MG/DL (ref 6–20)
BUN/CREAT SERPL: 12.8 (ref 7–25)
CALCIUM SPEC-SCNC: 9.2 MG/DL (ref 8.6–10.5)
CHLORIDE SERPL-SCNC: 107 MMOL/L (ref 98–107)
CO2 SERPL-SCNC: 25.1 MMOL/L (ref 22–29)
CREAT SERPL-MCNC: 0.78 MG/DL (ref 0.57–1)
DEPRECATED RDW RBC AUTO: 46.3 FL (ref 37–54)
EGFRCR SERPLBLD CKD-EPI 2021: 89.3 ML/MIN/1.73
ERYTHROCYTE [DISTWIDTH] IN BLOOD BY AUTOMATED COUNT: 13.5 % (ref 12.3–15.4)
GLUCOSE SERPL-MCNC: 102 MG/DL (ref 65–99)
HCT VFR BLD AUTO: 38.5 % (ref 34–46.6)
HGB BLD-MCNC: 11.9 G/DL (ref 12–15.9)
MCH RBC QN AUTO: 28.9 PG (ref 26.6–33)
MCHC RBC AUTO-ENTMCNC: 30.9 G/DL (ref 31.5–35.7)
MCV RBC AUTO: 93.4 FL (ref 79–97)
PLATELET # BLD AUTO: 300 10*3/MM3 (ref 140–450)
PMV BLD AUTO: 10.1 FL (ref 6–12)
POTASSIUM SERPL-SCNC: 4 MMOL/L (ref 3.5–5.2)
RBC # BLD AUTO: 4.12 10*6/MM3 (ref 3.77–5.28)
SODIUM SERPL-SCNC: 141 MMOL/L (ref 136–145)
WBC NRBC COR # BLD: 8.93 10*3/MM3 (ref 3.4–10.8)

## 2022-03-09 PROCEDURE — 93458 L HRT ARTERY/VENTRICLE ANGIO: CPT | Performed by: INTERNAL MEDICINE

## 2022-03-09 PROCEDURE — C1769 GUIDE WIRE: HCPCS | Performed by: INTERNAL MEDICINE

## 2022-03-09 PROCEDURE — 99153 MOD SED SAME PHYS/QHP EA: CPT | Performed by: INTERNAL MEDICINE

## 2022-03-09 PROCEDURE — 25010000002 FENTANYL CITRATE (PF) 50 MCG/ML SOLUTION: Performed by: INTERNAL MEDICINE

## 2022-03-09 PROCEDURE — 93459 L HRT ART/GRFT ANGIO: CPT | Performed by: INTERNAL MEDICINE

## 2022-03-09 PROCEDURE — C1894 INTRO/SHEATH, NON-LASER: HCPCS | Performed by: INTERNAL MEDICINE

## 2022-03-09 PROCEDURE — 99152 MOD SED SAME PHYS/QHP 5/>YRS: CPT | Performed by: INTERNAL MEDICINE

## 2022-03-09 PROCEDURE — C1760 CLOSURE DEV, VASC: HCPCS | Performed by: INTERNAL MEDICINE

## 2022-03-09 PROCEDURE — 25010000002 MIDAZOLAM PER 1 MG: Performed by: INTERNAL MEDICINE

## 2022-03-09 PROCEDURE — 80048 BASIC METABOLIC PNL TOTAL CA: CPT | Performed by: INTERNAL MEDICINE

## 2022-03-09 PROCEDURE — 85027 COMPLETE CBC AUTOMATED: CPT | Performed by: INTERNAL MEDICINE

## 2022-03-09 PROCEDURE — 0 IOPAMIDOL PER 1 ML: Performed by: INTERNAL MEDICINE

## 2022-03-09 RX ORDER — SODIUM CHLORIDE 9 MG/ML
INJECTION, SOLUTION INTRAVENOUS CONTINUOUS PRN
Status: DISCONTINUED | OUTPATIENT
Start: 2022-03-09 | End: 2022-03-09 | Stop reason: HOSPADM

## 2022-03-09 RX ORDER — MIDAZOLAM HYDROCHLORIDE 1 MG/ML
INJECTION INTRAMUSCULAR; INTRAVENOUS AS NEEDED
Status: DISCONTINUED | OUTPATIENT
Start: 2022-03-09 | End: 2022-03-09 | Stop reason: HOSPADM

## 2022-03-09 RX ORDER — FENTANYL CITRATE 50 UG/ML
INJECTION, SOLUTION INTRAMUSCULAR; INTRAVENOUS AS NEEDED
Status: DISCONTINUED | OUTPATIENT
Start: 2022-03-09 | End: 2022-03-09 | Stop reason: HOSPADM

## 2022-03-09 RX ORDER — LIDOCAINE HYDROCHLORIDE 20 MG/ML
INJECTION, SOLUTION INFILTRATION; PERINEURAL AS NEEDED
Status: DISCONTINUED | OUTPATIENT
Start: 2022-03-09 | End: 2022-03-09 | Stop reason: HOSPADM

## 2022-03-09 RX ORDER — RANOLAZINE 500 MG/1
500 TABLET, EXTENDED RELEASE ORAL 2 TIMES DAILY
Qty: 60 TABLET | Refills: 3 | Status: SHIPPED | OUTPATIENT
Start: 2022-03-09 | End: 2022-03-09

## 2022-03-09 RX ORDER — LISINOPRIL 5 MG/1
5 TABLET ORAL DAILY
Qty: 30 TABLET | Refills: 11 | Status: SHIPPED | OUTPATIENT
Start: 2022-03-09 | End: 2022-06-14

## 2022-03-09 RX ORDER — SODIUM CHLORIDE 9 MG/ML
100 INJECTION, SOLUTION INTRAVENOUS CONTINUOUS
Status: DISCONTINUED | OUTPATIENT
Start: 2022-03-09 | End: 2022-03-09 | Stop reason: HOSPADM

## 2022-03-09 NOTE — INTERVAL H&P NOTE
H&P reviewed. The patient was examined and there are no changes to the H&P.    Pt had 3v CABG with LIMA-LAD, SVG- D1 and OM1, presenting with similar anginal pain progressively worsening with abnormal MPI showing inferolateral ischemia.   I have explained the risks associated with the procedure to the patient including but not limited to an allergic reaction to the contrast material or medications used during the procedure bleeding, infection, and bruising at the catheter insertion site blood clots, which may trigger heart attack, stroke,   damage to the artery where the catheter was inserted, or damage to the arteries as the catheter travels through your body, irregular heart rhythm arrhythmias, kidney damage caused by the contrast material.

## 2022-03-09 NOTE — DISCHARGE INSTR - ACTIVITY
No driving for 24hours   No tub baths/ Hot tubs, No lotions/powders/ointments for 3-5 days( Or until site heals)   No heavy lifting, bending at waist, vigorous activity, climbing stairs for the next few days.

## 2022-04-26 ENCOUNTER — SPECIALTY PHARMACY (OUTPATIENT)
Dept: PHARMACY | Facility: HOSPITAL | Age: 57
End: 2022-04-26

## 2022-04-26 NOTE — PROGRESS NOTES
Specialty Pharmacy Refill Coordination Note      Name:  Veda Enamorado  :  1965  Date:  2022         Past Medical History:   Diagnosis Date   • Arthritis     back   • Back ache    • Chronic back pain    • Coronary artery disease     s/p 3 stents    • GERD (gastroesophageal reflux disease)    • History of MRSA infection 2020    labia; hospitalized 6 days on IV antibiotics and then went home on po antibiotics    • Hyperlipidemia    • Hypertension    • Peptic ulceration        Past Surgical History:   Procedure Laterality Date   • CARDIAC CATHETERIZATION     • CARDIAC CATHETERIZATION N/A 2019    Procedure: Left Heart Cath;  Surgeon: Lazaro Conway MD;  Location: [facility] CATH INVASIVE LOCATION;  Service: Cardiology   • CARDIAC CATHETERIZATION     • CARDIAC CATHETERIZATION N/A 3/9/2022    Procedure: Left Heart Cath;  Surgeon: Lazaro Conway MD;  Location: [facility] INVASIVE LOCATION;  Service: Cardiology;  Laterality: N/A;   • CARDIAC SURGERY     •  SECTION      x2   • CORONARY ARTERY BYPASS GRAFT N/A 2020    Procedure: MEDIAN STERNOTOMY, CORONARY ARTERY BYPASS X3 WITH  INTERNAL MAMMARY ARTERY GRAFTING, ENDOVASCULAR VEIN HARVESTING OF THE RIGHT GREATER SAPHENOUS VEIN, MAICOL PER ANESTHESIA;  Surgeon: Juanjo Coronado MD;  Location: [facility];  Service: Cardiothoracic;  Laterality: N/A;  ST ON LE  VO: 1800  VR: 1814   • HAND SURGERY      right   • PERINEAL LESION/CYST EXCISION Right 2020    Procedure: I&D ABSCESS;  Surgeon: Leander Cardenas III, MD;  Location: [facility];  Service: Obstetrics/Gynecology       Social History     Socioeconomic History   • Marital status:    • Number of children: 2   Tobacco Use   • Smoking status: Former Smoker     Types: Cigarettes     Quit date: 2020     Years since quittin.7   • Smokeless tobacco: Never Used   Vaping Use   • Vaping Use: Former   Substance and Sexual Activity   • Alcohol use: No   •  Drug use: No   • Sexual activity: Defer       Family History   Problem Relation Age of Onset   • Heart disease Mother    • Coronary artery disease Mother    • Heart disease Father    • Heart attack Father    • Hypertension Father    • Stroke Father    • No Known Problems Sister    • Heart attack Brother    • Heart disease Brother    • Heart disease Maternal Grandmother    • Heart attack Maternal Grandmother    • No Known Problems Maternal Grandfather    • Stroke Paternal Grandmother    • Breast cancer Neg Hx        Allergies   Allergen Reactions   • Bactrim [Sulfamethoxazole-Trimethoprim] Rash   • Ciprofloxacin Other (See Comments)     tremors   • Ranexa [Ranolazine Er] Unknown - Low Severity       Current Outpatient Medications   Medication Sig Dispense Refill   • Alirocumab (Praluent) 150 MG/ML injection pen Inject 2 mL under the skin into the appropriate area as directed Every 28 (Twenty-Eight) Days. 2 mL 5   • Aspirin Low Dose 81 MG EC tablet Take 81 mg by mouth Daily.     • Eliquis 5 MG tablet tablet TAKE 1 TABLET BY MOUTH 2 (TWO) TIMES A DAY FOR 30 DAYS. 60 tablet 5   • HYDROcodone-acetaminophen (NORCO) 7.5-325 MG per tablet Take 1 tablet by mouth Every 6 (Six) Hours As Needed for Pain . 30 tablet 0   • isosorbide mononitrate (IMDUR) 30 MG 24 hr tablet Take 2 tablets by mouth Every Night. 30 tablet 11   • lisinopril (PRINIVIL,ZESTRIL) 5 MG tablet Take 1 tablet by mouth Daily. 30 tablet 11   • loratadine (CLARITIN) 10 MG tablet Take 10 mg by mouth As Needed.     • metoprolol tartrate (LOPRESSOR) 25 MG tablet Take 1 tablet by mouth 2 (Two) Times a Day. 30 tablet 11   • nitroglycerin (NITROSTAT) 0.4 MG SL tablet TAKE 1 AT ONSET OF CHEST PAIN,MAY RPT IN 5MIN, MAX 3 DOSES IN 24 HRS 25 tablet 11   • pravastatin (PRAVACHOL) 20 MG tablet TAKE ONE TABLET BY MOUTH DAILY (Patient taking differently: Take 20 mg by mouth Daily.) 30 tablet 3   • spironolactone (ALDACTONE) 25 MG tablet Take 0.5 tablets by mouth Daily. 90  tablet 3   • traMADol (ULTRAM) 50 MG tablet Take 50 mg by mouth Every 6 (Six) Hours As Needed.       No current facility-administered medications for this visit.     Facility-Administered Medications Ordered in Other Visits   Medication Dose Route Frequency Provider Last Rate Last Admin   • Chlorhexidine Gluconate Cloth 2 % pads 1 application  1 application Topical Q12H PRN Bianka Ramírez APRN             ASSESSMENT/PLAN:      Veda is a 57 y.o. female contacted today regarding refills of  Praluent 150ml specialty medication(s).    Reviewed and verified with patient:         Specialty medication(s) and dose(s) confirmed: yes    Refill Questions    Flowsheet Row Most Recent Value   Changes to allergies? No   Changes to medications? No   New conditions since last clinic visit No   Unplanned office visit, urgent care, ED, or hospital admission in the last 4 weeks  No   How does patient/caregiver feel medication is working? Very good   Financial problems or insurance changes  No   If yes, describe changes in insurance or financial issues. None   How many doses of your specialty medications were missed in the last 4 weeks? 0   Why were doses missed? NA   Does this patient require a clinical escalation to a pharmacist? No                     Follow-up: 28 day(s)     Merissa Vazquez, Pharmacy Technician  Specialty Pharmacy Technician

## 2022-05-26 ENCOUNTER — HOSPITAL ENCOUNTER (OUTPATIENT)
Dept: GENERAL RADIOLOGY | Facility: HOSPITAL | Age: 57
Discharge: HOME | End: 2022-05-26
Admitting: NURSE PRACTITIONER

## 2022-05-26 ENCOUNTER — TRANSCRIBE ORDERS (OUTPATIENT)
Dept: ADMINISTRATIVE | Facility: HOSPITAL | Age: 57
End: 2022-05-26

## 2022-05-26 DIAGNOSIS — M79.671 RIGHT FOOT PAIN: Primary | ICD-10-CM

## 2022-05-26 DIAGNOSIS — M79.671 RIGHT FOOT PAIN: ICD-10-CM

## 2022-05-26 PROCEDURE — 73630 X-RAY EXAM OF FOOT: CPT

## 2022-05-26 PROCEDURE — 73630 X-RAY EXAM OF FOOT: CPT | Performed by: RADIOLOGY

## 2022-05-31 ENCOUNTER — SPECIALTY PHARMACY (OUTPATIENT)
Dept: PHARMACY | Facility: HOSPITAL | Age: 57
End: 2022-05-31

## 2022-06-11 ENCOUNTER — HOSPITAL ENCOUNTER (EMERGENCY)
Facility: HOSPITAL | Age: 57
Discharge: HOME | End: 2022-06-11
Attending: EMERGENCY MEDICINE | Admitting: EMERGENCY MEDICINE

## 2022-06-11 VITALS
BODY MASS INDEX: 26.13 KG/M2 | WEIGHT: 142 LBS | RESPIRATION RATE: 16 BRPM | TEMPERATURE: 98 F | SYSTOLIC BLOOD PRESSURE: 135 MMHG | HEIGHT: 62 IN | OXYGEN SATURATION: 98 % | HEART RATE: 71 BPM | DIASTOLIC BLOOD PRESSURE: 75 MMHG

## 2022-06-11 DIAGNOSIS — S61.209A AVULSION OF SKIN OF FINGER, INITIAL ENCOUNTER: ICD-10-CM

## 2022-06-11 DIAGNOSIS — T14.8XXA ANIMAL BITE: Primary | ICD-10-CM

## 2022-06-11 PROCEDURE — 90715 TDAP VACCINE 7 YRS/> IM: CPT | Performed by: PHYSICIAN ASSISTANT

## 2022-06-11 PROCEDURE — 90471 IMMUNIZATION ADMIN: CPT | Performed by: PHYSICIAN ASSISTANT

## 2022-06-11 PROCEDURE — 99283 EMERGENCY DEPT VISIT LOW MDM: CPT

## 2022-06-11 PROCEDURE — 25010000002 TETANUS-DIPHTH-ACELL PERTUSSIS 5-2.5-18.5 LF-MCG/0.5 SUSPENSION PREFILLED SYRINGE: Performed by: PHYSICIAN ASSISTANT

## 2022-06-11 RX ORDER — AMOXICILLIN AND CLAVULANATE POTASSIUM 875; 125 MG/1; MG/1
1 TABLET, FILM COATED ORAL 2 TIMES DAILY
Qty: 20 TABLET | Refills: 0 | Status: SHIPPED | OUTPATIENT
Start: 2022-06-11 | End: 2022-06-29

## 2022-06-11 RX ORDER — AMOXICILLIN AND CLAVULANATE POTASSIUM 875; 125 MG/1; MG/1
1 TABLET, FILM COATED ORAL ONCE
Status: COMPLETED | OUTPATIENT
Start: 2022-06-11 | End: 2022-06-11

## 2022-06-11 RX ADMIN — TETANUS TOXOID, REDUCED DIPHTHERIA TOXOID AND ACELLULAR PERTUSSIS VACCINE, ADSORBED 0.5 ML: 5; 2.5; 8; 8; 2.5 SUSPENSION INTRAMUSCULAR at 16:30

## 2022-06-11 RX ADMIN — AMOXICILLIN AND CLAVULANATE POTASSIUM 1 TABLET: 875; 125 TABLET, FILM COATED ORAL at 16:30

## 2022-06-11 NOTE — ED PROVIDER NOTES
Subjective   Patient is a 57-year-old female with hypertension, hyperlipidemia, GERD, coronary artery disease, and arthritis that presents to the ED after her cat bit her just prior to arrival.  She states that her cat was on the front porch when she went to pick it up and it bit her right middle finger.  She states she was able to control the bleeding with direct pressure.  She states he is unsure when her last tetanus shot was given.  She states that the cat is not vaccinated but she does not wish to have the rabies vaccination.  She denies numbness, tingling, or loss of range of motion.      History provided by:  Patient   used: No        Review of Systems   Constitutional: Negative.  Negative for chills, diaphoresis, fatigue and fever.   HENT: Negative.  Negative for congestion, drooling, ear discharge, ear pain, facial swelling, postnasal drip, rhinorrhea, sinus pressure, sinus pain, sneezing, sore throat, tinnitus and trouble swallowing.    Eyes: Negative.  Negative for photophobia, pain, discharge, redness and itching.   Respiratory: Negative.  Negative for cough, shortness of breath and wheezing.    Cardiovascular: Negative.  Negative for chest pain.   Gastrointestinal: Negative.  Negative for abdominal distention, abdominal pain, constipation, diarrhea and nausea.   Endocrine: Negative.    Genitourinary: Negative.  Negative for difficulty urinating, dysuria, flank pain and frequency.   Musculoskeletal: Positive for arthralgias. Negative for back pain, gait problem, joint swelling, myalgias, neck pain and neck stiffness.   Skin: Positive for wound.   Neurological: Negative.  Negative for dizziness, tremors, seizures, syncope, facial asymmetry, speech difficulty, weakness, light-headedness, numbness and headaches.   Psychiatric/Behavioral: Negative.  Negative for confusion.   All other systems reviewed and are negative.      Past Medical History:   Diagnosis Date   • Arthritis     back   •  Back ache    • Chronic back pain    • Coronary artery disease     s/p 3 stents    • GERD (gastroesophageal reflux disease)    • History of MRSA infection 2020    labia; hospitalized 6 days on IV antibiotics and then went home on po antibiotics    • Hyperlipidemia    • Hypertension    • Peptic ulceration        Allergies   Allergen Reactions   • Bactrim [Sulfamethoxazole-Trimethoprim] Rash   • Ciprofloxacin Other (See Comments)     tremors   • Ranexa [Ranolazine Er] Unknown - Low Severity       Past Surgical History:   Procedure Laterality Date   • CARDIAC CATHETERIZATION     • CARDIAC CATHETERIZATION N/A 2019    Procedure: Left Heart Cath;  Surgeon: Lazaro Conway MD;  Location: [facility] CATH INVASIVE LOCATION;  Service: Cardiology   • CARDIAC CATHETERIZATION     • CARDIAC CATHETERIZATION N/A 3/9/2022    Procedure: Left Heart Cath;  Surgeon: Lazaro Conway MD;  Location: [facility] CATH INVASIVE LOCATION;  Service: Cardiology;  Laterality: N/A;   • CARDIAC SURGERY     •  SECTION      x2   • CORONARY ARTERY BYPASS GRAFT N/A 2020    Procedure: MEDIAN STERNOTOMY, CORONARY ARTERY BYPASS X3 WITH  INTERNAL MAMMARY ARTERY GRAFTING, ENDOVASCULAR VEIN HARVESTING OF THE RIGHT GREATER SAPHENOUS VEIN, MAICOL PER ANESTHESIA;  Surgeon: Juanjo Coronado MD;  Location: [facility];  Service: Cardiothoracic;  Laterality: N/A;  ST ON LE  VO: 1800  VR: 1814   • HAND SURGERY      right   • PERINEAL LESION/CYST EXCISION Right 2020    Procedure: I&D ABSCESS;  Surgeon: Leander Cardenas III, MD;  Location: [facility];  Service: Obstetrics/Gynecology       Family History   Problem Relation Age of Onset   • Heart disease Mother    • Coronary artery disease Mother    • Heart disease Father    • Heart attack Father    • Hypertension Father    • Stroke Father    • No Known Problems Sister    • Heart attack Brother    • Heart disease Brother    • Heart disease Maternal Grandmother    • Heart attack  Maternal Grandmother    • No Known Problems Maternal Grandfather    • Stroke Paternal Grandmother    • Breast cancer Neg Hx        Social History     Socioeconomic History   • Marital status:    • Number of children: 2   Tobacco Use   • Smoking status: Former Smoker     Types: Cigarettes     Quit date: 2020     Years since quittin.8   • Smokeless tobacco: Never Used   Vaping Use   • Vaping Use: Former   Substance and Sexual Activity   • Alcohol use: No   • Drug use: No   • Sexual activity: Defer           Objective   Physical Exam  Vitals and nursing note reviewed.   Constitutional:       General: She is not in acute distress.     Appearance: She is well-developed. She is not diaphoretic.   HENT:      Head: Normocephalic and atraumatic.      Right Ear: External ear normal.      Left Ear: External ear normal.      Nose: Nose normal.      Mouth/Throat:      Mouth: Mucous membranes are moist.      Pharynx: Oropharynx is clear.   Eyes:      Extraocular Movements: Extraocular movements intact.      Conjunctiva/sclera: Conjunctivae normal.      Pupils: Pupils are equal, round, and reactive to light.   Neck:      Vascular: No JVD.      Trachea: No tracheal deviation.   Cardiovascular:      Rate and Rhythm: Normal rate and regular rhythm.      Pulses: Normal pulses.      Heart sounds: Normal heart sounds. No murmur heard.  Pulmonary:      Effort: Pulmonary effort is normal. No respiratory distress.      Breath sounds: Normal breath sounds. No wheezing.   Abdominal:      General: Bowel sounds are normal. There is no distension.      Palpations: Abdomen is soft.      Tenderness: There is no abdominal tenderness. There is no guarding or rebound.   Musculoskeletal:         General: No deformity. Normal range of motion.      Cervical back: Normal range of motion and neck supple.      Right lower leg: No edema.      Left lower leg: No edema.   Skin:     General: Skin is warm and dry.      Coloration: Skin is not  pale.      Findings: Laceration present. No erythema or rash.      Comments: Superficial skin avulsion along fat pad of right index finger   Neurological:      General: No focal deficit present.      Mental Status: She is alert and oriented to person, place, and time.      Cranial Nerves: No cranial nerve deficit.   Psychiatric:         Behavior: Behavior normal.         Thought Content: Thought content normal.         Procedures           ED Course  ED Course as of 06/11/22 1835   Sat Jun 11, 2022 1834 Discussed plan of care with patient.  Advised if symptoms worsen return to ED.  Advise close follow-up with PCP. [MH]      ED Course User Index  [MH] Josefina Choudhary PA-C                                                 [facility]    Final diagnoses:   Animal bite   Avulsion of skin of finger, initial encounter       ED Disposition  ED Disposition     ED Disposition   Discharge    Condition   Stable    Comment   --             Chiquita Choudhary, APRN  [address]  [address] [address]  [phone]    Schedule an appointment as soon as possible for a visit in 1 day           Medication List      New Prescriptions    amoxicillin-clavulanate 875-125 MG per tablet  Commonly known as: AUGMENTIN  Take 1 tablet by mouth 2 (Two) Times a Day for 10 days.           Where to Get Your Medications      You can get these medications from any pharmacy    Bring a paper prescription for each of these medications  · amoxicillin-clavulanate 875-125 MG per tablet          Josefina Choudhary PA-C  06/11/22 1835

## 2022-06-14 ENCOUNTER — OFFICE VISIT (OUTPATIENT)
Dept: CARDIOLOGY | Facility: CLINIC | Age: 57
End: 2022-06-14

## 2022-06-14 VITALS
DIASTOLIC BLOOD PRESSURE: 69 MMHG | OXYGEN SATURATION: 99 % | HEART RATE: 78 BPM | BODY MASS INDEX: 26.65 KG/M2 | WEIGHT: 144.8 LBS | SYSTOLIC BLOOD PRESSURE: 119 MMHG | HEIGHT: 62 IN

## 2022-06-14 DIAGNOSIS — I48.0 PAF (PAROXYSMAL ATRIAL FIBRILLATION): ICD-10-CM

## 2022-06-14 DIAGNOSIS — I10 ESSENTIAL HYPERTENSION: Chronic | ICD-10-CM

## 2022-06-14 DIAGNOSIS — Z95.1 S/P CABG (CORONARY ARTERY BYPASS GRAFT): Primary | ICD-10-CM

## 2022-06-14 DIAGNOSIS — E78.2 MIXED HYPERLIPIDEMIA: ICD-10-CM

## 2022-06-14 DIAGNOSIS — I25.10 CORONARY ARTERY DISEASE INVOLVING NATIVE CORONARY ARTERY OF NATIVE HEART WITHOUT ANGINA PECTORIS: Chronic | ICD-10-CM

## 2022-06-14 PROCEDURE — 99214 OFFICE O/P EST MOD 30 MIN: CPT | Performed by: INTERNAL MEDICINE

## 2022-06-14 RX ORDER — LISINOPRIL 10 MG/1
10 TABLET ORAL DAILY
Qty: 90 TABLET | Refills: 3 | Status: SHIPPED | OUTPATIENT
Start: 2022-06-14 | End: 2022-09-12 | Stop reason: SDUPTHER

## 2022-06-14 NOTE — PROGRESS NOTES
[facility] CARDIOLOGY  [address]  [place] [address]  Phone: [phone]  Fax: [phone]    2022    Chief Complaint   Patient presents with   • Follow-up     3MO F/U, med adjustment, left arm pain   • Med Management     verbally        History:     Veda Enamorado is a 57 y.o. female presenting for  follow-up evaluation   Clinically stable from cardiac standpoint   Symptoms:  CP no  SOB no    Orthopnea No  Lower extremity edema no   Palpitations no   Compliant with medications YES  Claudication no      Past Medical History:   Diagnosis Date   • Arthritis     back   • Back ache    • Chronic back pain    • Coronary artery disease     s/p 3 stents    • GERD (gastroesophageal reflux disease)    • History of MRSA infection 2020    labia; hospitalized 6 days on IV antibiotics and then went home on po antibiotics    • Hyperlipidemia    • Hypertension    • Peptic ulceration        Past Surgical History:   Procedure Laterality Date   • CARDIAC CATHETERIZATION     • CARDIAC CATHETERIZATION N/A 2019    Procedure: Left Heart Cath;  Surgeon: Lazaro Conway MD;  Location:  [facility] INVASIVE LOCATION;  Service: Cardiology   • CARDIAC CATHETERIZATION     • CARDIAC CATHETERIZATION N/A 3/9/2022    Procedure: Left Heart Cath;  Surgeon: Lazaro Conway MD;  Location: [facility] INVASIVE LOCATION;  Service: Cardiology;  Laterality: N/A;   • CARDIAC SURGERY     •  SECTION      x2   • CORONARY ARTERY BYPASS GRAFT N/A 2020    Procedure: MEDIAN STERNOTOMY, CORONARY ARTERY BYPASS X3 WITH  INTERNAL MAMMARY ARTERY GRAFTING, ENDOVASCULAR VEIN HARVESTING OF THE RIGHT GREATER SAPHENOUS VEIN, MAICOL PER ANESTHESIA;  Surgeon: Juanjo Coronado MD;  [facility]: [facility];  Service: Cardiothoracic;  Laterality: N/A;  ST ON LE  VO: 1800  VR: 1814   • HAND SURGERY      right   • PERINEAL LESION/CYST EXCISION Right 2020    Procedure: I&D ABSCESS;   Surgeon: Leander Cardenas III, MD;  Location: [facility];  Service: Obstetrics/Gynecology        Past Social History:  Social History     Socioeconomic History   • Marital status:    • Number of children: 2   Tobacco Use   • Smoking status: Former Smoker     Types: Cigarettes     Quit date: 2020     Years since quittin.8   • Smokeless tobacco: Never Used   Vaping Use   • Vaping Use: Former   Substance and Sexual Activity   • Alcohol use: No   • Drug use: No   • Sexual activity: Defer       Past Family History:  Family History   Problem Relation Age of Onset   • Heart disease Mother    • Coronary artery disease Mother    • Heart disease Father    • Heart attack Father    • Hypertension Father    • Stroke Father    • No Known Problems Sister    • Heart attack Brother    • Heart disease Brother    • Heart disease Maternal Grandmother    • Heart attack Maternal Grandmother    • No Known Problems Maternal Grandfather    • Stroke Paternal Grandmother    • Breast cancer Neg Hx        Review of Systems:   ROS       Current Outpatient Medications   Medication Sig Dispense Refill   • Alirocumab (Praluent) 150 MG/ML injection pen Inject 2 mL under the skin into the appropriate area as directed Every 28 (Twenty-Eight) Days. 2 mL 5   • amoxicillin-clavulanate (AUGMENTIN) 875-125 MG per tablet Take 1 tablet by mouth 2 (Two) Times a Day for 10 days. 20 tablet 0   • Aspirin Low Dose 81 MG EC tablet Take 81 mg by mouth Daily.     • Eliquis 5 MG tablet tablet TAKE 1 TABLET BY MOUTH 2 (TWO) TIMES A DAY FOR 30 DAYS. 60 tablet 5   • HYDROcodone-acetaminophen (NORCO) 7.5-325 MG per tablet Take 1 tablet by mouth Every 6 (Six) Hours As Needed for Pain . 30 tablet 0   • lisinopril (PRINIVIL,ZESTRIL) 10 MG tablet Take 1 tablet by mouth Daily. 90 tablet 3   • loratadine (CLARITIN) 10 MG tablet Take 10 mg by mouth As Needed.     • metoprolol tartrate (LOPRESSOR) 25 MG tablet Take 1 tablet by mouth 2 (Two) Times a Day. 30 tablet 11  "  • nitroglycerin (NITROSTAT) 0.4 MG SL tablet TAKE 1 AT ONSET OF CHEST PAIN,MAY RPT IN 5MIN, MAX 3 DOSES IN 24 HRS 25 tablet 11   • pravastatin (PRAVACHOL) 20 MG tablet TAKE ONE TABLET BY MOUTH DAILY (Patient taking differently: Take 20 mg by mouth Daily.) 30 tablet 3   • spironolactone (ALDACTONE) 25 MG tablet Take 0.5 tablets by mouth Daily. 90 tablet 3   • traMADol (ULTRAM) 50 MG tablet Take 50 mg by mouth Every 6 (Six) Hours As Needed.     • isosorbide mononitrate (IMDUR) 30 MG 24 hr tablet Take 2 tablets by mouth Every Night. (Patient taking differently: Take 30 mg by mouth Every Night.) 30 tablet 11     No current facility-administered medications for this visit.     Facility-Administered Medications Ordered in Other Visits   Medication Dose Route Frequency Provider Last Rate Last Admin   • Chlorhexidine Gluconate Cloth 2 % pads 1 application  1 application Topical Q12H PRN Bianka Ramírez APRN            Allergies   Allergen Reactions   • Bactrim [Sulfamethoxazole-Trimethoprim] Rash   • Ciprofloxacin Other (See Comments)     tremors   • Ranexa [Ranolazine Er] Unknown - Low Severity       Objective     /69   Pulse 78   Ht 157.5 cm (62\")   Wt 65.7 kg (144 lb 12.8 oz)   SpO2 99%   BMI 26.48 kg/m²     Physical Exam  Constitutional:       Appearance: She is well-developed.   HENT:      Head: Normocephalic and atraumatic.   Eyes:      Pupils: Pupils are equal, round, and reactive to light.   Neck:      Vascular: No JVD.   Cardiovascular:      Rate and Rhythm: Normal rate and regular rhythm.      Heart sounds: No murmur heard.  Pulmonary:      Effort: Pulmonary effort is normal. No respiratory distress.      Breath sounds: Normal breath sounds. No wheezing.   Abdominal:      General: Bowel sounds are normal.      Palpations: Abdomen is soft.      Tenderness: There is no abdominal tenderness.   Musculoskeletal:         General: Normal range of motion.      Cervical back: Normal range of motion and neck " supple.   Skin:     General: Skin is warm and dry.      Capillary Refill: Capillary refill takes less than 2 seconds.      Findings: No erythema.   Neurological:      Mental Status: She is alert and oriented to person, place, and time.   Psychiatric:         Behavior: Behavior normal.            DATA:  Results for orders placed during the hospital encounter of 07/17/20    SCANNED - TELEMETRY     Results for orders placed during the hospital encounter of 01/19/21    Adult Transthoracic Echo Complete W/ Cont if Necessary Per Protocol    Interpretation Summary  · Estimated left ventricular EF = 60% Left ventricular ejection fraction appears to be 56 - 60%. Left ventricular systolic function is normal.  · Left ventricular diastolic function was normal.  · No significant valvular abnormality  · No pericardial effusion  · No change since prev echo of 6/26/20   Results for orders placed in visit on 02/23/22    Stress Test With Myocardial Perfusion (1 Day)    Interpretation Summary  · Myocardial perfusion imaging indicates a small-sized, mildly severe area of ischemia located in the basal inferior lateral wall. The anterior perfusion wall defect is likely secondary to breast attenuation artifact,  · Breast attenuation artifact is present.  · Left ventricular ejection fraction is normal. .  · Impressions are consistent with an intermediate risk study.   Results for orders placed in visit on 02/23/22    Stress Test With Myocardial Perfusion (1 Day)    Interpretation Summary  · Myocardial perfusion imaging indicates a small-sized, mildly severe area of ischemia located in the basal inferior lateral wall. The anterior perfusion wall defect is likely secondary to breast attenuation artifact,  · Breast attenuation artifact is present.  · Left ventricular ejection fraction is normal. .  · Impressions are consistent with an intermediate risk study.   Results for orders placed during the hospital encounter of 03/09/22    Cardiac  Catheterization/Vascular Study    Narrative  CARDIAC CATHETERIZATION / INTERVENTION REPORT    DATE OF PROCEDURE: 3/9/2022    INDICATION FOR PROCEDURE: Abnormal stress test      PRE PROCEDURE DIAGNOSIS:  CAD s/p three-vessel CABG  Hypertension  Dyslipidemia    POST PROCEDURE DIAGNOSIS:  CAD with a patent LIMA graft and patent SVG to OM1 and diagonal 1 graft, native RCA mild nonobstructive disease unchanged from before.  Normal LV systolic function, mildly elevated LVEDP of 20 mmHg.    Face to face mdoerate conscious  sedation time:      COMPLICATIONS : None    Specimens collected : None        PROCEDURE PERFORMED:    1. Selective right and left coronary Angiogram  2. Left heart catheretization  3. Left ventriculography  4.  LIMA angiogram  5.SVG to diagonal and OM angiogram      PROCEDURE COMMENTS:    Prior to the procedure risks benefits alternatives and possible adverse events were discussed with the patient and informed consent was obtained.  Veda Enamorado was brought to the cath lab and placed on the cardiac catherization table in the postabsortive state. The patient was prepped and draped according to the cath lab protocol under strict aseptic and sterile condition. Patient's right femoral artery sight was prepped and draped. Under fluoroscopic guidance the right femoral artery was punctured using a Micropuncture gauge needle utilizing the modified Seldinger technique. 6 Setswana sheath was introduced over a wire. After aspirating for blood it was flushed with heparinized saline. Angio was performed to confirm the position of the sheath in right common femoral artery    We advanced Genaro type diagnostic catheters over the wire. The  left and right coronary arteries  were selectively engaged. Left and right coronary angiogram were obtained in multiple orthogonal projections.  5 Mongolian JR4 catheter was used for engaging the SVG on the LIMA angiogram and angiogram pictures were obtained in orthogonal views .5  F Pigtail catheter was used to cross across the AV retrograde into the LV cavity and resting LV pressures were recorded. Manual pull back to used to record gradient across the Aortic valve.    After completion of the procedure the femoral sheath was removed in the cath lab and hemostasis was achieved by Mynx. The patient tolerated the procedure without complications.      Coronary anatomy findings:    LM: Moderate caliber vessel, short and no significant stenosis.    LAD: Was chronically totally occluded at the proximal segment before the diagonal takeoff.    Ramus intermedius.  Moderate caliber vessel with no significant stenosis.    LCX: Moderate calibre vessel, the OM1 which was patent before has been occluded in the proximal segment but the graft supplying the OM1 is patent and the distal OM 1 distal to the anastomosis had no significant stenosis.    RCA: Large calibre, dominant artery, proximal, mid and distal had mild luminal irregularities with no significant stenosis, distally divides into R PDA and PL branches both had mild luminal irregularities with no stensois.  Noted mild 30 to 40% stenosis across the entire RCA, the PDA and PL branches had no significant stenosis.    LIMA angiogram:  The CELESTE graft was well matured with no significant stenosis with good proximal and distal anastomosis site, the distal LAD is a small caliber artery with mild diffuse disease.    SVG to OM graft appeared patent supplying the distal OM branch which had no significant stenosis.  SVG to diagonal 1 graft appeared patent with no significant stenosis supplying a small caliber diagonal artery with mild diffuse disease.    Left Ventriculography:    LV systolic function was normal with visual estimated EF of 60%. No significant mitral regurgitation noted.    LVEDP: 20 mmHg  No gradient across the aortic valve on pull back.        EBL: Less than 10cc        Final Impression:  CAD with patent vein grafts and the LIMA graft and no  significant stenosis in the native RCA.        Recommendations:  Continue with the current guideline rated medical management for her CAD and will try to optimize antianginal medication.              Lazaro Conway MD, [facility]  Interventional Cardiology    03/09/22  08:57 EST    Procedures     Lab Results   Component Value Date    CHOL 102 02/24/2022    CHOL 230 (H) 12/28/2021    CHOL 153 11/17/2020    CHLPL 215 (H) 04/11/2016     Lab Results   Component Value Date    TRIG 121 02/24/2022    TRIG 303 (H) 12/28/2021    TRIG 146 11/17/2020     Lab Results   Component Value Date    HDL 50 02/24/2022    HDL 47 12/28/2021    HDL 66 (H) 11/17/2020     Lab Results   Component Value Date    LDL 30 02/24/2022     (H) 12/28/2021    LDL 62 11/17/2020       Lab Results   Component Value Date    TSH 0.649 12/28/2021               Invalid input(s): LABALBU, PROT        Assessment and Plan     Diagnosis Plan   1. S/P CABG (coronary artery bypass graft)     2. PAF (paroxysmal atrial fibrillation) ([facility])     3. Mixed hyperlipidemia     4. Essential hypertension     5. Coronary artery disease involving native coronary artery of native heart without angina pectoris             Recommended increase activity to 30 minutes of walking daily, most days of the week    Thank you for allowing me to participate in the care of Veda Enamorado. Feel free to contact me directly with any further questions or concerns.          Lazaro Conway MD, [facility]  Interventional Cardiology

## 2022-06-28 RX ORDER — APIXABAN 5 MG/1
TABLET, FILM COATED ORAL
Qty: 60 TABLET | Refills: 5 | Status: SHIPPED | OUTPATIENT
Start: 2022-06-28 | End: 2023-03-06

## 2022-06-29 ENCOUNTER — SPECIALTY PHARMACY (OUTPATIENT)
Dept: PHARMACY | Facility: HOSPITAL | Age: 57
End: 2022-06-29

## 2022-06-29 RX ORDER — ALIROCUMAB 150 MG/ML
300 INJECTION, SOLUTION SUBCUTANEOUS
Qty: 2 ML | Refills: 5 | Status: SHIPPED | OUTPATIENT
Start: 2022-06-29 | End: 2022-09-13 | Stop reason: SDUPTHER

## 2022-06-29 NOTE — PROGRESS NOTES
[facility]  Lipid Management Program - [facility]       Veda Enamorado is a 57 y.o. female referred to the [facility] by Dr. Chang for clinical pharmacy and specialty pharmacy management of [facility].  Veda Enamorado is  treated for clinical ASCVD and currently takes Praluent 300mg every 28 days.  In the past, Pt has tried Lipitor but had myalgias with it and Pravastatin, which was discontinued recently. The patient denies any allergies to latex. The patient reports that she did miss a dose and ended up taking it about 1 month late. She had a death in the family at the time of her next injection that made her miss the dose and then she kept on forgetting to actually give the injection each day. She would remember in the evenings, but likes to only take it in the mornings and therefore would keep in putting it off until the next day. She reports no issues with actually giving herself the injection. She did report a runny nose a few days after giving the injection, but reports that it cleared up quickly and was not a concern for her. She did experience what she reports as muscle pain in her left leg after giving the injection, but not in the other leg.     Relevant Past Medical History and Comorbidities  Past Medical History:   Diagnosis Date   • Arthritis     back   • Back ache    • Chronic back pain    • Coronary artery disease     s/p 3 stents    • GERD (gastroesophageal reflux disease)    • History of MRSA infection 2020    labia; hospitalized 6 days on IV antibiotics and then went home on po antibiotics    • Hyperlipidemia    • Hypertension    • Peptic ulceration      Social History     Socioeconomic History   • Marital status:    • Number of children: 2   Tobacco Use   • Smoking status: Former Smoker     Types: Cigarettes     Quit date: 2020     Years since quittin.9   • Smokeless tobacco: Never Used   Vaping Use   • Vaping Use: Former   Substance and  Sexual Activity   • Alcohol use: No   • Drug use: No   • Sexual activity: Defer       Allergies  Bactrim [sulfamethoxazole-trimethoprim], Ciprofloxacin, and Ranexa [ranolazine er]    Current Medication List    Current Outpatient Medications:   •  Alirocumab (Praluent) 150 MG/ML injection pen, Inject 2 mL under the skin into the appropriate area as directed Every 28 (Twenty-Eight) Days., Disp: 2 mL, Rfl: 5  •  Aspirin Low Dose 81 MG EC tablet, Take 81 mg by mouth Daily., Disp: , Rfl:   •  Eliquis 5 MG tablet tablet, TAKE 1 TABLET BY MOUTH 2 (TWO) TIMES A DAY FOR 30 DAYS., Disp: 60 tablet, Rfl: 5  •  HYDROcodone-acetaminophen (NORCO) 7.5-325 MG per tablet, Take 1 tablet by mouth Every 6 (Six) Hours As Needed for Pain ., Disp: 30 tablet, Rfl: 0  •  isosorbide mononitrate (IMDUR) 30 MG 24 hr tablet, Take 2 tablets by mouth Every Night. (Patient taking differently: Take 30 mg by mouth Daily.), Disp: 30 tablet, Rfl: 11  •  lisinopril (PRINIVIL,ZESTRIL) 10 MG tablet, Take 1 tablet by mouth Daily., Disp: 90 tablet, Rfl: 3  •  loratadine (CLARITIN) 10 MG tablet, Take 10 mg by mouth As Needed., Disp: , Rfl:   •  metoprolol tartrate (LOPRESSOR) 25 MG tablet, Take 1 tablet by mouth 2 (Two) Times a Day., Disp: 30 tablet, Rfl: 11  •  nitroglycerin (NITROSTAT) 0.4 MG SL tablet, TAKE 1 AT ONSET OF CHEST PAIN,MAY RPT IN 5MIN, MAX 3 DOSES IN 24 HRS, Disp: 25 tablet, Rfl: 11  •  spironolactone (ALDACTONE) 25 MG tablet, Take 0.5 tablets by mouth Daily., Disp: 90 tablet, Rfl: 3  •  traMADol (ULTRAM) 50 MG tablet, Take 50 mg by mouth Every 6 (Six) Hours As Needed., Disp: , Rfl:   No current facility-administered medications for this visit.    Facility-Administered Medications Ordered in Other Visits:   •  Chlorhexidine Gluconate Cloth 2 % pads 1 application, 1 application, Topical, Q12H PRN, Bianka Ramírez APRN    Drug Interactions  None with Praluent    Relevant Laboratory Values  Lab Results   Component Value Date    CHOL 102  02/24/2022    CHLPL 215 (H) 04/11/2016    TRIG 121 02/24/2022    HDL 50 02/24/2022    LDL 30 02/24/2022       Medication Assessment & Plan    Patient's last LDL was at goal and she was last seen by cardiology on 6/14/22 and will follow-up with them on 7/25/22.     Will continue Praluent 300mg every 28 days . Sent in next 6 month refills. Patient would like to continue to pick-up a 1 month supply each time at our pharmacy. Recommended pt to make sure to give in the fatty portion of her thigh and possibly the need to pinch up the area to ensure that she is not hitting her muscle.     Will follow-up with patient after her next scheduled injection on 7/25 to reassess her adherence and check in on her administration of the injection and any following side effects.     Will continue to follow-up in 6 months.     Aneta Oleary RPH  6/29/2022  10:04 EDT

## 2022-07-11 ENCOUNTER — SPECIALTY PHARMACY (OUTPATIENT)
Dept: PHARMACY | Facility: HOSPITAL | Age: 57
End: 2022-07-11

## 2022-07-11 NOTE — PROGRESS NOTES
Specialty Pharmacy Refill Coordination Note      Name:  Veda Enamorado  :  1965  Date:  2022         Past Medical History:   Diagnosis Date   • Arthritis     back   • Back ache    • Chronic back pain    • Coronary artery disease     s/p 3 stents    • GERD (gastroesophageal reflux disease)    • History of MRSA infection 2020    labia; hospitalized 6 days on IV antibiotics and then went home on po antibiotics    • Hyperlipidemia    • Hypertension    • Peptic ulceration        Past Surgical History:   Procedure Laterality Date   • CARDIAC CATHETERIZATION     • CARDIAC CATHETERIZATION N/A 2019    Procedure: Left Heart Cath;  Surgeon: Lazaro Conway MD;  Location: [facility] CATH INVASIVE LOCATION;  Service: Cardiology   • CARDIAC CATHETERIZATION     • CARDIAC CATHETERIZATION N/A 3/9/2022    Procedure: Left Heart Cath;  Surgeon: Lazaro Conway MD;  Location: [facility] INVASIVE LOCATION;  Service: Cardiology;  Laterality: N/A;   • CARDIAC SURGERY     •  SECTION      x2   • CORONARY ARTERY BYPASS GRAFT N/A 2020    Procedure: MEDIAN STERNOTOMY, CORONARY ARTERY BYPASS X3 WITH  INTERNAL MAMMARY ARTERY GRAFTING, ENDOVASCULAR VEIN HARVESTING OF THE RIGHT GREATER SAPHENOUS VEIN, MAICOL PER ANESTHESIA;  Surgeon: Juanjo Coronado MD;  Location: [facility];  Service: Cardiothoracic;  Laterality: N/A;  ST ON LE  VO: 1800  VR: 1814   • HAND SURGERY      right   • PERINEAL LESION/CYST EXCISION Right 2020    Procedure: I&D ABSCESS;  Surgeon: Leander Cardenas III, MD;  Location: [facility];  Service: Obstetrics/Gynecology       Social History     Socioeconomic History   • Marital status:    • Number of children: 2   Tobacco Use   • Smoking status: Former Smoker     Types: Cigarettes     Quit date: 2020     Years since quittin.9   • Smokeless tobacco: Never Used   Vaping Use   • Vaping Use: Former   Substance and Sexual Activity   • Alcohol use: No   •  Drug use: No   • Sexual activity: Defer       Family History   Problem Relation Age of Onset   • Heart disease Mother    • Coronary artery disease Mother    • Heart disease Father    • Heart attack Father    • Hypertension Father    • Stroke Father    • No Known Problems Sister    • Heart attack Brother    • Heart disease Brother    • Heart disease Maternal Grandmother    • Heart attack Maternal Grandmother    • No Known Problems Maternal Grandfather    • Stroke Paternal Grandmother    • Breast cancer Neg Hx        Allergies   Allergen Reactions   • Bactrim [Sulfamethoxazole-Trimethoprim] Rash   • Ciprofloxacin Other (See Comments)     tremors   • Ranexa [Ranolazine Er] Unknown - Low Severity       Current Outpatient Medications   Medication Sig Dispense Refill   • Alirocumab (Praluent) 150 MG/ML injection pen Inject 2 mL under the skin into the appropriate area as directed Every 28 (Twenty-Eight) Days. 2 mL 5   • Aspirin Low Dose 81 MG EC tablet Take 81 mg by mouth Daily.     • Eliquis 5 MG tablet tablet TAKE 1 TABLET BY MOUTH 2 (TWO) TIMES A DAY FOR 30 DAYS. 60 tablet 5   • HYDROcodone-acetaminophen (NORCO) 7.5-325 MG per tablet Take 1 tablet by mouth Every 6 (Six) Hours As Needed for Pain . 30 tablet 0   • isosorbide mononitrate (IMDUR) 30 MG 24 hr tablet Take 2 tablets by mouth Every Night. (Patient taking differently: Take 30 mg by mouth Daily.) 30 tablet 11   • lisinopril (PRINIVIL,ZESTRIL) 10 MG tablet Take 1 tablet by mouth Daily. 90 tablet 3   • loratadine (CLARITIN) 10 MG tablet Take 10 mg by mouth As Needed.     • metoprolol tartrate (LOPRESSOR) 25 MG tablet Take 1 tablet by mouth 2 (Two) Times a Day. 30 tablet 11   • nitroglycerin (NITROSTAT) 0.4 MG SL tablet TAKE 1 AT ONSET OF CHEST PAIN,MAY RPT IN 5MIN, MAX 3 DOSES IN 24 HRS 25 tablet 11   • spironolactone (ALDACTONE) 25 MG tablet Take 0.5 tablets by mouth Daily. 90 tablet 3   • traMADol (ULTRAM) 50 MG tablet Take 50 mg by mouth Every 6 (Six) Hours As  Needed.       No current facility-administered medications for this visit.     Facility-Administered Medications Ordered in Other Visits   Medication Dose Route Frequency Provider Last Rate Last Admin   • Chlorhexidine Gluconate Cloth 2 % pads 1 application  1 application Topical Q12H PRN Bianka Ramírez APRN             ASSESSMENT/PLAN:      Veda is a 57 y.o. female contacted today regarding refills of  Praluent 150mg/ml specialty medication(s).    Reviewed and verified with patient:         Specialty medication(s) and dose(s) confirmed: yes    Refill Questions    Flowsheet Row Most Recent Value   Changes to allergies? No   Changes to medications? No   New conditions since last clinic visit No   Unplanned office visit, urgent care, ED, or hospital admission in the last 4 weeks  No   How does patient/caregiver feel medication is working? Very good   Financial problems or insurance changes  No   Since the previous refill, were any specialty medication doses or scheduled injections missed or delayed?  No   Does this patient require a clinical escalation to a pharmacist? No                Medication Adherence    Adherence tools used: calendar   Other adherence tool: n/a   Support network for adherence: family member   Other support network: na           Follow-up: 28 day(s)     Merissa Vazquez, Pharmacy Technician  Specialty Pharmacy Technician

## 2022-07-27 ENCOUNTER — SPECIALTY PHARMACY (OUTPATIENT)
Dept: PHARMACY | Facility: HOSPITAL | Age: 57
End: 2022-07-27

## 2022-07-27 NOTE — PROGRESS NOTES
Reached out to patient today; reports she has not given herself the Praluent yet this week but is going to inject today. Will follow up in a couple of days to assess adherence and tolerability.    Thank you,  Kinga Shane, PharmD

## 2022-07-29 NOTE — PROGRESS NOTES
Pt reported today that she gave her Praluent injection on Wednesday. Patient denies any adverse events and even reports that she did not even have a runny nose which she had after an injection previously. Patient reports tolerating well currently. Patient is aware to continue monthly injections, as long as tolerability continued, and uses calendar for reminder. Patient was advised if any changes in side effects to please reach out to the clinic. At this time, no further follow is warranted as patient has reported tolerating last injection well.     Thank you,    Nyla Stafford. Manuel, PharmD  07/29/22  10:22 EDT

## 2022-08-05 ENCOUNTER — SPECIALTY PHARMACY (OUTPATIENT)
Dept: PHARMACY | Facility: HOSPITAL | Age: 57
End: 2022-08-05

## 2022-08-05 NOTE — PROGRESS NOTES
Specialty Pharmacy Refill Coordination Note      Name:  Veda Enamorado  :  1965  Date:  2022         Past Medical History:   Diagnosis Date   • Arthritis     back   • Back ache    • Chronic back pain    • Coronary artery disease     s/p 3 stents    • GERD (gastroesophageal reflux disease)    • History of MRSA infection 2020    labia; hospitalized 6 days on IV antibiotics and then went home on po antibiotics    • Hyperlipidemia    • Hypertension    • Peptic ulceration        Past Surgical History:   Procedure Laterality Date   • CARDIAC CATHETERIZATION     • CARDIAC CATHETERIZATION N/A 2019    Procedure: Left Heart Cath;  Surgeon: Lazaro Conway MD;  Location: [facility] CATH INVASIVE LOCATION;  Service: Cardiology   • CARDIAC CATHETERIZATION     • CARDIAC CATHETERIZATION N/A 3/9/2022    Procedure: Left Heart Cath;  Surgeon: Lazaro Conway MD;  Location: [facility] INVASIVE LOCATION;  Service: Cardiology;  Laterality: N/A;   • CARDIAC SURGERY     •  SECTION      x2   • CORONARY ARTERY BYPASS GRAFT N/A 2020    Procedure: MEDIAN STERNOTOMY, CORONARY ARTERY BYPASS X3 WITH  INTERNAL MAMMARY ARTERY GRAFTING, ENDOVASCULAR VEIN HARVESTING OF THE RIGHT GREATER SAPHENOUS VEIN, MAICOL PER ANESTHESIA;  Surgeon: Juanjo Coronado MD;  Location: [facility];  Service: Cardiothoracic;  Laterality: N/A;  ST ON LE  VO: 1800  VR: 1814   • HAND SURGERY      right   • PERINEAL LESION/CYST EXCISION Right 2020    Procedure: I&D ABSCESS;  Surgeon: Leander Cardenas III, MD;  Location: [facility];  Service: Obstetrics/Gynecology       Social History     Socioeconomic History   • Marital status:    • Number of children: 2   Tobacco Use   • Smoking status: Former Smoker     Types: Cigarettes     Quit date: 2020     Years since quittin.0   • Smokeless tobacco: Never Used   Vaping Use   • Vaping Use: Former   Substance and Sexual Activity   • Alcohol use: No   •  Drug use: No   • Sexual activity: Defer       Family History   Problem Relation Age of Onset   • Heart disease Mother    • Coronary artery disease Mother    • Heart disease Father    • Heart attack Father    • Hypertension Father    • Stroke Father    • No Known Problems Sister    • Heart attack Brother    • Heart disease Brother    • Heart disease Maternal Grandmother    • Heart attack Maternal Grandmother    • No Known Problems Maternal Grandfather    • Stroke Paternal Grandmother    • Breast cancer Neg Hx        Allergies   Allergen Reactions   • Bactrim [Sulfamethoxazole-Trimethoprim] Rash   • Ciprofloxacin Other (See Comments)     tremors   • Ranexa [Ranolazine Er] Unknown - Low Severity       Current Outpatient Medications   Medication Sig Dispense Refill   • Alirocumab (Praluent) 150 MG/ML injection pen Inject 2 mL under the skin into the appropriate area as directed Every 28 (Twenty-Eight) Days. 2 mL 5   • Aspirin Low Dose 81 MG EC tablet Take 81 mg by mouth Daily.     • Eliquis 5 MG tablet tablet TAKE 1 TABLET BY MOUTH 2 (TWO) TIMES A DAY FOR 30 DAYS. 60 tablet 5   • HYDROcodone-acetaminophen (NORCO) 7.5-325 MG per tablet Take 1 tablet by mouth Every 6 (Six) Hours As Needed for Pain . 30 tablet 0   • isosorbide mononitrate (IMDUR) 30 MG 24 hr tablet Take 2 tablets by mouth Every Night. (Patient taking differently: Take 30 mg by mouth Daily.) 30 tablet 11   • lisinopril (PRINIVIL,ZESTRIL) 10 MG tablet Take 1 tablet by mouth Daily. 90 tablet 3   • loratadine (CLARITIN) 10 MG tablet Take 10 mg by mouth As Needed.     • metoprolol tartrate (LOPRESSOR) 25 MG tablet Take 1 tablet by mouth 2 (Two) Times a Day. 30 tablet 11   • nitroglycerin (NITROSTAT) 0.4 MG SL tablet TAKE 1 AT ONSET OF CHEST PAIN,MAY RPT IN 5MIN, MAX 3 DOSES IN 24 HRS 25 tablet 11   • spironolactone (ALDACTONE) 25 MG tablet Take 0.5 tablets by mouth Daily. 90 tablet 3   • traMADol (ULTRAM) 50 MG tablet Take 50 mg by mouth Every 6 (Six) Hours As  Needed.       No current facility-administered medications for this visit.     Facility-Administered Medications Ordered in Other Visits   Medication Dose Route Frequency Provider Last Rate Last Admin   • Chlorhexidine Gluconate Cloth 2 % pads 1 application  1 application Topical Q12H PRN Bianka Ramírez APRN             ASSESSMENT/PLAN:      Veda is a 57 y.o. female contacted today regarding refills of  Pralueny 150mg/ml specialty medication(s).    Reviewed and verified with patient:       Specialty medication(s) and dose(s) confirmed: yes    Refill Questions    Flowsheet Row Most Recent Value   Changes to allergies? No   Changes to medications? No   New conditions since last clinic visit No   Unplanned office visit, urgent care, ED, or hospital admission in the last 4 weeks  No   How does patient/caregiver feel medication is working? Very good   Financial problems or insurance changes  No   If yes, describe changes in insurance or financial issues. none   Since the previous refill, were any specialty medication doses or scheduled injections missed or delayed?  No   If yes, please provide the amount 0   Why were doses missed? n/a   Does this patient require a clinical escalation to a pharmacist? No                Medication Adherence    Adherence tools used: calendar   Other adherence tool: n/a   Support network for adherence: family member   Other support network: na           Follow-up: 28 day(s)     Merissa Vazquez, Pharmacy Technician  Specialty Pharmacy Technician

## 2022-08-12 ENCOUNTER — TELEPHONE (OUTPATIENT)
Dept: CARDIAC REHAB | Facility: HOSPITAL | Age: 57
End: 2022-08-12

## 2022-08-12 NOTE — TELEPHONE ENCOUNTER
Staff spoke with patient regarding cardiac rehab program. Pt stated she doesn't think she needs to do the program, that she is doing well. I told pt if she changes her mind then she can call us back to get scheduled.

## 2022-09-12 RX ORDER — NITROGLYCERIN 0.4 MG/1
TABLET SUBLINGUAL
Qty: 25 TABLET | Refills: 11 | Status: SHIPPED | OUTPATIENT
Start: 2022-09-12

## 2022-09-12 RX ORDER — LISINOPRIL 10 MG/1
10 TABLET ORAL DAILY
Qty: 90 TABLET | Refills: 3 | Status: ON HOLD | OUTPATIENT
Start: 2022-09-12 | End: 2023-02-23 | Stop reason: SDUPTHER

## 2022-09-13 ENCOUNTER — SPECIALTY PHARMACY (OUTPATIENT)
Dept: PHARMACY | Facility: HOSPITAL | Age: 57
End: 2022-09-13

## 2022-09-13 RX ORDER — ALIROCUMAB 150 MG/ML
300 INJECTION, SOLUTION SUBCUTANEOUS
Qty: 2 ML | Refills: 5 | Status: SHIPPED | OUTPATIENT
Start: 2022-09-13 | End: 2023-02-20 | Stop reason: SDUPTHER

## 2022-09-13 NOTE — PROGRESS NOTES
[facility]  Lipid Management Program - [facility]       Veda Enamorado is a 57 y.o. female referred to the [facility] Management [facility] by Dr. Chang for clinical pharmacy and specialty pharmacy management of [facility].  Veda Enamorado is  treated for clinical ASCVD and currently takes Praluent 300mg every 28 days.  In the past, Pt has tried Lipitor but had myalgias with it and Pravastatin, which was discontinued recently. The patient denies any allergies to latex. She denies any missed doses, denies any adverse effects and denies any trouble giving herself the injection.     Relevant Past Medical History and Comorbidities  Past Medical History:   Diagnosis Date   • Arthritis     back   • Back ache    • Chronic back pain    • Coronary artery disease     s/p 3 stents    • GERD (gastroesophageal reflux disease)    • History of MRSA infection 2020    labia; hospitalized 6 days on IV antibiotics and then went home on po antibiotics    • Hyperlipidemia    • Hypertension    • Peptic ulceration      Social History     Socioeconomic History   • Marital status:    • Number of children: 2   Tobacco Use   • Smoking status: Former Smoker     Types: Cigarettes     Quit date: 2020     Years since quittin.1   • Smokeless tobacco: Never Used   Vaping Use   • Vaping Use: Former   Substance and Sexual Activity   • Alcohol use: No   • Drug use: No   • Sexual activity: Defer       Allergies  Bactrim [sulfamethoxazole-trimethoprim], Ciprofloxacin, and Ranexa [ranolazine er]    Current Medication List    Current Outpatient Medications:   •  Alirocumab (Praluent) 150 MG/ML injection pen, Inject 2 mL under the skin into the appropriate area as directed Every 28 (Twenty-Eight) Days., Disp: 2 mL, Rfl: 5  •  Aspirin Low Dose 81 MG EC tablet, Take 81 mg by mouth Daily., Disp: , Rfl:   •  Eliquis 5 MG tablet tablet, TAKE 1 TABLET BY MOUTH 2 (TWO) TIMES A DAY FOR 30 DAYS., Disp: 60 tablet, Rfl: 5  •   isosorbide mononitrate (IMDUR) 30 MG 24 hr tablet, Take 2 tablets by mouth Every Night. (Patient taking differently: Take 30 mg by mouth Daily.), Disp: 30 tablet, Rfl: 11  •  lisinopril (PRINIVIL,ZESTRIL) 10 MG tablet, Take 1 tablet by mouth Daily., Disp: 90 tablet, Rfl: 3  •  loratadine (CLARITIN) 10 MG tablet, Take 10 mg by mouth As Needed., Disp: , Rfl:   •  metoprolol tartrate (LOPRESSOR) 25 MG tablet, Take 1 tablet by mouth 2 (Two) Times a Day., Disp: 30 tablet, Rfl: 11  •  nitroglycerin (NITROSTAT) 0.4 MG SL tablet, TAKE 1 TABLET AT ONSET OF CHEST PAIN,MAY REPEAT IN 5 MIN, MAX 3 DOSES IN 24 HRS, Disp: 25 tablet, Rfl: 11  •  spironolactone (ALDACTONE) 25 MG tablet, Take 0.5 tablets by mouth Daily., Disp: 90 tablet, Rfl: 3  •  traMADol (ULTRAM) 50 MG tablet, Take 50 mg by mouth Every 6 (Six) Hours As Needed., Disp: , Rfl:   No current facility-administered medications for this visit.    Facility-Administered Medications Ordered in Other Visits:   •  Chlorhexidine Gluconate Cloth 2 % pads 1 application, 1 application, Topical, Q12H PRN, Bianka Ramírez APRN    Drug Interactions  None with Praluent    Relevant Laboratory Values  Lab Results   Component Value Date    CHOL 102 02/24/2022    CHLPL 215 (H) 04/11/2016    TRIG 121 02/24/2022    HDL 50 02/24/2022    LDL 30 02/24/2022       Medication Assessment & Plan    Will continue Praluent 300mg every 28 days. Sent in next 6 month refills, discussed with Sharda Meyers as we are having trouble getting Dr. Chang as a provider covered.  She would like to  in our pharmacy.     Pt will continue regular follow-up with cardiology.  Will continue to follow-up in 6 months.     Kaia Stein, PharmCHRIS  9/13/2022  16:26 EDT

## 2022-09-27 ENCOUNTER — TRANSCRIBE ORDERS (OUTPATIENT)
Dept: ADMINISTRATIVE | Facility: HOSPITAL | Age: 57
End: 2022-09-27

## 2022-09-27 ENCOUNTER — HOSPITAL ENCOUNTER (OUTPATIENT)
Dept: GENERAL RADIOLOGY | Facility: HOSPITAL | Age: 57
Discharge: HOME | End: 2022-09-27
Admitting: NURSE PRACTITIONER

## 2022-09-27 DIAGNOSIS — R05.9 COUGH, UNSPECIFIED: Primary | ICD-10-CM

## 2022-09-27 DIAGNOSIS — R05.9 COUGH, UNSPECIFIED: ICD-10-CM

## 2022-09-27 PROCEDURE — 71046 X-RAY EXAM CHEST 2 VIEWS: CPT | Performed by: RADIOLOGY

## 2022-09-27 PROCEDURE — 71046 X-RAY EXAM CHEST 2 VIEWS: CPT

## 2022-09-30 NOTE — PROGRESS NOTES
Spoke with patient.  She is feeling better.  She would like to  Praluent on Monday.     Kaia Stein, PharmD  09/30/22  14:38 EDT

## 2022-10-13 RX ORDER — ISOSORBIDE MONONITRATE 30 MG/1
TABLET, EXTENDED RELEASE ORAL
Qty: 30 TABLET | Refills: 11 | Status: SHIPPED | OUTPATIENT
Start: 2022-10-13

## 2022-11-09 ENCOUNTER — TELEPHONE (OUTPATIENT)
Dept: CARDIOLOGY | Facility: CLINIC | Age: 57
End: 2022-11-09

## 2022-11-09 NOTE — TELEPHONE ENCOUNTER
Patient is complaining of arm pain, head, eye pain and some chest pain advised the patient to go see PCP and with CP she should be evaluated in the ED.

## 2022-11-28 ENCOUNTER — HOSPITAL ENCOUNTER (OUTPATIENT)
Dept: MAMMOGRAPHY | Facility: HOSPITAL | Age: 57
Discharge: HOME | End: 2022-11-28
Admitting: NURSE PRACTITIONER

## 2022-11-28 DIAGNOSIS — Z12.31 VISIT FOR SCREENING MAMMOGRAM: ICD-10-CM

## 2022-11-28 PROCEDURE — 77067 SCR MAMMO BI INCL CAD: CPT

## 2022-11-28 PROCEDURE — 77063 BREAST TOMOSYNTHESIS BI: CPT

## 2022-11-28 PROCEDURE — 77067 SCR MAMMO BI INCL CAD: CPT | Performed by: RADIOLOGY

## 2022-11-28 PROCEDURE — 77063 BREAST TOMOSYNTHESIS BI: CPT | Performed by: RADIOLOGY

## 2023-01-09 RX ORDER — SPIRONOLACTONE 25 MG/1
TABLET ORAL
Qty: 90 TABLET | Refills: 3 | Status: SHIPPED | OUTPATIENT
Start: 2023-01-09

## 2023-01-11 ENCOUNTER — SPECIALTY PHARMACY (OUTPATIENT)
Dept: PHARMACY | Facility: HOSPITAL | Age: 58
End: 2023-01-11
Payer: COMMERCIAL

## 2023-01-11 NOTE — PROGRESS NOTES
Specialty Pharmacy Refill Coordination Note      Name:  Veda Enamorado  :  1965  Date:  2023         Past Medical History:   Diagnosis Date   • Arthritis     back   • Back ache    • Chronic back pain    • Coronary artery disease     s/p 3 stents    • GERD (gastroesophageal reflux disease)    • History of MRSA infection 2020    labia; hospitalized 6 days on IV antibiotics and then went home on po antibiotics    • Hyperlipidemia    • Hypertension    • Peptic ulceration        Past Surgical History:   Procedure Laterality Date   • CARDIAC CATHETERIZATION     • CARDIAC CATHETERIZATION N/A 2019    Procedure: Left Heart Cath;  Surgeon: Lazaro Conway MD;  Location: [facility] CATH INVASIVE LOCATION;  Service: Cardiology   • CARDIAC CATHETERIZATION     • CARDIAC CATHETERIZATION N/A 3/9/2022    Procedure: Left Heart Cath;  Surgeon: Lazaro Conway MD;  Location: [facility] INVASIVE LOCATION;  Service: Cardiology;  Laterality: N/A;   • CARDIAC SURGERY     •  SECTION      x2   • CORONARY ARTERY BYPASS GRAFT N/A 2020    Procedure: MEDIAN STERNOTOMY, CORONARY ARTERY BYPASS X3 WITH  INTERNAL MAMMARY ARTERY GRAFTING, ENDOVASCULAR VEIN HARVESTING OF THE RIGHT GREATER SAPHENOUS VEIN, MAICOL PER ANESTHESIA;  Surgeon: Juanjo Coronado MD;  Location: [facility];  Service: Cardiothoracic;  Laterality: N/A;  ST ON LE  VO: 1800  VR: 1814   • HAND SURGERY      right   • PERINEAL LESION/CYST EXCISION Right 2020    Procedure: I&D ABSCESS;  Surgeon: eLander Cardenas III, MD;  Location: [facility];  Service: Obstetrics/Gynecology       Social History     Socioeconomic History   • Marital status:    • Number of children: 2   Tobacco Use   • Smoking status: Former     Types: Cigarettes     Quit date: 2020     Years since quittin.4   • Smokeless tobacco: Never   Vaping Use   • Vaping Use: Former   Substance and Sexual Activity   • Alcohol use: No   • Drug use:  No   • Sexual activity: Defer       Family History   Problem Relation Age of Onset   • Heart disease Mother    • Coronary artery disease Mother    • Heart disease Father    • Heart attack Father    • Hypertension Father    • Stroke Father    • No Known Problems Sister    • Heart attack Brother    • Heart disease Brother    • Heart disease Maternal Grandmother    • Heart attack Maternal Grandmother    • No Known Problems Maternal Grandfather    • Stroke Paternal Grandmother    • Breast cancer Neg Hx        Allergies   Allergen Reactions   • Bactrim [Sulfamethoxazole-Trimethoprim] Rash   • Ciprofloxacin Other (See Comments)     tremors   • Ranexa [Ranolazine Er] Unknown - Low Severity       Current Outpatient Medications   Medication Sig Dispense Refill   • Alirocumab (Praluent) 150 MG/ML injection pen Inject 2 mL under the skin into the appropriate area as directed Every 28 (Twenty-Eight) Days. 2 mL 5   • Aspirin Low Dose 81 MG EC tablet Take 81 mg by mouth Daily.     • Eliquis 5 MG tablet tablet TAKE 1 TABLET BY MOUTH 2 (TWO) TIMES A DAY FOR 30 DAYS. 60 tablet 5   • isosorbide mononitrate (IMDUR) 30 MG 24 hr tablet TAKE 1 TABLET BY MOUTH EVERY NIGHT. 30 tablet 11   • lisinopril (PRINIVIL,ZESTRIL) 10 MG tablet Take 1 tablet by mouth Daily. 90 tablet 3   • loratadine (CLARITIN) 10 MG tablet Take 10 mg by mouth As Needed.     • metoprolol tartrate (LOPRESSOR) 25 MG tablet Take 1 tablet by mouth 2 (Two) Times a Day. 30 tablet 11   • nitroglycerin (NITROSTAT) 0.4 MG SL tablet TAKE 1 TABLET AT ONSET OF CHEST PAIN,MAY REPEAT IN 5 MIN, MAX 3 DOSES IN 24 HRS 25 tablet 11   • spironolactone (ALDACTONE) 25 MG tablet TAKE 1/2 TABLET BY MOUTH DAILY 90 tablet 3   • traMADol (ULTRAM) 50 MG tablet Take 50 mg by mouth Every 6 (Six) Hours As Needed.       No current facility-administered medications for this visit.     Facility-Administered Medications Ordered in Other Visits   Medication Dose Route Frequency Provider Last Rate Last  Admin   • Chlorhexidine Gluconate Cloth 2 % pads 1 application  1 application Topical Q12H PRN Bianka Ramírez APRN             ASSESSMENT/PLAN:      Veda is a 57 y.o. female contacted today regarding refills of  Praluent 150mg/ ml injection  specialty medication(s).    Reviewed and verified with patient:       Specialty medication(s) and dose(s) confirmed: yes    Refill Questions    Flowsheet Row Most Recent Value   Changes to medications? No   New conditions since last clinic visit No   Unplanned office visit, urgent care, ED, or hospital admission in the last 4 weeks  No   How does patient/caregiver feel medication is working? Very good   Financial problems or insurance changes  No   Since the previous refill, were any specialty medication doses or scheduled injections missed or delayed?  Yes   If yes, please provide the amount 5   Why were doses missed? patient didn't know she was due for in injection and sorry she didnt call sooner   Does this patient require a clinical escalation to a pharmacist? Yes  [missed 5 doses. last inj was 11/3/22]                Medication Adherence    Adherence tools used: calendar   Other adherence tool: n/a   Support network for adherence: family member   Other support network: na           Follow-up: 28 day(s)     Zoraida Porter, Pharmacy Technician  Specialty Pharmacy Technician

## 2023-01-11 NOTE — PROGRESS NOTES
Attempted to call patient on both numbers to discuss missed doses.  She did not answer.     Kaia Stein, PharmD  01/11/23  12:45 EST

## 2023-01-11 NOTE — PROGRESS NOTES
Called Patient to discuss missed doses.  She reports she forgot to  and didn't realize it was overdue since we hadn't called.     Missed dose was due to system not flagging technician to call since drug was not picked up and returned to stock.      Typically, this would not occur as outreach call would be routed to PharmD pool, however this call was not routed by mistake.     Discussed importance of remembering doses and adherence techniques.  Discussed that since injection was due every 28 days, in the future outreach call will serve as good adherence reminder.     Kaia Stein, PharmD  01/11/23  13:16 EST

## 2023-01-23 ENCOUNTER — OFFICE VISIT (OUTPATIENT)
Dept: CARDIOLOGY | Facility: CLINIC | Age: 58
End: 2023-01-23
Payer: COMMERCIAL

## 2023-01-23 VITALS
SYSTOLIC BLOOD PRESSURE: 158 MMHG | BODY MASS INDEX: 26.57 KG/M2 | OXYGEN SATURATION: 98 % | DIASTOLIC BLOOD PRESSURE: 85 MMHG | HEART RATE: 71 BPM | WEIGHT: 144.4 LBS | HEIGHT: 62 IN

## 2023-01-23 DIAGNOSIS — I10 ESSENTIAL HYPERTENSION: Primary | ICD-10-CM

## 2023-01-23 DIAGNOSIS — E78.2 MIXED HYPERLIPIDEMIA: Chronic | ICD-10-CM

## 2023-01-23 DIAGNOSIS — I20.8 STABLE ANGINA PECTORIS: ICD-10-CM

## 2023-01-23 DIAGNOSIS — Z95.1 S/P CABG (CORONARY ARTERY BYPASS GRAFT): ICD-10-CM

## 2023-01-23 DIAGNOSIS — I25.10 CORONARY ARTERY DISEASE INVOLVING NATIVE CORONARY ARTERY OF NATIVE HEART WITHOUT ANGINA PECTORIS: Chronic | ICD-10-CM

## 2023-01-23 PROCEDURE — 99214 OFFICE O/P EST MOD 30 MIN: CPT | Performed by: INTERNAL MEDICINE

## 2023-01-23 RX ORDER — AMLODIPINE BESYLATE 5 MG/1
5 TABLET ORAL
Status: DISCONTINUED | OUTPATIENT
Start: 2023-01-23 | End: 2023-02-20

## 2023-01-25 ENCOUNTER — TELEPHONE (OUTPATIENT)
Dept: CARDIOLOGY | Facility: CLINIC | Age: 58
End: 2023-01-25

## 2023-01-25 DIAGNOSIS — I10 ESSENTIAL HYPERTENSION: Primary | ICD-10-CM

## 2023-01-25 DIAGNOSIS — R07.9 CHEST PAIN, UNSPECIFIED TYPE: ICD-10-CM

## 2023-01-25 NOTE — TELEPHONE ENCOUNTER
Caller: Veda Enamorado    Relationship: Self    Best call back number: [phone]    What is the best time to reach you: ANYTIME    Who are you requesting to speak with (clinical staff, provider,  specific staff member): CLINICAL STAFF    What was the call regarding: PATIENT STATED SHE WAS INTO SEE DR. SIMON AND HE MENTIONED DOING A HEART CATH, BUT WANTED TO WAIT AND UP PATIENTS MEDICATION TO SEE IF THAT WOULD HELP HER FEEL BETTER. PATIENT ISN'T FEELING ANY BETTER AND WOULD LIKE TO SEE ABOUT GETTING THE HEART CATH SOONER. PATIENT STATED HER HEART IS STILL POUNDING AND SHE FEELS REALLY SICK TO HER STOMACH.     Do you require a callback: YES

## 2023-01-25 NOTE — TELEPHONE ENCOUNTER
Spoke with Dr Chang regarding patients concerns. He states he advised her to take medications for at least 2 weeks . Dr Chang states he will schedule her a cath for the week of February 6th. Patient will be notified.

## 2023-01-26 PROBLEM — R07.9 CHEST PAIN: Status: ACTIVE | Noted: 2023-01-26

## 2023-02-07 ENCOUNTER — SPECIALTY PHARMACY (OUTPATIENT)
Dept: PHARMACY | Facility: HOSPITAL | Age: 58
End: 2023-02-07
Payer: COMMERCIAL

## 2023-02-07 NOTE — PROGRESS NOTES
Specialty Pharmacy Refill Coordination Note      Name:  Veda Enamorado  :  1965  Date:  2023         Past Medical History:   Diagnosis Date   • Arthritis     back   • Back ache    • Chronic back pain    • Coronary artery disease     s/p 3 stents    • GERD (gastroesophageal reflux disease)    • History of MRSA infection 2020    labia; hospitalized 6 days on IV antibiotics and then went home on po antibiotics    • Hyperlipidemia    • Hypertension    • Peptic ulceration        Past Surgical History:   Procedure Laterality Date   • CARDIAC CATHETERIZATION     • CARDIAC CATHETERIZATION N/A 2019    Procedure: Left Heart Cath;  Surgeon: Lazaro Conway MD;  Location: [facility] CATH INVASIVE LOCATION;  Service: Cardiology   • CARDIAC CATHETERIZATION     • CARDIAC CATHETERIZATION N/A 3/9/2022    Procedure: Left Heart Cath;  Surgeon: Lazaro Conway MD;  Location: [facility] INVASIVE LOCATION;  Service: Cardiology;  Laterality: N/A;   • CARDIAC SURGERY     •  SECTION      x2   • CORONARY ARTERY BYPASS GRAFT N/A 2020    Procedure: MEDIAN STERNOTOMY, CORONARY ARTERY BYPASS X3 WITH  INTERNAL MAMMARY ARTERY GRAFTING, ENDOVASCULAR VEIN HARVESTING OF THE RIGHT GREATER SAPHENOUS VEIN, MAICOL PER ANESTHESIA;  Surgeon: Juanjo Coronado MD;  Location: [facility];  Service: Cardiothoracic;  Laterality: N/A;  ST ON LE  VO: 1800  VR: 1814   • HAND SURGERY      right   • PERINEAL LESION/CYST EXCISION Right 2020    Procedure: I&D ABSCESS;  Surgeon: Leander Cardenas III, MD;  Location: [facility];  Service: Obstetrics/Gynecology       Social History     Socioeconomic History   • Marital status:    • Number of children: 2   Tobacco Use   • Smoking status: Former     Types: Cigarettes     Quit date: 2020     Years since quittin.5   • Smokeless tobacco: Never   Vaping Use   • Vaping Use: Former   Substance and Sexual Activity   • Alcohol use: No   • Drug use: No    • Sexual activity: Defer       Family History   Problem Relation Age of Onset   • Heart disease Mother    • Coronary artery disease Mother    • Heart disease Father    • Heart attack Father    • Hypertension Father    • Stroke Father    • No Known Problems Sister    • Heart attack Brother    • Heart disease Brother    • Heart disease Maternal Grandmother    • Heart attack Maternal Grandmother    • No Known Problems Maternal Grandfather    • Stroke Paternal Grandmother    • Breast cancer Neg Hx        Allergies   Allergen Reactions   • Bactrim [Sulfamethoxazole-Trimethoprim] Rash   • Ciprofloxacin Other (See Comments)     tremors   • Ranexa [Ranolazine Er] Unknown - Low Severity       Current Outpatient Medications   Medication Sig Dispense Refill   • Alirocumab (Praluent) 150 MG/ML injection pen Inject 2 mL under the skin into the appropriate area as directed Every 28 (Twenty-Eight) Days. 2 mL 5   • Aspirin Low Dose 81 MG EC tablet Take 81 mg by mouth Daily.     • Eliquis 5 MG tablet tablet TAKE 1 TABLET BY MOUTH 2 (TWO) TIMES A DAY FOR 30 DAYS. 60 tablet 5   • isosorbide mononitrate (IMDUR) 30 MG 24 hr tablet TAKE 1 TABLET BY MOUTH EVERY NIGHT. 30 tablet 11   • lisinopril (PRINIVIL,ZESTRIL) 10 MG tablet Take 1 tablet by mouth Daily. (Patient taking differently: Take 20 mg by mouth Daily. Patient taking 20 mg , PCP added 10 mg approx 1 month ago) 90 tablet 3   • loratadine (CLARITIN) 10 MG tablet Take 10 mg by mouth As Needed.     • metoprolol tartrate (LOPRESSOR) 25 MG tablet Take 1 tablet by mouth 2 (Two) Times a Day. 30 tablet 11   • nitroglycerin (NITROSTAT) 0.4 MG SL tablet TAKE 1 TABLET AT ONSET OF CHEST PAIN,MAY REPEAT IN 5 MIN, MAX 3 DOSES IN 24 HRS 25 tablet 11   • spironolactone (ALDACTONE) 25 MG tablet TAKE 1/2 TABLET BY MOUTH DAILY 90 tablet 3   • traMADol (ULTRAM) 50 MG tablet Take 50 mg by mouth Every 6 (Six) Hours As Needed.       Current Facility-Administered Medications   Medication Dose Route  Frequency Provider Last Rate Last Admin   • amLODIPine (NORVASC) tablet 5 mg  5 mg Oral Q24H Lazaro Conway MD         Facility-Administered Medications Ordered in Other Visits   Medication Dose Route Frequency Provider Last Rate Last Admin   • Chlorhexidine Gluconate Cloth 2 % pads 1 application  1 application Topical Q12H PRN Bianka Ramírez APRN             ASSESSMENT/PLAN:      Veda is a 58 y.o. female contacted today regarding refills of  Praluent 150mg/ml injection  specialty medication(s).    Reviewed and verified with patient:       Specialty medication(s) and dose(s) confirmed: yes    Refill Questions    Flowsheet Row Most Recent Value   Changes to allergies? No   Changes to medications? No   New conditions since last clinic visit No   Unplanned office visit, urgent care, ED, or hospital admission in the last 4 weeks  No   How does patient/caregiver feel medication is working? Very good   Financial problems or insurance changes  No   If yes, describe changes in insurance or financial issues. no   Since the previous refill, were any specialty medication doses or scheduled injections missed or delayed?  No   If yes, please provide the amount 0   Why were doses missed? n/a   Does this patient require a clinical escalation to a pharmacist? No                Medication Adherence    Adherence tools used: calendar   Other adherence tool: n/a   Support network for adherence: family member   Other support network: na           Follow-up: 28 day(s)     Zoraida Porter, Pharmacy Technician  Specialty Pharmacy Technician

## 2023-02-10 ENCOUNTER — TELEPHONE (OUTPATIENT)
Dept: CARDIOLOGY | Facility: CLINIC | Age: 58
End: 2023-02-10

## 2023-02-10 NOTE — TELEPHONE ENCOUNTER
WAYNE with patient she seen Dr. Chang on 1/23/23, she states he was going to put her on Norvasc? It was not in the last office note. But, it is on med. List but not prescribed with directions.    Also, she is only taking 1/2 tab of metoprolol bid, and she wanted to let  Know,  Also, that she is taking 2tabs daily of lisinopril and would like to know if that is safe?

## 2023-02-14 ENCOUNTER — TELEPHONE (OUTPATIENT)
Dept: CARDIOLOGY | Facility: CLINIC | Age: 58
End: 2023-02-14
Payer: COMMERCIAL

## 2023-02-14 NOTE — TELEPHONE ENCOUNTER
I called Veda.  She states that Dr. Chang wanted her to take Norvasc but she never received a prescription.  He also wanted her to increase her metoprolol to 25 mg twice daily.  She tried that she took 2 doses and she states that she felt so bad that day that she could not even move off the couch.  She is now back to taking half a tab twice a day.    She also wanted me to be aware that her family doctor had added an extra lisinopril every day and she is now taking 10 mg of lisinopril twice a day.      I asked her if she had checked her blood pressure that day and she states that she had not.  She has not checked her blood pressure.    I instructed her to check her blood pressure.  I asked her to take it tonight about an hour or so after she eats, tomorrow morning before she takes her meds and then an hour after she takes her meds.  I asked her also to check it mid afternoon and then call our office with those measurements.  I told her to talk to Beatriz.  She stated understanding.    She also wants to reschedule her cath that she had to cancel because she has upper respiratory infection.

## 2023-02-15 ENCOUNTER — TELEPHONE (OUTPATIENT)
Dept: CARDIOLOGY | Facility: CLINIC | Age: 58
End: 2023-02-15
Payer: COMMERCIAL

## 2023-02-15 NOTE — TELEPHONE ENCOUNTER
Patient returned my call.  I reviewed the blood pressures she had given to DASIA Redmond earlier today.  The systolic readings were in the 150s and 170s right before she took her medications.    I explained to Ms. Enamorado that increasing the metoprolol by 12 and half milligrams is unlikely the cause of her feeling bad that day.  I explained to her that a total of 50 mg of metoprolol a day was not a very high dose and that it was unlikely the cause of her feeling so bad given that her blood pressure could certainly take the additional 25 mg a day.    I have asked her to read try taking the 25 mg and I will get back with her on Friday.

## 2023-02-16 DIAGNOSIS — R07.9 CHEST PAIN, UNSPECIFIED TYPE: Primary | ICD-10-CM

## 2023-02-17 ENCOUNTER — TELEPHONE (OUTPATIENT)
Dept: CARDIOLOGY | Facility: CLINIC | Age: 58
End: 2023-02-17
Payer: COMMERCIAL

## 2023-02-17 NOTE — TELEPHONE ENCOUNTER
L/M WITH THE PATIENTS  TO CALL THE OFFICE, PER LAURYN. NEED TO VERIFY BP READING AND METOPROLOL DOSE? PER LAURYN.

## 2023-02-17 NOTE — TELEPHONE ENCOUNTER
Caller: Veda Enamorado    Relationship to patient: Self    Best call back number: [phone]    Patient is needing: PT GIVING LAURYN A CALL BACK, HUB WAS UNABLE TO WT

## 2023-02-20 ENCOUNTER — TELEPHONE (OUTPATIENT)
Dept: CARDIOLOGY | Facility: CLINIC | Age: 58
End: 2023-02-20

## 2023-02-20 ENCOUNTER — SPECIALTY PHARMACY (OUTPATIENT)
Dept: PHARMACY | Facility: HOSPITAL | Age: 58
End: 2023-02-20
Payer: COMMERCIAL

## 2023-02-20 RX ORDER — HYDROCODONE BITARTRATE AND ACETAMINOPHEN 5; 325 MG/1; MG/1
1 TABLET ORAL EVERY 8 HOURS PRN
COMMUNITY

## 2023-02-20 RX ORDER — ALIROCUMAB 150 MG/ML
300 INJECTION, SOLUTION SUBCUTANEOUS
Qty: 2 ML | Refills: 5 | Status: SHIPPED | OUTPATIENT
Start: 2023-02-20

## 2023-02-20 NOTE — PROGRESS NOTES
[facility]  Lipid Management Program - PCSK9i       Veda Enamorado is a 58 y.o. female referred to the [facility] Management [facility] by Dr. Chang for clinical pharmacy and specialty pharmacy management of PCSK9i.  Veda Enamorado is  treated for clinical ASCVD and currently takes Praluent 300mg every 28 days.  In the past, Pt has tried Lipitor but had myalgias with it and Pravastatin, which was discontinued recently. The patient denies any allergies to latex. She denies any adverse effects and denies any trouble giving herself the injection. She reports that she has been taking them late and admits to missing whole doses before. She reports that the last dose she took on  was about one week late. She writes down when she gives her shots, but does not proactively plan out her next shot and does not write down when it should be due, but rather guesses on when to take it after picking up her refill from the pharmacy and ends up forgetting to take it most of the time.     Relevant Past Medical History and Comorbidities  Past Medical History:   Diagnosis Date   • Arthritis     back   • Back ache    • Chronic back pain    • Coronary artery disease     s/p 3 stents    • GERD (gastroesophageal reflux disease)    • History of MRSA infection 2020    labia; hospitalized 6 days on IV antibiotics and then went home on po antibiotics    • Hyperlipidemia    • Hypertension    • Peptic ulceration      Social History     Socioeconomic History   • Marital status:    • Number of children: 2   Tobacco Use   • Smoking status: Former     Types: Cigarettes     Quit date: 2020     Years since quittin.5   • Smokeless tobacco: Never   Vaping Use   • Vaping Use: Former   Substance and Sexual Activity   • Alcohol use: No   • Drug use: No   • Sexual activity: Defer       Allergies  Bactrim [sulfamethoxazole-trimethoprim], Ciprofloxacin, and Ranexa [ranolazine er]    Current Medication  List    Current Outpatient Medications:   •  Alirocumab (Praluent) 150 MG/ML injection pen, Inject 2 mL under the skin into the appropriate area as directed Every 28 (Twenty-Eight) Days., Disp: 2 mL, Rfl: 5  •  Aspirin Low Dose 81 MG EC tablet, Take 81 mg by mouth Daily., Disp: , Rfl:   •  Eliquis 5 MG tablet tablet, TAKE 1 TABLET BY MOUTH 2 (TWO) TIMES A DAY FOR 30 DAYS., Disp: 60 tablet, Rfl: 5  •  HYDROcodone-acetaminophen (NORCO) 5-325 MG per tablet, Take 1 tablet by mouth Every 8 (Eight) Hours As Needed., Disp: , Rfl:   •  isosorbide mononitrate (IMDUR) 30 MG 24 hr tablet, TAKE 1 TABLET BY MOUTH EVERY NIGHT., Disp: 30 tablet, Rfl: 11  •  lisinopril (PRINIVIL,ZESTRIL) 10 MG tablet, Take 1 tablet by mouth Daily. (Patient taking differently: Take 20 mg by mouth Daily. Patient taking 10mg bid as instructed by PCP she reports), Disp: 90 tablet, Rfl: 3  •  loratadine (CLARITIN) 10 MG tablet, Take 10 mg by mouth As Needed., Disp: , Rfl:   •  metoprolol tartrate (LOPRESSOR) 25 MG tablet, Take 1 tablet by mouth 2 (Two) Times a Day., Disp: 30 tablet, Rfl: 11  •  nitroglycerin (NITROSTAT) 0.4 MG SL tablet, TAKE 1 TABLET AT ONSET OF CHEST PAIN,MAY REPEAT IN 5 MIN, MAX 3 DOSES IN 24 HRS, Disp: 25 tablet, Rfl: 11  •  spironolactone (ALDACTONE) 25 MG tablet, TAKE 1/2 TABLET BY MOUTH DAILY, Disp: 90 tablet, Rfl: 3  No current facility-administered medications for this visit.    Facility-Administered Medications Ordered in Other Visits:   •  Chlorhexidine Gluconate Cloth 2 % pads 1 application, 1 application, Topical, Q12H PRN, Bianka Ramírez APRN    Drug Interactions  None with Praluent    Relevant Laboratory Values  Lab Results   Component Value Date    CHOL 102 02/24/2022    CHLPL 215 (H) 04/11/2016    TRIG 121 02/24/2022    HDL 50 02/24/2022    LDL 30 02/24/2022       Medication Assessment & Plan    Patient was last seen by cardiology on 1/23/23 and Praluent was continued. Her LDL was at goal on 2/24/22.     Will continue  Praluent 300mg every 28 days. Sent in next 6 month refills.  She would like to  in our pharmacy.     Pt will continue regular follow-up with cardiology.  Will continue to follow-up in 6 months.     Counseled the patient on the importance on giving this medication at the appropriate times for the best results.  I suggested that when she writes down when she gives her shots each time, that she goes ahead and counts out the 4 weeks until her next injection and marks it on her calender boldly to help serve as a reminder. I also suggested setting a reminder/alarm on her cell phone on the day of her next scheduled shot to help remind her. Pt seemed interested in trying to do better with adherence. Will check in on patient in a few months to see how this is going.       Aneta Oleary RPH  2/20/2023  14:51 EST

## 2023-02-20 NOTE — TELEPHONE ENCOUNTER
Bertrand with patient today she is taking Metoprolol 25mg BID,  regarding BP:    2/20 with no meds, 163/82  30 mins. Later 153/86  30 mins. [id number]  Went to Dr. Alcantar Around 1:20pm  Today and it was 156/86    2-19, 142/71, 130/66  2/18, 166/88,150/79

## 2023-02-23 ENCOUNTER — HOSPITAL ENCOUNTER (OUTPATIENT)
Facility: HOSPITAL | Age: 58
Setting detail: HOSPITAL OUTPATIENT SURGERY
Discharge: HOME | End: 2023-02-23
Attending: INTERNAL MEDICINE | Admitting: INTERNAL MEDICINE
Payer: COMMERCIAL

## 2023-02-23 VITALS
DIASTOLIC BLOOD PRESSURE: 89 MMHG | HEIGHT: 62 IN | OXYGEN SATURATION: 98 % | RESPIRATION RATE: 20 BRPM | SYSTOLIC BLOOD PRESSURE: 137 MMHG | TEMPERATURE: 98 F | HEART RATE: 70 BPM | WEIGHT: 144 LBS | BODY MASS INDEX: 26.5 KG/M2

## 2023-02-23 DIAGNOSIS — I25.10 CORONARY ARTERY DISEASE INVOLVING NATIVE CORONARY ARTERY OF NATIVE HEART WITHOUT ANGINA PECTORIS: Primary | Chronic | ICD-10-CM

## 2023-02-23 DIAGNOSIS — R07.9 CHEST PAIN, UNSPECIFIED TYPE: ICD-10-CM

## 2023-02-23 LAB
ANION GAP SERPL CALCULATED.3IONS-SCNC: 9.1 MMOL/L (ref 5–15)
BUN SERPL-MCNC: 8 MG/DL (ref 6–20)
BUN/CREAT SERPL: 11.6 (ref 7–25)
CALCIUM SPEC-SCNC: 9.4 MG/DL (ref 8.6–10.5)
CHLORIDE SERPL-SCNC: 110 MMOL/L (ref 98–107)
CO2 SERPL-SCNC: 22.9 MMOL/L (ref 22–29)
CREAT SERPL-MCNC: 0.69 MG/DL (ref 0.57–1)
DEPRECATED RDW RBC AUTO: 47.1 FL (ref 37–54)
EGFRCR SERPLBLD CKD-EPI 2021: 101.4 ML/MIN/1.73
ERYTHROCYTE [DISTWIDTH] IN BLOOD BY AUTOMATED COUNT: 13.6 % (ref 12.3–15.4)
GLUCOSE SERPL-MCNC: 109 MG/DL (ref 65–99)
HCT VFR BLD AUTO: 38.6 % (ref 34–46.6)
HGB BLD-MCNC: 12.7 G/DL (ref 12–15.9)
MCH RBC QN AUTO: 31.1 PG (ref 26.6–33)
MCHC RBC AUTO-ENTMCNC: 32.9 G/DL (ref 31.5–35.7)
MCV RBC AUTO: 94.4 FL (ref 79–97)
PLATELET # BLD AUTO: 361 10*3/MM3 (ref 140–450)
PMV BLD AUTO: 9.7 FL (ref 6–12)
POTASSIUM SERPL-SCNC: 4.3 MMOL/L (ref 3.5–5.2)
RBC # BLD AUTO: 4.09 10*6/MM3 (ref 3.77–5.28)
SODIUM SERPL-SCNC: 142 MMOL/L (ref 136–145)
WBC NRBC COR # BLD: 11.54 10*3/MM3 (ref 3.4–10.8)

## 2023-02-23 PROCEDURE — C1894 INTRO/SHEATH, NON-LASER: HCPCS | Performed by: INTERNAL MEDICINE

## 2023-02-23 PROCEDURE — 25010000002 MIDAZOLAM PER 1 MG: Performed by: INTERNAL MEDICINE

## 2023-02-23 PROCEDURE — 93459 L HRT ART/GRFT ANGIO: CPT | Performed by: INTERNAL MEDICINE

## 2023-02-23 PROCEDURE — 85027 COMPLETE CBC AUTOMATED: CPT | Performed by: INTERNAL MEDICINE

## 2023-02-23 PROCEDURE — 0 IOPAMIDOL PER 1 ML: Performed by: INTERNAL MEDICINE

## 2023-02-23 PROCEDURE — C1760 CLOSURE DEV, VASC: HCPCS | Performed by: INTERNAL MEDICINE

## 2023-02-23 PROCEDURE — C1769 GUIDE WIRE: HCPCS | Performed by: INTERNAL MEDICINE

## 2023-02-23 PROCEDURE — 80048 BASIC METABOLIC PNL TOTAL CA: CPT | Performed by: INTERNAL MEDICINE

## 2023-02-23 PROCEDURE — 25010000002 FENTANYL CITRATE (PF) 50 MCG/ML SOLUTION: Performed by: INTERNAL MEDICINE

## 2023-02-23 PROCEDURE — S0260 H&P FOR SURGERY: HCPCS | Performed by: INTERNAL MEDICINE

## 2023-02-23 RX ORDER — ACETAMINOPHEN 325 MG/1
650 TABLET ORAL EVERY 4 HOURS PRN
Status: DISCONTINUED | OUTPATIENT
Start: 2023-02-23 | End: 2023-02-23 | Stop reason: HOSPADM

## 2023-02-23 RX ORDER — LIDOCAINE HYDROCHLORIDE 20 MG/ML
INJECTION, SOLUTION INFILTRATION; PERINEURAL
Status: DISCONTINUED | OUTPATIENT
Start: 2023-02-23 | End: 2023-02-23 | Stop reason: HOSPADM

## 2023-02-23 RX ORDER — LISINOPRIL 10 MG/1
20 TABLET ORAL DAILY
Qty: 90 TABLET | Refills: 3 | Status: SHIPPED | OUTPATIENT
Start: 2023-02-23 | End: 2023-03-08

## 2023-02-23 RX ORDER — FENTANYL CITRATE 50 UG/ML
INJECTION, SOLUTION INTRAMUSCULAR; INTRAVENOUS
Status: DISCONTINUED | OUTPATIENT
Start: 2023-02-23 | End: 2023-02-23 | Stop reason: HOSPADM

## 2023-02-23 RX ORDER — SODIUM CHLORIDE 9 MG/ML
100 INJECTION, SOLUTION INTRAVENOUS CONTINUOUS
Status: DISCONTINUED | OUTPATIENT
Start: 2023-02-23 | End: 2023-02-23 | Stop reason: HOSPADM

## 2023-02-23 RX ORDER — SODIUM CHLORIDE 9 MG/ML
INJECTION, SOLUTION INTRAVENOUS
Status: COMPLETED | OUTPATIENT
Start: 2023-02-23 | End: 2023-02-23

## 2023-02-23 RX ORDER — MIDAZOLAM HYDROCHLORIDE 1 MG/ML
INJECTION INTRAMUSCULAR; INTRAVENOUS
Status: DISCONTINUED | OUTPATIENT
Start: 2023-02-23 | End: 2023-02-23 | Stop reason: HOSPADM

## 2023-02-23 NOTE — DISCHARGE INSTR - ACTIVITY
Take it easy for the next several days to one week. No driving or signing legal documents for the next 24 hours. Keep cath site clean, dry, and covered. Do not submerge site underwater, no hot tubs, tub baths, or swimming pools for the next several days to one week.  Patient is to restart eliquis tomorrow per Dr. Chang, no changes to any of your other medications per Dr. Chang.

## 2023-02-23 NOTE — H&P
2023     No chief complaint on file.        History:      Veda Enamorado is a 58 y.o. female presenting for  follow-up evaluation   Clinically stable from cardiac standpoint   Symptoms:  CP no  SOB stable    Orthopnea No  Lower extremity edema no   Palpitations worsening   Compliant with medications yes  Claudication no        Medical History        Past Medical History:   Diagnosis Date   • Arthritis       back   • Back ache     • Chronic back pain     • Coronary artery disease       s/p 3 stents    • GERD (gastroesophageal reflux disease)     • History of MRSA infection 2020     labia; hospitalized 6 days on IV antibiotics and then went home on po antibiotics    • Hyperlipidemia     • Hypertension     • Peptic ulceration              Surgical History   Past Surgical History:   Procedure Laterality Date   • CARDIAC CATHETERIZATION       • CARDIAC CATHETERIZATION N/A 2019     Procedure: Left Heart Cath;  Surgeon: Lazaro Conway MD;  Location: [facility] CATH INVASIVE LOCATION;  Service: Cardiology   • CARDIAC CATHETERIZATION       • CARDIAC CATHETERIZATION N/A 3/9/2022     Procedure: Left Heart Cath;  Surgeon: Lazaro Conway MD;  Location: [facility] CATH INVASIVE LOCATION;  Service: Cardiology;  Laterality: N/A;   • CARDIAC SURGERY       •  SECTION         x2   • CORONARY ARTERY BYPASS GRAFT N/A 2020     Procedure: MEDIAN STERNOTOMY, CORONARY ARTERY BYPASS X3 WITH  INTERNAL MAMMARY ARTERY GRAFTING, ENDOVASCULAR VEIN HARVESTING OF THE RIGHT GREATER SAPHENOUS VEIN, MAICOL PER ANESTHESIA;  Surgeon: Juanjo Coronado MD;  Location: [facility];  Service: Cardiothoracic;  Laterality: N/A;  ST ON LE  VO: 1800  VR: 1814   • HAND SURGERY         right   • PERINEAL LESION/CYST EXCISION Right 2020     Procedure: I&D ABSCESS;  Surgeon: Leander Cardenas III, MD;  Location: [facility];  Service: Obstetrics/Gynecology            Past Social History:  Social History   Social  History            Socioeconomic History   • Marital status:    • Number of children: 2   Tobacco Use   • Smoking status: Former       Types: Cigarettes       Quit date: 2020       Years since quittin.5   • Smokeless tobacco: Never   Vaping Use   • Vaping Use: Former   Substance and Sexual Activity   • Alcohol use: No   • Drug use: No   • Sexual activity: Defer            Past Family History:        Family History   Problem Relation Age of Onset   • Heart disease Mother     • Coronary artery disease Mother     • Heart disease Father     • Heart attack Father     • Hypertension Father     • Stroke Father     • No Known Problems Sister     • Heart attack Brother     • Heart disease Brother     • Heart disease Maternal Grandmother     • Heart attack Maternal Grandmother     • No Known Problems Maternal Grandfather     • Stroke Paternal Grandmother     • Breast cancer Neg Hx           Review of Systems:   Pertinent positive review of system documented in HPI     Current Medications          Current Outpatient Medications   Medication Sig Dispense Refill   • Alirocumab (Praluent) 150 MG/ML injection pen Inject 2 mL under the skin into the appropriate area as directed Every 28 (Twenty-Eight) Days. 2 mL 5   • Aspirin Low Dose 81 MG EC tablet Take 81 mg by mouth Daily.       • Eliquis 5 MG tablet tablet TAKE 1 TABLET BY MOUTH 2 (TWO) TIMES A DAY FOR 30 DAYS. 60 tablet 5   • isosorbide mononitrate (IMDUR) 30 MG 24 hr tablet TAKE 1 TABLET BY MOUTH EVERY NIGHT. 30 tablet 11   • lisinopril (PRINIVIL,ZESTRIL) 10 MG tablet Take 1 tablet by mouth Daily. (Patient taking differently: Take 20 mg by mouth Daily. Patient taking 20 mg , PCP added 10 mg approx 1 month ago) 90 tablet 3   • metoprolol tartrate (LOPRESSOR) 25 MG tablet Take 1 tablet by mouth 2 (Two) Times a Day. 30 tablet 11   • nitroglycerin (NITROSTAT) 0.4 MG SL tablet TAKE 1 TABLET AT ONSET OF CHEST PAIN,MAY REPEAT IN 5 MIN, MAX 3 DOSES IN 24 HRS 25 tablet  "11   • spironolactone (ALDACTONE) 25 MG tablet TAKE 1/2 TABLET BY MOUTH DAILY 90 tablet 3   • loratadine (CLARITIN) 10 MG tablet Take 10 mg by mouth As Needed.       • traMADol (ULTRAM) 50 MG tablet Take 50 mg by mouth Every 6 (Six) Hours As Needed.                    Current Facility-Administered Medications   Medication Dose Route Frequency Provider Last Rate Last Admin   • amLODIPine (NORVASC) tablet 5 mg  5 mg Oral Q24H Lazaro Conway MD                    Facility-Administered Medications Ordered in Other Visits   Medication Dose Route Frequency Provider Last Rate Last Admin   • Chlorhexidine Gluconate Cloth 2 % pads 1 application  1 application Topical Q12H PRN Bianka Ramírez APRN                      Allergies   Allergen Reactions   • Bactrim [Sulfamethoxazole-Trimethoprim] Rash   • Ciprofloxacin Other (See Comments)       tremors   • Ranexa [Ranolazine Er] Unknown - Low Severity            Objective         /85 (BP Location: Left arm, Patient Position: Sitting, Cuff Size: Adult)   Pulse 71   Ht 157.5 cm (62\")   Wt 65.5 kg (144 lb 6.4 oz)   SpO2 98%   BMI 26.41 kg/m²      Physical Exam   PERRLA  S1-S2 heard no S3-S4 no murmur  Abdomen soft nontender  Bilateral air entry noted.  Peripheries no edema.  Skin no rashes.  Focal neurological deficits on exam.     DATA:  Results for orders placed during the hospital encounter of 07/17/20     SCANNED - TELEMETRY     Results for orders placed during the hospital encounter of 01/19/21     Adult Transthoracic Echo Complete W/ Cont if Necessary Per Protocol     Interpretation Summary  · Estimated left ventricular EF = 60% Left ventricular ejection fraction appears to be 56 - 60%. Left ventricular systolic function is normal.  · Left ventricular diastolic function was normal.  · No significant valvular abnormality  · No pericardial effusion  · No change since prev echo of 6/26/20   Results for orders placed in visit on 02/23/22     Stress Test " With Myocardial Perfusion (1 Day)     Interpretation Summary  · Myocardial perfusion imaging indicates a small-sized, mildly severe area of ischemia located in the basal inferior lateral wall. The anterior perfusion wall defect is likely secondary to breast attenuation artifact,  · Breast attenuation artifact is present.  · Left ventricular ejection fraction is normal. .  · Impressions are consistent with an intermediate risk study.   Results for orders placed in visit on 02/23/22     Stress Test With Myocardial Perfusion (1 Day)     Interpretation Summary  · Myocardial perfusion imaging indicates a small-sized, mildly severe area of ischemia located in the basal inferior lateral wall. The anterior perfusion wall defect is likely secondary to breast attenuation artifact,  · Breast attenuation artifact is present.  · Left ventricular ejection fraction is normal. .  · Impressions are consistent with an intermediate risk study.   Results for orders placed during the hospital encounter of 03/09/22     Cardiac Catheterization/Vascular Study     Narrative  Images from the original result were not included.  CARDIAC CATHETERIZATION / INTERVENTION REPORT     DATE OF PROCEDURE: 3/9/2022     INDICATION FOR PROCEDURE: Abnormal stress test        PRE PROCEDURE DIAGNOSIS:  CAD s/p three-vessel CABG  Hypertension  Dyslipidemia     POST PROCEDURE DIAGNOSIS:  CAD with a patent LIMA graft and patent SVG to OM1 and diagonal 1 graft, native RCA mild nonobstructive disease unchanged from before.  Normal LV systolic function, mildly elevated LVEDP of 20 mmHg.     Face to face mdoerate conscious  sedation time:        COMPLICATIONS : None     Specimens collected : None           PROCEDURE PERFORMED:     1. Selective right and left coronary Angiogram  2. Left heart catheretization  3. Left ventriculography  4.  LIMA angiogram  5.SVG to diagonal and OM angiogram        PROCEDURE COMMENTS:     Prior to the procedure risks benefits  alternatives and possible adverse events were discussed with the patient and informed consent was obtained.  Veda Enamorado was brought to the cath lab and placed on the cardiac catherization table in the postabsortive state. The patient was prepped and draped according to the cath lab protocol under strict aseptic and sterile condition. Patient's right femoral artery sight was prepped and draped. Under fluoroscopic guidance the right femoral artery was punctured using a Micropuncture gauge needle utilizing the modified Seldinger technique. 6 Syrian sheath was introduced over a wire. After aspirating for blood it was flushed with heparinized saline. Angio was performed to confirm the position of the sheath in right common femoral artery     We advanced Genaro type diagnostic catheters over the wire. The  left and right coronary arteries  were selectively engaged. Left and right coronary angiogram were obtained in multiple orthogonal projections.  5 New Zealander JR4 catheter was used for engaging the SVG on the LIMA angiogram and angiogram pictures were obtained in orthogonal views .5 F Pigtail catheter was used to cross across the AV retrograde into the LV cavity and resting LV pressures were recorded. Manual pull back to used to record gradient across the Aortic valve.     After completion of the procedure the femoral sheath was removed in the cath lab and hemostasis was achieved by Mynx. The patient tolerated the procedure without complications.        Coronary anatomy findings:     LM: Moderate caliber vessel, short and no significant stenosis.     LAD: Was chronically totally occluded at the proximal segment before the diagonal takeoff.     Ramus intermedius.  Moderate caliber vessel with no significant stenosis.     LCX: Moderate calibre vessel, the OM1 which was patent before has been occluded in the proximal segment but the graft supplying the OM1 is patent and the distal OM 1 distal to the anastomosis had no  significant stenosis.     RCA: Large calibre, dominant artery, proximal, mid and distal had mild luminal irregularities with no significant stenosis, distally divides into R PDA and PL branches both had mild luminal irregularities with no stensois.  Noted mild 30 to 40% stenosis across the entire RCA, the PDA and PL branches had no significant stenosis.     LIMA angiogram:  The CELESTE graft was well matured with no significant stenosis with good proximal and distal anastomosis site, the distal LAD is a small caliber artery with mild diffuse disease.     SVG to OM graft appeared patent supplying the distal OM branch which had no significant stenosis.  SVG to diagonal 1 graft appeared patent with no significant stenosis supplying a small caliber diagonal artery with mild diffuse disease.     Left Ventriculography:     LV systolic function was normal with visual estimated EF of 60%. No significant mitral regurgitation noted.     LVEDP: 20 mmHg  No gradient across the aortic valve on pull back.           EBL: Less than 10cc           Final Impression:  CAD with patent vein grafts and the LIMA graft and no significant stenosis in the native RCA.           Recommendations:  Patient continued to have recurrent anginal symptoms despite being on maximum tolerated antianginal medications.  I had a long discussion with her and she feels her chest pain is similar to his previous episodes of MI and was concerned that she might have developed more blockages since her last angiogram post CABG.  We will [place] proceed with a repeat coronary angiogram.    I have explained the risks associated with the procedure to the patient including but not limited to an allergic reaction to the contrast material or medications used during the procedure bleeding, infection, and bruising at the catheter insertion site blood clots, which may trigger heart attack, stroke,   damage to the artery where the catheter was inserted, or damage to the  arteries as the catheter travels through your body, irregular heart rhythm arrhythmias, kidney damage caused by the contrast material.                        Lazaro Conway MD, [facility]  Interventional Cardiology     03/09/22  08:57 EST

## 2023-02-27 ENCOUNTER — TELEPHONE (OUTPATIENT)
Dept: CARDIOLOGY | Facility: CLINIC | Age: 58
End: 2023-02-27
Payer: COMMERCIAL

## 2023-02-27 NOTE — TELEPHONE ENCOUNTER
Called patient.  She states that her blood pressures have been doing okay recently.  She reports BPs in the 130s systolically.    I will have her make an appointment for next week and she will continue to monitor, keep a diary.

## 2023-03-06 RX ORDER — APIXABAN 5 MG/1
TABLET, FILM COATED ORAL
Qty: 60 TABLET | Refills: 5 | Status: SHIPPED | OUTPATIENT
Start: 2023-03-06

## 2023-03-07 ENCOUNTER — SPECIALTY PHARMACY (OUTPATIENT)
Dept: PHARMACY | Facility: HOSPITAL | Age: 58
End: 2023-03-07
Payer: COMMERCIAL

## 2023-03-07 NOTE — PROGRESS NOTES
Specialty Pharmacy Refill Coordination Note      Name:  Veda Enamorado  :  1965  Date:  3/7/2023         Past Medical History:   Diagnosis Date   • Arthritis     back   • Back ache    • Chronic back pain    • Coronary artery disease     s/p 3 stents    • GERD (gastroesophageal reflux disease)    • History of MRSA infection 2020    labia; hospitalized 6 days on IV antibiotics and then went home on po antibiotics    • Hyperlipidemia    • Hypertension    • Peptic ulceration        Past Surgical History:   Procedure Laterality Date   • CARDIAC CATHETERIZATION     • CARDIAC CATHETERIZATION N/A 2019    Procedure: Left Heart Cath;  Surgeon: Lazaro Conway MD;  Location: [facility] CATH INVASIVE LOCATION;  Service: Cardiology   • CARDIAC CATHETERIZATION     • CARDIAC CATHETERIZATION N/A 3/9/2022    Procedure: Left Heart Cath;  Surgeon: Lazaro Conway MD;  Location: [facility] CATH INVASIVE LOCATION;  Service: Cardiology;  Laterality: N/A;   • CARDIAC CATHETERIZATION N/A 2023    Procedure: Left Heart Cath;  Surgeon: Lazaro Conway MD;  Location: [facility] CATH INVASIVE LOCATION;  Service: Cardiology;  Laterality: N/A;   • CARDIAC SURGERY     •  SECTION      x2   • CORONARY ARTERY BYPASS GRAFT N/A 2020    Procedure: MEDIAN STERNOTOMY, CORONARY ARTERY BYPASS X3 WITH  INTERNAL MAMMARY ARTERY GRAFTING, ENDOVASCULAR VEIN HARVESTING OF THE RIGHT GREATER SAPHENOUS VEIN, MAICOL PER ANESTHESIA;  Surgeon: Juanjo Coronado MD;  Location: [facility];  Service: Cardiothoracic;  Laterality: N/A;  ST ON LE  VO: 1800  VR: 1814   • HAND SURGERY      right   • PERINEAL LESION/CYST EXCISION Right 2020    Procedure: I&D ABSCESS;  Surgeon: Leander Cardenas III, MD;  Location: [facility];  Service: Obstetrics/Gynecology       Social History     Socioeconomic History   • Marital status:    • Number of children: 2   Tobacco Use   • Smoking status: Former     Types:  Cigarettes     Quit date: 2020     Years since quittin.5   • Smokeless tobacco: Never   Vaping Use   • Vaping Use: Former   Substance and Sexual Activity   • Alcohol use: No   • Drug use: No   • Sexual activity: Defer       Family History   Problem Relation Age of Onset   • Heart disease Mother    • Coronary artery disease Mother    • Heart disease Father    • Heart attack Father    • Hypertension Father    • Stroke Father    • No Known Problems Sister    • Heart attack Brother    • Heart disease Brother    • Heart disease Maternal Grandmother    • Heart attack Maternal Grandmother    • No Known Problems Maternal Grandfather    • Stroke Paternal Grandmother    • Breast cancer Neg Hx        Allergies   Allergen Reactions   • Bactrim [Sulfamethoxazole-Trimethoprim] Rash   • Ciprofloxacin Other (See Comments)     tremors   • Ranexa [Ranolazine Er] Unknown - Low Severity       Current Outpatient Medications   Medication Sig Dispense Refill   • Alirocumab (Praluent) 150 MG/ML injection pen Inject 2 mL under the skin into the appropriate area as directed Every 28 (Twenty-Eight) Days. 2 mL 5   • Aspirin Low Dose 81 MG EC tablet Take 81 mg by mouth Daily.     • Eliquis 5 MG tablet tablet TAKE 1 TABLET BY MOUTH 2 (TWO) TIMES A DAY FOR 30 DAYS. 60 tablet 5   • HYDROcodone-acetaminophen (NORCO) 5-325 MG per tablet Take 1 tablet by mouth Every 8 (Eight) Hours As Needed.     • isosorbide mononitrate (IMDUR) 30 MG 24 hr tablet TAKE 1 TABLET BY MOUTH EVERY NIGHT. 30 tablet 11   • lisinopril (PRINIVIL,ZESTRIL) 10 MG tablet Take 2 tablets by mouth Daily. Patient taking 10mg bid as instructed by PCP she reports 90 tablet 3   • loratadine (CLARITIN) 10 MG tablet Take 10 mg by mouth As Needed.     • metoprolol tartrate (LOPRESSOR) 25 MG tablet Take 1 tablet by mouth 2 (Two) Times a Day. 30 tablet 11   • nitroglycerin (NITROSTAT) 0.4 MG SL tablet TAKE 1 TABLET AT ONSET OF CHEST PAIN,MAY REPEAT IN 5 MIN, MAX 3 DOSES IN 24 HRS 25  tablet 11   • spironolactone (ALDACTONE) 25 MG tablet TAKE 1/2 TABLET BY MOUTH DAILY 90 tablet 3     No current facility-administered medications for this visit.     Facility-Administered Medications Ordered in Other Visits   Medication Dose Route Frequency Provider Last Rate Last Admin   • Chlorhexidine Gluconate Cloth 2 % pads 1 application  1 application Topical Q12H PRN Bianka Ramírez APRN             ASSESSMENT/PLAN:      Veda is a 58 y.o. female contacted today regarding refills of  PRALUENT 150MG/ML INJECTION  specialty medication(s).    Reviewed and verified with patient:       Specialty medication(s) and dose(s) confirmed: yes    Refill Questions    Flowsheet Row Most Recent Value   Changes to allergies? No   Changes to medications? No   New conditions since last clinic visit No   Unplanned office visit, urgent care, ED, or hospital admission in the last 4 weeks  Yes  [ER VISIT ON 2/23 FOR 4 HOURS FOR CHEST PAIN. DID LEFT HEART CATH]   How does patient/caregiver feel medication is working? Very good   Financial problems or insurance changes  No   If yes, describe changes in insurance or financial issues. NO   Since the previous refill, were any specialty medication doses or scheduled injections missed or delayed?  No   If yes, please provide the amount 0   Why were doses missed? N/A   Does this patient require a clinical escalation to a pharmacist? Yes  [ER VISIT ON 2/23 FOR 4 HOURS FOR CHEST PAIN. DID LEFT HEART CATH]                Medication Adherence    Adherence tools used: calendar   Other adherence tool: n/a   Support network for adherence: family member   Other support network: na           Follow-up: 28 day(s)     Zoraida Porter, Pharmacy Technician  Specialty Pharmacy Technician

## 2023-03-07 NOTE — PROGRESS NOTES
Spoke with patient who reported she did not require any stents from recent heart cath. She denied any changes to medications. Patient is to continue Praluent as previously directed. No further follow-up is warranted.       Thank you,    Nyla Stafford. Manuel, PharmD  03/07/23  16:09 EST

## 2023-03-08 ENCOUNTER — OFFICE VISIT (OUTPATIENT)
Dept: CARDIOLOGY | Facility: CLINIC | Age: 58
End: 2023-03-08
Payer: COMMERCIAL

## 2023-03-08 VITALS
WEIGHT: 143 LBS | HEART RATE: 60 BPM | HEIGHT: 62 IN | DIASTOLIC BLOOD PRESSURE: 70 MMHG | BODY MASS INDEX: 26.31 KG/M2 | SYSTOLIC BLOOD PRESSURE: 150 MMHG | OXYGEN SATURATION: 99 %

## 2023-03-08 DIAGNOSIS — I10 ESSENTIAL HYPERTENSION: Chronic | ICD-10-CM

## 2023-03-08 DIAGNOSIS — E78.2 MIXED HYPERLIPIDEMIA: Chronic | ICD-10-CM

## 2023-03-08 DIAGNOSIS — I48.0 PAF (PAROXYSMAL ATRIAL FIBRILLATION): Chronic | ICD-10-CM

## 2023-03-08 DIAGNOSIS — I25.10 CORONARY ARTERY DISEASE INVOLVING NATIVE CORONARY ARTERY OF NATIVE HEART WITHOUT ANGINA PECTORIS: Primary | Chronic | ICD-10-CM

## 2023-03-08 PROCEDURE — 3077F SYST BP >= 140 MM HG: CPT | Performed by: NURSE PRACTITIONER

## 2023-03-08 PROCEDURE — 3078F DIAST BP <80 MM HG: CPT | Performed by: NURSE PRACTITIONER

## 2023-03-08 PROCEDURE — 99214 OFFICE O/P EST MOD 30 MIN: CPT | Performed by: NURSE PRACTITIONER

## 2023-03-08 RX ORDER — LISINOPRIL 30 MG/1
30 TABLET ORAL DAILY
Qty: 30 TABLET | Refills: 11 | Status: SHIPPED | OUTPATIENT
Start: 2023-03-08

## 2023-03-08 NOTE — PROGRESS NOTES
"Chief Complaint  Palpitations (Patient states palpitations, fatigue irregular heart beat , )    Subjective          Veda Enamorado presents to [facility] CARDIOLOGY for follow up.    History of Present Illness    On 2/23/2023 patient underwent cardiac catheterization for unstable angina.  She was noted to have patent vein grafts and patent LIMA graft.  No intervention was necessary.  She has also had recent difficulty in controlling her blood pressure and her medications have been adjusted several times.  She is here today for follow-up for both.    At today's visit Veda shows me a log of her blood pressures which have ranged from 1 31-1 60s systolically.  Her diastolic numbers are generally in the 70s and 80s.    Veda has reported to me that she had some left shoulder and left arm pain recently.  She also reports intermittent pain in her head and neck.    Objective     Vital Signs:   /70 (BP Location: Left arm, Patient Position: Sitting, Cuff Size: Adult)   Pulse 60   Ht 157.5 cm (62\")   Wt 64.9 kg (143 lb)   SpO2 99%   BMI 26.16 kg/m²       Physical Exam  Vitals reviewed.   Constitutional:       Appearance: Normal appearance. She is well-developed.   Cardiovascular:      Rate and Rhythm: Normal rate and regular rhythm.      Heart sounds: No murmur heard.    No friction rub. No gallop.   Pulmonary:      Effort: Pulmonary effort is normal. No respiratory distress.      Breath sounds: Normal breath sounds. No wheezing or rales.   Skin:     General: Skin is warm and dry.   Neurological:      Mental Status: She is alert and oriented to person, place, and time.   Psychiatric:         Mood and Affect: Mood normal.         Behavior: Behavior normal.          Result Review :                Current Outpatient Medications   Medication Sig Dispense Refill   • Alirocumab (Praluent) 150 MG/ML injection pen Inject 2 mL under the skin into the appropriate area as directed Every 28 (Twenty-Eight) " Days. 2 mL 5   • Aspirin Low Dose 81 MG EC tablet Take 1 tablet by mouth Daily.     • Eliquis 5 MG tablet tablet TAKE 1 TABLET BY MOUTH 2 (TWO) TIMES A DAY FOR 30 DAYS. 60 tablet 5   • HYDROcodone-acetaminophen (NORCO) 5-325 MG per tablet Take 1 tablet by mouth Every 8 (Eight) Hours As Needed.     • isosorbide mononitrate (IMDUR) 30 MG 24 hr tablet TAKE 1 TABLET BY MOUTH EVERY NIGHT. 30 tablet 11   • lisinopril (PRINIVIL,ZESTRIL) 30 MG tablet Take 1 tablet by mouth Daily. Patient taking 10mg bid as instructed by PCP she reports 30 tablet 11   • loratadine (CLARITIN) 10 MG tablet Take 1 tablet by mouth As Needed.     • metoprolol tartrate (LOPRESSOR) 25 MG tablet Take 1 tablet by mouth 2 (Two) Times a Day. 30 tablet 11   • nitroglycerin (NITROSTAT) 0.4 MG SL tablet TAKE 1 TABLET AT ONSET OF CHEST PAIN,MAY REPEAT IN 5 MIN, MAX 3 DOSES IN 24 HRS 25 tablet 11   • spironolactone (ALDACTONE) 25 MG tablet TAKE 1/2 TABLET BY MOUTH DAILY 90 tablet 3     No current facility-administered medications for this visit.            Assessment and Plan    Problem List Items Addressed This Visit        Cardiac and Vasculature    Coronary artery disease involving native coronary artery of native heart without angina pectoris - Primary (Chronic)    Mixed hyperlipidemia (Chronic)    Essential hypertension (Chronic)    Relevant Medications    lisinopril (PRINIVIL,ZESTRIL) 30 MG tablet    PAF (paroxysmal atrial fibrillation) (HCC) (Chronic)    Overview     Post op afib after CABG 7/2020                Follow Up     Medications were reviewed with the patient.    I have reassured Veda that her pain in her shoulder area was unlikely cardiac in nature.  She has not recent cardiac catheterization which showed no area of concern.  I also talked to her about her head and neck pain which I believe is likely stress or musculoskeletal related.  I have reassured her that it is not cardiac related.  Furthermore I do not think that it would be related  to elevated blood pressure since her blood pressure readings have not been grossly abnormal.    Her blood pressure is slightly elevated still and I am increasing her lisinopril today to 30 mg daily.  Continue to monitor blood pressure at home.    ASCVD is stable.  Patient is to continue aspirin, lisinopril, metoprolol tartrate.    With regard to dyslipidemia patient is intolerant of statins and is on Praluent.  She is to continue this.    Atrial fibrillation is stable.  Continue Eliquis.[id number] Score: 3 (3/8/2023 11:21 AM)    Return in about 3 months (around 6/8/2023).    Patient was given instructions and counseling regarding her condition or for health maintenance advice. Please see specific information pulled into the AVS if appropriate.

## 2023-03-30 ENCOUNTER — SPECIALTY PHARMACY (OUTPATIENT)
Dept: PHARMACY | Facility: HOSPITAL | Age: 58
End: 2023-03-30
Payer: COMMERCIAL

## 2023-03-30 NOTE — PROGRESS NOTES
Specialty Pharmacy Refill Coordination Note      Name:  Veda Enamorado  :  1965  Date:  3/30/2023         Past Medical History:   Diagnosis Date   • Arthritis     back   • Back ache    • Chronic back pain    • Coronary artery disease     s/p 3 stents    • GERD (gastroesophageal reflux disease)    • History of MRSA infection 2020    labia; hospitalized 6 days on IV antibiotics and then went home on po antibiotics    • Hyperlipidemia    • Hypertension    • Peptic ulceration        Past Surgical History:   Procedure Laterality Date   • CARDIAC CATHETERIZATION     • CARDIAC CATHETERIZATION N/A 2019    Procedure: Left Heart Cath;  Surgeon: Lazaro Conway MD;  Location: [facility] CATH INVASIVE LOCATION;  Service: Cardiology   • CARDIAC CATHETERIZATION     • CARDIAC CATHETERIZATION N/A 3/9/2022    Procedure: Left Heart Cath;  Surgeon: Lazaro Conway MD;  Location: [facility] CATH INVASIVE LOCATION;  Service: Cardiology;  Laterality: N/A;   • CARDIAC CATHETERIZATION N/A 2023    Procedure: Left Heart Cath;  Surgeon: Lazaro Conway MD;  Location: [facility] CATH INVASIVE LOCATION;  Service: Cardiology;  Laterality: N/A;   • CARDIAC SURGERY     •  SECTION      x2   • CORONARY ARTERY BYPASS GRAFT N/A 2020    Procedure: MEDIAN STERNOTOMY, CORONARY ARTERY BYPASS X3 WITH  INTERNAL MAMMARY ARTERY GRAFTING, ENDOVASCULAR VEIN HARVESTING OF THE RIGHT GREATER SAPHENOUS VEIN, MAICOL PER ANESTHESIA;  Surgeon: Juanjo Coronado MD;  Location: [facility];  Service: Cardiothoracic;  Laterality: N/A;  ST ON LE  VO: 1800  VR: 1814   • HAND SURGERY      right   • PERINEAL LESION/CYST EXCISION Right 2020    Procedure: I&D ABSCESS;  Surgeon: Leander Cardenas III, MD;  Location: [facility];  Service: Obstetrics/Gynecology       Social History     Socioeconomic History   • Marital status:    • Number of children: 2   Tobacco Use   • Smoking status: Former      Types: Cigarettes     Quit date: 2020     Years since quittin.6   • Smokeless tobacco: Never   Vaping Use   • Vaping Use: Former   Substance and Sexual Activity   • Alcohol use: No   • Drug use: No   • Sexual activity: Defer       Family History   Problem Relation Age of Onset   • Heart disease Mother    • Coronary artery disease Mother    • Heart disease Father    • Heart attack Father    • Hypertension Father    • Stroke Father    • No Known Problems Sister    • Heart attack Brother    • Heart disease Brother    • Heart disease Maternal Grandmother    • Heart attack Maternal Grandmother    • No Known Problems Maternal Grandfather    • Stroke Paternal Grandmother    • Breast cancer Neg Hx        Allergies   Allergen Reactions   • Bactrim [Sulfamethoxazole-Trimethoprim] Rash   • Ciprofloxacin Other (See Comments)     tremors   • Ranexa [Ranolazine Er] Unknown - Low Severity       Current Outpatient Medications   Medication Sig Dispense Refill   • Alirocumab (Praluent) 150 MG/ML injection pen Inject 2 mL under the skin into the appropriate area as directed Every 28 (Twenty-Eight) Days. 2 mL 5   • Aspirin Low Dose 81 MG EC tablet Take 1 tablet by mouth Daily.     • Eliquis 5 MG tablet tablet TAKE 1 TABLET BY MOUTH 2 (TWO) TIMES A DAY FOR 30 DAYS. 60 tablet 5   • HYDROcodone-acetaminophen (NORCO) 5-325 MG per tablet Take 1 tablet by mouth Every 8 (Eight) Hours As Needed.     • isosorbide mononitrate (IMDUR) 30 MG 24 hr tablet TAKE 1 TABLET BY MOUTH EVERY NIGHT. 30 tablet 11   • lisinopril (PRINIVIL,ZESTRIL) 30 MG tablet Take 1 tablet by mouth Daily. Patient taking 10mg bid as instructed by PCP she reports 30 tablet 11   • loratadine (CLARITIN) 10 MG tablet Take 1 tablet by mouth As Needed.     • metoprolol tartrate (LOPRESSOR) 25 MG tablet Take 1 tablet by mouth 2 (Two) Times a Day. 30 tablet 11   • nitroglycerin (NITROSTAT) 0.4 MG SL tablet TAKE 1 TABLET AT ONSET OF CHEST PAIN,MAY REPEAT IN 5 MIN, MAX 3 DOSES  IN 24 HRS 25 tablet 11   • spironolactone (ALDACTONE) 25 MG tablet TAKE 1/2 TABLET BY MOUTH DAILY 90 tablet 3     No current facility-administered medications for this visit.         ASSESSMENT/PLAN:      Veda is a 58 y.o. female contacted today regarding refills of  Praluent 150mg injection  specialty medication(s).    Reviewed and verified with patient:       Specialty medication(s) and dose(s) confirmed: yes    Refill Questions    Flowsheet Row Most Recent Value   Changes to allergies? No   Changes to medications? No   New conditions since last clinic visit No   Unplanned office visit, urgent care, ED, or hospital admission in the last 4 weeks  No   How does patient/caregiver feel medication is working? Very good   Financial problems or insurance changes  No   If yes, describe changes in insurance or financial issues. no   Since the previous refill, were any specialty medication doses or scheduled injections missed or delayed?  No   If yes, please provide the amount 0   Why were doses missed? n/a   Does this patient require a clinical escalation to a pharmacist? No                Medication Adherence    Adherence tools used: calendar   Other adherence tool: n/a   Support network for adherence: family member   Other support network: na           Follow-up: 84 day(s)     Zoraida Porter, Pharmacy Technician  Specialty Pharmacy Technician

## 2023-04-12 ENCOUNTER — TELEPHONE (OUTPATIENT)
Dept: CARDIOLOGY | Facility: CLINIC | Age: 58
End: 2023-04-12
Payer: COMMERCIAL

## 2023-04-12 NOTE — TELEPHONE ENCOUNTER
I have spoke with patient in regards to her feeling like she was having symptoms of afib, fatigue and soa. Patient denies chest pain at this time but feels like her heart beats very rapid along with elevated bp. I have informed the patient to go to the ED. Patient is understanding and states she is going to rest and will go to the ED this evening when her  gets home.

## 2023-04-12 NOTE — TELEPHONE ENCOUNTER
Caller: Veda Enamorado    Relationship to patient: Self    Best call back number: [phone]    Chief complaint: PATIENT SAYS SHE THINKS HER HEART IS IN A-FIB HER BLOOD PRESSURE /72  SHE HAS NOT BEEN FEELING GOOD FOR ABOUT A WEEK. ONCE SHE LAYS DOWN TO REST AND THEN GETS BACK UP AND IT STARTS AGAIN.     Type of visit: FOLLOW UP    Requested date: ASAP

## 2023-04-13 ENCOUNTER — APPOINTMENT (OUTPATIENT)
Dept: GENERAL RADIOLOGY | Facility: HOSPITAL | Age: 58
End: 2023-04-13
Payer: COMMERCIAL

## 2023-04-13 ENCOUNTER — HOSPITAL ENCOUNTER (EMERGENCY)
Facility: HOSPITAL | Age: 58
Discharge: HOME | End: 2023-04-13
Attending: STUDENT IN AN ORGANIZED HEALTH CARE EDUCATION/TRAINING PROGRAM | Admitting: STUDENT IN AN ORGANIZED HEALTH CARE EDUCATION/TRAINING PROGRAM
Payer: COMMERCIAL

## 2023-04-13 ENCOUNTER — HOSPITAL ENCOUNTER (OUTPATIENT)
Dept: RESPIRATORY THERAPY | Facility: HOSPITAL | Age: 58
Discharge: HOME | End: 2023-04-13
Admitting: PHYSICIAN ASSISTANT
Payer: COMMERCIAL

## 2023-04-13 ENCOUNTER — TRANSCRIBE ORDERS (OUTPATIENT)
Dept: ADMINISTRATIVE | Facility: HOSPITAL | Age: 58
End: 2023-04-13
Payer: COMMERCIAL

## 2023-04-13 VITALS
RESPIRATION RATE: 16 BRPM | HEART RATE: 59 BPM | DIASTOLIC BLOOD PRESSURE: 67 MMHG | OXYGEN SATURATION: 98 % | TEMPERATURE: 98.7 F | SYSTOLIC BLOOD PRESSURE: 143 MMHG | HEIGHT: 62 IN | BODY MASS INDEX: 26.68 KG/M2 | WEIGHT: 145 LBS

## 2023-04-13 DIAGNOSIS — R00.2 PALPITATIONS: ICD-10-CM

## 2023-04-13 DIAGNOSIS — R00.2 PALPITATIONS: Primary | ICD-10-CM

## 2023-04-13 LAB
ALBUMIN SERPL-MCNC: 3.9 G/DL (ref 3.5–5.2)
ALBUMIN/GLOB SERPL: 1.3 G/DL
ALP SERPL-CCNC: 122 U/L (ref 39–117)
ALT SERPL W P-5'-P-CCNC: 5 U/L (ref 1–33)
ANION GAP SERPL CALCULATED.3IONS-SCNC: 12.2 MMOL/L (ref 5–15)
AST SERPL-CCNC: 14 U/L (ref 1–32)
BASOPHILS # BLD AUTO: 0.03 10*3/MM3 (ref 0–0.2)
BASOPHILS NFR BLD AUTO: 0.3 % (ref 0–1.5)
BILIRUB SERPL-MCNC: 0.2 MG/DL (ref 0–1.2)
BUN SERPL-MCNC: 7 MG/DL (ref 6–20)
BUN/CREAT SERPL: 9.9 (ref 7–25)
CALCIUM SPEC-SCNC: 9.1 MG/DL (ref 8.6–10.5)
CHLORIDE SERPL-SCNC: 109 MMOL/L (ref 98–107)
CO2 SERPL-SCNC: 20.8 MMOL/L (ref 22–29)
CREAT SERPL-MCNC: 0.71 MG/DL (ref 0.57–1)
DEPRECATED RDW RBC AUTO: 50.1 FL (ref 37–54)
EGFRCR SERPLBLD CKD-EPI 2021: 99.3 ML/MIN/1.73
EOSINOPHIL # BLD AUTO: 0.11 10*3/MM3 (ref 0–0.4)
EOSINOPHIL NFR BLD AUTO: 1.2 % (ref 0.3–6.2)
ERYTHROCYTE [DISTWIDTH] IN BLOOD BY AUTOMATED COUNT: 14.1 % (ref 12.3–15.4)
GEN 5 2HR TROPONIN T REFLEX: 10 NG/L
GLOBULIN UR ELPH-MCNC: 3 GM/DL
GLUCOSE SERPL-MCNC: 120 MG/DL (ref 65–99)
HCT VFR BLD AUTO: 37.8 % (ref 34–46.6)
HGB BLD-MCNC: 12 G/DL (ref 12–15.9)
IMM GRANULOCYTES # BLD AUTO: 0.02 10*3/MM3 (ref 0–0.05)
IMM GRANULOCYTES NFR BLD AUTO: 0.2 % (ref 0–0.5)
LYMPHOCYTES # BLD AUTO: 1.41 10*3/MM3 (ref 0.7–3.1)
LYMPHOCYTES NFR BLD AUTO: 15.9 % (ref 19.6–45.3)
MCH RBC QN AUTO: 30.7 PG (ref 26.6–33)
MCHC RBC AUTO-ENTMCNC: 31.7 G/DL (ref 31.5–35.7)
MCV RBC AUTO: 96.7 FL (ref 79–97)
MONOCYTES # BLD AUTO: 0.4 10*3/MM3 (ref 0.1–0.9)
MONOCYTES NFR BLD AUTO: 4.5 % (ref 5–12)
NEUTROPHILS NFR BLD AUTO: 6.9 10*3/MM3 (ref 1.7–7)
NEUTROPHILS NFR BLD AUTO: 77.9 % (ref 42.7–76)
NRBC BLD AUTO-RTO: 0 /100 WBC (ref 0–0.2)
NT-PROBNP SERPL-MCNC: 724.4 PG/ML (ref 0–900)
PLATELET # BLD AUTO: 335 10*3/MM3 (ref 140–450)
PMV BLD AUTO: 9.9 FL (ref 6–12)
POTASSIUM SERPL-SCNC: 3.3 MMOL/L (ref 3.5–5.2)
PROT SERPL-MCNC: 6.9 G/DL (ref 6–8.5)
QT INTERVAL: 438 MS
QTC INTERVAL: 440 MS
RBC # BLD AUTO: 3.91 10*6/MM3 (ref 3.77–5.28)
SODIUM SERPL-SCNC: 142 MMOL/L (ref 136–145)
TROPONIN T DELTA: -1 NG/L
TROPONIN T SERPL HS-MCNC: 11 NG/L
WBC NRBC COR # BLD: 8.87 10*3/MM3 (ref 3.4–10.8)

## 2023-04-13 PROCEDURE — 80053 COMPREHEN METABOLIC PANEL: CPT | Performed by: PHYSICIAN ASSISTANT

## 2023-04-13 PROCEDURE — 71045 X-RAY EXAM CHEST 1 VIEW: CPT | Performed by: RADIOLOGY

## 2023-04-13 PROCEDURE — 36415 COLL VENOUS BLD VENIPUNCTURE: CPT

## 2023-04-13 PROCEDURE — 84484 ASSAY OF TROPONIN QUANT: CPT | Performed by: PHYSICIAN ASSISTANT

## 2023-04-13 PROCEDURE — 71045 X-RAY EXAM CHEST 1 VIEW: CPT

## 2023-04-13 PROCEDURE — 93225 XTRNL ECG REC<48 HRS REC: CPT

## 2023-04-13 PROCEDURE — 93005 ELECTROCARDIOGRAM TRACING: CPT | Performed by: STUDENT IN AN ORGANIZED HEALTH CARE EDUCATION/TRAINING PROGRAM

## 2023-04-13 PROCEDURE — 85025 COMPLETE CBC W/AUTO DIFF WBC: CPT | Performed by: PHYSICIAN ASSISTANT

## 2023-04-13 PROCEDURE — 99283 EMERGENCY DEPT VISIT LOW MDM: CPT

## 2023-04-13 PROCEDURE — 83880 ASSAY OF NATRIURETIC PEPTIDE: CPT | Performed by: PHYSICIAN ASSISTANT

## 2023-04-13 PROCEDURE — 93226 XTRNL ECG REC<48 HR SCAN A/R: CPT

## 2023-04-13 PROCEDURE — 93010 ELECTROCARDIOGRAM REPORT: CPT | Performed by: INTERNAL MEDICINE

## 2023-04-13 RX ORDER — POTASSIUM CHLORIDE 750 MG/1
10 TABLET, FILM COATED, EXTENDED RELEASE ORAL 2 TIMES DAILY
Qty: 6 TABLET | Refills: 0 | Status: SHIPPED | OUTPATIENT
Start: 2023-04-13 | End: 2023-04-16

## 2023-04-13 RX ORDER — POTASSIUM CHLORIDE 20 MEQ/1
20 TABLET, EXTENDED RELEASE ORAL ONCE
Status: COMPLETED | OUTPATIENT
Start: 2023-04-13 | End: 2023-04-13

## 2023-04-13 RX ADMIN — POTASSIUM CHLORIDE 20 MEQ: 20 TABLET, EXTENDED RELEASE ORAL at 14:37

## 2023-04-13 NOTE — ED PROVIDER NOTES
Subjective   History of Present Illness  PT HAS BEEN HAVING PALPITATIONS AND FATIGUE FOR 6 DAYS HAS HISTORY OF BYPASS   Heart score 3     History provided by:  Patient   used: No    Palpitations  Palpitations quality:  Irregular  Onset quality:  Sudden  Timing:  Constant  Context: anxiety    Relieved by:  None tried  Worsened by:  Nothing  Ineffective treatments:  Beta blockers  Risk factors: heart disease        Review of Systems   Cardiovascular: Positive for palpitations.       Past Medical History:   Diagnosis Date   • Arthritis     back   • Back ache    • Chronic back pain    • Coronary artery disease     s/p 3 stents    • GERD (gastroesophageal reflux disease)    • History of MRSA infection 2020    labia; hospitalized 6 days on IV antibiotics and then went home on po antibiotics    • Hyperlipidemia    • Hypertension    • Peptic ulceration        Allergies   Allergen Reactions   • Bactrim [Sulfamethoxazole-Trimethoprim] Rash   • Ciprofloxacin Other (See Comments)     tremors   • Ranexa [Ranolazine Er] Unknown - Low Severity       Past Surgical History:   Procedure Laterality Date   • CARDIAC CATHETERIZATION     • CARDIAC CATHETERIZATION N/A 2019    Procedure: Left Heart Cath;  Surgeon: Lazaro Cnoway MD;  [facility] CATH INVASIVE LOC[facility];  Service: Cardiology   • CARDIAC CATHETERIZATION     • CARDIAC CATHETERIZATION N/A 3/9/2022    Procedure: Left Heart Cath;  Surgeon: Lazaro Conway MD;  [facility] CATH INVASIVE LOC[facility];  Service: Cardiology;  Laterality: N/A;   • CARDIAC CATHETERIZATION N/A 2023    Procedure: Left Heart Cath;  Surgeon: Lazaro Conway MD;  [facility]ATH [facility];  Service: Cardiology;  Laterality: N/A;   • CARDIAC SURGERY     •  SECTION      x2   • CORONARY ARTERY BYPASS GRAFT N/A 2020    Procedure: MEDIAN STERNOTOMY, CORONARY ARTERY BYPASS X3 WITH  INTERNAL MAMMARY ARTERY  GRAFTING, ENDOVASCULAR VEIN HARVESTING OF THE RIGHT GREATER SAPHENOUS VEIN, MAICOL PER ANESTHESIA;  Surgeon: Juanjo Coronado MD;  Location: [facility] OR;  Service: Cardiothoracic;  Laterality: N/A;  ST ON LE  VO: 1800  VR: 1814   • HAND SURGERY      right   • PERINEAL LESION/CYST EXCISION Right 2020    Procedure: I&D ABSCESS;  Surgeon: Leander Cardenas III, MD;  Location:  [facility] OR;  Service: Obstetrics/Gynecology       Family History   Problem Relation Age of Onset   • Heart disease Mother    • Coronary artery disease Mother    • Heart disease Father    • Heart attack Father    • Hypertension Father    • Stroke Father    • No Known Problems Sister    • Heart attack Brother    • Heart disease Brother    • Heart disease Maternal Grandmother    • Heart attack Maternal Grandmother    • No Known Problems Maternal Grandfather    • Stroke Paternal Grandmother    • Breast cancer Neg Hx        Social History     Socioeconomic History   • Marital status:    • Number of children: 2   Tobacco Use   • Smoking status: Former     Types: Cigarettes     Quit date: 2020     Years since quittin.7   • Smokeless tobacco: Never   Vaping Use   • Vaping Use: Former   Substance and Sexual Activity   • Alcohol use: No   • Drug use: No   • Sexual activity: Defer           Objective   Physical Exam  Vitals and nursing note reviewed.   Constitutional:       Appearance: She is well-developed.   HENT:      Head: Normocephalic.   Cardiovascular:      Rate and Rhythm: Normal rate and regular rhythm.   Pulmonary:      Effort: Pulmonary effort is normal.      Breath sounds: Normal breath sounds.   Abdominal:      General: Bowel sounds are normal.      Palpations: Abdomen is soft.      Tenderness: There is no abdominal tenderness.   Musculoskeletal:         General: Normal range of motion.      Cervical back: Neck supple.   Skin:     General: Skin is warm and dry.   Neurological:      Mental Status: She is alert and oriented to  person, place, and time.   Psychiatric:         Behavior: Behavior normal.         Thought Content: Thought content normal.         Judgment: Judgment normal.         Procedures           ED Course  ED Course as of 04/19/23 2112   Thu Apr 13, 2023   1033 EKG at 10:12 AM NSR 61 bpm, , QRS 80, QTc 440, regular axis, no significant ST deviation or T wave abnormalities concerning for acute ischemia.  Electronically signed by Dakotah Lowery MD, 04/13/23, 10:34 AM EDT.   [KP]   1521 24 hour holter monitor order given to patient  [LC]      ED Course User Index  [KP] Dakotah Lowery MD  [] Tran Aleman PA            Results for orders placed or performed during the hospital encounter of 04/13/23   Comprehensive Metabolic Panel    Specimen: Blood   Result Value Ref Range    Glucose 120 (H) 65 - 99 mg/dL    BUN 7 6 - 20 mg/dL    Creatinine 0.71 0.57 - 1.00 mg/dL    Sodium 142 136 - 145 mmol/L    Potassium 3.3 (L) 3.5 - 5.2 mmol/L    Chloride 109 (H) 98 - 107 mmol/L    CO2 20.8 (L) 22.0 - 29.0 mmol/L    Calcium 9.1 8.6 - 10.5 mg/dL    Total Protein 6.9 6.0 - 8.5 g/dL    Albumin 3.9 3.5 - 5.2 g/dL    ALT (SGPT) 5 1 - 33 U/L    AST (SGOT) 14 1 - 32 U/L    Alkaline Phosphatase 122 (H) 39 - 117 U/L    Total Bilirubin 0.2 0.0 - 1.2 mg/dL    Globulin 3.0 gm/dL    A/G Ratio 1.3 g/dL    BUN/Creatinine Ratio 9.9 7.0 - 25.0    Anion Gap 12.2 5.0 - 15.0 mmol/L    eGFR 99.3 >60.0 mL/min/1.73   CBC Auto Differential    Specimen: Blood   Result Value Ref Range    WBC 8.87 3.40 - 10.80 10*3/mm3    RBC 3.91 3.77 - 5.28 10*6/mm3    Hemoglobin 12.0 12.0 - 15.9 g/dL    Hematocrit 37.8 34.0 - 46.6 %    MCV 96.7 79.0 - 97.0 fL    MCH 30.7 26.6 - 33.0 pg    MCHC 31.7 31.5 - 35.7 g/dL    RDW 14.1 12.3 - 15.4 %    RDW-SD 50.1 37.0 - 54.0 fl    MPV 9.9 6.0 - 12.0 fL    Platelets 335 140 - 450 10*3/mm3    Neutrophil % 77.9 (H) 42.7 - 76.0 %    Lymphocyte % 15.9 (L) 19.6 - 45.3 %    Monocyte % 4.5 (L) 5.0 - 12.0 %    Eosinophil % 1.2 0.3 -  6.2 %    Basophil % 0.3 0.0 - 1.5 %    Immature Grans % 0.2 0.0 - 0.5 %    Neutrophils, Absolute 6.90 1.70 - 7.00 10*3/mm3    Lymphocytes, Absolute 1.41 0.70 - 3.10 10*3/mm3    Monocytes, Absolute 0.40 0.10 - 0.90 10*3/mm3    Eosinophils, Absolute 0.11 0.00 - 0.40 10*3/mm3    Basophils, Absolute 0.03 0.00 - 0.20 10*3/mm3    Immature Grans, Absolute 0.02 0.00 - 0.05 10*3/mm3    nRBC 0.0 0.0 - 0.2 /100 WBC   High Sensitivity Troponin T    Specimen: Blood   Result Value Ref Range    HS Troponin T 11 (H) <10 ng/L   High Sensitivity Troponin T 2Hr    Specimen: Arm, Left; Blood   Result Value Ref Range    HS Troponin T 10 (H) <10 ng/L    Troponin T Delta -1 >=-4 - <+4 ng/L   BNP    Specimen: Blood   Result Value Ref Range    proBNP 724.4 0.0 - 900.0 pg/mL   ECG 12 Lead Dyspnea   Result Value Ref Range    QT Interval 438 ms    QTC Interval 440 ms          XR Chest 1 View   Final Result     No acute cardiopulmonary findings identified.       This report was finalized on 4/13/2023 11:16 AM by Dr. Fili Ramirez MD.                                     Medical Decision Making  Pt has been having increased palpitations and fatigue for the last 6 days.   Pt has hx of cabg, htn, hyperlipidemia, gerd, cad     Palpitations: acute illness or injury  Amount and/or Complexity of Data Reviewed  Labs: ordered. Decision-making details documented in ED Course.  Radiology: ordered. Decision-making details documented in ED Course.      Risk  Prescription drug management.    Risk Details: Outpatient holter monitor given   Tran Aleman    Patient is stable.  Patient is medically cleared.  No EMC exist.  Patient is discharged home.  Patient's diagnostic results were discussed with him and they demonstrated understanding of diagnosis and treatment plan.  Patient was advised to return to the ER or follow-up with PCP if symptoms worsen or fail to improve as expected.        Final diagnoses:   Palpitations       ED Disposition  ED Disposition      ED Disposition   Discharge    Condition   Stable    Comment   --             Lazaro Conway MD  [address]  [facility] [address]  [place] [id number]  [phone]    In 1 day           Medication List      ASK your doctor about these medications    potassium chloride 10 MEQ CR tablet  Take 1 tablet by mouth 2 (Two) Times a Day for 3 days.  Ask about: Should I take this medication?           Where to Get Your Medications      These medications were sent to [facility] - [place], [place] - [address] - [phone]  - [phone]   [address], [facility] [address]    Phone: [phone]   · potassium chloride 10 MEQ CR tablet          Tran Aleman PA  04/19/23 2112

## 2023-05-01 ENCOUNTER — SPECIALTY PHARMACY (OUTPATIENT)
Dept: PHARMACY | Facility: HOSPITAL | Age: 58
End: 2023-05-01

## 2023-05-03 ENCOUNTER — OFFICE VISIT (OUTPATIENT)
Dept: CARDIOLOGY | Facility: CLINIC | Age: 58
End: 2023-05-03
Payer: COMMERCIAL

## 2023-05-03 VITALS
HEART RATE: 61 BPM | BODY MASS INDEX: 21.22 KG/M2 | SYSTOLIC BLOOD PRESSURE: 161 MMHG | WEIGHT: 140 LBS | HEIGHT: 68 IN | OXYGEN SATURATION: 98 % | DIASTOLIC BLOOD PRESSURE: 73 MMHG

## 2023-05-03 DIAGNOSIS — I48.0 PAF (PAROXYSMAL ATRIAL FIBRILLATION): Chronic | ICD-10-CM

## 2023-05-03 DIAGNOSIS — E78.2 MIXED HYPERLIPIDEMIA: Primary | ICD-10-CM

## 2023-05-03 DIAGNOSIS — Z95.1 S/P CABG (CORONARY ARTERY BYPASS GRAFT): ICD-10-CM

## 2023-05-03 DIAGNOSIS — I10 ESSENTIAL HYPERTENSION: ICD-10-CM

## 2023-05-03 PROBLEM — I47.1 ATRIAL TACHYCARDIA: Status: ACTIVE | Noted: 2023-05-03

## 2023-05-03 PROBLEM — I47.19 ATRIAL TACHYCARDIA: Status: ACTIVE | Noted: 2023-05-03

## 2023-05-03 PROCEDURE — 99214 OFFICE O/P EST MOD 30 MIN: CPT | Performed by: INTERNAL MEDICINE

## 2023-05-03 PROCEDURE — 3077F SYST BP >= 140 MM HG: CPT | Performed by: INTERNAL MEDICINE

## 2023-05-03 PROCEDURE — 3078F DIAST BP <80 MM HG: CPT | Performed by: INTERNAL MEDICINE

## 2023-05-03 RX ORDER — BUPRENORPHINE HYDROCHLORIDE AND NALOXONE HYDROCHLORIDE DIHYDRATE 8; 2 MG/1; MG/1
1 TABLET SUBLINGUAL DAILY
COMMUNITY

## 2023-05-03 NOTE — PROGRESS NOTES
[facility] CARDIOLOGY  [address]  [place] [address]  Phone: [phone]  Fax: [phone]    2023    No chief complaint on file.       History:     Veda Enamorado is a 58 y.o. female presenting for  follow-up evaluation   Clinically stable from cardiac standpoint   Symptoms:  CP no  SOB no    Orthopnea No  Lower extremity edema no   Palpitations stable   Compliant with medications yes  Claudication no      Past Medical History:   Diagnosis Date    Arthritis     back    Back ache     Chronic back pain     Coronary artery disease     s/p 3 stents     GERD (gastroesophageal reflux disease)     History of MRSA infection 2020    labia; hospitalized 6 days on IV antibiotics and then went home on po antibiotics     Hyperlipidemia     Hypertension     Peptic ulceration        Past Surgical History:   Procedure Laterality Date    CARDIAC CATHETERIZATION      CARDIAC CATHETERIZATION N/A 2019    Procedure: Left Heart Cath;  Surgeon: Lazaro Conway MD;  Location: [facility] INVASIVE LOCATION;  Service: Cardiology    CARDIAC CATHETERIZATION      CARDIAC CATHETERIZATION N/A 3/9/2022    Procedure: Left Heart Cath;  Surgeon: Lazaro Conway MD;  Location: [facility] INVASIVE LOCATION;  Service: Cardiology;  Laterality: N/A;    CARDIAC CATHETERIZATION N/A 2023    Procedure: Left Heart Cath;  Surgeon: Lazaro Conway MD;  Location: [facility] INVASIVE LOCATION;  Service: Cardiology;  Laterality: N/A;    CARDIAC SURGERY       SECTION      x2    CORONARY ARTERY BYPASS GRAFT N/A 2020    Procedure: MEDIAN STERNOTOMY, CORONARY ARTERY BYPASS X3 WITH  INTERNAL MAMMARY ARTERY GRAFTING, ENDOVASCULAR VEIN HARVESTING OF THE RIGHT GREATER SAPHENOUS VEIN, MAICOL PER ANESTHESIA;  Surgeon: Juanjo Coronado MD;  Location: [facility];  Service: Cardiothoracic;  Laterality: N/A;  ST ON LE  VO: 1800  VR: 1814    HAND SURGERY       right    PERINEAL LESION/CYST EXCISION Right 2020    Procedure: I&D ABSCESS;  Surgeon: Leander Cardenas III, MD;  Location: [facility];  Service: Obstetrics/Gynecology        Past Social History:  Social History     Socioeconomic History    Marital status:     Number of children: 2   Tobacco Use    Smoking status: Former     Types: Cigarettes     Quit date: 2020     Years since quittin.8    Smokeless tobacco: Never   Vaping Use    Vaping Use: Former   Substance and Sexual Activity    Alcohol use: No    Drug use: No    Sexual activity: Defer       Past Family History:  Family History   Problem Relation Age of Onset    Heart disease Mother     Coronary artery disease Mother     Heart disease Father     Heart attack Father     Hypertension Father     Stroke Father     No Known Problems Sister     Heart attack Brother     Heart disease Brother     Heart disease Maternal Grandmother     Heart attack Maternal Grandmother     No Known Problems Maternal Grandfather     Stroke Paternal Grandmother     Breast cancer Neg Hx        Review of Systems:   ROS       Current Outpatient Medications   Medication Sig Dispense Refill    Alirocumab (Praluent) 150 MG/ML injection pen Inject 2 mL under the skin into the appropriate area as directed Every 28 (Twenty-Eight) Days. 2 mL 5    Aspirin Low Dose 81 MG EC tablet Take 1 tablet by mouth Daily.      buprenorphine-naloxone (SUBOXONE) 8-2 MG per SL tablet Place 1 tablet under the tongue Daily.      Eliquis 5 MG tablet tablet TAKE 1 TABLET BY MOUTH 2 (TWO) TIMES A DAY FOR 30 DAYS. 60 tablet 5    isosorbide mononitrate (IMDUR) 30 MG 24 hr tablet TAKE 1 TABLET BY MOUTH EVERY NIGHT. 30 tablet 11    lisinopril (PRINIVIL,ZESTRIL) 30 MG tablet Take 1 tablet by mouth Daily. Patient taking 10mg bid as instructed by PCP she reports 30 tablet 11    loratadine (CLARITIN) 10 MG tablet Take 1 tablet by mouth As Needed.      metoprolol tartrate (LOPRESSOR) 25 MG tablet Take 1.5 tablets  "by mouth 2 (Two) Times a Day. 30 tablet 11    nitroglycerin (NITROSTAT) 0.4 MG SL tablet TAKE 1 TABLET AT ONSET OF CHEST PAIN,MAY REPEAT IN 5 MIN, MAX 3 DOSES IN 24 HRS 25 tablet 11    spironolactone (ALDACTONE) 25 MG tablet TAKE 1/2 TABLET BY MOUTH DAILY 90 tablet 3    HYDROcodone-acetaminophen (NORCO) 5-325 MG per tablet Take 1 tablet by mouth Every 8 (Eight) Hours As Needed. (Patient not taking: Reported on 5/3/2023)       No current facility-administered medications for this visit.        Allergies   Allergen Reactions    Bactrim [Sulfamethoxazole-Trimethoprim] Rash    Ciprofloxacin Other (See Comments)     tremors    Ranexa [Ranolazine Er] Unknown - Low Severity       Objective     /73 (BP Location: Left arm, Patient Position: Sitting, Cuff Size: Adult)   Pulse 61   Ht 172.7 cm (68\")   Wt 63.5 kg (140 lb)   SpO2 98%   BMI 21.29 kg/m²     Physical Exam       DATA:  Results for orders placed during the hospital encounter of 07/17/20    SCANNED - TELEMETRY     Results for orders placed during the hospital encounter of 01/19/21    Adult Transthoracic Echo Complete W/ Cont if Necessary Per Protocol    Interpretation Summary  · Estimated left ventricular EF = 60% Left ventricular ejection fraction appears to be 56 - 60%. Left ventricular systolic function is normal.  · Left ventricular diastolic function was normal.  · No significant valvular abnormality  · No pericardial effusion  · No change since prev echo of 6/26/20   Results for orders placed in visit on 02/23/22    Stress Test With Myocardial Perfusion (1 Day)    Interpretation Summary  · Myocardial perfusion imaging indicates a small-sized, mildly severe area of ischemia located in the basal inferior lateral wall. The anterior perfusion wall defect is likely secondary to breast attenuation artifact,  · Breast attenuation artifact is present.  · Left ventricular ejection fraction is normal. .  · Impressions are consistent with an intermediate risk " study.   Results for orders placed in visit on 02/23/22    Stress Test With Myocardial Perfusion (1 Day)    Interpretation Summary  · Myocardial perfusion imaging indicates a small-sized, mildly severe area of ischemia located in the basal inferior lateral wall. The anterior perfusion wall defect is likely secondary to breast attenuation artifact,  · Breast attenuation artifact is present.  · Left ventricular ejection fraction is normal. .  · Impressions are consistent with an intermediate risk study.   Results for orders placed during the hospital encounter of 02/23/23    Cardiac Catheterization/Vascular Study    Narrative  Images from the original result were not included.  CARDIAC CATHETERIZATION / INTERVENTION REPORT    DATE OF PROCEDURE: 2/23/2023    INDICATION FOR PROCEDURE: chest pain      PRE PROCEDURE DIAGNOSIS:  CAD s/p three-vessel CABG  Hypertension  Dyslipidemia    POST PROCEDURE DIAGNOSIS:  CAD with a patent LIMA graft and patent SVG to OM1 and diagonal 1 graft, native RCA mild nonobstructive disease unchanged from before.  Normal LV systolic function, mildly elevated LVEDP of 20 mmHg.    Face to face mdoerate conscious  sedation time:      COMPLICATIONS : None    Specimens collected : None        PROCEDURE PERFORMED:    1. Selective right and left coronary Angiogram  2. Left heart catheretization  3. Left ventriculography  4.  LIMA angiogram  5.SVG to diagonal and OM angiogram      PROCEDURE COMMENTS:    Prior to the procedure risks benefits alternatives and possible adverse events were discussed with the patient and informed consent was obtained.  Veda Enamorado was brought to the cath lab and placed on the cardiac catherization table in the postabsortive state. The patient was prepped and draped according to the cath lab protocol under strict aseptic and sterile condition. Patient's right femoral artery sight was prepped and draped. Under fluoroscopic guidance the right femoral artery was  punctured using a Micropuncture gauge needle utilizing the modified Seldinger technique. 6 Kyrgyz sheath was introduced over a wire. After aspirating for blood it was flushed with heparinized saline. Angio was performed to confirm the position of the sheath in right common femoral artery    We advanced Genaro type diagnostic catheters over the wire. The  left and right coronary arteries  were selectively engaged. Left and right coronary angiogram were obtained in multiple orthogonal projections.  5 Telugu JR4 catheter was used for engaging the SVG on the LIMA angiogram and angiogram pictures were obtained in orthogonal views .5 F Pigtail catheter was used to cross across the AV retrograde into the LV cavity and resting LV pressures were recorded. Manual pull back to used to record gradient across the Aortic valve.    After completion of the procedure the femoral sheath was removed in the cath lab and hemostasis was achieved by Mynx. The patient tolerated the procedure without complications.      Coronary anatomy findings:    LM: Moderate caliber vessel, short and no significant stenosis.    LAD: Was chronically totally occluded at the proximal segment before the diagonal takeoff.    Ramus intermedius.  Moderate caliber vessel with no significant stenosis.    LCX: Moderate calibre vessel, the OM1 which was patent before has been occluded in the proximal segment but the graft supplying the OM1 is patent and the distal OM 1 distal to the anastomosis had no significant stenosis.    RCA: Large calibre, dominant artery, proximal, mid and distal had mild luminal irregularities with no significant stenosis, distally divides into R PDA and PL branches both had mild luminal irregularities with no stensois.  Noted mild 30 to 40% stenosis across the entire RCA, the PDA and PL branches had no significant stenosis.    LIMA angiogram:  The CELESTE graft was well matured with no significant stenosis with good proximal and distal  anastomosis site, the distal LAD is a small caliber artery with mild diffuse disease.    SVG to OM graft appeared patent supplying the distal OM branch which had no significant stenosis.  SVG to diagonal 1 graft appeared patent with no significant stenosis supplying a small caliber diagonal artery with mild diffuse disease.    Left Ventriculography:    LV systolic function was normal with visual estimated EF of 60%. No significant mitral regurgitation noted.    LVEDP: 20 mmHg  No gradient across the aortic valve on pull back.        EBL: Less than 10cc        Final Impression:  CAD with patent vein grafts and the LIMA graft and no significant stenosis in the native RCA.        Recommendations:  Continue with the current medical rx, evaluate for non cardiac causes for her L arm pain          Lazaro MD Zoraida, [facility]  Interventional Cardiology    02/23/23  11:10 EST    Procedures     Lab Results   Component Value Date    CHOL 102 02/24/2022    CHOL 230 (H) 12/28/2021    CHOL 153 11/17/2020    CHLPL 215 (H) 04/11/2016     Lab Results   Component Value Date    TRIG 121 02/24/2022    TRIG 303 (H) 12/28/2021    TRIG 146 11/17/2020     Lab Results   Component Value Date    HDL 50 02/24/2022    HDL 47 12/28/2021    HDL 66 (H) 11/17/2020     Lab Results   Component Value Date    LDL 30 02/24/2022     (H) 12/28/2021    LDL 62 11/17/2020       Lab Results   Component Value Date    TSH 0.649 12/28/2021               Invalid input(s): LABALBU, PROT        Assessment and Plan    Assessment and Plan    Problem List Items Addressed This Visit          Cardiac and Vasculature    Essential hypertension (Chronic)    Relevant Medications    metoprolol tartrate (LOPRESSOR) 25 MG tablet    PAF (paroxysmal atrial fibrillation) (HCC) (Chronic)    Overview     Post op afib after CABG 7/2020         Relevant Medications    metoprolol tartrate (LOPRESSOR) 25 MG tablet    Mixed hyperlipidemia - Primary    Overview     Added  automatically from request for surgery [id number]         S/P CABG (coronary artery bypass graft)    Relevant Medications    metoprolol tartrate (LOPRESSOR) 25 MG tablet           Recommended increase activity to 30 minutes of walking daily, most days of the week    Thank you for allowing me to participate in the care of Veda Enamorado. Feel free to contact me directly with any further questions or concerns.          Lazaro Conway MD, FACC  Interventional Cardiology

## 2023-05-08 ENCOUNTER — SPECIALTY PHARMACY (OUTPATIENT)
Dept: PHARMACY | Facility: HOSPITAL | Age: 58
End: 2023-05-08
Payer: COMMERCIAL

## 2023-05-08 NOTE — PROGRESS NOTES
Specialty Pharmacy Refill Coordination Note      Name:  Veda Enamorado  :  1965  Date:  2023         Past Medical History:   Diagnosis Date   • Arthritis     back   • Back ache    • Chronic back pain    • Coronary artery disease     s/p 3 stents    • GERD (gastroesophageal reflux disease)    • History of MRSA infection 2020    labia; hospitalized 6 days on IV antibiotics and then went home on po antibiotics    • Hyperlipidemia    • Hypertension    • Peptic ulceration        Past Surgical History:   Procedure Laterality Date   • CARDIAC CATHETERIZATION     • CARDIAC CATHETERIZATION N/A 2019    Procedure: Left Heart Cath;  Surgeon: Lazaro Conway MD;  Location: [facility] CATH INVASIVE LOCATION;  Service: Cardiology   • CARDIAC CATHETERIZATION     • CARDIAC CATHETERIZATION N/A 3/9/2022    Procedure: Left Heart Cath;  Surgeon: Lazaro Conway MD;  Location: [facility] CATH INVASIVE LOCATION;  Service: Cardiology;  Laterality: N/A;   • CARDIAC CATHETERIZATION N/A 2023    Procedure: Left Heart Cath;  Surgeon: Lazaro Conway MD;  Location: [facility] CATH INVASIVE LOCATION;  Service: Cardiology;  Laterality: N/A;   • CARDIAC SURGERY     •  SECTION      x2   • CORONARY ARTERY BYPASS GRAFT N/A 2020    Procedure: MEDIAN STERNOTOMY, CORONARY ARTERY BYPASS X3 WITH  INTERNAL MAMMARY ARTERY GRAFTING, ENDOVASCULAR VEIN HARVESTING OF THE RIGHT GREATER SAPHENOUS VEIN, MAICOL PER ANESTHESIA;  Surgeon: Juanjo Coronado MD;  Location: [facility];  Service: Cardiothoracic;  Laterality: N/A;  ST ON LE  VO: 1800  VR: 1814   • HAND SURGERY      right   • PERINEAL LESION/CYST EXCISION Right 2020    Procedure: I&D ABSCESS;  Surgeon: Leander Cardenas III, MD;  Location: [facility];  Service: Obstetrics/Gynecology       Social History     Socioeconomic History   • Marital status:    • Number of children: 2   Tobacco Use   • Smoking status: Former     Types:  Cigarettes     Quit date: 2020     Years since quittin.7   • Smokeless tobacco: Never   Vaping Use   • Vaping Use: Former   Substance and Sexual Activity   • Alcohol use: No   • Drug use: No   • Sexual activity: Defer       Family History   Problem Relation Age of Onset   • Heart disease Mother    • Coronary artery disease Mother    • Heart disease Father    • Heart attack Father    • Hypertension Father    • Stroke Father    • No Known Problems Sister    • Heart attack Brother    • Heart disease Brother    • Heart disease Maternal Grandmother    • Heart attack Maternal Grandmother    • No Known Problems Maternal Grandfather    • Stroke Paternal Grandmother    • Breast cancer Neg Hx        Allergies   Allergen Reactions   • Bactrim [Sulfamethoxazole-Trimethoprim] Rash   • Ciprofloxacin Other (See Comments)     tremors   • Ranexa [Ranolazine Er] Unknown - Low Severity       Current Outpatient Medications   Medication Sig Dispense Refill   • Alirocumab (Praluent) 150 MG/ML injection pen Inject 2 mL under the skin into the appropriate area as directed Every 28 (Twenty-Eight) Days. 2 mL 5   • Aspirin Low Dose 81 MG EC tablet Take 1 tablet by mouth Daily.     • buprenorphine-naloxone (SUBOXONE) 8-2 MG per SL tablet Place 1 tablet under the tongue Daily.     • Eliquis 5 MG tablet tablet TAKE 1 TABLET BY MOUTH 2 (TWO) TIMES A DAY FOR 30 DAYS. 60 tablet 5   • HYDROcodone-acetaminophen (NORCO) 5-325 MG per tablet Take 1 tablet by mouth Every 8 (Eight) Hours As Needed. (Patient not taking: Reported on 5/3/2023)     • isosorbide mononitrate (IMDUR) 30 MG 24 hr tablet TAKE 1 TABLET BY MOUTH EVERY NIGHT. 30 tablet 11   • lisinopril (PRINIVIL,ZESTRIL) 30 MG tablet Take 1 tablet by mouth Daily. Patient taking 10mg bid as instructed by PCP she reports 30 tablet 11   • loratadine (CLARITIN) 10 MG tablet Take 1 tablet by mouth As Needed.     • metoprolol tartrate (LOPRESSOR) 25 MG tablet Take 1.5 tablets by mouth 2 (Two) Times  a Day. 30 tablet 11   • nitroglycerin (NITROSTAT) 0.4 MG SL tablet TAKE 1 TABLET AT ONSET OF CHEST PAIN,MAY REPEAT IN 5 MIN, MAX 3 DOSES IN 24 HRS 25 tablet 11   • spironolactone (ALDACTONE) 25 MG tablet TAKE 1/2 TABLET BY MOUTH DAILY 90 tablet 3     No current facility-administered medications for this visit.         ASSESSMENT/PLAN:      Veda is a 58 y.o. female contacted today regarding refills of  Praluent 150mg/ml injection specialty medication(s).    Reviewed and verified with patient:       Specialty medication(s) and dose(s) confirmed: yes, no    Refill Questions    Flowsheet Row Most Recent Value   Changes to allergies? No   Changes to medications? No   New conditions since last clinic visit No   Unplanned office visit, urgent care, ED, or hospital admission in the last 4 weeks  No   How does patient/caregiver feel medication is working? Very good   Financial problems or insurance changes  No   If yes, describe changes in insurance or financial issues. no   Since the previous refill, were any specialty medication doses or scheduled injections missed or delayed?  No   If yes, please provide the amount 0   Why were doses missed? n/a   Does this patient require a clinical escalation to a pharmacist? No                Medication Adherence    Adherence tools used: calendar   Other adherence tool: n/a   Support network for adherence: family member   Other support network: na           Follow-up: 84 day(s)     Zoraida Porter, Pharmacy Technician  Specialty Pharmacy Technician

## 2023-06-14 ENCOUNTER — HOSPITAL ENCOUNTER (OUTPATIENT)
Dept: GENERAL RADIOLOGY | Facility: HOSPITAL | Age: 58
Discharge: HOME | End: 2023-06-14
Admitting: NURSE PRACTITIONER
Payer: COMMERCIAL

## 2023-06-14 ENCOUNTER — OFFICE VISIT (OUTPATIENT)
Dept: UROLOGY | Facility: CLINIC | Age: 58
End: 2023-06-14
Payer: COMMERCIAL

## 2023-06-14 VITALS
HEIGHT: 68 IN | DIASTOLIC BLOOD PRESSURE: 76 MMHG | HEART RATE: 61 BPM | WEIGHT: 136.4 LBS | SYSTOLIC BLOOD PRESSURE: 170 MMHG | BODY MASS INDEX: 20.67 KG/M2

## 2023-06-14 DIAGNOSIS — N34.3 DYSURIA-FREQUENCY SYNDROME: ICD-10-CM

## 2023-06-14 DIAGNOSIS — N39.0 URINARY TRACT INFECTION WITH HEMATURIA, SITE UNSPECIFIED: ICD-10-CM

## 2023-06-14 DIAGNOSIS — R31.29 OTHER MICROSCOPIC HEMATURIA: ICD-10-CM

## 2023-06-14 DIAGNOSIS — N28.1 BILATERAL RENAL CYSTS: ICD-10-CM

## 2023-06-14 DIAGNOSIS — R10.30 LOWER ABDOMINAL PAIN: ICD-10-CM

## 2023-06-14 DIAGNOSIS — N36.8 URETHRAL MEATUS PAIN: ICD-10-CM

## 2023-06-14 DIAGNOSIS — N39.0 RECURRENT UTI (URINARY TRACT INFECTION): ICD-10-CM

## 2023-06-14 DIAGNOSIS — N95.2 ATROPHIC VAGINITIS: ICD-10-CM

## 2023-06-14 DIAGNOSIS — N39.0 RECURRENT UTI (URINARY TRACT INFECTION): Primary | ICD-10-CM

## 2023-06-14 DIAGNOSIS — R31.9 URINARY TRACT INFECTION WITH HEMATURIA, SITE UNSPECIFIED: ICD-10-CM

## 2023-06-14 LAB
BILIRUB BLD-MCNC: NEGATIVE MG/DL
CLARITY, POC: CLEAR
COLOR UR: YELLOW
EXPIRATION DATE: ABNORMAL
GLUCOSE UR STRIP-MCNC: NEGATIVE MG/DL
KETONES UR QL: NEGATIVE
LEUKOCYTE EST, POC: NEGATIVE
Lab: ABNORMAL
NITRITE UR-MCNC: NEGATIVE MG/ML
PH UR: 6 [PH] (ref 5–8)
PROT UR STRIP-MCNC: NEGATIVE MG/DL
RBC # UR STRIP: ABNORMAL /UL
SP GR UR: 1.02 (ref 1–1.03)
UROBILINOGEN UR QL: NORMAL

## 2023-06-14 PROCEDURE — 74018 RADEX ABDOMEN 1 VIEW: CPT

## 2023-06-14 PROCEDURE — 74018 RADEX ABDOMEN 1 VIEW: CPT | Performed by: RADIOLOGY

## 2023-06-14 PROCEDURE — 1160F RVW MEDS BY RX/DR IN RCRD: CPT | Performed by: NURSE PRACTITIONER

## 2023-06-14 PROCEDURE — 87088 URINE BACTERIA CULTURE: CPT | Performed by: NURSE PRACTITIONER

## 2023-06-14 PROCEDURE — 3077F SYST BP >= 140 MM HG: CPT | Performed by: NURSE PRACTITIONER

## 2023-06-14 PROCEDURE — 1159F MED LIST DOCD IN RCRD: CPT | Performed by: NURSE PRACTITIONER

## 2023-06-14 PROCEDURE — 87186 SC STD MICRODIL/AGAR DIL: CPT | Performed by: NURSE PRACTITIONER

## 2023-06-14 PROCEDURE — 3078F DIAST BP <80 MM HG: CPT | Performed by: NURSE PRACTITIONER

## 2023-06-14 PROCEDURE — 51798 US URINE CAPACITY MEASURE: CPT | Performed by: NURSE PRACTITIONER

## 2023-06-14 PROCEDURE — 99214 OFFICE O/P EST MOD 30 MIN: CPT | Performed by: NURSE PRACTITIONER

## 2023-06-14 PROCEDURE — 87086 URINE CULTURE/COLONY COUNT: CPT | Performed by: NURSE PRACTITIONER

## 2023-06-14 RX ORDER — PHENAZOPYRIDINE HYDROCHLORIDE 200 MG/1
200 TABLET, FILM COATED ORAL 3 TIMES DAILY PRN
Qty: 20 TABLET | Refills: 0 | Status: SHIPPED | OUTPATIENT
Start: 2023-06-14

## 2023-06-14 NOTE — PROGRESS NOTES
"Chief Complaint  Dysuria, UTI'S (New patient ), and Urinary Incontinence    Subjective          Veda Enamorado presents to [facility] for RECURRENT UTI/MICROHEMATURIA/DYSURIA/ULISSES  Dysuria   Associated symptoms include frequency and urgency. Pertinent negatives include no flank pain, hematuria, nausea or vomiting.     Mrs. Veda Enamorado date of birth1965, she is a pleasant 58-year-old female who presents to clinic today for evaluation as a new patient consult. The patient has been referred to clinic by her PCP with concerns of recurrent UTIs. On initial evaluation in clinic today, she reports dysuria and urinary incontinence episodes at times. Her urine dipstick on initial evaluation is completely negative for leukocyte esterase. It is negative for nitrites. It is negative for gross but showed 2+ microscopic hematuria. Her PVR is 0 mL.    The patient reports she has had 3 UTIs in the past year. She had a severe UTI approximately 1 week ago. She was treated with 3 different antibiotics. The last one she tried was Augmentin, which was helpful. She took amoxicillin, but it made her sick. The patient took Macrobid, which made her sick. She was told she had E. Coli.    PATIENT REPORTS Her UTI symptoms include nausea, vomiting, dysuria, and vaginal pain. She has urinary frequency and urgency. The patient has right lower quadrant back pain. She has a history of back pain, but her current pain is different.     PATIENT REPORTS, She had open heart surgery 3 years ago. When HER GARCIA catheter was removed, they \"filled they drained too far out of it.\" She had urethral pain. The patient denies any hysterectomy. Her last Pap smear was less than 1 year ago, which was normal.     She has had hematuria in the past 2 months. The patient denies any issues with constipation. She is unsure if she has passed a kidney stone. The patient has not had any imaging of her abdomen. Her " "symptoms have improved. She has mild discomfort when she drinks water. She had nausea in the morning, which has resolved.    The patient smoked less than 1 pack of cigarettes per day for 30+ years. She denies any family history of bladder cancer. Her mother had cervical cancer.    She is not diabetic.    The patient was prescribed a cream for atrophic vaginitis by Dr. Blackwell at [facility], but she has not used it.    Her blood pressure is 170/76 mmHg today. She has an appointment with her cardiologist tomorrow.    Past Medical History:   Diagnosis Date   • Arthritis       back   • Back ache     • Chronic back pain     • Coronary artery disease       s/p 3 stents    • GERD (gastroesophageal reflux disease)     • History of MRSA infection 01/2020     labia; hospitalized 6 days on IV antibiotics and then went home on po antibiotics    • Hyperlipidemia     • Hypertension     • Peptic ulceration             Objective   Vital Signs:   /76   Pulse 61   Ht 172.7 cm (68\")   Wt 61.9 kg (136 lb 6.4 oz)   BMI 20.74 kg/m²       ROS:   Review of Systems   Constitutional:  Positive for fatigue. Negative for activity change, appetite change, diaphoresis, fever, unexpected weight gain and unexpected weight loss.   HENT:  Negative for congestion, ear discharge, ear pain, nosebleeds, rhinorrhea, sinus pressure and sore throat.    Eyes:  Negative for blurred vision, double vision, photophobia, pain, redness and visual disturbance.   Respiratory:  Negative for apnea, cough, chest tightness, shortness of breath, wheezing and stridor.    Cardiovascular:  Negative for chest pain and palpitations.   Gastrointestinal:  Negative for abdominal distention, abdominal pain, constipation, diarrhea, nausea and vomiting.   Endocrine: Negative for polydipsia, polyphagia and polyuria.   Genitourinary:  Positive for dysuria, frequency, urgency and urinary incontinence. Negative for decreased urine volume, difficulty urinating, flank pain " and hematuria.   Musculoskeletal:  Negative for arthralgias and joint swelling.   Skin:  Negative for pallor, rash and wound.   Neurological:  Negative for dizziness, tremors, syncope, weakness, light-headedness, headache, memory problem and confusion.   Hematological:  Bruises/bleeds easily.   Psychiatric/Behavioral:  Negative for behavioral problems. The patient is nervous/anxious.       Physical Exam  Constitutional:       General: She is in acute distress.      Appearance: She is well-developed.   HENT:      Head: Normocephalic and atraumatic.   Eyes:      Pupils: Pupils are equal, round, and reactive to light.   Neck:      Thyroid: No thyromegaly.      Trachea: No tracheal deviation.   Cardiovascular:      Rate and Rhythm: Normal rate and regular rhythm.      Heart sounds: No murmur heard.  Pulmonary:      Effort: Pulmonary effort is normal. No respiratory distress.      Breath sounds: Normal breath sounds. No stridor. No wheezing.   Abdominal:      General: Bowel sounds are normal. There is distension.      Palpations: Abdomen is soft.      Tenderness: There is no abdominal tenderness.   Genitourinary:     Labia:         Right: No tenderness.         Left: No tenderness.       Vagina: Normal. No vaginal discharge.      Comments: DYSURIA/FREQUENCY  Musculoskeletal:         General: Tenderness present. No deformity. Normal range of motion.      Cervical back: Normal range of motion.   Skin:     General: Skin is warm and dry.      Capillary Refill: Capillary refill takes less than 2 seconds.      Coloration: Skin is not pale.      Findings: No erythema or rash.   Neurological:      Mental Status: She is alert and oriented to person, place, and time.      Cranial Nerves: No cranial nerve deficit.      Sensory: No sensory deficit.      Coordination: Coordination normal.   Psychiatric:         Behavior: Behavior normal.         Thought Content: Thought content normal.         Judgment: Judgment normal.        Result  Review :     UA        6/14/2023    10:27   Urinalysis   Ketones, UA Negative    Leukocytes, UA Negative      Urine Culture        6/14/2023    10:29   Urine Culture   Urine Culture 25,000 CFU/mL Escherichia coli           Assessment and Plan    Problem List Items Addressed This Visit        Gastrointestinal Abdominal     Lower abdominal pain    Relevant Orders    CT Abdomen Pelvis With & Without Contrast       Genitourinary and Reproductive     Recurrent UTI (urinary tract infection) - Primary    Relevant Orders    XR abdomen kub (Completed)    CT Abdomen Pelvis With & Without Contrast    Dysuria-frequency syndrome    Relevant Medications    phenazopyridine (Pyridium) 200 MG tablet    Other Relevant Orders    XR abdomen kub (Completed)    CT Abdomen Pelvis With & Without Contrast    Atrophic vaginitis    Urethral meatus pain    Relevant Medications    phenazopyridine (Pyridium) 200 MG tablet    Other Relevant Orders    CT Abdomen Pelvis With & Without Contrast    Other microscopic hematuria    Relevant Orders    CT Abdomen Pelvis With & Without Contrast    Bilateral renal cysts   Other Visit Diagnoses     Urinary tract infection with hematuria, site unspecified        Relevant Orders    POC Urinalysis Dipstick, Automated (Completed)    Urine Culture - Urine, Urine, Clean Catch (Completed)    XR abdomen kub (Completed)                                                             ASSESSMENT    Recurrent urinary tract infections/OAB/Atrophic/yeast vaginitis/RENAL CYSTS  Mrs. Veda Enamorado is a pleasant 58-year-old female who presents to clinic today for evaluation with concerns of recurrent UTIs. On initial evaluation in clinic today, she reports dysuria and urinary incontinence episodes at times. Her urine dipstick on initial evaluation is completely negative for leukocyte esterase. It is negative for nitrites. It is negative for gross but showed 2+ microscopic hematuria. Her PVR is 0 mL.     She is in no apparent  distress TODAY, and reports a remarkable improvement in her overall symptoms POST ABX X 3 ROUNDS. She is happy to be feeling better she states. She reports doing relatively better POST HER RECENT antibiotic prophylaxis, and would like to continue. We discussed the types of organisms that are found in the urinary tract indicating that the vast majority are results of the patient's own gastrointestinal rosi.  We discussed how many of the antibiotics that are utilized can actually exacerbate these infections by creating resistant organisms and there is only a very few antibiotics that are concentrated in the urine and do not affect the rectal reservoir nor cause recurrent yeast vaginitis.      We discussed the risk factors for recurrent infections being intercourse in younger patients and atrophic changes in older patients.  We discussed the symptoms that are found including pain, pressure, burning, frequency, urgency suprapubic pain and painful intercourse.  I discussed upper tract symptoms including fevers, chills, and indicated the workup would be much more aggressive if the patient were to present with recurrent infections in the face of upper tract symptomatology such as fever. I discussed the history of vesicoureteral reflux in young patients and finally chronic renal scarring as a result of such.     Discussed Microscopic Hematuria with patient. She has been Asymptomatic for gross hematuria. However, pt educated on possible causes such as infection in the bladder, kidney, or bladder discomfort, trauma. vigorous exercise, different kinds of cancers, viral illness, such as hepatitis a virus that causes liver disease and inflammation of the liver and sexual activity.  WE Discussed the fact that there is about a 96% chance of a negative workup with episodes of microscopic hematuria and with much greater in the face of Gross hematuria.  We discussed the fact that this is a non-cumulative test.  In other words if  there is hematuria next year I would recommend continuing to work up the condition because of the fact that neoplasms may be small at the first workup and easily are missed.   We discussed the fact that if there is any history of chronic kidney disease or risk factors such as diabetes for contrast a noncontrasted study will be utilized.  We will initiate an investigation.     Dysuria/urine frequency:/URETHRAL PAIN: We discussed the different causes of painful urination such as infection, inflammation, dietary factors, or problems with her bladder.  We discussed inflammation that can be caused by irritation to the skin, or dysuria caused by and underlying bladder conditions.We also discussed urinary tract infections which can be easily treated with antibiotics.  However her urine dipstick is completely negative for any infection today.     We discussed anatomic obstructions or malformations due to ureteral stricture, pain due to external lesions on the genitalia, with urine touching this lesions causing pain.  Discussed external irritation reaction from frequent douching, or application of irritating/allergenic products to the vaginal area.  Patient denies utilizing any of these products,    Bilateral Renal Cysts/Flank Pain: FINALLY, We discussed the Bosniak classification of Renal Cysts.  I discussed the strict criteria involved with making a decision regarding intervention.  We discussed the fact that this is likely a simple cyst-Bosniak type I cyst which has sharp distinct borders and a thin wall and no internal echoes versus a Bosniak 2 with a thicker wall and faint striations. We discussed the risks of malignancy in these situations is essentially 0 and requires no further intervention. However we discussed the fact that a Bosniak type 3 cyst has about a 28% chance of malignancy and finally a Bosniak type IV cyst which most probably is representative of a renal cell carcinoma variant.    We further discussed the  fact that most renal cyst are generally asymptomatic and do not require any further intervention and that the appropriate surgical management if it becomes bothersome to the patient is a radiographic cyst puncture, especially when causing significant pain/discomfort or problems particularly on the left with an early satiety. In the meantime I assured the patient this is simple renal cyst and nothing to worry about at this point                                        PLAN  We will resend her urine for culture, I will call HER with results if any positive bacterial growth.     We discussed Starting antibiotic therapy with MACROBID if any positive bacteria      I recommend concomitant probiotics with treatment with antibiotics to protect the rectal reservoir including over-the-counter yogurt preparations to donis oral pills containing the appropriate probiotics.      We discussed the use of both an upper and lower tract investigation.  I discussed the fact that an upper tract investigation includes a  CT scan with contrast being the gold standard to diagnose the small neoplasms-ORDERED. Patient had prior CT DONE  2020 +RENAL CYSTS    We discussed  lower tract investigation consisting of a Cystoscopy -patient has been scheduled for Cystoscopy WITH Dr. Franklin on 09/07/23-RECURRENT UTI/DYSURIA    We discussed several things She can do to reduce the discomfort of painful urination/straining on urination such as increasing his p.o. fluid intake with water, and avoiding bladder irritants such as caffeine products, cola, soda drinks, and nicotine.     Also avoiding issues with constipation, as it puts pressure on the bladder, he is agreeable to watching HIS diet to prevent  any bladder irritants such as citrus, caffeine, cola, soda drinks    We discussed obtaining a CT urogram for her microhematuria if it persists.     If the patient's microscopic hematuria work-up is negative, per AUA guidelines I would recommend a repeat  urinalysis via the patient's primary care provider in 12 months.  If the repeat urinalysis is positive, the patient and I will then need to have a shared decision-making conversation regarding further work-up versus observation, given the low likelihood of identifying etiology in this situation.  If patient were to develop gross hematuria in the interim, the patient would need a total re-evaluation.               She is to also continue other medications for yeast vaginitis, atrophic vaginitis as prescribed above.    Will see her back in  for Follow up in clinic and recheck her urinalysis at that time.    Patient is agreeable to plan of care.    Patient reports that she is not currently experiencing any symptoms of urinary incontinence.    BMI is within normal parameters. No other follow-up for BMI required.    RADIOLOGY (CT AND/OR KUB):    CT Abdomen and Pelvis: No results found for this or any previous visit.     CT Stone Protocol: No results found for this or any previous visit.     KUB: No results found for this or any previous visit.       LABS (3 MONTHS):    Office Visit on 06/14/2023   Component Date Value Ref Range Status   • Color 06/14/2023 Yellow  Yellow, Straw, Dark Yellow, Eliane Final   • Clarity, UA 06/14/2023 Clear  Clear Final   • Specific Gravity  06/14/2023 1.020  1.005 - 1.030 Final   • pH, Urine 06/14/2023 6.0  5.0 - 8.0 Final   • Leukocytes 06/14/2023 Negative  Negative Final   • Nitrite, UA 06/14/2023 Negative  Negative Final   • Protein, POC 06/14/2023 Negative  Negative mg/dL Final   • Glucose, UA 06/14/2023 Negative  Negative mg/dL Final   • Ketones, UA 06/14/2023 Negative  Negative Final   • Urobilinogen, UA 06/14/2023 Normal  Normal, 0.2 E.U./dL Final   • Bilirubin 06/14/2023 Negative  Negative Final   • Blood, UA 06/14/2023 2+ (A)  Negative Final   • Lot Number 06/14/2023 [id number]   Final   • Expiration Date 06/14/2023 102,024   Final   • Urine Culture 06/14/2023 25,000 CFU/mL  Escherichia coli (A)   Final   Admission on 04/13/2023, Discharged on 04/13/2023   Component Date Value Ref Range Status   • QT Interval 04/13/2023 438  ms Final   • QTC Interval 04/13/2023 440  ms Final   • Glucose 04/13/2023 120 (H)  65 - 99 mg/dL Final   • BUN 04/13/2023 7  6 - 20 mg/dL Final   • Creatinine 04/13/2023 0.71  0.57 - 1.00 mg/dL Final   • Sodium 04/13/2023 142  136 - 145 mmol/L Final   • Potassium 04/13/2023 3.3 (L)  3.5 - 5.2 mmol/L Final   • Chloride 04/13/2023 109 (H)  98 - 107 mmol/L Final   • CO2 04/13/2023 20.8 (L)  22.0 - 29.0 mmol/L Final   • Calcium 04/13/2023 9.1  8.6 - 10.5 mg/dL Final   • Total Protein 04/13/2023 6.9  6.0 - 8.5 g/dL Final   • Albumin 04/13/2023 3.9  3.5 - 5.2 g/dL Final   • ALT (SGPT) 04/13/2023 5  1 - 33 U/L Final   • AST (SGOT) 04/13/2023 14  1 - 32 U/L Final   • Alkaline Phosphatase 04/13/2023 122 (H)  39 - 117 U/L Final   • Total Bilirubin 04/13/2023 0.2  0.0 - 1.2 mg/dL Final   • Globulin 04/13/2023 3.0  gm/dL Final   • A/G Ratio 04/13/2023 1.3  g/dL Final   • BUN/Creatinine Ratio 04/13/2023 9.9  7.0 - 25.0 Final   • Anion Gap 04/13/2023 12.2  5.0 - 15.0 mmol/L Final   • eGFR 04/13/2023 99.3  >60.0 mL/min/1.73 Final   • WBC 04/13/2023 8.87  3.40 - 10.80 10*3/mm3 Final   • RBC 04/13/2023 3.91  3.77 - 5.28 10*6/mm3 Final   • Hemoglobin 04/13/2023 12.0  12.0 - 15.9 g/dL Final   • Hematocrit 04/13/2023 37.8  34.0 - 46.6 % Final   • MCV 04/13/2023 96.7  79.0 - 97.0 fL Final   • MCH 04/13/2023 30.7  26.6 - 33.0 pg Final   • MCHC 04/13/2023 31.7  31.5 - 35.7 g/dL Final   • RDW 04/13/2023 14.1  12.3 - 15.4 % Final   • RDW-SD 04/13/2023 50.1  37.0 - 54.0 fl Final   • MPV 04/13/2023 9.9  6.0 - 12.0 fL Final   • Platelets 04/13/2023 335  140 - 450 10*3/mm3 Final   • Neutrophil % 04/13/2023 77.9 (H)  42.7 - 76.0 % Final   • Lymphocyte % 04/13/2023 15.9 (L)  19.6 - 45.3 % Final   • Monocyte % 04/13/2023 4.5 (L)  5.0 - 12.0 % Final   • Eosinophil % 04/13/2023 1.2  0.3 - 6.2 %  Final   • Basophil % 04/13/2023 0.3  0.0 - 1.5 % Final   • Immature Grans % 04/13/2023 0.2  0.0 - 0.5 % Final   • Neutrophils, Absolute 04/13/2023 6.90  1.70 - 7.00 10*3/mm3 Final   • Lymphocytes, Absolute 04/13/2023 1.41  0.70 - 3.10 10*3/mm3 Final   • Monocytes, Absolute 04/13/2023 0.40  0.10 - 0.90 10*3/mm3 Final   • Eosinophils, Absolute 04/13/2023 0.11  0.00 - 0.40 10*3/mm3 Final   • Basophils, Absolute 04/13/2023 0.03  0.00 - 0.20 10*3/mm3 Final   • Immature Grans, Absolute 04/13/2023 0.02  0.00 - 0.05 10*3/mm3 Final   • nRBC 04/13/2023 0.0  0.0 - 0.2 /100 WBC Final   • HS Troponin T 04/13/2023 11 (H)  <10 ng/L Final   • HS Troponin T 04/13/2023 10 (H)  <10 ng/L Final   • Troponin T Delta 04/13/2023 -1  >=-4 - <+4 ng/L Final   • proBNP 04/13/2023 724.4  0.0 - 900.0 pg/mL Final          Smoking Cessation Counseling:  Former smoker.  Patient does not currently use any tobacco products.     Follow Up   Return in about 3 months (around 9/7/2023) for Next scheduled follow up, With Dr. Franklin, Cystoscopy, RECURRENT UTI/DYSURIA/DETRUSSOR INSTABILITY.    Patient was given instructions and counseling regarding her condition or for health maintenance advice. Please see specific information pulled into the AVS if appropriate.          This document has been electronically signed by Griselda Cheng-Akwa, APRN   June 19, 2023 21:15 EDT      Dictated Utilizing Dragon Dictation: Part of this note may be an electronic transcription/translation of spoken language to printed text using the Dragon Dictation System.           Transcribed from ambient dictation for Griselda Cheng-Akwa, APRN by So Frausto.  06/14/23   12:11 EDT    Patient or patient representative verbalized consent to the visit recording.  I have personally performed the services described in this document as transcribed by the above individual, and it is both accurate and complete.  Griselda Cheng-Akwa, APRN  6/19/2023  21:15 EDT

## 2023-06-14 NOTE — LETTER
June 19, 2023     ZION Watson  [address] [facility] [address] [facility] [address]    Patient: Veda Enamorado   YOB: 1965   Date of Visit: 6/14/2023       Dear ZION Watson,    Thank you for referring Veda Enamorado to me for evaluation. Below is a copy of my consult note.    If you have questions, please do not hesitate to call me. I look forward to following Veda along with you.         Sincerely,        Griselda Cheng-Akwa, APRN        CC: No Recipients    Chief Complaint  Dysuria, UTI'S (New patient ), and Urinary Incontinence    Subjective           Veda Enamorado presents to [facility] for RECURRENT UTI/MICROHEMATURIA/DYSURIA/ULISSES  Dysuria   Associated symptoms include frequency and urgency. Pertinent negatives include no flank pain, hematuria, nausea or vomiting.     Mrs. Veda Enamorado date of birth1965, she is a pleasant 58-year-old female who presents to clinic today for evaluation as a new patient consult. The patient has been referred to clinic by her PCP with concerns of recurrent UTIs. On initial evaluation in clinic today, she reports dysuria and urinary incontinence episodes at times. Her urine dipstick on initial evaluation is completely negative for leukocyte esterase. It is negative for nitrites. It is negative for gross but showed 2+ microscopic hematuria. Her PVR is 0 mL.    The patient reports she has had 3 UTIs in the past year. She had a severe UTI approximately 1 week ago. She was treated with 3 different antibiotics. The last one she tried was Augmentin, which was helpful. She took amoxicillin, but it made her sick. The patient took Macrobid, which made her sick. She was told she had E. Coli.    PATIENT REPORTS Her UTI symptoms include nausea, vomiting, dysuria, and vaginal pain. She has urinary frequency and urgency. The patient has right lower quadrant back pain. She has a history of back pain, but her  "current pain is different.     PATIENT REPORTS, She had open heart surgery 3 years ago. When HER GARCIA catheter was removed, they \"filled they drained too far out of it.\" She had urethral pain. The patient denies any hysterectomy. Her last Pap smear was less than 1 year ago, which was normal.     She has had hematuria in the past 2 months. The patient denies any issues with constipation. She is unsure if she has passed a kidney stone. The patient has not had any imaging of her abdomen. Her symptoms have improved. She has mild discomfort when she drinks water. She had nausea in the morning, which has resolved.    The patient smoked less than 1 pack of cigarettes per day for 30+ years. She denies any family history of bladder cancer. Her mother had cervical cancer.    She is not diabetic.    The patient was prescribed a cream for atrophic vaginitis by Dr. Blackwell at [employer], but she has not used it.    Her blood pressure is 170/76 mmHg today. She has an appointment with her cardiologist tomorrow.    Past Medical History:   Diagnosis Date   • Arthritis       back   • Back ache     • Chronic back pain     • Coronary artery disease       s/p 3 stents    • GERD (gastroesophageal reflux disease)     • History of MRSA infection 01/2020     labia; hospitalized 6 days on IV antibiotics and then went home on po antibiotics    • Hyperlipidemia     • Hypertension     • Peptic ulceration             Objective    Vital Signs:   /76   Pulse 61   Ht 172.7 cm (68\")   Wt 61.9 kg (136 lb 6.4 oz)   BMI 20.74 kg/m²       ROS:   Review of Systems   Constitutional:  Positive for fatigue. Negative for activity change, appetite change, diaphoresis, fever, unexpected weight gain and unexpected weight loss.   HENT:  Negative for congestion, ear discharge, ear pain, nosebleeds, rhinorrhea, sinus pressure and sore throat.    Eyes:  Negative for blurred vision, double vision, photophobia, pain, redness and visual disturbance. "   Respiratory:  Negative for apnea, cough, chest tightness, shortness of breath, wheezing and stridor.    Cardiovascular:  Negative for chest pain and palpitations.   Gastrointestinal:  Negative for abdominal distention, abdominal pain, constipation, diarrhea, nausea and vomiting.   Endocrine: Negative for polydipsia, polyphagia and polyuria.   Genitourinary:  Positive for dysuria, frequency, urgency and urinary incontinence. Negative for decreased urine volume, difficulty urinating, flank pain and hematuria.   Musculoskeletal:  Negative for arthralgias and joint swelling.   Skin:  Negative for pallor, rash and wound.   Neurological:  Negative for dizziness, tremors, syncope, weakness, light-headedness, headache, memory problem and confusion.   Hematological:  Bruises/bleeds easily.   Psychiatric/Behavioral:  Negative for behavioral problems. The patient is nervous/anxious.       Physical Exam  Constitutional:       General: She is in acute distress.      Appearance: She is well-developed.   HENT:      Head: Normocephalic and atraumatic.   Eyes:      Pupils: Pupils are equal, round, and reactive to light.   Neck:      Thyroid: No thyromegaly.      Trachea: No tracheal deviation.   Cardiovascular:      Rate and Rhythm: Normal rate and regular rhythm.      Heart sounds: No murmur heard.  Pulmonary:      Effort: Pulmonary effort is normal. No respiratory distress.      Breath sounds: Normal breath sounds. No stridor. No wheezing.   Abdominal:      General: Bowel sounds are normal. There is distension.      Palpations: Abdomen is soft.      Tenderness: There is no abdominal tenderness.   Genitourinary:     Labia:         Right: No tenderness.         Left: No tenderness.       Vagina: Normal. No vaginal discharge.      Comments: DYSURIA/FREQUENCY  Musculoskeletal:         General: Tenderness present. No deformity. Normal range of motion.      Cervical back: Normal range of motion.   Skin:     General: Skin is warm and  dry.      Capillary Refill: Capillary refill takes less than 2 seconds.      Coloration: Skin is not pale.      Findings: No erythema or rash.   Neurological:      Mental Status: She is alert and oriented to person, place, and time.      Cranial Nerves: No cranial nerve deficit.      Sensory: No sensory deficit.      Coordination: Coordination normal.   Psychiatric:         Behavior: Behavior normal.         Thought Content: Thought content normal.         Judgment: Judgment normal.        Result Review :     UA          6/14/2023    10:27   Urinalysis   Ketones, UA Negative    Leukocytes, UA Negative      Urine Culture          6/14/2023    10:29   Urine Culture   Urine Culture 25,000 CFU/mL Escherichia coli           Assessment and Plan    Problem List Items Addressed This Visit          Gastrointestinal Abdominal     Lower abdominal pain    Relevant Orders    CT Abdomen Pelvis With & Without Contrast       Genitourinary and Reproductive     Recurrent UTI (urinary tract infection) - Primary    Relevant Orders    XR abdomen kub (Completed)    CT Abdomen Pelvis With & Without Contrast    Dysuria-frequency syndrome    Relevant Medications    phenazopyridine (Pyridium) 200 MG tablet    Other Relevant Orders    XR abdomen kub (Completed)    CT Abdomen Pelvis With & Without Contrast    Atrophic vaginitis    Urethral meatus pain    Relevant Medications    phenazopyridine (Pyridium) 200 MG tablet    Other Relevant Orders    CT Abdomen Pelvis With & Without Contrast    Other microscopic hematuria    Relevant Orders    CT Abdomen Pelvis With & Without Contrast    Bilateral renal cysts     Other Visit Diagnoses       Urinary tract infection with hematuria, site unspecified        Relevant Orders    POC Urinalysis Dipstick, Automated (Completed)    Urine Culture - Urine, Urine, Clean Catch (Completed)    XR abdomen kub (Completed)                                                               ASSESSMENT    Recurrent urinary  tract infections/OAB/Atrophic/yeast vaginitis/RENAL CYSTS  Mrs. Veda Enamorado is a pleasant 58-year-old female who presents to clinic today for evaluation with concerns of recurrent UTIs. On initial evaluation in clinic today, she reports dysuria and urinary incontinence episodes at times. Her urine dipstick on initial evaluation is completely negative for leukocyte esterase. It is negative for nitrites. It is negative for gross but showed 2+ microscopic hematuria. Her PVR is 0 mL.     She is in no apparent distress TODAY, and reports a remarkable improvement in her overall symptoms POST ABX X 3 ROUNDS. She is happy to be feeling better she states. She reports doing relatively better POST HER RECENT antibiotic prophylaxis, and would like to continue. We discussed the types of organisms that are found in the urinary tract indicating that the vast majority are results of the patient's own gastrointestinal rosi.  We discussed how many of the antibiotics that are utilized can actually exacerbate these infections by creating resistant organisms and there is only a very few antibiotics that are concentrated in the urine and do not affect the rectal reservoir nor cause recurrent yeast vaginitis.      We discussed the risk factors for recurrent infections being intercourse in younger patients and atrophic changes in older patients.  We discussed the symptoms that are found including pain, pressure, burning, frequency, urgency suprapubic pain and painful intercourse.  I discussed upper tract symptoms including fevers, chills, and indicated the workup would be much more aggressive if the patient were to present with recurrent infections in the face of upper tract symptomatology such as fever. I discussed the history of vesicoureteral reflux in young patients and finally chronic renal scarring as a result of such.     Discussed Microscopic Hematuria with patient. She has been Asymptomatic for gross hematuria. However, pt  educated on possible causes such as infection in the bladder, kidney, or bladder discomfort, trauma. vigorous exercise, different kinds of cancers, viral illness, such as hepatitis a virus that causes liver disease and inflammation of the liver and sexual activity.  WE Discussed the fact that there is about a 96% chance of a negative workup with episodes of microscopic hematuria and with much greater in the face of Gross hematuria.  We discussed the fact that this is a non-cumulative test.  In other words if there is hematuria next year I would recommend continuing to work up the condition because of the fact that neoplasms may be small at the first workup and easily are missed.   We discussed the fact that if there is any history of chronic kidney disease or risk factors such as diabetes for contrast a noncontrasted study will be utilized.  We will initiate an investigation.     Dysuria/urine frequency:/URETHRAL PAIN: We discussed the different causes of painful urination such as infection, inflammation, dietary factors, or problems with her bladder.  We discussed inflammation that can be caused by irritation to the skin, or dysuria caused by and underlying bladder conditions.We also discussed urinary tract infections which can be easily treated with antibiotics.  However her urine dipstick is completely negative for any infection today.     We discussed anatomic obstructions or malformations due to ureteral stricture, pain due to external lesions on the genitalia, with urine touching this lesions causing pain.  Discussed external irritation reaction from frequent douching, or application of irritating/allergenic products to the vaginal area.  Patient denies utilizing any of these products,    Bilateral Renal Cysts/Flank Pain: FINALLY, We discussed the Bosniak classification of Renal Cysts.  I discussed the strict criteria involved with making a decision regarding intervention.  We discussed the fact that this is  likely a simple cyst-Bosniak type I cyst which has sharp distinct borders and a thin wall and no internal echoes versus a Bosniak 2 with a thicker wall and faint striations. We discussed the risks of malignancy in these situations is essentially 0 and requires no further intervention. However we discussed the fact that a Bosniak type 3 cyst has about a 28% chance of malignancy and finally a Bosniak type IV cyst which most probably is representative of a renal cell carcinoma variant.    We further discussed the fact that most renal cyst are generally asymptomatic and do not require any further intervention and that the appropriate surgical management if it becomes bothersome to the patient is a radiographic cyst puncture, especially when causing significant pain/discomfort or problems particularly on the left with an early satiety. In the meantime I assured the patient this is simple renal cyst and nothing to worry about at this point                                        PLAN  We will resend her urine for culture, I will call HER with results if any positive bacterial growth.     We discussed Starting antibiotic therapy with MACROBID if any positive bacteria      I recommend concomitant probiotics with treatment with antibiotics to protect the rectal reservoir including over-the-counter yogurt preparations to donis oral pills containing the appropriate probiotics.      We discussed the use of both an upper and lower tract investigation.  I discussed the fact that an upper tract investigation includes a  CT scan with contrast being the gold standard to diagnose the small neoplasms-ORDERED. Patient had prior CT DONE  2020 +RENAL CYSTS    We discussed  lower tract investigation consisting of a Cystoscopy -patient has been scheduled for Cystoscopy WITH Dr. Franklin on 09/07/23-RECURRENT UTI/DYSURIA    We discussed several things She can do to reduce the discomfort of painful urination/straining on urination such as  increasing his p.o. fluid intake with water, and avoiding bladder irritants such as caffeine products, cola, soda drinks, and nicotine.     Also avoiding issues with constipation, as it puts pressure on the bladder, he is agreeable to watching HIS diet to prevent  any bladder irritants such as citrus, caffeine, cola, soda drinks    We discussed obtaining a CT urogram for her microhematuria if it persists.     If the patient's microscopic hematuria work-up is negative, per AUA guidelines I would recommend a repeat urinalysis via the patient's primary care provider in 12 months.  If the repeat urinalysis is positive, the patient and I will then need to have a shared decision-making conversation regarding further work-up versus observation, given the low likelihood of identifying etiology in this situation.  If patient were to develop gross hematuria in the interim, the patient would need a total re-evaluation.               She is to also continue other medications for yeast vaginitis, atrophic vaginitis as prescribed above.    Will see her back in  for Follow up in clinic and recheck her urinalysis at that time.    Patient is agreeable to plan of care.    Patient reports that she is not currently experiencing any symptoms of urinary incontinence.    BMI is within normal parameters. No other follow-up for BMI required.    RADIOLOGY (CT AND/OR KUB):    CT Abdomen and Pelvis: No results found for this or any previous visit.     CT Stone Protocol: No results found for this or any previous visit.     KUB: No results found for this or any previous visit.       LABS (3 MONTHS):    Office Visit on 06/14/2023   Component Date Value Ref Range Status   • Color 06/14/2023 Yellow  Yellow, Straw, Dark Yellow, Eliane Final   • Clarity, UA 06/14/2023 Clear  Clear Final   • Specific Gravity  06/14/2023 1.020  1.005 - 1.030 Final   • pH, Urine 06/14/2023 6.0  5.0 - 8.0 Final   • Leukocytes 06/14/2023 Negative  Negative Final   •  Nitrite, UA 06/14/2023 Negative  Negative Final   • Protein, POC 06/14/2023 Negative  Negative mg/dL Final   • Glucose, UA 06/14/2023 Negative  Negative mg/dL Final   • Ketones, UA 06/14/2023 Negative  Negative Final   • Urobilinogen, UA 06/14/2023 Normal  Normal, 0.2 E.U./dL Final   • Bilirubin 06/14/2023 Negative  Negative Final   • Blood, UA 06/14/2023 2+ (A)  Negative Final   • Lot Number 06/14/2023 [id number]   Final   • Expiration Date 06/14/2023 102,024   Final   • Urine Culture 06/14/2023 25,000 CFU/mL Escherichia coli (A)   Final   Admission on 04/13/2023, Discharged on 04/13/2023   Component Date Value Ref Range Status   • QT Interval 04/13/2023 438  ms Final   • QTC Interval 04/13/2023 440  ms Final   • Glucose 04/13/2023 120 (H)  65 - 99 mg/dL Final   • BUN 04/13/2023 7  6 - 20 mg/dL Final   • Creatinine 04/13/2023 0.71  0.57 - 1.00 mg/dL Final   • Sodium 04/13/2023 142  136 - 145 mmol/L Final   • Potassium 04/13/2023 3.3 (L)  3.5 - 5.2 mmol/L Final   • Chloride 04/13/2023 109 (H)  98 - 107 mmol/L Final   • CO2 04/13/2023 20.8 (L)  22.0 - 29.0 mmol/L Final   • Calcium 04/13/2023 9.1  8.6 - 10.5 mg/dL Final   • Total Protein 04/13/2023 6.9  6.0 - 8.5 g/dL Final   • Albumin 04/13/2023 3.9  3.5 - 5.2 g/dL Final   • ALT (SGPT) 04/13/2023 5  1 - 33 U/L Final   • AST (SGOT) 04/13/2023 14  1 - 32 U/L Final   • Alkaline Phosphatase 04/13/2023 122 (H)  39 - 117 U/L Final   • Total Bilirubin 04/13/2023 0.2  0.0 - 1.2 mg/dL Final   • Globulin 04/13/2023 3.0  gm/dL Final   • A/G Ratio 04/13/2023 1.3  g/dL Final   • BUN/Creatinine Ratio 04/13/2023 9.9  7.0 - 25.0 Final   • Anion Gap 04/13/2023 12.2  5.0 - 15.0 mmol/L Final   • eGFR 04/13/2023 99.3  >60.0 mL/min/1.73 Final   • WBC 04/13/2023 8.87  3.40 - 10.80 10*3/mm3 Final   • RBC 04/13/2023 3.91  3.77 - 5.28 10*6/mm3 Final   • Hemoglobin 04/13/2023 12.0  12.0 - 15.9 g/dL Final   • Hematocrit 04/13/2023 37.8  34.0 - 46.6 % Final   • MCV 04/13/2023 96.7  79.0 -  97.0 fL Final   • MCH 04/13/2023 30.7  26.6 - 33.0 pg Final   • MCHC 04/13/2023 31.7  31.5 - 35.7 g/dL Final   • RDW 04/13/2023 14.1  12.3 - 15.4 % Final   • RDW-SD 04/13/2023 50.1  37.0 - 54.0 fl Final   • MPV 04/13/2023 9.9  6.0 - 12.0 fL Final   • Platelets 04/13/2023 335  140 - 450 10*3/mm3 Final   • Neutrophil % 04/13/2023 77.9 (H)  42.7 - 76.0 % Final   • Lymphocyte % 04/13/2023 15.9 (L)  19.6 - 45.3 % Final   • Monocyte % 04/13/2023 4.5 (L)  5.0 - 12.0 % Final   • Eosinophil % 04/13/2023 1.2  0.3 - 6.2 % Final   • Basophil % 04/13/2023 0.3  0.0 - 1.5 % Final   • Immature Grans % 04/13/2023 0.2  0.0 - 0.5 % Final   • Neutrophils, Absolute 04/13/2023 6.90  1.70 - 7.00 10*3/mm3 Final   • Lymphocytes, Absolute 04/13/2023 1.41  0.70 - 3.10 10*3/mm3 Final   • Monocytes, Absolute 04/13/2023 0.40  0.10 - 0.90 10*3/mm3 Final   • Eosinophils, Absolute 04/13/2023 0.11  0.00 - 0.40 10*3/mm3 Final   • Basophils, Absolute 04/13/2023 0.03  0.00 - 0.20 10*3/mm3 Final   • Immature Grans, Absolute 04/13/2023 0.02  0.00 - 0.05 10*3/mm3 Final   • nRBC 04/13/2023 0.0  0.0 - 0.2 /100 WBC Final   • HS Troponin T 04/13/2023 11 (H)  <10 ng/L Final   • HS Troponin T 04/13/2023 10 (H)  <10 ng/L Final   • Troponin T Delta 04/13/2023 -1  >=-4 - <+4 ng/L Final   • proBNP 04/13/2023 724.4  0.0 - 900.0 pg/mL Final          Smoking Cessation Counseling:  Former smoker.  Patient does not currently use any tobacco products.     Follow Up   Return in about 3 months (around 9/7/2023) for Next scheduled follow up, With Dr. Franklin, Cystoscopy, RECURRENT UTI/DYSURIA/DETRUSSOR INSTABILITY.    Patient was given instructions and counseling regarding her condition or for health maintenance advice. Please see specific information pulled into the AVS if appropriate.          This document has been electronically signed by Griselda Cheng-Akwa, APRN   June 19, 2023 21:15 EDT      Dictated Utilizing Dragon Dictation: Part of this note may be an electronic  transcription/translation of spoken language to printed text using the Dragon Dictation System.           Transcribed from ambient dictation for Griselda Cheng-Akwa, APRN by So Frausto.  06/14/23   12:11 EDT    Patient or patient representative verbalized consent to the visit recording.  I have personally performed the services described in this document as transcribed by the above individual, and it is both accurate and complete.  Griselda Cheng-Akwa, APRN  6/19/2023  21:15 EDT

## 2023-06-16 ENCOUNTER — TELEPHONE (OUTPATIENT)
Dept: UROLOGY | Facility: CLINIC | Age: 58
End: 2023-06-16
Payer: COMMERCIAL

## 2023-06-16 DIAGNOSIS — N39.0 E-COLI UTI: Primary | ICD-10-CM

## 2023-06-16 DIAGNOSIS — B96.20 E-COLI UTI: Primary | ICD-10-CM

## 2023-06-16 DIAGNOSIS — N39.0 RECURRENT UTI (URINARY TRACT INFECTION): ICD-10-CM

## 2023-06-16 LAB — BACTERIA SPEC AEROBE CULT: ABNORMAL

## 2023-06-16 RX ORDER — CEFDINIR 300 MG/1
300 CAPSULE ORAL 2 TIMES DAILY
Qty: 20 CAPSULE | Refills: 0 | Status: SHIPPED | OUTPATIENT
Start: 2023-06-16

## 2023-06-16 RX ORDER — NITROFURANTOIN MONOHYDRATE/MACROCRYSTALLINE 25; 75 MG/1; MG/1
CAPSULE ORAL
Qty: 56 CAPSULE | Refills: 3 | Status: SHIPPED | OUTPATIENT
Start: 2023-06-16 | End: 2023-06-16

## 2023-06-16 NOTE — TELEPHONE ENCOUNTER
Called patient to go over urine culture that showed E coli. Patient stated she could not take the Macrobid so I spoke with Victorino to change her antibiotic to  Cefdinir 300 mg 2x for 10 days. Patient verbalized understanding.     Called pharmacy and spoke with Berry to canceled the macrobid. Called in Cefdinir 300 mg 2x daily for 10 days and sent in.

## 2023-06-16 NOTE — TELEPHONE ENCOUNTER
----- Message from Griselda Cheng-Akwa, APRN sent at 6/16/2023  8:12 AM EDT -----  Please call patient with urine culture results positive for E'coli. I have sent Macrobid to her [facility] to take BID x 10 days. Then nightly. Tell her to drop off another sample in two weeks for recheck.    Thank you

## 2023-06-19 PROBLEM — R31.29 OTHER MICROSCOPIC HEMATURIA: Status: ACTIVE | Noted: 2023-06-19

## 2023-06-19 PROBLEM — N34.3 DYSURIA-FREQUENCY SYNDROME: Status: ACTIVE | Noted: 2023-06-19

## 2023-06-19 PROBLEM — N95.2 ATROPHIC VAGINITIS: Status: ACTIVE | Noted: 2023-06-19

## 2023-06-19 PROBLEM — N36.8 URETHRAL MEATUS PAIN: Status: ACTIVE | Noted: 2023-06-19

## 2023-06-19 PROBLEM — N39.0 RECURRENT UTI (URINARY TRACT INFECTION): Status: ACTIVE | Noted: 2023-06-19

## 2023-06-19 PROBLEM — R10.30 LOWER ABDOMINAL PAIN: Status: ACTIVE | Noted: 2023-06-19

## 2023-06-19 PROBLEM — N28.1 BILATERAL RENAL CYSTS: Status: ACTIVE | Noted: 2023-06-19

## 2023-08-07 ENCOUNTER — SPECIALTY PHARMACY (OUTPATIENT)
Dept: PHARMACY | Facility: HOSPITAL | Age: 58
End: 2023-08-07
Payer: COMMERCIAL

## 2023-08-07 DIAGNOSIS — E78.2 MIXED HYPERLIPIDEMIA: Primary | Chronic | ICD-10-CM

## 2023-08-07 RX ORDER — ALIROCUMAB 150 MG/ML
300 INJECTION, SOLUTION SUBCUTANEOUS
Qty: 6 ML | Refills: 1 | Status: SHIPPED | OUTPATIENT
Start: 2023-08-07

## 2023-08-07 NOTE — PROGRESS NOTES
[facility]  Lipid Management Program - [facility]       Veda Enamorado is a 58 y.o. female referred to the [facility] Management Clinic by Dr. Chang for clinical pharmacy and specialty pharmacy management of [facility].  Veda Enamorado is  treated for clinical ASCVD and currently takes Praluent 300mg every 28 days.  In the past, Pt has tried Lipitor but had myalgias with it and Pravastatin, which was discontinued recently. The patient denies any allergies to latex. She denies any adverse effects and denies any trouble giving herself the injection. She reports that she has been doing better at writing down on the calendar when she takes her dose, but is still struggling to be proactive and write down when her next dose is due, or at least this last time she did not do that. She thinks she has been taking them at least every 28-30 days.       Relevant Past Medical History and Comorbidities  Past Medical History:   Diagnosis Date    Arthritis     back    Back ache     Chronic back pain     Coronary artery disease     s/p 3 stents     GERD (gastroesophageal reflux disease)     History of MRSA infection 01/2020    labia; hospitalized 6 days on IV antibiotics and then went home on po antibiotics     Hyperlipidemia     Hypertension     Peptic ulceration     Urinary tract infection      Social History     Socioeconomic History    Marital status:     Number of children: 2   Tobacco Use    Smoking status: Former     Types: Cigarettes     Quit date: 08/2020     Years since quitting: 3.0    Smokeless tobacco: Never   Vaping Use    Vaping Use: Former   Substance and Sexual Activity    Alcohol use: No    Drug use: No    Sexual activity: Defer       Allergies  Bactrim [sulfamethoxazole-trimethoprim], Ciprofloxacin, and Ranexa [ranolazine er]    Current Medication List    Current Outpatient Medications:     Alirocumab (Praluent) 150 MG/ML injection pen, Inject 2 mL under the skin into the appropriate  area as directed Every 28 (Twenty-Eight) Days., Disp: 6 mL, Rfl: 1    Aspirin Low Dose 81 MG EC tablet, Take 1 tablet by mouth Daily., Disp: , Rfl:     buprenorphine-naloxone (SUBOXONE) 8-2 MG per SL tablet, Place 1 tablet under the tongue Daily., Disp: , Rfl:     Eliquis 5 MG tablet tablet, TAKE 1 TABLET BY MOUTH 2 (TWO) TIMES A DAY FOR 30 DAYS., Disp: 60 tablet, Rfl: 5    isosorbide mononitrate (IMDUR) 30 MG 24 hr tablet, TAKE 1 TABLET BY MOUTH EVERY NIGHT., Disp: 30 tablet, Rfl: 11    lisinopril (PRINIVIL,ZESTRIL) 30 MG tablet, Take 1 tablet by mouth Daily. Patient taking 10mg bid as instructed by PCP she reports, Disp: 30 tablet, Rfl: 11    loratadine (CLARITIN) 10 MG tablet, Take 1 tablet by mouth As Needed., Disp: , Rfl:     metoprolol tartrate (LOPRESSOR) 25 MG tablet, Take 1.5 tablets by mouth 2 (Two) Times a Day. (Patient taking differently: Take 1.5 tablets by mouth 2 (Two) Times a Day. Patient states she is taking one 25 mg tablet twice a day. Patient states taking more than the 50 mg made her too fatigued and dizzy), Disp: 30 tablet, Rfl: 11    nitroglycerin (NITROSTAT) 0.4 MG SL tablet, TAKE 1 TABLET AT ONSET OF CHEST PAIN,MAY REPEAT IN 5 MIN, MAX 3 DOSES IN 24 HRS, Disp: 25 tablet, Rfl: 11    spironolactone (ALDACTONE) 25 MG tablet, TAKE 1/2 TABLET BY MOUTH DAILY, Disp: 90 tablet, Rfl: 3    nitrofurantoin, macrocrystal-monohydrate, (Macrobid) 100 MG capsule, TAKE 1 CAPSULE BY MOUTH TWICE DAILY X 7 DAYS, THEN TAKE 1 CAPSULE NIGHTLY FOR SUPPRESSIVE THERAPY E'COLI UTIs (Patient not taking: Reported on 8/7/2023), Disp: 56 capsule, Rfl: 3    phenazopyridine (Pyridium) 200 MG tablet, Take 1 tablet by mouth 3 (Three) Times a Day As Needed for Bladder Spasms. (Patient not taking: Reported on 8/7/2023), Disp: 20 tablet, Rfl: 0    Drug Interactions  None with Praluent    Relevant Laboratory Values  Lab Results   Component Value Date    CHOL 102 02/24/2022    CHLPL 215 (H) 04/11/2016    TRIG 121 02/24/2022    HDL  50 02/24/2022    LDL 30 02/24/2022       Medication Assessment & Plan    Patient was last seen by cardiology on 6/15/23 and Praluent was continued. Her LDL was at goal on 2/24/22.     Will continue Praluent 300mg every 28 days. Sent in next 6 month refills.  She would like to  in our pharmacy.     Pt will continue regular follow-up with cardiology.  Will continue to follow-up in 6 months.     She is due for a new lipid panel and reports that she will come and get drawn this week when she picks up her next refill of Praluent.     Again counseled the patient on the importance on giving this medication at the appropriate times for the best results.  I suggested that when she writes down when she gives her shots each time, that she goes ahead and counts out the 4 weeks until her next injection and marks it on her calender boldly to help serve as a reminder. I also suggested setting a reminder/alarm on her cell phone on the day of her next scheduled shot to help remind her. Pt seemed interested in trying to do better with adherence. Will check in on patient in a few months to see how this is going.       Aneta Oleary RPH  8/7/2023  16:01 EDT

## 2023-08-10 ENCOUNTER — LAB (OUTPATIENT)
Dept: LAB | Facility: HOSPITAL | Age: 58
End: 2023-08-10
Payer: COMMERCIAL

## 2023-08-10 DIAGNOSIS — E78.2 MIXED HYPERLIPIDEMIA: Chronic | ICD-10-CM

## 2023-08-10 LAB
CHOLEST SERPL-MCNC: 99 MG/DL (ref 0–200)
HDLC SERPL-MCNC: 44 MG/DL (ref 40–60)
LDLC SERPL CALC-MCNC: 33 MG/DL (ref 0–100)
LDLC/HDLC SERPL: 0.7 {RATIO}
TRIGL SERPL-MCNC: 121 MG/DL (ref 0–150)
VLDLC SERPL-MCNC: 22 MG/DL (ref 5–40)

## 2023-08-10 PROCEDURE — 36415 COLL VENOUS BLD VENIPUNCTURE: CPT

## 2023-08-10 PROCEDURE — 80061 LIPID PANEL: CPT

## 2023-08-17 ENCOUNTER — SPECIALTY PHARMACY (OUTPATIENT)
Dept: PHARMACY | Facility: HOSPITAL | Age: 58
End: 2023-08-17
Payer: COMMERCIAL

## 2023-08-17 NOTE — PROGRESS NOTES
Patient last saw medication management clinic for hyperlipidemia on 8/7/2023. At that visit, the pharmacist noted that the patient was due for a new lipid panel.    Confirmed the patient did get the lipid panel drawn and the results are as follows:    Component  (Ref Range & Units)   (8/10/23)   (2/24/22)   Total Cholesterol  (0 - 200 mg/dL) 99 102   Triglycerides  (0 - 150 mg/dL) 121 121   HDL Cholesterol  (40 - 60 mg/dL) 44 50   LDL Cholesterol  (0 - 100 mg/dL) 33 30   VLDL Cholesterol  (5 - 40 mg/dL) 22 22   (LDL/HDL Ratio) 0.70 0.56       Patient's LDL is still at goal with current medication regimen.     Patient currently following with cardiology (next cardiology appointment is 12/13/2023).   Medication management clinic will continue to follow patient while enrolled in specialty clinic.     Thank you,    Zainab Becerra RPH  08/17/23  17:51 EDT

## 2023-09-15 ENCOUNTER — TELEPHONE (OUTPATIENT)
Dept: CARDIOLOGY | Facility: CLINIC | Age: 58
End: 2023-09-15

## 2023-10-04 ENCOUNTER — TELEPHONE (OUTPATIENT)
Dept: CARDIOLOGY | Facility: CLINIC | Age: 58
End: 2023-10-04

## 2023-10-04 NOTE — TELEPHONE ENCOUNTER
Caller: DOLLY MURRY    Relationship:SELF    Callback number: [phone]   Is it ok to leave a message: [x] Yes [] No    Requested medication for samples: ELIQUIS 5MG    How much medication does the patient currently have left: LESS THAN 2 DAYS    Who will be picking up the samples: PT HERSELF    Do you need information about patient financial assistance for this medication: [] Yes [x] No

## 2023-10-11 RX ORDER — ISOSORBIDE MONONITRATE 30 MG/1
TABLET, EXTENDED RELEASE ORAL
Qty: 30 TABLET | Refills: 11 | Status: SHIPPED | OUTPATIENT
Start: 2023-10-11

## 2023-10-31 DIAGNOSIS — I48.0 PAF (PAROXYSMAL ATRIAL FIBRILLATION): Primary | Chronic | ICD-10-CM

## 2023-12-13 ENCOUNTER — OFFICE VISIT (OUTPATIENT)
Dept: CARDIOLOGY | Facility: CLINIC | Age: 58
End: 2023-12-13
Payer: COMMERCIAL

## 2023-12-13 VITALS
HEIGHT: 62 IN | WEIGHT: 134.6 LBS | SYSTOLIC BLOOD PRESSURE: 148 MMHG | HEART RATE: 58 BPM | OXYGEN SATURATION: 100 % | DIASTOLIC BLOOD PRESSURE: 71 MMHG | BODY MASS INDEX: 24.77 KG/M2

## 2023-12-13 DIAGNOSIS — E78.2 MIXED HYPERLIPIDEMIA: ICD-10-CM

## 2023-12-13 DIAGNOSIS — I48.0 PAF (PAROXYSMAL ATRIAL FIBRILLATION): Chronic | ICD-10-CM

## 2023-12-13 DIAGNOSIS — I25.10 CAD IN NATIVE ARTERY: ICD-10-CM

## 2023-12-13 DIAGNOSIS — I47.19 ATRIAL TACHYCARDIA: Primary | ICD-10-CM

## 2023-12-13 DIAGNOSIS — Z95.1 S/P CABG (CORONARY ARTERY BYPASS GRAFT): ICD-10-CM

## 2023-12-13 DIAGNOSIS — I25.10 CORONARY ARTERY DISEASE INVOLVING NATIVE CORONARY ARTERY OF NATIVE HEART WITHOUT ANGINA PECTORIS: ICD-10-CM

## 2023-12-13 PROCEDURE — 3078F DIAST BP <80 MM HG: CPT | Performed by: INTERNAL MEDICINE

## 2023-12-13 PROCEDURE — 3077F SYST BP >= 140 MM HG: CPT | Performed by: INTERNAL MEDICINE

## 2023-12-13 PROCEDURE — 99213 OFFICE O/P EST LOW 20 MIN: CPT | Performed by: INTERNAL MEDICINE

## 2023-12-13 NOTE — PROGRESS NOTES
[facility] CARDIOLOGY  [address] [address]  [place] [address]  Phone: [phone]  Fax: [phone]    2023    Chief Complaint   Patient presents with    Coronary Artery Disease     Patient denies chest pain or shortness of breath today ,         History:     Veda Enamorado is a 58 y.o. female presenting for  follow-up evaluation   Clinically stable from cardiac standpoint   Symptoms:  CP no  SOB no    Orthopnea No  Lower extremity edema no   Palpitations worsening   Compliant with medications yes  Claudication no      Past Medical History:   Diagnosis Date    Arthritis     back    Back ache     Chronic back pain     Coronary artery disease     s/p 3 stents     GERD (gastroesophageal reflux disease)     History of MRSA infection 2020    labia; hospitalized 6 days on IV antibiotics and then went home on po antibiotics     Hyperlipidemia     Hypertension     Peptic ulceration     Urinary tract infection        Past Surgical History:   Procedure Laterality Date    CARDIAC CATHETERIZATION      CARDIAC CATHETERIZATION N/A 2019    Procedure: Left Heart Cath;  Surgeon: Lazaro Conway MD;  Location: [facility] CATH INVASIVE LOC[facility];  Service: Cardiology    CARDIAC CATHETERIZATION      CARDIAC CATHETERIZATION N/A 3/9/2022    Procedure: Left Heart Cath;  Surgeon: Lazaro Conway MD;  Location:  [facility] CATH INVASIVE LOCATION;  Service: Cardiology;  Laterality: N/A;    CARDIAC CATHETERIZATION N/A 2023    Procedure: Left Heart Cath;  Surgeon: Lazaro Conway MD;  Location:  [facility] CATH INVASIVE LOCATION;  Service: Cardiology;  Laterality: N/A;    CARDIAC SURGERY       SECTION      x2    CORONARY ARTERY BYPASS GRAFT N/A 2020    Procedure: MEDIAN STERNOTOMY, CORONARY ARTERY BYPASS X3 WITH  INTERNAL MAMMARY ARTERY GRAFTING, ENDOVASCULAR VEIN HARVESTING OF THE RIGHT GREATER SAPHENOUS VEIN, MAICOL PER ANESTHESIA;  Surgeon: Ivonne  Juanjo BUNCH MD;  Location:  [facility];  Service: Cardiothoracic;  Laterality: N/A;  ST ON LE  VO: 1800  VR: 1814    HAND SURGERY      right    PERINEAL LESION/CYST EXCISION Right 2020    Procedure: I&D ABSCESS;  Surgeon: Leander Cardenas III, MD;  Location:  [facility] OR;  Service: Obstetrics/Gynecology        Past Social History:  Social History     Socioeconomic History    Marital status:     Number of children: 2   Tobacco Use    Smoking status: Former     Types: Cigarettes     Quit date: 2020     Years since quitting: 3.4    Smokeless tobacco: Never   Vaping Use    Vaping Use: Former   Substance and Sexual Activity    Alcohol use: No    Drug use: No    Sexual activity: Defer       Past Family History:  Family History   Problem Relation Age of Onset    Heart disease Mother     Coronary artery disease Mother     Heart disease Father     Heart attack Father     Hypertension Father     Stroke Father     No Known Problems Sister     Heart attack Brother     Heart disease Brother     Heart disease Maternal Grandmother     Heart attack Maternal Grandmother     No Known Problems Maternal Grandfather     Stroke Paternal Grandmother     Breast cancer Neg Hx        Review of Systems:   ROS       Current Outpatient Medications   Medication Sig Dispense Refill    Aspirin Low Dose 81 MG EC tablet Take 1 tablet by mouth Daily.      buprenorphine-naloxone (SUBOXONE) 8-2 MG per SL tablet Place 1 tablet under the tongue Daily.      isosorbide mononitrate (IMDUR) 30 MG 24 hr tablet TAKE 1 TABLET BY MOUTH EVERY NIGHT. 30 tablet 11    lisinopril (PRINIVIL,ZESTRIL) 30 MG tablet Take 1 tablet by mouth Daily. Patient taking 10mg bid as instructed by PCP she reports 30 tablet 11    loratadine (CLARITIN) 10 MG tablet Take 1 tablet by mouth As Needed.      metoprolol tartrate (LOPRESSOR) 25 MG tablet Take 1.5 tablets by mouth 2 (Two) Times a Day. (Patient taking differently: Take 1.5 tablets by mouth 2 (Two) Times a Day. Patient  "states she is taking one 25 mg tablet twice a day. Patient states taking more than the 50 mg made her too fatigued and dizzy) 30 tablet 11    nitroglycerin (NITROSTAT) 0.4 MG SL tablet TAKE 1 TABLET AT ONSET OF CHEST PAIN,MAY REPEAT IN 5 MIN, MAX 3 DOSES IN 24 HRS 25 tablet 11    Alirocumab (Praluent) 150 MG/ML injection pen Inject 2 mL under the skin into the appropriate area as directed Every 28 (Twenty-Eight) Days. 6 mL 1    Eliquis 5 MG tablet tablet TAKE 1 TABLET BY MOUTH 2 (TWO) TIMES A DAY FOR 30 DAYS. 60 tablet 5    spironolactone (ALDACTONE) 25 MG tablet TAKE 1/2 TABLET BY MOUTH DAILY 90 tablet 3     No current facility-administered medications for this visit.        Allergies   Allergen Reactions    Bactrim [Sulfamethoxazole-Trimethoprim] Rash    Ciprofloxacin Other (See Comments)     tremors    Ranexa [Ranolazine Er] Unknown - Low Severity       Objective     /71 (BP Location: Left arm, Patient Position: Sitting, Cuff Size: Adult)   Pulse 58   Ht 157.5 cm (62\")   Wt 61.1 kg (134 lb 9.6 oz)   SpO2 100%   BMI 24.62 kg/m²     Physical Exam       DATA:  Results for orders placed during the hospital encounter of 07/17/20    SCANNED - TELEMETRY     Results for orders placed during the hospital encounter of 01/19/21    Adult Transthoracic Echo Complete W/ Cont if Necessary Per Protocol    Interpretation Summary  · Estimated left ventricular EF = 60% Left ventricular ejection fraction appears to be 56 - 60%. Left ventricular systolic function is normal.  · Left ventricular diastolic function was normal.  · No significant valvular abnormality  · No pericardial effusion  · No change since prev echo of 6/26/20   Results for orders placed in visit on 02/23/22    Stress Test With Myocardial Perfusion (1 Day)    Interpretation Summary  · Myocardial perfusion imaging indicates a small-sized, mildly severe area of ischemia located in the basal inferior lateral wall. The anterior perfusion wall defect is likely " secondary to breast attenuation artifact,  · Breast attenuation artifact is present.  · Left ventricular ejection fraction is normal. .  · Impressions are consistent with an intermediate risk study.   Results for orders placed in visit on 02/23/22    Stress Test With Myocardial Perfusion (1 Day)    Interpretation Summary  · Myocardial perfusion imaging indicates a small-sized, mildly severe area of ischemia located in the basal inferior lateral wall. The anterior perfusion wall defect is likely secondary to breast attenuation artifact,  · Breast attenuation artifact is present.  · Left ventricular ejection fraction is normal. .  · Impressions are consistent with an intermediate risk study.   Results for orders placed during the hospital encounter of 02/23/23    Cardiac Catheterization/Vascular Study    Narrative  Images from the original result were not included.  CARDIAC CATHETERIZATION / INTERVENTION REPORT    DATE OF PROCEDURE: 2/23/2023    INDICATION FOR PROCEDURE: chest pain      PRE PROCEDURE DIAGNOSIS:  CAD s/p three-vessel CABG  Hypertension  Dyslipidemia    POST PROCEDURE DIAGNOSIS:  CAD with a patent LIMA graft and patent SVG to OM1 and diagonal 1 graft, native RCA mild nonobstructive disease unchanged from before.  Normal LV systolic function, mildly elevated LVEDP of 20 mmHg.    Face to face mdoerate conscious  sedation time:      COMPLICATIONS : None    Specimens collected : None        PROCEDURE PERFORMED:    1. Selective right and left coronary Angiogram  2. Left heart catheretization  3. Left ventriculography  4.  LIMA angiogram  5.SVG to diagonal and OM angiogram      PROCEDURE COMMENTS:    Prior to the procedure risks benefits alternatives and possible adverse events were discussed with the patient and informed consent was obtained.  Veda Enamorado was brought to the cath lab and placed on the cardiac catherization table in the postabsortive state. The patient was prepped and draped according  to the cath lab protocol under strict aseptic and sterile condition. Patient's right femoral artery sight was prepped and draped. Under fluoroscopic guidance the right femoral artery was punctured using a Micropuncture gauge needle utilizing the modified Seldinger technique. 6 Iraqi sheath was introduced over a wire. After aspirating for blood it was flushed with heparinized saline. Angio was performed to confirm the position of the sheath in right common femoral artery    We advanced Genaro type diagnostic catheters over the wire. The  left and right coronary arteries  were selectively engaged. Left and right coronary angiogram were obtained in multiple orthogonal projections.  5 Greek JR4 catheter was used for engaging the SVG on the LIMA angiogram and angiogram pictures were obtained in orthogonal views .5 F Pigtail catheter was used to cross across the AV retrograde into the LV cavity and resting LV pressures were recorded. Manual pull back to used to record gradient across the Aortic valve.    After completion of the procedure the femoral sheath was removed in the cath lab and hemostasis was achieved by Mynx. The patient tolerated the procedure without complications.      Coronary anatomy findings:    LM: Moderate caliber vessel, short and no significant stenosis.    LAD: Was chronically totally occluded at the proximal segment before the diagonal takeoff.    Ramus intermedius.  Moderate caliber vessel with no significant stenosis.    LCX: Moderate calibre vessel, the OM1 which was patent before has been occluded in the proximal segment but the graft supplying the OM1 is patent and the distal OM 1 distal to the anastomosis had no significant stenosis.    RCA: Large calibre, dominant artery, proximal, mid and distal had mild luminal irregularities with no significant stenosis, distally divides into R PDA and PL branches both had mild luminal irregularities with no stensois.  Noted mild 30 to 40% stenosis  "across the entire RCA, the PDA and PL branches had no significant stenosis.    LIMA angiogram:  The CELESTE graft was well matured with no significant stenosis with good proximal and distal anastomosis site, the distal LAD is a small caliber artery with mild diffuse disease.    SVG to OM graft appeared patent supplying the distal OM branch which had no significant stenosis.  SVG to diagonal 1 graft appeared patent with no significant stenosis supplying a small caliber diagonal artery with mild diffuse disease.    Left Ventriculography:    LV systolic function was normal with visual estimated EF of 60%. No significant mitral regurgitation noted.    LVEDP: 20 mmHg  No gradient across the aortic valve on pull back.        EBL: Less than 10cc        Final Impression:  CAD with patent vein grafts and the LIMA graft and no significant stenosis in the native RCA.        Recommendations:  Continue with the current medical rx, evaluate for non cardiac causes for her L arm pain          Lazaro Conway MD, [facility]  Interventional Cardiology    02/23/23  11:10 EST    Procedures     Lab Results   Component Value Date    CHOL 99 08/10/2023    CHOL 102 02/24/2022    CHOL 230 (H) 12/28/2021    CHLPL 215 (H) 04/11/2016     Lab Results   Component Value Date    TRIG 121 08/10/2023    TRIG 121 02/24/2022    TRIG 303 (H) 12/28/2021     Lab Results   Component Value Date    HDL 44 08/10/2023    HDL 50 02/24/2022    HDL 47 12/28/2021     Lab Results   Component Value Date    LDL 33 08/10/2023    LDL 30 02/24/2022     (H) 12/28/2021       Lab Results   Component Value Date    TSH 0.649 12/28/2021               Invalid input(s): \"LABALBU\", \"PROT\"        Assessment and Plan    Assessment and Plan    Problem List Items Addressed This Visit          Cardiac and Vasculature    PAF (paroxysmal atrial fibrillation) (Chronic)    Overview     Post op afib after CABG 7/2020         Mixed hyperlipidemia    Overview     Added automatically from " request for surgery [id number]         Coronary artery disease    CAD in native artery    S/P CABG (coronary artery bypass graft)    Atrial tachycardia - Primary           Recommended increase activity to 30 minutes of walking daily, most days of the week    Thank you for allowing me to participate in the care of Veda Enamorado. Feel free to contact me directly with any further questions or concerns.          Lazaro Conway MD, FACC  Interventional Cardiology

## 2023-12-22 ENCOUNTER — DISEASE STATE MANAGEMENT VISIT (OUTPATIENT)
Dept: PHARMACY | Facility: HOSPITAL | Age: 58
End: 2023-12-22
Payer: COMMERCIAL

## 2023-12-22 RX ORDER — ALIROCUMAB 150 MG/ML
300 INJECTION, SOLUTION SUBCUTANEOUS
Qty: 6 ML | Refills: 1 | Status: SHIPPED | OUTPATIENT
Start: 2023-12-22

## 2023-12-22 NOTE — PROGRESS NOTES
[facility]ication Management Clinic  Lipid Management Program - [facility]     Veda Enamorado is a 58 y.o. female referred to the Medication Management Clinic by Dr. Chang for clinical pharmacy and specialty pharmacy management of PCSK[facility]i.  Veda Enamorado is  treated for clinical ASCVD and currently takes Praluent 300mg every 28 days.  In the past, Pt has tried Lipitor but had myalgias with it and Pravastatin, which was discontinued recently. The patient denies any allergies to latex. She denies any adverse effects and denies any trouble giving herself the injection. She reports her last dose was on November 26th.  She is in the clinic today because of insurance changes and thought she was going to have to change to [place].  Since she is here she would like to have me watch her and help her give her injection.     Relevant Past Medical History and Comorbidities  Past Medical History:   Diagnosis Date    Arthritis     back    Back ache     Chronic back pain     Coronary artery disease     s/p 3 stents     GERD (gastroesophageal reflux disease)     History of MRSA infection 01/2020    labia; hospitalized 6 days on IV antibiotics and then went home on po antibiotics     Hyperlipidemia     Hypertension     Peptic ulceration     Urinary tract infection      Social History     Socioeconomic History    Marital status:     Number of children: 2   Tobacco Use    Smoking status: Former     Types: Cigarettes     Quit date: 08/2020     Years since quitting: 3.3    Smokeless tobacco: Never   Vaping Use    Vaping Use: Former   Substance and Sexual Activity    Alcohol use: No    Drug use: No    Sexual activity: Defer       Allergies  Bactrim [sulfamethoxazole-trimethoprim], Ciprofloxacin, and Ranexa [ranolazine er]    Current Medication List    Current Outpatient Medications:     Alirocumab (Praluent) 150 MG/ML injection pen, Inject 2 mL under the skin into the appropriate area as directed Every 28 (Twenty-Eight) Days.,  Disp: 6 mL, Rfl: 1    Aspirin Low Dose 81 MG EC tablet, Take 1 tablet by mouth Daily., Disp: , Rfl:     buprenorphine-naloxone (SUBOXONE) 8-2 MG per SL tablet, Place 1 tablet under the tongue Daily., Disp: , Rfl:     Eliquis 5 MG tablet tablet, TAKE 1 TABLET BY MOUTH 2 (TWO) TIMES A DAY FOR 30 DAYS., Disp: 60 tablet, Rfl: 5    isosorbide mononitrate (IMDUR) 30 MG 24 hr tablet, TAKE 1 TABLET BY MOUTH EVERY NIGHT., Disp: 30 tablet, Rfl: 11    lisinopril (PRINIVIL,ZESTRIL) 30 MG tablet, Take 1 tablet by mouth Daily. Patient taking 10mg bid as instructed by PCP she reports, Disp: 30 tablet, Rfl: 11    loratadine (CLARITIN) 10 MG tablet, Take 1 tablet by mouth As Needed., Disp: , Rfl:     metoprolol tartrate (LOPRESSOR) 25 MG tablet, Take 1.5 tablets by mouth 2 (Two) Times a Day. (Patient taking differently: Take 1.5 tablets by mouth 2 (Two) Times a Day. Patient states she is taking one 25 mg tablet twice a day. Patient states taking more than the 50 mg made her too fatigued and dizzy), Disp: 30 tablet, Rfl: 11    nitroglycerin (NITROSTAT) 0.4 MG SL tablet, TAKE 1 TABLET AT ONSET OF CHEST PAIN,MAY REPEAT IN 5 MIN, MAX 3 DOSES IN 24 HRS, Disp: 25 tablet, Rfl: 11    spironolactone (ALDACTONE) 25 MG tablet, TAKE 1/2 TABLET BY MOUTH DAILY, Disp: 90 tablet, Rfl: 3    Drug Interactions  None with Praluent    Relevant Laboratory Values  Lab Results   Component Value Date    CHOL 99 08/10/2023    CHLPL 215 (H) 04/11/2016    TRIG 121 08/10/2023    HDL 44 08/10/2023    LDL 33 08/10/2023       Medication Assessment & Plan    Continue Praluent 300mg every 28 days. Sent in next 6 month refills.      I helped her administer her Praluent in clinic, one in each thigh.      Pt will continue regular follow-up with cardiology.  Will continue to follow-up in 6 months.     Kaia Stein, PharmD  12/22/2023  13:33 EST

## 2024-03-05 ENCOUNTER — TRANSCRIBE ORDERS (OUTPATIENT)
Dept: ADMINISTRATIVE | Facility: HOSPITAL | Age: 59
End: 2024-03-05
Payer: COMMERCIAL

## 2024-03-05 ENCOUNTER — HOSPITAL ENCOUNTER (OUTPATIENT)
Dept: GENERAL RADIOLOGY | Facility: HOSPITAL | Age: 59
Discharge: HOME | End: 2024-03-05
Admitting: NURSE PRACTITIONER
Payer: COMMERCIAL

## 2024-03-05 DIAGNOSIS — M79.642 HAND PAIN, LEFT: Primary | ICD-10-CM

## 2024-03-05 DIAGNOSIS — M79.642 HAND PAIN, LEFT: ICD-10-CM

## 2024-03-05 PROCEDURE — 73140 X-RAY EXAM OF FINGER(S): CPT

## 2024-03-12 ENCOUNTER — SPECIALTY PHARMACY (OUTPATIENT)
Dept: PHARMACY | Facility: HOSPITAL | Age: 59
End: 2024-03-12
Payer: COMMERCIAL

## 2024-03-12 NOTE — PROGRESS NOTES
Specialty Pharmacy Refill Coordination Note     Veda is a 59 y.o. female contacted today regarding refills of  Praluent 150 MG/ml specialty medication(s).    Reviewed and verified with patient:       Specialty medication(s) and dose(s) confirmed: yes    Refill Questions      Flowsheet Row Most Recent Value   Changes to allergies? No   Changes to medications? No   New conditions or infections since last clinic visit No   Unplanned office visit, urgent care, ED, or hospital admission in the last 4 weeks  No   How does patient/caregiver feel medication is working? Very good   Financial problems or insurance changes  No   If yes, describe changes in insurance or financial issues. na   Since the previous refill, were any specialty medication doses or scheduled injections missed or delayed?  No   If yes, please provide the amount na   Why were doses missed? na   Does this patient require a clinical escalation to a pharmacist? No            Delivery Questions      Flowsheet Row Most Recent Value   Copay verified? Yes   Copay amount 0.00   Copay form of payment No copayment ($0)              Medication Adherence    Adherence tools used: calendar   Other adherence tool: n/a   Support network for adherence: family member   Other support network: na             Follow-up: 84 day(s)     Tatiana Garnica, Pharmacy Technician  Specialty Pharmacy Technician

## 2024-03-29 NOTE — TELEPHONE ENCOUNTER
PT IS AWARE OF LAB RESULTS PER LAURYN.      ----- Message from ZION Drake sent at 9/13/2023  2:01 PM EDT -----  Please call patient about their normal result.    Lipid panel is great.  LDL, which is bad cholesterol, was 33.  Keep doing what you've been doing.        Thanks, Lauryn  
Yes

## 2024-04-11 ENCOUNTER — OFFICE VISIT (OUTPATIENT)
Dept: CARDIOLOGY | Facility: CLINIC | Age: 59
End: 2024-04-11
Payer: COMMERCIAL

## 2024-04-11 VITALS
OXYGEN SATURATION: 97 % | HEART RATE: 55 BPM | BODY MASS INDEX: 24.47 KG/M2 | DIASTOLIC BLOOD PRESSURE: 80 MMHG | SYSTOLIC BLOOD PRESSURE: 162 MMHG | HEIGHT: 61 IN | WEIGHT: 129.6 LBS

## 2024-04-11 DIAGNOSIS — E78.2 MIXED HYPERLIPIDEMIA: Chronic | ICD-10-CM

## 2024-04-11 DIAGNOSIS — I25.10 CORONARY ARTERY DISEASE INVOLVING NATIVE CORONARY ARTERY OF NATIVE HEART WITHOUT ANGINA PECTORIS: Primary | Chronic | ICD-10-CM

## 2024-04-11 DIAGNOSIS — I48.0 PAF (PAROXYSMAL ATRIAL FIBRILLATION): Chronic | ICD-10-CM

## 2024-04-11 DIAGNOSIS — I10 ESSENTIAL HYPERTENSION: ICD-10-CM

## 2024-04-11 PROCEDURE — 3079F DIAST BP 80-89 MM HG: CPT | Performed by: NURSE PRACTITIONER

## 2024-04-11 PROCEDURE — 93000 ELECTROCARDIOGRAM COMPLETE: CPT | Performed by: NURSE PRACTITIONER

## 2024-04-11 PROCEDURE — 1159F MED LIST DOCD IN RCRD: CPT | Performed by: NURSE PRACTITIONER

## 2024-04-11 PROCEDURE — 99214 OFFICE O/P EST MOD 30 MIN: CPT | Performed by: NURSE PRACTITIONER

## 2024-04-11 PROCEDURE — 3077F SYST BP >= 140 MM HG: CPT | Performed by: NURSE PRACTITIONER

## 2024-04-11 PROCEDURE — 1160F RVW MEDS BY RX/DR IN RCRD: CPT | Performed by: NURSE PRACTITIONER

## 2024-04-11 RX ORDER — LISINOPRIL 40 MG/1
40 TABLET ORAL DAILY
Qty: 90 TABLET | Refills: 2 | Status: SHIPPED | OUTPATIENT
Start: 2024-04-11

## 2024-04-11 NOTE — PROGRESS NOTES
"Chief Complaint  Follow-up (Patient states she is doing good )    Subjective          Veda Enamorado presents to [facility] for follow up.    History of Present Illness    Veda was last seen in clinic on 12/13/2023 by Dr. Conway.  She was stable at that time and no changes were made to her medications.    At today's visit Veda reports that her blood pressures at home have been much lower than the reading we have today.  She states that her top number is usually in the 130s.    She denies any chest pain.  She denies shortness of breath and dyspnea on exertion.  She denies palpitations.  She denies any bleeding issues.    Objective     Vital Signs:   /80   Pulse 55   Ht 154.9 cm (61\")   Wt 58.8 kg (129 lb 9.6 oz)   SpO2 97%   BMI 24.49 kg/m²       Physical Exam  Constitutional:       Appearance: Normal appearance. She is well-developed.   Cardiovascular:      Rate and Rhythm: Normal rate and regular rhythm.      Heart sounds: No murmur heard.     No friction rub. No gallop.   Pulmonary:      Effort: Pulmonary effort is normal. No respiratory distress.      Breath sounds: Normal breath sounds. No wheezing or rales.   Skin:     General: Skin is warm and dry.   Neurological:      Mental Status: She is alert and oriented to person, place, and time.   Psychiatric:         Mood and Affect: Mood normal.         Behavior: Behavior normal.          Result Review :         Lipid Panel          8/10/2023    09:49   Lipid Panel   Total Cholesterol 99    Triglycerides 121    HDL Cholesterol 44    VLDL Cholesterol 22    LDL Cholesterol  33    LDL/HDL Ratio 0.70           ECG 12 Lead    Date/Time: 4/11/2024 12:29 PM  Performed by: Sharda Meyers APRN    Authorized by: Sharda Meyers APRN  Comparison: compared with previous ECG from 4/13/2020  Comparison to previous ECG: Normal sinus rhythm, 61 bpm  Rhythm: sinus rhythm  Ectopy: atrial premature contractions  Rate: " bradycardic  BPM: 59  T flattening: III, aVL, V1 and V2           Most recent echocardiogram  Results for orders placed during the hospital encounter of 01/19/21    Adult Transthoracic Echo Complete W/ Cont if Necessary Per Protocol    Interpretation Summary  · Estimated left ventricular EF = 60% Left ventricular ejection fraction appears to be 56 - 60%. Left ventricular systolic function is normal.  · Left ventricular diastolic function was normal.  · No significant valvular abnormality  · No pericardial effusion  · No change since prev echo of 6/26/20      Most recent Stress Test  Results for orders placed in visit on 02/23/22    Stress Test With Myocardial Perfusion (1 Day)    Interpretation Summary  · Myocardial perfusion imaging indicates a small-sized, mildly severe area of ischemia located in the basal inferior lateral wall. The anterior perfusion wall defect is likely secondary to breast attenuation artifact,  · Breast attenuation artifact is present.  · Left ventricular ejection fraction is normal. .  · Impressions are consistent with an intermediate risk study.       Most recent Cardiac Cath  Results for orders placed during the hospital encounter of 02/23/23    Cardiac Catheterization/Vascular Study    Conclusion  Images from the original result were not included.  CARDIAC CATHETERIZATION / INTERVENTION REPORT    DATE OF PROCEDURE: 2/23/2023    INDICATION FOR PROCEDURE: chest pain      PRE PROCEDURE DIAGNOSIS:  CAD s/p three-vessel CABG  Hypertension  Dyslipidemia    POST PROCEDURE DIAGNOSIS:  CAD with a patent LIMA graft and patent SVG to OM1 and diagonal 1 graft, native RCA mild nonobstructive disease unchanged from before.  Normal LV systolic function, mildly elevated LVEDP of 20 mmHg.    Face to face mdoerate conscious  sedation time:      COMPLICATIONS : None    Specimens collected : None        PROCEDURE PERFORMED:    1. Selective right and left coronary Angiogram  2. Left heart catheretization  3.  Left ventriculography  4.  LIMA angiogram  5.SVG to diagonal and OM angiogram      PROCEDURE COMMENTS:    Prior to the procedure risks benefits alternatives and possible adverse events were discussed with the patient and informed consent was obtained.  Veda Enamorado was brought to the cath lab and placed on the cardiac catherization table in the postabsortive state. The patient was prepped and draped according to the cath lab protocol under strict aseptic and sterile condition. Patient's right femoral artery sight was prepped and draped. Under fluoroscopic guidance the right femoral artery was punctured using a Micropuncture gauge needle utilizing the modified Seldinger technique. 6 Albanian sheath was introduced over a wire. After aspirating for blood it was flushed with heparinized saline. Angio was performed to confirm the position of the sheath in right common femoral artery    We advanced Genaro type diagnostic catheters over the wire. The  left and right coronary arteries  were selectively engaged. Left and right coronary angiogram were obtained in multiple orthogonal projections.  5 Greek JR4 catheter was used for engaging the SVG on the LIMA angiogram and angiogram pictures were obtained in orthogonal views .5 F Pigtail catheter was used to cross across the AV retrograde into the LV cavity and resting LV pressures were recorded. Manual pull back to used to record gradient across the Aortic valve.    After completion of the procedure the femoral sheath was removed in the cath lab and hemostasis was achieved by Mynx. The patient tolerated the procedure without complications.      Coronary anatomy findings:    LM: Moderate caliber vessel, short and no significant stenosis.    LAD: Was chronically totally occluded at the proximal segment before the diagonal takeoff.    Ramus intermedius.  Moderate caliber vessel with no significant stenosis.    LCX: Moderate calibre vessel, the OM1 which was patent before  has been occluded in the proximal segment but the graft supplying the OM1 is patent and the distal OM 1 distal to the anastomosis had no significant stenosis.    RCA: Large calibre, dominant artery, proximal, mid and distal had mild luminal irregularities with no significant stenosis, distally divides into R PDA and PL branches both had mild luminal irregularities with no stensois.  Noted mild 30 to 40% stenosis across the entire RCA, the PDA and PL branches had no significant stenosis.    LIMA angiogram:  The CELESTE graft was well matured with no significant stenosis with good proximal and distal anastomosis site, the distal LAD is a small caliber artery with mild diffuse disease.    SVG to OM graft appeared patent supplying the distal OM branch which had no significant stenosis.  SVG to diagonal 1 graft appeared patent with no significant stenosis supplying a small caliber diagonal artery with mild diffuse disease.    Left Ventriculography:    LV systolic function was normal with visual estimated EF of 60%. No significant mitral regurgitation noted.    LVEDP: 20 mmHg  No gradient across the aortic valve on pull back.        EBL: Less than 10cc        Final Impression:  CAD with patent vein grafts and the LIMA graft and no significant stenosis in the native RCA.        Recommendations:  Continue with the current medical rx, evaluate for non cardiac causes for her L arm pain          Lazaro MD Zoraida, [facility]  Interventional Cardiology    02/23/23  11:10 EST      Current Outpatient Medications   Medication Sig Dispense Refill    Alirocumab (Praluent) 150 MG/ML injection pen Inject 2 mL under the skin into the appropriate area as directed Every 28 (Twenty-Eight) Days. 6 mL 1    Aspirin Low Dose 81 MG EC tablet Take 1 tablet by mouth Daily.      buprenorphine-naloxone (SUBOXONE) 8-2 MG per SL tablet Place 1 tablet under the tongue Daily.      Eliquis 5 MG tablet tablet TAKE 1 TABLET BY MOUTH 2 (TWO) TIMES A DAY FOR 30  DAYS. 60 tablet 5    isosorbide mononitrate (IMDUR) 30 MG 24 hr tablet TAKE 1 TABLET BY MOUTH EVERY NIGHT. 30 tablet 11    loratadine (CLARITIN) 10 MG tablet Take 1 tablet by mouth As Needed.      metoprolol tartrate (LOPRESSOR) 25 MG tablet Take 1 tablet by mouth 2 (Two) Times a Day. 60 tablet 3    nitroglycerin (NITROSTAT) 0.4 MG SL tablet TAKE 1 TABLET AT ONSET OF CHEST PAIN,MAY REPEAT IN 5 MIN, MAX 3 DOSES IN 24 HRS 25 tablet 11    spironolactone (ALDACTONE) 25 MG tablet TAKE 1/2 TABLET BY MOUTH DAILY 90 tablet 3    lisinopril (PRINIVIL,ZESTRIL) 40 MG tablet Take 1 tablet by mouth Daily. 90 tablet 2     No current facility-administered medications for this visit.            Assessment and Plan    Problem List Items Addressed This Visit          Cardiac and Vasculature    Coronary artery disease involving native coronary artery of native heart without angina pectoris - Primary (Chronic)    Relevant Medications    metoprolol tartrate (LOPRESSOR) 25 MG tablet    Other Relevant Orders    ECG 12 Lead    Mixed hyperlipidemia (Chronic)    Essential hypertension (Chronic)    Relevant Medications    metoprolol tartrate (LOPRESSOR) 25 MG tablet    lisinopril (PRINIVIL,ZESTRIL) 40 MG tablet    PAF (paroxysmal atrial fibrillation) (Chronic)    Overview     Post op afib after CABG 7/2020         Relevant Medications    metoprolol tartrate (LOPRESSOR) 25 MG tablet           Follow Up     Medications were reviewed with the patient.    Coronary artery disease is stable.  Continue aspirin, lisinopril, metoprolol tartrate.  Patient is intolerant to statins and is on Praluent.  Continue isosorbide mononitrate.    Hypertension is not well-controlled.  I have increased lisinopril to 40 mg daily.    With regard to dyslipidemia, continue Praluent.    Continue Eliquis for stroke prophylaxis in the presence of paroxysmal atrial fibrillation.    Risk factor modification: Smoking cessation counseling was given.  Patient advised regarding  the correlation between cigarette smoking and coronary artery disease, as well as lung disease, cancer.  Patient was offered strategies to quit, including medication.  The patient is not interested in quitting.      Return in about 2 months (around 6/11/2024).    Patient was given instructions and counseling regarding her condition or for health maintenance advice. Please see specific information pulled into the AVS if appropriate.

## 2024-06-06 ENCOUNTER — SPECIALTY PHARMACY (OUTPATIENT)
Dept: PHARMACY | Facility: HOSPITAL | Age: 59
End: 2024-06-06
Payer: COMMERCIAL

## 2024-06-06 RX ORDER — ALIROCUMAB 150 MG/ML
300 INJECTION, SOLUTION SUBCUTANEOUS
Qty: 6 ML | Refills: 1 | Status: SHIPPED | OUTPATIENT
Start: 2024-06-06

## 2024-06-06 NOTE — PROGRESS NOTES
[facility]  Lipid Management Program - PCSK9i     Veda Enamorado is a 59 y.o. female referred to the [facility] by Dr. Chang for clinical pharmacy and specialty pharmacy management of PCSK9i.  Veda Enamorado is treated for clinical ASCVD and currently takes Praluent 300mg every 28 days.  In the past, Pt has tried Lipitor but had myalgias with it and Pravastatin, which was discontinued recently. The patient denies any allergies to latex.     She denies any adverse effects and denies any trouble giving herself the injection. She also denies any missed doses.      Relevant Past Medical History and Comorbidities  Past Medical History:   Diagnosis Date    Arthritis     back    Back ache     Chronic back pain     Coronary artery disease     s/p 3 stents     GERD (gastroesophageal reflux disease)     History of MRSA infection 01/2020    labia; hospitalized 6 days on IV antibiotics and then went home on po antibiotics     Hyperlipidemia     Hypertension     Peptic ulceration     Urinary tract infection      Social History     Socioeconomic History    Marital status:     Number of children: 2   Tobacco Use    Smoking status: Former     Current packs/day: 0.00     Types: Cigarettes     Quit date: 08/2020     Years since quitting: 3.8    Smokeless tobacco: Never   Vaping Use    Vaping status: Former   Substance and Sexual Activity    Alcohol use: No    Drug use: No    Sexual activity: Defer       Allergies  Bactrim [sulfamethoxazole-trimethoprim], Ciprofloxacin, and Ranexa [ranolazine er]    Current Medication List    Current Outpatient Medications:     Alirocumab (Praluent) 150 MG/ML injection pen, Inject 2 mL under the skin into the appropriate area as directed Every 28 (Twenty-Eight) Days., Disp: 6 mL, Rfl: 1    Aspirin Low Dose 81 MG EC tablet, Take 1 tablet by mouth Daily., Disp: , Rfl:     buprenorphine-naloxone (SUBOXONE) 8-2 MG per SL tablet, Place 1 tablet under  the tongue Daily., Disp: , Rfl:     Eliquis 5 MG tablet tablet, TAKE 1 TABLET BY MOUTH 2 (TWO) TIMES A DAY FOR 30 DAYS., Disp: 60 tablet, Rfl: 5    isosorbide mononitrate (IMDUR) 30 MG 24 hr tablet, TAKE 1 TABLET BY MOUTH EVERY NIGHT., Disp: 30 tablet, Rfl: 11    lisinopril (PRINIVIL,ZESTRIL) 40 MG tablet, Take 1 tablet by mouth Daily., Disp: 90 tablet, Rfl: 2    loratadine (CLARITIN) 10 MG tablet, Take 1 tablet by mouth As Needed., Disp: , Rfl:     metoprolol tartrate (LOPRESSOR) 25 MG tablet, Take 1 tablet by mouth 2 (Two) Times a Day., Disp: 60 tablet, Rfl: 3    nitroglycerin (NITROSTAT) 0.4 MG SL tablet, TAKE 1 TABLET AT ONSET OF CHEST PAIN,MAY REPEAT IN 5 MIN, MAX 3 DOSES IN 24 HRS, Disp: 25 tablet, Rfl: 11    spironolactone (ALDACTONE) 25 MG tablet, TAKE 1/2 TABLET BY MOUTH DAILY, Disp: 90 tablet, Rfl: 3    Drug Interactions  None with Praluent    Relevant Laboratory Values  Lab Results   Component Value Date    CHOL 99 08/10/2023    CHLPL 215 (H) 04/11/2016    TRIG 121 08/10/2023    HDL 44 08/10/2023    LDL 33 08/10/2023         Medication Assessment & Plan    Patient's LDL is at goal at 36 on 3/5/24. Patient was recently seen by cardiology on 4/11/24 and her current dose was continued.     Continue Praluent 300mg every 28 days. Sent in next 6 month refills.  Patient will continue to  from pharmacy and will  a 3 month supply on this coming Monday.     Pt will continue regular follow-up with cardiology- next appointment on 6/13/24.     Will continue to follow-up in 6 months.     Aneta Oleary RPH  6/6/2024  09:35 EDT

## 2024-06-06 NOTE — PROGRESS NOTES
Specialty Pharmacy Refill Coordination Note      Name:  Veda Enamorado  :  1965  Date:  2024         Past Medical History:   Diagnosis Date    Arthritis     back    Back ache     Chronic back pain     Coronary artery disease     s/p 3 stents     GERD (gastroesophageal reflux disease)     History of MRSA infection 2020    labia; hospitalized 6 days on IV antibiotics and then went home on po antibiotics     Hyperlipidemia     Hypertension     Peptic ulceration     Urinary tract infection        Past Surgical History:   Procedure Laterality Date    CARDIAC CATHETERIZATION      CARDIAC CATHETERIZATION N/A 2019    Procedure: Left Heart Cath;  Surgeon: Lazaro Conway MD;  Location: [facility] CATH INVASIVE LOCATION;  Service: Cardiology    CARDIAC CATHETERIZATION      CARDIAC CATHETERIZATION N/A 3/9/2022    Procedure: Left Heart Cath;  Surgeon: Lazaro Conwya MD;  Location: [facility] CATH INVASIVE LOCATION;  Service: Cardiology;  Laterality: N/A;    CARDIAC CATHETERIZATION N/A 2023    Procedure: Left Heart Cath;  Surgeon: Lazaro Conway MD;  Location: [facility] CATH INVASIVE LOCATION;  Service: Cardiology;  Laterality: N/A;    CARDIAC SURGERY       SECTION      x2    CORONARY ARTERY BYPASS GRAFT N/A 2020    Procedure: MEDIAN STERNOTOMY, CORONARY ARTERY BYPASS X3 WITH  INTERNAL MAMMARY ARTERY GRAFTING, ENDOVASCULAR VEIN HARVESTING OF THE RIGHT GREATER SAPHENOUS VEIN, MAICOL PER ANESTHESIA;  Surgeon: Juanjo Coronado MD;  Location: [facility];  Service: Cardiothoracic;  Laterality: N/A;  ST ON LE  VO: 1800  VR: 1814    HAND SURGERY      right    PERINEAL LESION/CYST EXCISION Right 2020    Procedure: I&D ABSCESS;  Surgeon: Leander Cardenas III, MD;  Location: [facility];  Service: Obstetrics/Gynecology       Social History     Socioeconomic History    Marital status:     Number of children: 2   Tobacco Use    Smoking status: Former      Current packs/day: 0.00     Types: Cigarettes     Quit date: 08/2020     Years since quitting: 3.8    Smokeless tobacco: Never   Vaping Use    Vaping status: Former   Substance and Sexual Activity    Alcohol use: No    Drug use: No    Sexual activity: Defer       Family History   Problem Relation Age of Onset    Heart disease Mother     Coronary artery disease Mother     Heart disease Father     Heart attack Father     Hypertension Father     Stroke Father     No Known Problems Sister     Heart attack Brother     Heart disease Brother     Heart disease Maternal Grandmother     Heart attack Maternal Grandmother     No Known Problems Maternal Grandfather     Stroke Paternal Grandmother     Breast cancer Neg Hx        Allergies   Allergen Reactions    Bactrim [Sulfamethoxazole-Trimethoprim] Rash    Ciprofloxacin Other (See Comments)     tremors    Ranexa [Ranolazine Er] Unknown - Low Severity       Current Outpatient Medications   Medication Sig Dispense Refill    Alirocumab (Praluent) 150 MG/ML injection pen Inject 2 mL under the skin into the appropriate area as directed Every 28 (Twenty-Eight) Days. 6 mL 1    Aspirin Low Dose 81 MG EC tablet Take 1 tablet by mouth Daily.      buprenorphine-naloxone (SUBOXONE) 8-2 MG per SL tablet Place 1 tablet under the tongue Daily.      Eliquis 5 MG tablet tablet TAKE 1 TABLET BY MOUTH 2 (TWO) TIMES A DAY FOR 30 DAYS. 60 tablet 5    isosorbide mononitrate (IMDUR) 30 MG 24 hr tablet TAKE 1 TABLET BY MOUTH EVERY NIGHT. 30 tablet 11    lisinopril (PRINIVIL,ZESTRIL) 40 MG tablet Take 1 tablet by mouth Daily. 90 tablet 2    loratadine (CLARITIN) 10 MG tablet Take 1 tablet by mouth As Needed.      metoprolol tartrate (LOPRESSOR) 25 MG tablet Take 1 tablet by mouth 2 (Two) Times a Day. 60 tablet 3    nitroglycerin (NITROSTAT) 0.4 MG SL tablet TAKE 1 TABLET AT ONSET OF CHEST PAIN,MAY REPEAT IN 5 MIN, MAX 3 DOSES IN 24 HRS 25 tablet 11    spironolactone (ALDACTONE) 25 MG tablet TAKE 1/2  TABLET BY MOUTH DAILY 90 tablet 3     No current facility-administered medications for this visit.         ASSESSMENT/PLAN:      Veda is a 59 y.o. female contacted today regarding refills of  Praluent 300mg specialty medication(s).    Reviewed and verified with patient:  Allergies  Meds  Problems       Specialty medication(s) and dose(s) confirmed: yes    Refill Questions      Flowsheet Row Most Recent Value   Changes to allergies? No   Changes to medications? No   New conditions or infections since last clinic visit No   Unplanned office visit, urgent care, ED, or hospital admission in the last 4 weeks  No   How does patient/caregiver feel medication is working? Very good   Financial problems or insurance changes  No   If yes, describe changes in insurance or financial issues. na   Since the previous refill, were any specialty medication doses or scheduled injections missed or delayed?  No   If yes, please provide the amount na   Why were doses missed? na   Does this patient require a clinical escalation to a pharmacist? No            Delivery Questions      Flowsheet Row Most Recent Value   Copay verified? Yes   Copay amount 0   Copay form of payment No copayment ($0)              Medication Adherence    Adherence tools used: calendar   Other adherence tool: n/a   Support network for adherence: family member   Other support network: na             Follow-up: 84 day(s)     Aneta Oleary [facility]  Specialty Pharmacy Technician

## 2024-06-13 ENCOUNTER — OFFICE VISIT (OUTPATIENT)
Dept: CARDIOLOGY | Facility: CLINIC | Age: 59
End: 2024-06-13
Payer: COMMERCIAL

## 2024-06-13 VITALS
SYSTOLIC BLOOD PRESSURE: 155 MMHG | HEIGHT: 61 IN | WEIGHT: 129 LBS | OXYGEN SATURATION: 95 % | BODY MASS INDEX: 24.35 KG/M2 | DIASTOLIC BLOOD PRESSURE: 68 MMHG | HEART RATE: 68 BPM

## 2024-06-13 DIAGNOSIS — I48.0 PAF (PAROXYSMAL ATRIAL FIBRILLATION): Chronic | ICD-10-CM

## 2024-06-13 DIAGNOSIS — I10 ESSENTIAL HYPERTENSION: Chronic | ICD-10-CM

## 2024-06-13 DIAGNOSIS — I25.10 CORONARY ARTERY DISEASE INVOLVING NATIVE CORONARY ARTERY OF NATIVE HEART WITHOUT ANGINA PECTORIS: Primary | Chronic | ICD-10-CM

## 2024-06-13 DIAGNOSIS — E78.2 MIXED HYPERLIPIDEMIA: Chronic | ICD-10-CM

## 2024-06-13 PROCEDURE — 1160F RVW MEDS BY RX/DR IN RCRD: CPT | Performed by: NURSE PRACTITIONER

## 2024-06-13 PROCEDURE — 3078F DIAST BP <80 MM HG: CPT | Performed by: NURSE PRACTITIONER

## 2024-06-13 PROCEDURE — 3077F SYST BP >= 140 MM HG: CPT | Performed by: NURSE PRACTITIONER

## 2024-06-13 PROCEDURE — 1159F MED LIST DOCD IN RCRD: CPT | Performed by: NURSE PRACTITIONER

## 2024-06-13 PROCEDURE — 99214 OFFICE O/P EST MOD 30 MIN: CPT | Performed by: NURSE PRACTITIONER

## 2024-06-13 RX ORDER — AMLODIPINE BESYLATE 5 MG/1
5 TABLET ORAL DAILY
Qty: 30 TABLET | Refills: 11 | Status: SHIPPED | OUTPATIENT
Start: 2024-06-13

## 2024-06-13 NOTE — PROGRESS NOTES
"Chief Complaint  Follow-up (Patient C/O of pounding sensation to the chest when she wakes up in the mornings ), Headache, and Nausea    Subjective          Veda Enamorado presents to [facility] CARDIOLOG[facility] for follow up.    History of Present Illness    Veda Enamorado was last seen in clinic on 4/11/2024.  Her blood pressure was not well-controlled and lisinopril was increased to 40 mg daily.    At today's visit Veda reports that her blood pressure has been elevated and she has a headache.  She also reports that sometimes she feels her heart beating in her chest.    She expresses concern that she has a blockage somewhere and that something is wrong with her heart.  She also does report that recently her oral Suboxone was exchanged for a shot which she got approximately 2 weeks ago.  Her symptoms may have started after the shot, she is unsure.    Objective     Vital Signs:   /68   Pulse 68   Ht 154.9 cm (61\")   Wt 58.5 kg (129 lb)   SpO2 95%   BMI 24.37 kg/m²       Physical Exam  Constitutional:       Appearance: Normal appearance. She is well-developed.   Cardiovascular:      Rate and Rhythm: Normal rate and regular rhythm.      Heart sounds: No murmur heard.     No friction rub. No gallop.   Pulmonary:      Effort: Pulmonary effort is normal. No respiratory distress.      Breath sounds: Normal breath sounds. No wheezing or rales.   Skin:     General: Skin is warm and dry.   Neurological:      Mental Status: She is alert and oriented to person, place, and time.   Psychiatric:         Mood and Affect: Mood normal.         Behavior: Behavior normal.          Result Review :                Most recent echocardiogram  Results for orders placed during the hospital encounter of 01/19/21    Adult Transthoracic Echo Complete W/ Cont if Necessary Per Protocol    Interpretation Summary  · Estimated left ventricular EF = 60% Left ventricular ejection fraction appears to be 56 - 60%. Left " ventricular systolic function is normal.  · Left ventricular diastolic function was normal.  · No significant valvular abnormality  · No pericardial effusion  · No change since prev echo of 6/26/20      Most recent Stress Test  Results for orders placed in visit on 02/23/22    Stress Test With Myocardial Perfusion (1 Day)    Interpretation Summary  · Myocardial perfusion imaging indicates a small-sized, mildly severe area of ischemia located in the basal inferior lateral wall. The anterior perfusion wall defect is likely secondary to breast attenuation artifact,  · Breast attenuation artifact is present.  · Left ventricular ejection fraction is normal. .  · Impressions are consistent with an intermediate risk study.       Most recent Cardiac Cath  Results for orders placed during the hospital encounter of 02/23/23    Cardiac Catheterization/Vascular Study    Conclusion  Images from the original result were not included.  CARDIAC CATHETERIZATION / INTERVENTION REPORT    DATE OF PROCEDURE: 2/23/2023    INDICATION FOR PROCEDURE: chest pain      PRE PROCEDURE DIAGNOSIS:  CAD s/p three-vessel CABG  Hypertension  Dyslipidemia    POST PROCEDURE DIAGNOSIS:  CAD with a patent LIMA graft and patent SVG to OM1 and diagonal 1 graft, native RCA mild nonobstructive disease unchanged from before.  Normal LV systolic function, mildly elevated LVEDP of 20 mmHg.    Face to face mdoerate conscious  sedation time:      COMPLICATIONS : None    Specimens collected : None        PROCEDURE PERFORMED:    1. Selective right and left coronary Angiogram  2. Left heart catheretization  3. Left ventriculography  4.  LIMA angiogram  5.SVG to diagonal and OM angiogram      PROCEDURE COMMENTS:    Prior to the procedure risks benefits alternatives and possible adverse events were discussed with the patient and informed consent was obtained.  Veda Enamorado was brought to the cath lab and placed on the cardiac catherization table in the  postabsortive state. The patient was prepped and draped according to the cath lab protocol under strict aseptic and sterile condition. Patient's right femoral artery sight was prepped and draped. Under fluoroscopic guidance the right femoral artery was punctured using a Micropuncture gauge needle utilizing the modified Seldinger technique. 6 Greek sheath was introduced over a wire. After aspirating for blood it was flushed with heparinized saline. Angio was performed to confirm the position of the sheath in right common femoral artery    We advanced Genaro type diagnostic catheters over the wire. The  left and right coronary arteries  were selectively engaged. Left and right coronary angiogram were obtained in multiple orthogonal projections.  5 Citizen of [place] JR4 catheter was used for engaging the SVG on the LIMA angiogram and angiogram pictures were obtained in orthogonal views .5 F Pigtail catheter was used to cross across the AV retrograde into the LV cavity and resting LV pressures were recorded. Manual pull back to used to record gradient across the Aortic valve.    After completion of the procedure the femoral sheath was removed in the cath lab and hemostasis was achieved by Mynx. The patient tolerated the procedure without complications.      Coronary anatomy findings:    LM: Moderate caliber vessel, short and no significant stenosis.    LAD: Was chronically totally occluded at the proximal segment before the diagonal takeoff.    Ramus intermedius.  Moderate caliber vessel with no significant stenosis.    LCX: Moderate calibre vessel, the OM1 which was patent before has been occluded in the proximal segment but the graft supplying the OM1 is patent and the distal OM 1 distal to the anastomosis had no significant stenosis.    RCA: Large calibre, dominant artery, proximal, mid and distal had mild luminal irregularities with no significant stenosis, distally divides into R PDA and PL branches both had mild luminal  irregularities with no stensois.  Noted mild 30 to 40% stenosis across the entire RCA, the PDA and PL branches had no significant stenosis.    LIMA angiogram:  The CELESTE graft was well matured with no significant stenosis with good proximal and distal anastomosis site, the distal LAD is a small caliber artery with mild diffuse disease.    SVG to OM graft appeared patent supplying the distal OM branch which had no significant stenosis.  SVG to diagonal 1 graft appeared patent with no significant stenosis supplying a small caliber diagonal artery with mild diffuse disease.    Left Ventriculography:    LV systolic function was normal with visual estimated EF of 60%. No significant mitral regurgitation noted.    LVEDP: 20 mmHg  No gradient across the aortic valve on pull back.        EBL: Less than 10cc        Final Impression:  CAD with patent vein grafts and the LIMA graft and no significant stenosis in the native RCA.        Recommendations:  Continue with the current medical rx, evaluate for non cardiac causes for her L arm pain          Lazaro MD Zoraida, [facility]  Interventional Cardiology    02/23/23  11:10 EST      Current Outpatient Medications   Medication Sig Dispense Refill    Alirocumab (Praluent) 150 MG/ML injection pen Inject 2 mL under the skin into the appropriate area as directed Every 28 (Twenty-Eight) Days. 6 mL 1    Aspirin Low Dose 81 MG EC tablet Take 1 tablet by mouth Daily.      buprenorphine-naloxone (SUBOXONE) 8-2 MG per SL tablet Place 1 tablet under the tongue Daily.      Eliquis 5 MG tablet tablet TAKE 1 TABLET BY MOUTH 2 (TWO) TIMES A DAY FOR 30 DAYS. 60 tablet 5    isosorbide mononitrate (IMDUR) 30 MG 24 hr tablet TAKE 1 TABLET BY MOUTH EVERY NIGHT. 30 tablet 11    lisinopril (PRINIVIL,ZESTRIL) 40 MG tablet Take 1 tablet by mouth Daily. 90 tablet 2    loratadine (CLARITIN) 10 MG tablet Take 1 tablet by mouth As Needed.      metoprolol tartrate (LOPRESSOR) 25 MG tablet Take 1 tablet by  mouth 2 (Two) Times a Day. 60 tablet 3    nitroglycerin (NITROSTAT) 0.4 MG SL tablet TAKE 1 TABLET AT ONSET OF CHEST PAIN,MAY REPEAT IN 5 MIN, MAX 3 DOSES IN 24 HRS 25 tablet 11    spironolactone (ALDACTONE) 25 MG tablet TAKE 1/2 TABLET BY MOUTH DAILY 90 tablet 3    amLODIPine (NORVASC) 5 MG tablet Take 1 tablet by mouth Daily. 30 tablet 11     No current facility-administered medications for this visit.            Assessment and Plan    Problem List Items Addressed This Visit          Cardiac and Vasculature    Coronary artery disease involving native coronary artery of native heart without angina pectoris - Primary (Chronic)    Overview     Remote past PCI ZURI x 2 to the LAD and diagonal 1  12/16/2019 [facility] for [facility]: Complex LAD/diagonal bifurcation lesion with severe in-stent restenosis of diagonal 1.  Patient referred to [facility] for bypass  7/18/2023 vessel CABG LIMA to LAD, GSV to OM and GSV to D1 (Dr. Coronado)  3/3/2022 [facility] for abnormal stress: CAD with patent LIMA to LAD and grafts x 2.  No significant stenosis in the native RCA  2/23/2023 [facility] for [place] patent saphenous vein grafts x 2 and LIMA to LAD.  Mild disease in the native RCA         Relevant Medications    amLODIPine (NORVASC) 5 MG tablet    Mixed hyperlipidemia (Chronic)    Overview     2/24/2022 total cholesterol 102, triglycerides 121, HDL 50, and LDL 30  8/10/2023 total cholesterol 99, triglycerides 121, HDL 44, and LDL 38         Essential hypertension (Chronic)    Relevant Medications    amLODIPine (NORVASC) 5 MG tablet    PAF (paroxysmal atrial fibrillation) (Chronic)    Overview     Post op afib after CABG 7/2020         Relevant Medications    amLODIPine (NORVASC) 5 MG tablet           Follow Up     Medications were reviewed with the patient.    ASCVD/CAD is stable.  Continue aspirin, lisinopril, metoprolol tartrate, and spironolactone.  With regard to her symptoms specifically I have reassured her that she has had 2 cardiac catheterizations  since her open heart surgery and both showed patent SVGs x 2 and LIMA.  There was little or no change in the time of 11 months between them.  The last cath was in February 2023.    Continue Eliquis for stroke prophylaxis in the presence of paroxysmal atrial fibrillation.    HTN is uncontrolled.  Pt asked to keep a blood pressure log.  I have added amlodipine.  I do believe her symptoms are more likely from uncontrolled hypertension and I have explained this to her.    Continue Praluent for dyslipidemia    Return in about 3 months (around 9/13/2024).    Patient was given instructions and counseling regarding her condition or for health maintenance advice. Please see specific information pulled into the AVS if appropriate.

## 2024-09-03 ENCOUNTER — SPECIALTY PHARMACY (OUTPATIENT)
Dept: PHARMACY | Facility: HOSPITAL | Age: 59
End: 2024-09-03
Payer: COMMERCIAL

## 2024-09-03 NOTE — PROGRESS NOTES
Specialty Pharmacy Refill Coordination Note     Veda is a 59 y.o. female contacted today regarding refills of  Praluent 150mg specialty medication(s).    Reviewed and verified with patient:       Specialty medication(s) and dose(s) confirmed: yes    Refill Questions      Flowsheet Row Most Recent Value   Changes to allergies? No   Changes to medications? No   New conditions or infections since last clinic visit No   Unplanned office visit, urgent care, ED, or hospital admission in the last 4 weeks  No   How does patient/caregiver feel medication is working? Very good   Financial problems or insurance changes  No   If yes, describe changes in insurance or financial issues. na   Since the previous refill, were any specialty medication doses or scheduled injections missed or delayed?  No   If yes, please provide the amount na   Why were doses missed? 0   Does this patient require a clinical escalation to a pharmacist? No            Delivery Questions      Flowsheet Row Most Recent Value   Delivery method  at Pharmacy   Delivery address verified with patient/caregiver? No   Delivery address Prescription   Number of medications in delivery 1   Medication(s) being filled and delivered Alirocumab   Doses left of specialty medications 0   Copay verified? Yes   Copay amount 0   Copay form of payment No copayment ($0)   Ship Date PT will  9/03   Delivery Date PT will  9/03   Signature Required Yes              Medication Adherence    Adherence tools used: calendar   Other adherence tool: n/a   Support network for adherence: family member   Other support network: na             Follow-up: 84 day(s)     Tatiana Garnica, Pharmacy Technician  Specialty Pharmacy Technician

## 2024-10-02 RX ORDER — SPIRONOLACTONE 25 MG/1
12.5 TABLET ORAL DAILY
Qty: 30 TABLET | Refills: 3 | Status: SHIPPED | OUTPATIENT
Start: 2024-10-02

## 2024-10-02 RX ORDER — ISOSORBIDE MONONITRATE 30 MG/1
30 TABLET, EXTENDED RELEASE ORAL NIGHTLY
Qty: 30 TABLET | Refills: 3 | Status: SHIPPED | OUTPATIENT
Start: 2024-10-02

## 2024-10-02 NOTE — TELEPHONE ENCOUNTER
Caller: Veda Enamorado    Relationship: Self    Best call back number: [phone]    Requested Prescriptions:   Requested Prescriptions     Pending Prescriptions Disp Refills    isosorbide mononitrate (IMDUR) 30 MG 24 hr tablet 30 tablet 11     Sig: Take 1 tablet by mouth Every Night.    apixaban (Eliquis) 5 MG tablet tablet 60 tablet 5     Sig: Take 1 tablet by mouth 2 (Two) Times a Day.    spironolactone (ALDACTONE) 25 MG tablet 90 tablet 3     Sig: Take 0.5 tablets by mouth Daily.        Pharmacy where request should be sent: [facility] [address] - [phone]  - [phone] FX     Last office visit with prescribing clinician: 6/13/2024   Last telemedicine visit with prescribing clinician: Visit date not found   Next office visit with prescribing clinician: Visit date not found     Additional details provided by patient: 2 DAY SUPPLY SPIRONOLACTONE    Does the patient have less than a 3 day supply:  [x] Yes  [] No    Would you like a call back once the refill request has been completed: [] Yes [x] No    If the office needs to give you a call back, can they leave a voicemail: [] Yes [x] No    Anabelle Mitchell Rep   10/02/24 09:24 EDT

## 2024-10-02 NOTE — TELEPHONE ENCOUNTER
Caller: Veda Enamorado    Relationship to patient: Self    Best call back number: [phone]    Chief complaint:     Type of visit: FOLLOW UP    Requested date:      If rescheduling, when is the original appointment: NONE     Additional notes:HUB HAS NO SCHEDULING TIME FRAME, PATIENT WAS LAST SEEN 6.2024. PLEASE CALL THE PATIENT TO SCHEDULE.

## 2024-11-19 ENCOUNTER — SPECIALTY PHARMACY (OUTPATIENT)
Dept: PHARMACY | Facility: HOSPITAL | Age: 59
End: 2024-11-19
Payer: COMMERCIAL

## 2024-11-19 DIAGNOSIS — E78.2 MIXED HYPERLIPIDEMIA: Primary | Chronic | ICD-10-CM

## 2024-11-19 RX ORDER — ALIROCUMAB 150 MG/ML
300 INJECTION, SOLUTION SUBCUTANEOUS
Qty: 6 ML | Refills: 1 | Status: SHIPPED | OUTPATIENT
Start: 2024-11-19

## 2024-11-19 RX ORDER — OMEPRAZOLE 40 MG/1
40 CAPSULE, DELAYED RELEASE ORAL DAILY
COMMUNITY
Start: 2024-10-30

## 2024-11-19 RX ORDER — BUPRENORPHINE 100 MG/1
100 SOLUTION SUBCUTANEOUS
COMMUNITY
Start: 2024-10-30

## 2024-11-19 NOTE — PROGRESS NOTES
Medication Management Clinic/ Specialty Pharmacy Patient Management Program  Lipid Management Program - PCSK9i Initial Assessment     Veda Enamorado is a 59 y.o. female referred by their provider, Dr. Chang/Sharda Meyers, to the Hyperlipidemia Patient Management program offered by [facility] & Specialty Pharmacy for Lipid Management.  Veda Enamorado is  treated for clinical ASCVD and currently takes Praluent 300 mg every 28 days.  In the past, Pt has tried atorvastatin, but experienced myalgias. The patient denies any allergies to latex.       A follow-up outreach was conducted, including assessment of continued therapy appropriateness, medication adherence, and side effect incidence and management for Praluent. The patient denies any trouble giving themself the injection.  They deny missing doses or adverse effects.     Changes to Insurance Coverage or Financial Support  [employer] medicaid    Relevant Past Medical History and Comorbidities  Relevant medical history and concomitant health conditions were discussed with the patient. The patient's chart has been reviewed for relevant past medical history and comorbid health conditions and updated as necessary.   Past Medical History:   Diagnosis Date    Arthritis     back    Back ache     Chronic back pain     Coronary artery disease     s/p 3 stents     GERD (gastroesophageal reflux disease)     History of MRSA infection 2020    labia; hospitalized 6 days on IV antibiotics and then went home on po antibiotics     Hyperlipidemia     Hypertension     Peptic ulceration     Urinary tract infection      Social History     Socioeconomic History    Marital status:     Number of children: 2   Tobacco Use    Smoking status: Former     Current packs/day: 0.00     Types: Cigarettes     Quit date: 2020     Years since quittin.3    Smokeless tobacco: Never   Vaping Use    Vaping status: Former   Substance and Sexual  Activity    Alcohol use: No    Drug use: No    Sexual activity: Defer     Problem list reviewed by Sherry Samson, PharmD on 11/19/2024 at 10:55 AM    Hospitalizations and Urgent Care Since Last Assessment  ED Visits, Admissions, or Hospitalizations: none  Urgent Office Visits: none    Allergies  Known allergies and reactions were discussed with the patient. The patient's chart has been reviewed for allergy information and updated as necessary.   Allergies   Allergen Reactions    Bactrim [Sulfamethoxazole-Trimethoprim] Rash    Ciprofloxacin Other (See Comments)     tremors    Ranexa [Ranolazine Er] Unknown - Low Severity     Allergies reviewed by Sherry Samson, PharmD on 11/19/2024 at 10:54 AM    Relevant Laboratory Values  Relevant laboratory values were discussed with the patient. The following specialty medication dose adjustment(s) are recommended: none    Lab Results   Component Value Date    GLUCOSE 120 (H) 04/13/2023    CALCIUM 9.1 04/13/2023     04/13/2023    K 3.3 (L) 04/13/2023    CO2 20.8 (L) 04/13/2023     (H) 04/13/2023    BUN 7 04/13/2023    CREATININE 0.71 04/13/2023    EGFRIFNONA 70 02/24/2022    BCR 9.9 04/13/2023    ANIONGAP 12.2 04/13/2023     Lab Results   Component Value Date    CHOL 99 08/10/2023    CHLPL 215 (H) 04/11/2016    TRIG 121 08/10/2023    HDL 44 08/10/2023    LDL 33 08/10/2023     Current Medication List  This medication list has been reviewed with the patient and evaluated for any interactions or necessary modifications/recommendations, and updated to include all prescription medications, OTC medications, and supplements the patient is currently taking.  This list reflects what is contained in the patient's profile, which has also been marked as reviewed to communicate to other providers it is the most up to date version of the patient's current medication therapy.     Current Outpatient Medications:     Alirocumab (Praluent) 150 MG/ML injection pen, Inject 2 mL  under the skin into the appropriate area as directed Every 28 (Twenty-Eight) Days., Disp: 6 mL, Rfl: 1    amLODIPine (NORVASC) 5 MG tablet, Take 1 tablet by mouth Daily., Disp: 30 tablet, Rfl: 11    apixaban (Eliquis) 5 MG tablet tablet, Take 1 tablet by mouth 2 (Two) Times a Day., Disp: 60 tablet, Rfl: 3    Aspirin Low Dose 81 MG EC tablet, Take 1 tablet by mouth Daily., Disp: , Rfl:     isosorbide mononitrate (IMDUR) 30 MG 24 hr tablet, Take 1 tablet by mouth Every Night., Disp: 30 tablet, Rfl: 3    lisinopril (PRINIVIL,ZESTRIL) 40 MG tablet, Take 1 tablet by mouth Daily., Disp: 90 tablet, Rfl: 2    loratadine (CLARITIN) 10 MG tablet, Take 1 tablet by mouth As Needed., Disp: , Rfl:     metoprolol tartrate (LOPRESSOR) 25 MG tablet, Take 1 tablet by mouth 2 (Two) Times a Day., Disp: 60 tablet, Rfl: 3    nitroglycerin (NITROSTAT) 0.4 MG SL tablet, TAKE 1 TABLET AT ONSET OF CHEST PAIN,MAY REPEAT IN 5 MIN, MAX 3 DOSES IN 24 HRS, Disp: 25 tablet, Rfl: 11    omeprazole (priLOSEC) 40 MG capsule, Take 1 capsule by mouth Daily., Disp: , Rfl:     spironolactone (ALDACTONE) 25 MG tablet, Take 0.5 tablets by mouth Daily., Disp: 30 tablet, Rfl: 3    Sublocade 100 MG/0.5ML injection, Inject 0.5 mL under the skin into the appropriate area as directed Every 30 (Thirty) Days., Disp: , Rfl:     Medicines reviewed by Sherry Samson, PharmD on 11/19/2024 at 10:54 AM    Drug Interactions  None with repatha    Adverse Drug Reactions  Medication tolerability: Tolerating with no to minimal ADRs  Medication plan: Continue therapy with normal follow-up  Plan for ADR Management: n/a    Adherence, Self-Administration, and Current Therapy Problems  Adherence related to the patient's specialty therapy was discussed with the patient. The Adherence segment of this outreach has been reviewed and updated.     Adherence Questions  Linked Medication(s) Assessed: Alirocumab (Praluent)  On average, how many doses/injections does the patient miss per  month?: 0  What are the identified reasons for non-adherence or missed doses? : no problems identified  What is the estimated medication adherence level?: %  Based on the patient/caregiver response and refill history, does this patient require an MTP to track adherence improvements?: no    Additional Barriers to Patient Self-Administration: n/a  Methods for Supporting Patient Self-Administration: patient uses dosing calendar    Open Medication Therapy Problems  No medication therapy recommendations to display    Goals of Therapy  Goals related to the patient's specialty therapy were discussed with the patient. The Patient Goals segment of this outreach has been reviewed and updated.   Goals Addressed Today        Specialty Pharmacy General Goal      LDL < 70 mg/dl    11/19/24 MN: patient at goal last labs 8/10/23 ldl 33 mg/dl. Patient instructed to complete new lipid panel              Quality of Life Assessment   Quality of Life related to the patient's enrollment in the patient management program and services provided was discussed with the patient. The QOL segment of this outreach has been reviewed and updated.  Quality of Life Improvement Scale: 10-Significantly better    Reassessment Plan & Follow-Up  1. Medication Therapy Changes: none  2. Related Plans, Therapy Recommendations, or Issues to Be Addressed:   LDL at goal from 8/10/2023 lab work. Patient instructed to complete new lipid panel for current results. Lipid Panel order placed  3. Pharmacist to perform regular assessments no more than (6) months from the previous assessment.  Patient will continue regular follow-up with referring provider.   4. Care Coordinator to set up future refill outreaches, coordinate prescription delivery, and escalate clinical questions to pharmacist.    Attestation  Therapeutic appropriateness: Appropriate   I attest the patient was actively involved in and has agreed to the above plan of care.  If the prescribed therapy  is at any point deemed not appropriate based on the current or future assessments, a consultation will be initiated with the patient's specialty care provider to determine the best course of action. The revised plan of therapy will be documented along with any required assessments and/or additional patient education provided.     Sherry Samson, PharmD  11/19/2024  10:58 EST

## 2024-11-19 NOTE — PROGRESS NOTES
Specialty Pharmacy Patient Management Program  Medication Management Clinic Refill Outreach      Veda was contacted today regarding refills of her medication(s).    Specialty medication(s) and dose(s) confirmed: Praluent 300 mg SC every 28 days    Refill Questions      Flowsheet Row Most Recent Value   Changes to allergies? No   Changes to medications? No   New conditions or infections since last clinic visit No   Unplanned office visit, urgent care, ED, or hospital admission in the last 4 weeks  No   How does patient/caregiver feel medication is working? Very good   Financial problems or insurance changes  No   Since the previous refill, were any specialty medication doses or scheduled injections missed or delayed?  No   Does this patient require a clinical escalation to a pharmacist? No          Delivery Questions      Flowsheet Row Most Recent Value   Delivery method  at Pharmacy   Copay verified? Yes   Copay amount $0.00   Copay form of payment No copayment ($0)            Follow-Up: 25 days    Sherry Samson, PharmD  11/19/2024  11:01 EST

## 2024-12-02 ENCOUNTER — LAB (OUTPATIENT)
Dept: LAB | Facility: HOSPITAL | Age: 59
End: 2024-12-02
Payer: COMMERCIAL

## 2024-12-02 DIAGNOSIS — E78.2 MIXED HYPERLIPIDEMIA: Chronic | ICD-10-CM

## 2024-12-02 LAB
CHOLEST SERPL-MCNC: 142 MG/DL (ref 0–200)
HDLC SERPL-MCNC: 54 MG/DL (ref 40–60)
LDLC SERPL CALC-MCNC: 69 MG/DL (ref 0–100)
LDLC/HDLC SERPL: 1.25 {RATIO}
TRIGL SERPL-MCNC: 102 MG/DL (ref 0–150)
VLDLC SERPL-MCNC: 19 MG/DL (ref 5–40)

## 2024-12-02 PROCEDURE — 80061 LIPID PANEL: CPT

## 2024-12-02 PROCEDURE — 36415 COLL VENOUS BLD VENIPUNCTURE: CPT

## 2024-12-04 ENCOUNTER — OFFICE VISIT (OUTPATIENT)
Dept: CARDIOLOGY | Facility: CLINIC | Age: 59
End: 2024-12-04
Payer: COMMERCIAL

## 2024-12-04 VITALS
OXYGEN SATURATION: 99 % | WEIGHT: 133 LBS | DIASTOLIC BLOOD PRESSURE: 74 MMHG | HEART RATE: 58 BPM | SYSTOLIC BLOOD PRESSURE: 148 MMHG | HEIGHT: 61 IN | BODY MASS INDEX: 25.11 KG/M2

## 2024-12-04 DIAGNOSIS — I25.10 CORONARY ARTERY DISEASE INVOLVING NATIVE CORONARY ARTERY OF NATIVE HEART WITHOUT ANGINA PECTORIS: Primary | Chronic | ICD-10-CM

## 2024-12-04 DIAGNOSIS — E78.2 MIXED HYPERLIPIDEMIA: Chronic | ICD-10-CM

## 2024-12-04 DIAGNOSIS — I10 ESSENTIAL HYPERTENSION: Chronic | ICD-10-CM

## 2024-12-04 DIAGNOSIS — I48.0 PAF (PAROXYSMAL ATRIAL FIBRILLATION): Chronic | ICD-10-CM

## 2024-12-04 PROCEDURE — 3074F SYST BP LT 130 MM HG: CPT | Performed by: NURSE PRACTITIONER

## 2024-12-04 PROCEDURE — 99214 OFFICE O/P EST MOD 30 MIN: CPT | Performed by: NURSE PRACTITIONER

## 2024-12-04 PROCEDURE — 3078F DIAST BP <80 MM HG: CPT | Performed by: NURSE PRACTITIONER

## 2024-12-04 RX ORDER — SPIRONOLACTONE 25 MG/1
12.5 TABLET ORAL DAILY
Qty: 45 TABLET | Refills: 3 | Status: SHIPPED | OUTPATIENT
Start: 2024-12-04

## 2024-12-04 RX ORDER — METOPROLOL TARTRATE 25 MG/1
25 TABLET, FILM COATED ORAL 2 TIMES DAILY
Qty: 90 TABLET | Refills: 3 | Status: SHIPPED | OUTPATIENT
Start: 2024-12-04

## 2024-12-04 RX ORDER — LISINOPRIL 40 MG/1
40 TABLET ORAL DAILY
Qty: 90 TABLET | Refills: 3 | Status: SHIPPED | OUTPATIENT
Start: 2024-12-04

## 2024-12-04 RX ORDER — AMLODIPINE BESYLATE 5 MG/1
5 TABLET ORAL DAILY
Qty: 90 TABLET | Refills: 3 | Status: SHIPPED | OUTPATIENT
Start: 2024-12-04

## 2024-12-04 RX ORDER — ISOSORBIDE MONONITRATE 30 MG/1
30 TABLET, EXTENDED RELEASE ORAL NIGHTLY
Qty: 90 TABLET | Refills: 3 | Status: SHIPPED | OUTPATIENT
Start: 2024-12-04

## 2024-12-04 RX ORDER — ASPIRIN 81 MG/1
81 TABLET, COATED ORAL DAILY
Start: 2024-12-04

## 2024-12-04 NOTE — PROGRESS NOTES
"Chief Complaint  Follow-up (Patient denies chest pain or SOA today, states no new symptoms )    Subjective          Veda Enamorado presents to [facility] for follow up.    History of Present Illness  History of Present Illness  The patient is a 59-year-old female who follows in our clinic with coronary artery disease, hypertension, dyslipidemia, and paroxysmal atrial fibrillation. She was last seen in the clinic on 06/13/2024. At that visit, her blood pressure was elevated, and amlodipine 5 mg was added. She was otherwise stable, and no other changes were made to her medications.    She reports no chest pain or breathing difficulties. There is no swelling in her lower extremities.    She is uncertain about the need for an Eliquis refill. Her aspirin is obtained through a prescription. She admits to being slightly late in taking her Praluent injections but confirms that she has taken them. She is currently on amlodipine and is tolerating it well. She is taking half a tablet of spironolactone daily.She has not monitored her blood pressure recently, but it was well-controlled during her last visit to her family doctor.       Objective     Vital Signs:   /74 (BP Location: Left arm, Patient Position: Sitting, Cuff Size: Adult)   Pulse 58   Ht 154.9 cm (61\")   Wt 60.3 kg (133 lb)   SpO2 99%   BMI 25.13 kg/m²       Physical Exam  Constitutional:       Appearance: Normal appearance. She is well-developed.   Cardiovascular:      Rate and Rhythm: Normal rate and regular rhythm.      Heart sounds: No murmur heard.     No friction rub. No gallop.   Pulmonary:      Effort: Pulmonary effort is normal. No respiratory distress.      Breath sounds: Normal breath sounds. No wheezing or rales.   Skin:     General: Skin is warm and dry.   Neurological:      Mental Status: She is alert and oriented to person, place, and time.   Psychiatric:         Mood and Affect: Mood normal.         " Behavior: Behavior normal.        Physical Exam  Lungs sound good.  Heart sounds good.    Vital Signs  Blood pressure is 122/62.    Result Review :                Most recent echocardiogram  Results for orders placed during the hospital encounter of 01/19/21    Adult Transthoracic Echo Complete W/ Cont if Necessary Per Protocol    Interpretation Summary  · Estimated left ventricular EF = 60% Left ventricular ejection fraction appears to be 56 - 60%. Left ventricular systolic function is normal.  · Left ventricular diastolic function was normal.  · No significant valvular abnormality  · No pericardial effusion  · No change since prev echo of 6/26/20      Most recent Stress Test  Results for orders placed in visit on 02/23/22    Stress Test With Myocardial Perfusion (1 Day)    Interpretation Summary  · Myocardial perfusion imaging indicates a small-sized, mildly severe area of ischemia located in the basal inferior lateral wall. The anterior perfusion wall defect is likely secondary to breast attenuation artifact,  · Breast attenuation artifact is present.  · Left ventricular ejection fraction is normal. .  · Impressions are consistent with an intermediate risk study.       Most recent Cardiac Cath  Results for orders placed during the hospital encounter of 02/23/23    Cardiac Catheterization/Vascular Study    Conclusion  Images from the original result were not included.  CARDIAC CATHETERIZATION / INTERVENTION REPORT    DATE OF PROCEDURE: 2/23/2023    INDICATION FOR PROCEDURE: chest pain      PRE PROCEDURE DIAGNOSIS:  CAD s/p three-vessel CABG  Hypertension  Dyslipidemia    POST PROCEDURE DIAGNOSIS:  CAD with a patent LIMA graft and patent SVG to OM1 and diagonal 1 graft, native RCA mild nonobstructive disease unchanged from before.  Normal LV systolic function, mildly elevated LVEDP of 20 mmHg.    Face to face mdoerate conscious  sedation time:      COMPLICATIONS : None    Specimens collected : None        PROCEDURE  PERFORMED:    1. Selective right and left coronary Angiogram  2. Left heart catheretization  3. Left ventriculography  4.  LIMA angiogram  5.SVG to diagonal and OM angiogram      PROCEDURE COMMENTS:    Prior to the procedure risks benefits alternatives and possible adverse events were discussed with the patient and informed consent was obtained.  Veda Enamorado was brought to the cath lab and placed on the cardiac catherization table in the postabsortive state. The patient was prepped and draped according to the cath lab protocol under strict aseptic and sterile condition. Patient's right femoral artery sight was prepped and draped. Under fluoroscopic guidance the right femoral artery was punctured using a Micropuncture gauge needle utilizing the modified Seldinger technique. 6 Italian sheath was introduced over a wire. After aspirating for blood it was flushed with heparinized saline. Angio was performed to confirm the position of the sheath in right common femoral artery    We advanced Genaro type diagnostic catheters over the wire. The  left and right coronary arteries  were selectively engaged. Left and right coronary angiogram were obtained in multiple orthogonal projections.  5 Syriac JR4 catheter was used for engaging the SVG on the LIMA angiogram and angiogram pictures were obtained in orthogonal views .5 F Pigtail catheter was used to cross across the AV retrograde into the LV cavity and resting LV pressures were recorded. Manual pull back to used to record gradient across the Aortic valve.    After completion of the procedure the femoral sheath was removed in the cath lab and hemostasis was achieved by Mynx. The patient tolerated the procedure without complications.      Coronary anatomy findings:    LM: Moderate caliber vessel, short and no significant stenosis.    LAD: Was chronically totally occluded at the proximal segment before the diagonal takeoff.    Ramus intermedius.  Moderate caliber vessel  with no significant stenosis.    LCX: Moderate calibre vessel, the OM1 which was patent before has been occluded in the proximal segment but the graft supplying the OM1 is patent and the distal OM 1 distal to the anastomosis had no significant stenosis.    RCA: Large calibre, dominant artery, proximal, mid and distal had mild luminal irregularities with no significant stenosis, distally divides into R PDA and PL branches both had mild luminal irregularities with no stensois.  Noted mild 30 to 40% stenosis across the entire RCA, the PDA and PL branches had no significant stenosis.    LIMA angiogram:  The CELESTE graft was well matured with no significant stenosis with good proximal and distal anastomosis site, the distal LAD is a small caliber artery with mild diffuse disease.    SVG to OM graft appeared patent supplying the distal OM branch which had no significant stenosis.  SVG to diagonal 1 graft appeared patent with no significant stenosis supplying a small caliber diagonal artery with mild diffuse disease.    Left Ventriculography:    LV systolic function was normal with visual estimated EF of 60%. No significant mitral regurgitation noted.    LVEDP: 20 mmHg  No gradient across the aortic valve on pull back.        EBL: Less than 10cc        Final Impression:  CAD with patent vein grafts and the LIMA graft and no significant stenosis in the native RCA.        Recommendations:  Continue with the current medical rx, evaluate for non cardiac causes for her L arm pain          Lazaro MD [place], [facility]  Interventional Cardiology    02/23/23  11:10 EST      Most recent Coronary CT  No results found for this or any previous visit.         Current Outpatient Medications   Medication Sig Dispense Refill    Alirocumab (Praluent) 150 MG/ML injection pen Inject 2 mL under the skin into the appropriate area as directed Every 28 (Twenty-Eight) Days. 6 mL 1    amLODIPine (NORVASC) 5 MG tablet Take 1 tablet by mouth Daily. 90  tablet 3    apixaban (Eliquis) 5 MG tablet tablet Take 1 tablet by mouth 2 (Two) Times a Day. 180 tablet 3    Aspirin Low Dose 81 MG EC tablet Take 1 tablet by mouth Daily.      isosorbide mononitrate (IMDUR) 30 MG 24 hr tablet Take 1 tablet by mouth Every Night. 90 tablet 3    lisinopril (PRINIVIL,ZESTRIL) 40 MG tablet Take 1 tablet by mouth Daily. 90 tablet 3    loratadine (CLARITIN) 10 MG tablet Take 1 tablet by mouth As Needed.      metoprolol tartrate (LOPRESSOR) 25 MG tablet Take 1 tablet by mouth 2 (Two) Times a Day. 90 tablet 3    nitroglycerin (NITROSTAT) 0.4 MG SL tablet TAKE 1 TABLET AT ONSET OF CHEST PAIN,MAY REPEAT IN 5 MIN, MAX 3 DOSES IN 24 HRS 25 tablet 11    omeprazole (priLOSEC) 40 MG capsule Take 1 capsule by mouth Daily.      spironolactone (ALDACTONE) 25 MG tablet Take 0.5 tablets by mouth Daily. 45 tablet 3    Sublocade 100 MG/0.5ML injection Inject 0.5 mL under the skin into the appropriate area as directed Every 30 (Thirty) Days.       No current facility-administered medications for this visit.               Problem List Items Addressed This Visit          Cardiac and Vasculature    Coronary artery disease involving native coronary artery of native heart without angina pectoris - Primary (Chronic)    Overview     Remote past PCI ZURI x 2 to the LAD and diagonal 1  12/16/2019 [facility] for [facility]: Complex LAD/diagonal bifurcation lesion with severe in-stent restenosis of diagonal 1.  Patient referred to [facility] for bypass  7/18/2023 vessel CABG LIMA to LAD, GSV to OM and GSV to D1 (Dr. Coronado)  3/3/2022 [facility] for abnormal stress: CAD with patent LIMA to LAD and grafts x 2.  No significant stenosis in the native RCA  2/23/2023 [facility] for [place] patent saphenous vein grafts x 2 and LIMA to LAD.  Mild disease in the native RCA         Relevant Medications    amLODIPine (NORVASC) 5 MG tablet    Aspirin Low Dose 81 MG EC tablet    isosorbide mononitrate (IMDUR) 30 MG 24 hr tablet    metoprolol tartrate  (LOPRESSOR) 25 MG tablet    Mixed hyperlipidemia (Chronic)    Overview     2/24/2022 total cholesterol 102, triglycerides 121, HDL 50, and LDL 30  8/10/2023 total cholesterol 99, triglycerides 121, HDL 44, and LDL 38         Essential hypertension (Chronic)    Relevant Medications    amLODIPine (NORVASC) 5 MG tablet    lisinopril (PRINIVIL,ZESTRIL) 40 MG tablet    metoprolol tartrate (LOPRESSOR) 25 MG tablet    spironolactone (ALDACTONE) 25 MG tablet    PAF (paroxysmal atrial fibrillation) (Chronic)    Overview     Post op afib after CABG 7/2020         Current Assessment & Plan     Atrial fibrillation is:Controlled  [id number] Score: 3 (12/4/2024  3:43 PM)  Goals of care: Medication compliance and tolerance  Plan: Continue current medication  Follow up: in 6 months           Relevant Medications    amLODIPine (NORVASC) 5 MG tablet    apixaban (Eliquis) 5 MG tablet tablet    isosorbide mononitrate (IMDUR) 30 MG 24 hr tablet    metoprolol tartrate (LOPRESSOR) 25 MG tablet       Assessment & Plan  1. Coronary artery disease.  She reports no chest pain or breathing difficulties. Her blood pressure was slightly elevated during the last visit, and amlodipine 5 mg was added.  Initial blood pressure today was elevated however recheck showed good control.  She is advised to continue her current medication regimen.    2. Hypertension.  Her blood pressure was rechecked today and was 122/62. She is advised to continue taking amlodipine 5 mg and monitor her blood pressure at home.    3. Dyslipidemia.  Her recent lipid panel showed an elevated LDL level, possibly due to a missed or delayed dose of her medication. She is advised to continue with Praluent and ensure timely administration of her injections.    4. Paroxysmal atrial fibrillation.  She will continue taking Eliquis. A 3-month supply of her medications will be refilled and sent to [facility].    Follow-up  Return in 6 months for follow up.         Follow Up      Return in about 6 months (around 6/4/2025).    Patient was given instructions and counseling regarding her condition or for health maintenance advice. Please see specific information pulled into the AVS if appropriate.     Patient or patient representative verbalized consent for the use of Ambient Listening during the visit with  ZION Drake for chart documentation. 12/4/2024  15:46 EST

## 2024-12-04 NOTE — ASSESSMENT & PLAN NOTE
Atrial fibrillation is:Controlled  [id number] Score: 3 (12/4/2024  3:43 PM)  Goals of care: Medication compliance and tolerance  Plan: Continue current medication  Follow up: in 6 months

## 2025-02-25 ENCOUNTER — SPECIALTY PHARMACY (OUTPATIENT)
Dept: PHARMACY | Facility: HOSPITAL | Age: 60
End: 2025-02-25
Payer: COMMERCIAL

## 2025-02-25 NOTE — PROGRESS NOTES
Specialty Pharmacy Patient Management Program  Medication Management Clinic Refill Outreach      Veda was contacted today regarding refills of her medication(s).    Specialty medication(s) and dose(s) confirmed: PRALUENT 150 MG/ML    Refill Questions      Flowsheet Row Most Recent Value   Changes to allergies? No   Changes to medications? No   New conditions or infections since last clinic visit No   Unplanned office visit, urgent care, ED, or hospital admission in the last 4 weeks  No   How does patient/caregiver feel medication is working? Very good   Financial problems or insurance changes  No   Since the previous refill, were any specialty medication doses or scheduled injections missed or delayed?  No   Does this patient require a clinical escalation to a pharmacist? No              Follow-Up: 84 DAYS     Sherry Samson, PharmD  2/25/2025  14:12 EST

## 2025-04-24 NOTE — TELEPHONE ENCOUNTER
"    Cardiology Progress Note    4/24/2025  9:10 AM    Subjective/Interim:      No complaints.  Episode of AF RVR last night , SBP was 98.  Asymptomatic.  Did not get diltiazem drip.  Received digoxin 0.25mg IV x 2.  In sinus rhythm this morning.     Objective:   24-hour Vitals:  Temp:  [98 °F (36.7 °C)-98.5 °F (36.9 °C)] 98.5 °F (36.9 °C)  Pulse:  [] 80  Resp:  [15-60] 19  SpO2:  [90 %-100 %] 97 %  BP: ()/(52-69) 108/56    Physical Examination:  Vitals: BP (!) 108/56 (BP Location: Right arm, Patient Position: Lying)   Pulse 80   Temp 98.5 °F (36.9 °C) (Oral)   Resp 19   Ht 5' 4" (1.626 m)   Wt 63.5 kg (139 lb 15.9 oz)   SpO2 97%   Breastfeeding No   BMI 24.03 kg/m²     Physical Exam  Vitals reviewed.   Constitutional:       Appearance: She is well-developed.   HENT:      Head: Atraumatic.   Eyes:      General: No scleral icterus.  Neck:      Vascular: Normal carotid pulses. No carotid bruit, hepatojugular reflux or JVD.   Cardiovascular:      Rate and Rhythm: Normal rate and regular rhythm.      Chest Wall: PMI is not displaced.      Pulses: Intact distal pulses.           Carotid pulses are 2+ on the right side and 2+ on the left side.       Radial pulses are 2+ on the right side and 2+ on the left side.        Dorsalis pedis pulses are 2+ on the right side and 2+ on the left side.      Heart sounds: Normal heart sounds, S1 normal and S2 normal. No murmur heard.     No friction rub.   Pulmonary:      Effort: Pulmonary effort is normal. No respiratory distress.      Breath sounds: Normal breath sounds. No stridor. No wheezing or rales.   Chest:      Chest wall: No tenderness.   Abdominal:      General: Bowel sounds are normal.      Palpations: Abdomen is soft.   Musculoskeletal:      Cervical back: Neck supple. No edema.   Skin:     General: Skin is warm and dry.      Nails: There is no clubbing.   Neurological:      Mental Status: She is alert and oriented to person, place, and time. " Spoke with patient regarding test results. V/u & states she id getting her shot.   "  Psychiatric:         Behavior: Behavior normal.         Thought Content: Thought content normal.           Intake/Output Summary (Last 24 hours) at 4/24/2025 0911  Last data filed at 4/24/2025 0811  Gross per 24 hour   Intake 94.5 ml   Output 950 ml   Net -855.5 ml       Laboratory:  Trended Lab Data:  Recent Labs   Lab 04/22/25  0352 04/23/25  0412 04/24/25  0244   WBC 32.69* 41.76* 29.79*   HGB 8.0* 9.2* 8.5*   HCT 24.1* 27.7* 24.8*    431 424       Recent Labs   Lab 04/20/25  0635 04/21/25  0628 04/21/25  1907 04/22/25  0352 04/23/25  0412 04/24/25  0244   * 124*   < > 126* 126* 127*   K 4.1 3.9   < > 4.5 4.2 3.9   CL 98 94*   < > 99 99 96   CO2 23 23   < > 18* 20* 24   BUN 13 11   < > 12 15 17   CALCIUM 9.3 9.4   < > 9.0 9.3 9.1   MG 1.6 1.7   < > 2.2 1.9 2.0   PHOS 3.2 2.7  --  3.4  --   --     < > = values in this interval not displayed.       Recent Labs   Lab 04/23/25  0412 04/24/25  0244   ALBUMIN 1.6* 1.5*   BILITOT 0.2 0.3   AST 29 29   ALT 11 13   ALKPHOS 62 64       No results for input(s): "PROTIME", "PTT", "INR" in the last 168 hours.    Cardiac: No results for input(s): "TROPONINI", "CKTOTAL", "CKMB", "BNP" in the last 168 hours.    FLP:   Lab Results   Component Value Date    CHOL 150 01/21/2020    HDL 48 01/21/2020    LDLCALC 83.8 01/21/2020    TRIG 91 01/21/2020    CHOLHDL 32.0 01/21/2020     DM:   Lab Results   Component Value Date    HGBA1C 5.7 (H) 04/15/2025    HGBA1C 5.6 01/01/2023    HGBA1C 6.0 (H) 01/21/2020    GLUF 135 (H) 02/11/2008    LDLCALC 83.8 01/21/2020    CREATININE 1.0 04/24/2025     Thyroid:   Lab Results   Component Value Date    TSH 0.594 04/15/2025    FREET4 1.13 01/21/2020     Anemia:   Lab Results   Component Value Date    IRON 56 06/03/2010    TIBC 394 06/03/2010    FERRITIN 73 12/28/2011    JAYWCVCK10 884 03/03/2017    FOLATE >20.0 (A) 06/03/2010     Urinalysis:   Lab Results   Component Value Date    LABURIN  04/14/2025     Multiple organisms isolated. None " He does exercise regularly generally with weightlifting. He has been off of the weightlifting for the last two to three weeks. He does not feel short of breath with the symptoms. Prior to admission, his medications are simvastatin 40 mg daily,  amlodipine 10 mg daily, hydrochlorothiazide 25 mg daily, aspirin 81 mg  daily. He also has had some intermittent lightheadedness and  intermittent palpitations. He denies shortness of breath. PAST MEDICAL HISTORY:  Notable for history of hypertension and  hyperlipidemia. PAST SURGICAL HISTORY:  Includes inguinal repair many years ago. SOCIAL HISTORY:  He lives with his wife. He works in a [employer] on the  [employer]. As mentioned above, the does not currently use tobacco, but  he quit smoking two years ago. FAMILY HISTORY:  Noncontributory for premature coronary artery disease. REVIEW OF SYSTEMS:  A 14-system review of system is done and is  otherwise negative. PHYSICAL EXAMINATION:  GENERAL:  Currently, on physical examination, he is a physically fit  gentleman. VITAL SIGNS:  Blood pressure is 156/86, pulse rate 64. HEENT:  Sclerae are clear. Posterior pharynx is clear. NECK:  Supple. There are no carotid bruits. LUNGS:  Lung fields are clear. CARDIOVASCULAR:  Cardiac sounds are normal.  Chest wall is nontender to  palpation. Upper back is nontender to palpation. ABDOMEN:  Soft, nontender. EXTREMITIES:  Without edema. He has good peripheral pulses. NEUROLOGIC:  He moves all extremities well. Cranial nerves II through  XII grossly intact. PSYCHIATRIC:  He displays normal mood and affect. SKIN:  Warm and dry. DIAGNOSTIC DATA:  EKG is normal.  Lab work is all reviewed. Troponin ×3  sets are normal.  The patient did have a nuclear stress test, 2011,  which was normal.  Chest x-ray is unremarkable. IMPRESSION:  Chest pain symptoms in the patient with poorly controlled  hypertension.   Certainly one needs to consider acute coronary artery  syndrome, may be musculoskeletal symptoms, also consider unusual  presentation for even aortic dissection. RECOMMENDATIONS:  We would do stress test with Myoview. We would obtain  CT angiogram of his aorta to look for dissection. We would add Cardura  4 mg daily to his regimen. We would have him take nightly, have  stopped hydrochlorothiazide as seems to be having side effects from the  hydrochlorothiazide. Further management and plan is pending the  results. Please note that the 80 minutes of time spent performing this  consultation, reviewing with patient, most time is face-to-face time  with the patient.         Bladimir Bernardo MD    D: 08/12/2019 9:27:23       T: 08/12/2019 9:46:45     [email or web address]  Job#: [id number]     Doc#: [id number]    CC: in predominance. Repeat if clinically necessary.    COLORU Yellow 04/14/2025    SPECGRAV 1.005 04/14/2025    NITRITE Positive (A) 04/14/2025    KETONESU Negative 01/02/2023    UROBILINOGEN Negative 04/14/2025     @      Other Results:  EKG (my interpretation):    TELEMETRY:  sinus     Echo:   Results for orders placed or performed during the hospital encounter of 04/14/25   Echo   Result Value Ref Range    BSA 1.66 m2    LVIDd 3.7 3.5 - 6.0 cm    LV Systolic Volume 19 mL    LV Systolic Volume Index 11.4 mL/m2    LVIDs 2.4 2.1 - 4.0 cm    LV Diastolic Volume 59 mL    LV Diastolic Volume Index 35.54 mL/m2    Left Ventricular End Systolic Volume by Teichholz Method 19.25 mL    Left Ventricular End Diastolic Volume by Teichholz Method 59.41 mL    IVS 0.6 0.6 - 1.1 cm    LVOT diameter 2.0 cm    LVOT area 3.1 cm2    FS 35.1 28 - 44 %    Left Ventricle Relative Wall Thickness 0.32 cm    PW 0.6 0.6 - 1.1 cm    LV mass 56.3 g    LV Mass Index 33.9 g/m2    MV Peak E Gerson 0.83 m/s    TDI LATERAL 0.08 m/s    TDI SEPTAL 0.06 m/s    E/E' ratio 12 m/s    MV Peak A Gerson 1.01 m/s    TR Max Gerson 2.8 m/s    E/A ratio 0.82     IVRT 57 msec    E wave deceleration time 202 msec    LV SEPTAL E/E' RATIO 13.8 m/s    LV LATERAL E/E' RATIO 10.4 m/s    PV Peak S Gerson 0.77 m/s    PV Peak D Gerson 0.41 m/s    Pulm vein S/D ratio 1.88     LA size 2.8 cm    Left Atrium Minor Axis 4.7 cm    Left Atrium Major Axis 5.1 cm    RA Major Axis 4.62 cm    AV regurgitation pressure 1/2 time 355 ms    AR Max Gerson 3.68 m/s    AV mean gradient 6 mmHg    AV peak gradient 10 mmHg    Ao peak gerson 1.6 m/s    Ao VTI 36.7 cm    Mr max gerson 4.13 m/s    MV stenosis pressure 1/2 time 58.57 ms    MV valve area p 1/2 method 3.76 cm2    Triscuspid Valve Regurgitation Peak Gradient 31 mmHg    PV PEAK VELOCITY 0.91 m/s    PV peak gradient 3 mmHg    IVC diameter 1.79 cm    Mean e' 0.07 m/s    ZLVIDS -1.45     ZLVIDD -2.29     TV resting pulmonary artery pressure 34 mmHg    RV TB RVSP  6 mmHg    Est. RA pres 3 mmHg    Narrative      Left Ventricle: The left ventricle is normal in size. Normal wall   thickness. There is normal systolic function with a visually estimated   ejection fraction of 55 - 60%. Global longitudinal strain is normal;   19.6%.    Right Ventricle: The right ventricle is normal in size. Wall thickness   is normal. Systolic function is normal.    Aortic Valve: There is mild to moderate aortic regurgitation.    Tricuspid Valve: There is mild regurgitation.    IVC/SVC: Normal venous pressure at 3 mmHg.         Radiology:  CT Abdomen Pelvis With IV Contrast Routine Oral Contrast  Result Date: 4/19/2025  EXAMINATION: CT ABDOMEN PELVIS without IV CONTRAST CLINICAL HISTORY: Metastatic disease evaluation;LE swelling, concern for possible inguinal nodes, has mediastinal and axillary mass. full survey; TECHNIQUE: Axial images of the abdomen and pelvis were acquired without the use of intravenous contrast.  1000 cc of Omnipaque 9 oral contrast was administered.  Coronal and sagittal reconstructions were also obtained COMPARISON: CT chest from 04/17/2025. FINDINGS: Visualized portion of the thorax: Redemonstration of partial visualization of subcarinal mass, better evaluated on prior CT from 04/17/2025.  No cardiomegaly.  Moderate calcific atherosclerosis.  Trace pericardial effusion.  Visualized portions of the lungs demonstrates small bilateral pleural effusions.  Interlobular septal thickening with reticulations and fibrotic changes suggestive of interstitial lung disease. Liver: Normal in size and contour.  4.1 x 5.2 cm hypodensity near the dome of the liver consistent with a cyst.  No additional focal hepatic lesions. Gallbladder: Not well visualized possibly decompressed or absent. Bile Ducts: No evidence of dilated ducts. Pancreas: No mass or peripancreatic fat stranding. Spleen: Unremarkable. Stomach and duodenum: Unremarkable. Adrenals: Unremarkable. Kidneys/ Ureters: Normal in  size and location. Bilateral renal hypodensities consistent with cysts.  No hydronephrosis or nephrolithiasis. No ureteral dilatation. Bladder: No evidence of wall thickening. Reproductive organs: Unremarkable. Bowel/Mesentery: Small bowel is normal in caliber with no evidence of obstruction. No evidence of inflammation or wall thickening.  Colon demonstrates no focal wall thickening. Lymph nodes: No lymphadenapathy. Vasculature: No aneurysm. Mild calcific atherosclerosis. Abdominal wall:  Body wall anasarca. Bones: No acute fracture.  Postsurgical changes L4-5.  Hardware and alignment appear intact.  No suspicious osseous lesions.  Dense sclerosis of the SI joints bilaterally.     No evidence metastatic disease or lymphadenopathy in the abdomen or pelvis. Partial visualization subcarinal mediastinal mass better evaluated on recent CT of the chest from 04/17/2025. Partial visualization of bilateral small pleural effusions and interstitial lung disease better evaluated on recent CT of the chest from 04/17/2025. Electronically signed by: Balaji Goldman MD Date:    04/19/2025 Time:    14:25    US Soft Tissue Axilla, Left  Result Date: 4/19/2025  EXAMINATION: US SOFT TISSUE AXILLA, LEFT CLINICAL HISTORY: measure number and size of lymph nodes; TECHNIQUE: Ultrasound of the left axilla COMPARISON: CT chest 04/17/2025 FINDINGS: At least 4 lymph nodes are visualized, the largest of which measures 2.0 x 0.9 x 1.8 cm which is borderline caliber for pathologic enlargement.  There is some cortical thickening present and a normal reniform shape is difficult to appreciate with certainty on at least 1 of the lymph nodes.  Comparison with the CT scan shows that these are actually subpectoral lymph nodes.     Approximately 4 borderline enlarged left subpectoral lymph nodes which could be neoplastic in nature given this patient's large subcarinal mass seen on CT.. Electronically signed by: Ayden Negron Jr Date:    04/19/2025  Time:    10:19    X-Ray Chest AP Portable  Result Date: 4/19/2025  EXAMINATION: XR CHEST AP PORTABLE CLINICAL HISTORY: air in left pleural space; TECHNIQUE: Single frontal view of the chest was performed. COMPARISON: Plain film from 04/14/2025 and CT chest from 04/17/2025. FINDINGS: Cardiac silhouette is enlarged similar to prior exam.  Prominence of the pulmonary vasculature.  Coarse interstitial lung markings with a lower lobe predominance.  Small bilateral pleural effusions.  No pneumothorax is visualized.  Advanced degenerative changes of the right shoulder.     As above. Electronically signed by: Balaji Goldman MD Date:    04/19/2025 Time:    08:36    CT Chest With Contrast  Result Date: 4/17/2025  EXAMINATION: CT CHEST WITH CONTRAST CLINICAL HISTORY: Hemoptysis; TECHNIQUE: Low dose axial images, sagittal and coronal reformations were obtained from the thoracic inlet to the lung bases following the IV administration of 75 mL of Omnipaque 350. COMPARISON: None FINDINGS: Lungs: There is prominent peripheral dependent interstitial thickening, concerning for chronic interstitial/fibrotic changes.  Honeycombing present.  Patient respiratory motion artifact limits assessment.  There is mild bibasilar atelectasis with mild/moderate pleural effusions.  The main tracheobronchial tree is clear. Mediastinum: Extensive lymphadenopathy.  Subcarinal mass measures 8.1 by 4.5 cm (series 2, image 55).  Right hilar lymphadenopathy measures 1.9 cm (series 6, image 197).  Right paratracheal lymphadenopathy noted.  Heart size within normal limits.  Trace pericardial effusion.  Thoracic aorta is normal caliber.  Moderate scattered calcific atherosclerosis. Upper abdomen (visualized portion): Large liver cyst in the dome. Chest wall/axilla.  Mild left axillary lymphadenopathy. Bones: No marrow replacement process.     Large (8.1 x 4.5 cm) subcarinal mass, possible lymphadenopathy.  Additional mediastinal, hilar, and left axillary  lymphadenopathy noted. Mild/moderate sized pleural effusions with bibasilar atelectasis. Moderate chronic interstitial/ fibrotic lung changes. This report was flagged in Epic as abnormal. Electronically signed by: Alexandre Posada MD Date:    04/17/2025 Time:    13:40    US Lower Extremity Veins Bilateral  Result Date: 4/15/2025  EXAMINATION: US LOWER EXTREMITY VEINS BILATERAL CLINICAL HISTORY: Swelling, Rule out DVT; TECHNIQUE: Duplex and color flow Doppler evaluation of the bilateral lower extremity veins was performed. COMPARISON: None FINDINGS: No evidence of clot involving the bilateral common femoral, greater saphenous, femoral, popliteal, peroneal, anterior and posterior tibial veins.  All venous structures demonstrate normal respiratory phasicity and augment adequately.  No evidence of soft tissue mass or Baker's cyst.     No evidence of lower extremity deep venous thrombosis. Electronically signed by: Rodolfo Workman MD Date:    04/15/2025 Time:    17:36    Echo  Result Date: 4/15/2025    Left Ventricle: The left ventricle is normal in size. Normal wall thickness. There is normal systolic function with a visually estimated ejection fraction of 55 - 60%. Global longitudinal strain is normal; 19.6%.   Right Ventricle: The right ventricle is normal in size. Wall thickness is normal. Systolic function is normal.   Aortic Valve: There is mild to moderate aortic regurgitation.   Tricuspid Valve: There is mild regurgitation.   IVC/SVC: Normal venous pressure at 3 mmHg.     X-Ray Chest AP Portable  Result Date: 4/14/2025  EXAMINATION: AP PORTABLE CHEST CLINICAL HISTORY: Chest Pain; TECHNIQUE: AP portable chest radiograph was submitted. COMPARISON: 01/01/2023 FINDINGS: AP portable chest radiograph demonstrates a cardiac silhouette within normal limits.  There is bilateral hilar prominence, which may be due to adenopathy, infiltrates, pulmonary vasculature, and less likely masses.  Mild bibasilar densities are seen, which  may be related to atelectatic change or infiltrates.  There is no pneumothorax or large pleural effusion.  There are calcify thoracic lymph nodes.     As above described. Electronically signed by: Cyndie Holley Date:    04/14/2025 Time:    22:03    X-Ray Knee 3 View Left  Result Date: 4/14/2025  EXAMINATION: THREE VIEWS OF THE LEFT KNEE CLINICAL HISTORY: Pain in left knee TECHNIQUE: AP, oblique, and lateral view of the left knee COMPARISON: 01/01/2023 FINDINGS: Left knee arthroplasty is present.  Three views of the left knee demonstrate a curvilinear calcification/ossification adjacent to the medial femoral condyle paralleling the femoral cortex.  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  An avulsive fracture may have a similar appearance.  Correlate with the site of pain or history of trauma.  Moderate suprapatellar effusion is present.  The bones are osteopenic.  Vascular calcifications are present.     As above described. Electronically signed by: Cyndie Holley Date:    04/14/2025 Time:    21:55    X-Ray Chest PA And Lateral  Result Date: 3/26/2025  [facility] [place] XR CHEST AP PA LATERAL 2 VW on 3/26/2025 13:35 CDT Clinical history: CHRONIC COUGH Comparison: Chest radiograph 2/13/2025, 5/21/2024. Findings: LINES: None. LUNGS: No acute airspace disease. Unchanged chronic interstitial coarsening. No pleural effusion. No pneumothorax. MEDIASTINUM: Unchanged cardiomediastinal silhouette. Unchanged mediastinal calcified lymph nodes. OSSEOUS STRUCTURES: No acute osseous abnormality. Multilevel thoracic spondylosis. UPPER ABDOMEN: No acute abnormality.  OTHER: None.    No interval change with no acute cardiopulmonary abnormality. Electronically Signed By: Ayden Reece MD, 3/26/2025 13:39 CDT        Current Medications:     Infusions:   dilTIAZem  0-15 mg/hr Intravenous Continuous        NORepinephrine bitartrate-D5W  0-3 mcg/kg/min Intravenous Continuous   Held at 04/21/25 2330        Scheduled:   apixaban  5  mg Oral BID    digoxin  0.125 mg Oral Daily    diltiaZEM  0.35 mg/kg Intravenous Once    gabapentin  400 mg Oral TID    metoprolol tartrate  12.5 mg Oral Q8H    mupirocin   Nasal BID    sodium chloride 0.9%  10 mL Intravenous Q8H        PRN:    Current Facility-Administered Medications:     acetaminophen, 1,000 mg, Oral, Q8H PRN    dextrose 50%, 12.5 g, Intravenous, PRN    dextrose 50%, 25 g, Intravenous, PRN    glucagon (human recombinant), 1 mg, Intramuscular, PRN    glucose, 16 g, Oral, PRN    glucose, 24 g, Oral, PRN    insulin aspart U-100, 0-5 Units, Subcutaneous, QID (AC + HS) PRN    melatonin, 6 mg, Oral, Nightly PRN    naloxone, 0.02 mg, Intravenous, PRN    ondansetron, 4 mg, Oral, Q8H PRN    senna-docusate, 1 tablet, Oral, BID PRN    sodium chloride 0.9%, 10 mL, Intravenous, PRN     Assessment and Plan:     Jojo Ayoub is a 88 y.o.female with  PAF, back in sinus.  [id number] = 5 (female, > 75, HTN, DM)  -discussed with patient and family regarding her risk of embolic CVA given paroxysmal atrial fibrillation her CHADS-VASc score 5.  Recommend anticoagulation once cleared by surgery and monitor her anemia closely.  Patient, daughter and  understand the risks and benefits of oral anticoagulation and opted to take anticoagulation for CVA prophylaxis.  Start Eliquis 5mg BID once cleared by Dr. Johnson.  Discussed with Dr Colon.  -4/23:  remains in sinus.  Eliquis started.  Continue metoprolol.  -4/24:  AF RVR last night.  Received digoxin 0.25 mg IV x2.  In sinus rhythm this morning.  Metoprolol increased to 12.5 mg q.8 hours.  Will add digoxin 0.125 mg daily.  Discussed with Dr Colon.            Raulito Lares MD        Disclaimer: This document was created using voice recognition software ([employer]). Although it may be edited, this document may contain errors related to incorrect recognition of the spoken word. Please call the physician if clarification is needed.

## 2025-05-09 ENCOUNTER — SPECIALTY PHARMACY (OUTPATIENT)
Dept: PHARMACY | Facility: HOSPITAL | Age: 60
End: 2025-05-09
Payer: COMMERCIAL

## 2025-05-09 RX ORDER — ALIROCUMAB 150 MG/ML
300 INJECTION, SOLUTION SUBCUTANEOUS
Qty: 6 ML | Refills: 1 | Status: SHIPPED | OUTPATIENT
Start: 2025-05-09

## 2025-05-09 NOTE — PROGRESS NOTES
Medication Management Clinic/ Specialty Pharmacy Patient Management Program  Lipid Management Program - PCSK9i Initial Assessment     Veda Enamorado is a 60 y.o. female referred by their provider, Sharda Meyers (previously Dr. Chang), to the Hyperlipidemia Patient Management program offered by [facility] for Lipid Management.  Veda Enamorado is  treated for clinical ASCVD and currently takes Praluent 300mg every 28 days. In the past, Pt has tried Lipitor but had myalgias with it and Pravastatin, which was discontinued. The patient denies any allergies to latex     A follow-up outreach was conducted, including assessment of continued therapy appropriateness, medication adherence, and side effect incidence and management for Praluent. The patient denies any trouble giving themself the injection.  They deny missing doses or adverse effects.     Initial Start Date of PCSK9i: 6/2/2020  Initial LDL: 47mg/dl 3/2/16714    Changes to Insurance Coverage or Financial Support  [facility] to Medicaid    Relevant Past Medical History and Comorbidities  Relevant medical history and concomitant health conditions were discussed with the patient. The patient's chart has been reviewed for relevant past medical history and comorbid health conditions and updated as necessary.   Past Medical History:   Diagnosis Date    Arthritis     back    Back ache     Chronic back pain     Coronary artery disease     s/p 3 stents     GERD (gastroesophageal reflux disease)     History of MRSA infection 01/2020    labia; hospitalized 6 days on IV antibiotics and then went home on po antibiotics     Hyperlipidemia     Hypertension     Peptic ulceration     Urinary tract infection      Social History     Socioeconomic History    Marital status:     Number of children: 2   Tobacco Use    Smoking status: Former     Current packs/day: 0.00     Types: Cigarettes     Quit date:  2020     Years since quittin.7    Smokeless tobacco: Never   Vaping Use    Vaping status: Former   Substance and Sexual Activity    Alcohol use: No    Drug use: No    Sexual activity: Defer     Problem list reviewed by Sherry Samson, PharmD on 2025 at 10:35 AM    Hospitalizations and Urgent Care Since Last Assessment  ED Visits, Admissions, or Hospitalizations: none reported  Urgent Office Visits: none reported    Allergies  Known allergies and reactions were discussed with the patient. The patient's chart has been reviewed for allergy information and updated as necessary.   Allergies   Allergen Reactions    Bactrim [Sulfamethoxazole-Trimethoprim] Rash    Ciprofloxacin Other (See Comments)     tremors    Ranexa [Ranolazine Er] Unknown - Low Severity     Allergies reviewed by Sherry Samson, PharmD on 2025 at 10:35 AM    Relevant Laboratory Values  Relevant laboratory values were discussed with the patient. The following specialty medication dose adjustment(s) are recommended: none    Lab Results   Component Value Date    GLUCOSE 120 (H) 2023    CALCIUM 9.1 2023     2023    K 3.3 (L) 2023    CO2 20.8 (L) 2023     (H) 2023    BUN 7 2023    CREATININE 0.71 2023    EGFRIFNONA 70 2022    BCR 9.9 2023    ANIONGAP 12.2 2023     Lab Results   Component Value Date    CHOL 142 2024    CHLPL 215 (H) 2016    TRIG 102 2024    HDL 54 2024    LDL 69 2024       Current Medication List  This medication list has been reviewed with the patient and evaluated for any interactions or necessary modifications/recommendations, and updated to include all prescription medications, OTC medications, and supplements the patient is currently taking.  This list reflects what is contained in the patient's profile, which has also been marked as reviewed to communicate to other providers it is the most up to date version  of the patient's current medication therapy.     Current Outpatient Medications:     Alirocumab (Praluent) 150 MG/ML injection pen, Inject 2 mL under the skin into the appropriate area as directed Every 28 (Twenty-Eight) Days., Disp: 6 mL, Rfl: 1    amLODIPine (NORVASC) 5 MG tablet, Take 1 tablet by mouth Daily., Disp: 90 tablet, Rfl: 3    apixaban (Eliquis) 5 MG tablet tablet, Take 1 tablet by mouth 2 (Two) Times a Day., Disp: 180 tablet, Rfl: 3    Aspirin Low Dose 81 MG EC tablet, Take 1 tablet by mouth Daily., Disp: , Rfl:     isosorbide mononitrate (IMDUR) 30 MG 24 hr tablet, Take 1 tablet by mouth Every Night., Disp: 90 tablet, Rfl: 3    lisinopril (PRINIVIL,ZESTRIL) 40 MG tablet, Take 1 tablet by mouth Daily., Disp: 90 tablet, Rfl: 3    loratadine (CLARITIN) 10 MG tablet, Take 1 tablet by mouth As Needed., Disp: , Rfl:     metoprolol tartrate (LOPRESSOR) 25 MG tablet, Take 1 tablet by mouth 2 (Two) Times a Day., Disp: 90 tablet, Rfl: 3    nitroglycerin (NITROSTAT) 0.4 MG SL tablet, TAKE 1 TABLET AT ONSET OF CHEST PAIN,MAY REPEAT IN 5 MIN, MAX 3 DOSES IN 24 HRS, Disp: 25 tablet, Rfl: 11    omeprazole (priLOSEC) 40 MG capsule, Take 1 capsule by mouth Daily., Disp: , Rfl:     spironolactone (ALDACTONE) 25 MG tablet, Take 0.5 tablets by mouth Daily., Disp: 45 tablet, Rfl: 3    Sublocade 100 MG/0.5ML injection, Inject 0.5 mL under the skin into the appropriate area as directed Every 30 (Thirty) Days., Disp: , Rfl:     Medicines reviewed by Sherry Samson, PharmD on 5/9/2025 at 10:35 AM    Drug Interactions  None with praluent    Adverse Drug Reactions  Medication tolerability: Tolerating with no to minimal ADRs  Medication plan: Continue therapy with normal follow-up  Plan for ADR Management: n/a    Adherence, Self-Administration, and Current Therapy Problems  Adherence related to the patient's specialty therapy was discussed with the patient. The Adherence segment of this outreach has been reviewed and updated.      Adherence Questions  Linked Medication(s) Assessed: Alirocumab (Praluent)  On average, how many doses/injections does the patient miss per month?: 0  What are the identified reasons for non-adherence or missed doses? : no problems identified  What is the estimated medication adherence level?: %  Based on the patient/caregiver response and refill history, does this patient require an MTP to track adherence improvements?: no    Additional Barriers to Patient Self-Administration: none  Methods for Supporting Patient Self-Administration: marks on the calendar    Open Medication Therapy Problems  No medication therapy recommendations to display    Goals of Therapy  Goals related to the patient's specialty therapy were discussed with the patient. The Patient Goals segment of this outreach has been reviewed and updated.   Goals Addressed Today        Specialty Pharmacy General Goal      LDL < 70 mg/dl    5/9/25 [place]: LDL at goal, 69 mg/dl on 12/2/24              Quality of Life Assessment   Quality of Life related to the patient's enrollment in the patient management program and services provided was discussed with the patient. The QOL segment of this outreach has been reviewed and updated.  Quality of Life Improvement Scale: 10-Significantly better    Reassessment Plan & Follow-Up  1. Medication Therapy Changes: none reported  2. Related Plans, Therapy Recommendations, or Issues to Be Addressed:   LDL at goal  Refills profiled for patient-pt reports having 1 box on hand at home  3. Pharmacist to perform regular assessments no more than (6) months from the previous assessment.  Patient will continue regular follow-up with referring provider.   4. Care Coordinator to set up future refill outreaches, coordinate prescription delivery, and escalate clinical questions to pharmacist.    Attestation  Therapeutic appropriateness: Appropriate   I attest the patient was actively involved in and has agreed to the above plan  of care.  If the prescribed therapy is at any point deemed not appropriate based on the current or future assessments, a consultation will be initiated with the patient's specialty care provider to determine the best course of action. The revised plan of therapy will be documented along with any required assessments and/or additional patient education provided.     Sherry Samson, PharmD  5/9/2025  10:36 EDT

## 2025-05-21 ENCOUNTER — SPECIALTY PHARMACY (OUTPATIENT)
Dept: PHARMACY | Facility: HOSPITAL | Age: 60
End: 2025-05-21
Payer: COMMERCIAL

## 2025-05-21 NOTE — PROGRESS NOTES
Specialty Pharmacy Patient Management Program  Refill Outreach     Veda was contacted today regarding refills of their medication(s).    Refill Questions      Flowsheet Row Most Recent Value   Changes to allergies? No   Changes to medications? No   New conditions or infections since last clinic visit No   Unplanned office visit, urgent care, ED, or hospital admission in the last 4 weeks  No   How does patient/caregiver feel medication is working? Good   Financial problems or insurance changes  No   If yes, describe changes in insurance or financial issues. na   Since the previous refill, were any specialty medication doses or scheduled injections missed or delayed?  No   If yes, please provide the amount na   Why were doses missed? na   Does this patient require a clinical escalation to a pharmacist? No            Delivery Questions      Flowsheet Row Most Recent Value   Delivery method  at Pharmacy   Delivery address Prescription   Medication(s) being filled and delivered Alirocumab (Praluent)   Copay verified? Yes   Copay amount $0   Copay form of payment No copayment ($0)   Signature Required No            Medication Adherence    Adherence tools used: calendar   Other adherence tool: n/a   Support network for adherence: family member   Other support network: na             Follow-up: 84 day(s)     Tatiana Garnica, Pharmacy Technician  5/21/2025  17:25 EDT

## 2025-06-30 DIAGNOSIS — I10 ESSENTIAL HYPERTENSION: Chronic | ICD-10-CM

## 2025-06-30 RX ORDER — SPIRONOLACTONE 25 MG/1
12.5 TABLET ORAL DAILY
Qty: 45 TABLET | Refills: 3 | OUTPATIENT
Start: 2025-06-30

## 2025-06-30 RX ORDER — METOPROLOL TARTRATE 25 MG/1
25 TABLET, FILM COATED ORAL 2 TIMES DAILY
Qty: 90 TABLET | Refills: 3 | Status: SHIPPED | OUTPATIENT
Start: 2025-06-30

## 2025-06-30 RX ORDER — NITROGLYCERIN 0.4 MG/1
TABLET SUBLINGUAL
Qty: 25 TABLET | Refills: 11 | Status: SHIPPED | OUTPATIENT
Start: 2025-06-30

## 2025-06-30 NOTE — TELEPHONE ENCOUNTER
Caller: Veda Enamorado    Relationship: Self    Best call back number: [phone]      Requested Prescriptions:   Requested Prescriptions     Pending Prescriptions Disp Refills    metoprolol tartrate (LOPRESSOR) 25 MG tablet 90 tablet 3     Sig: Take 1 tablet by mouth 2 (Two) Times a Day.    spironolactone (ALDACTONE) 25 MG tablet 45 tablet 3     Sig: Take 0.5 tablets by mouth Daily.    nitroglycerin (NITROSTAT) 0.4 MG SL tablet 25 tablet 11     Sig: TAKE 1 TABLET AT ONSET OF CHEST PAIN,MAY REPEAT IN 5 MIN, MAX 3 DOSES IN 24 HRS        Pharmacy where request should be sent: [facility] [address] - [phone]  - [phone] FX     Last office visit with prescribing clinician: 12/4/2024   Last telemedicine visit with prescribing clinician: Visit date not found   Next office visit with prescribing clinician: 7/3/2025     Additional details provided by patient:     Does the patient have less than a 3 day supply:  [] Yes  [x] No    Would you like a call back once the refill request has been completed: [] Yes [] No    If the office needs to give you a call back, can they leave a voicemail: [] Yes [] No    Anablele Dsouza   06/30/25 09:17 EDT

## 2025-07-03 ENCOUNTER — OFFICE VISIT (OUTPATIENT)
Dept: CARDIOLOGY | Facility: CLINIC | Age: 60
End: 2025-07-03
Payer: COMMERCIAL

## 2025-07-03 ENCOUNTER — LAB (OUTPATIENT)
Dept: LAB | Facility: HOSPITAL | Age: 60
End: 2025-07-03
Payer: COMMERCIAL

## 2025-07-03 VITALS
HEART RATE: 66 BPM | OXYGEN SATURATION: 97 % | DIASTOLIC BLOOD PRESSURE: 65 MMHG | HEIGHT: 61 IN | BODY MASS INDEX: 25.49 KG/M2 | WEIGHT: 135 LBS | SYSTOLIC BLOOD PRESSURE: 124 MMHG

## 2025-07-03 DIAGNOSIS — R00.2 PALPITATIONS: ICD-10-CM

## 2025-07-03 DIAGNOSIS — I10 ESSENTIAL HYPERTENSION: Chronic | ICD-10-CM

## 2025-07-03 DIAGNOSIS — I25.10 CORONARY ARTERY DISEASE INVOLVING NATIVE CORONARY ARTERY OF NATIVE HEART WITHOUT ANGINA PECTORIS: Primary | Chronic | ICD-10-CM

## 2025-07-03 DIAGNOSIS — E78.2 MIXED HYPERLIPIDEMIA: Chronic | ICD-10-CM

## 2025-07-03 DIAGNOSIS — R07.2 PRECORDIAL PAIN: ICD-10-CM

## 2025-07-03 PROCEDURE — 36415 COLL VENOUS BLD VENIPUNCTURE: CPT | Performed by: NURSE PRACTITIONER

## 2025-07-03 PROCEDURE — 1160F RVW MEDS BY RX/DR IN RCRD: CPT | Performed by: NURSE PRACTITIONER

## 2025-07-03 PROCEDURE — 80050 GENERAL HEALTH PANEL: CPT | Performed by: NURSE PRACTITIONER

## 2025-07-03 PROCEDURE — 82306 VITAMIN D 25 HYDROXY: CPT | Performed by: NURSE PRACTITIONER

## 2025-07-03 PROCEDURE — 99214 OFFICE O/P EST MOD 30 MIN: CPT | Performed by: NURSE PRACTITIONER

## 2025-07-03 PROCEDURE — 1159F MED LIST DOCD IN RCRD: CPT | Performed by: NURSE PRACTITIONER

## 2025-07-03 PROCEDURE — 3074F SYST BP LT 130 MM HG: CPT | Performed by: NURSE PRACTITIONER

## 2025-07-03 PROCEDURE — 93000 ELECTROCARDIOGRAM COMPLETE: CPT | Performed by: NURSE PRACTITIONER

## 2025-07-03 PROCEDURE — 83735 ASSAY OF MAGNESIUM: CPT | Performed by: NURSE PRACTITIONER

## 2025-07-03 PROCEDURE — 3078F DIAST BP <80 MM HG: CPT | Performed by: NURSE PRACTITIONER

## 2025-07-03 PROCEDURE — 80061 LIPID PANEL: CPT | Performed by: NURSE PRACTITIONER

## 2025-07-03 NOTE — PROGRESS NOTES
"Chief Complaint  Follow-up (Patient C/O head and neck pain, BP has been running high at times, left arm pain ) and Palpitations    Subjective          Veda Enamorado presents to [facility] CARDIOLOGY for follow up.    History of Present Illness  History of Present Illness  The patient is a 60-year-old female who follows up in the clinic with ASCVD, hypertension, dyslipidemia, and paroxysmal atrial fibrillation. She was last seen in the clinic on 12/04/2024. At that visit, she was stable from a cardiac standpoint, and no changes were made to her medications. She is here for her usual follow-up.    She has been experiencing neck pain for approximately one week, which she initially attributed to high blood pressure. The pain has been progressively worsening, and she has not taken any medication for it. She also reports palpitations, which she believes are related to her heart condition. Additionally, she has experienced some chest pain, for which she has taken nitroglycerin twice. She felt well upon waking this morning, but her symptoms began after taking her medication. She slept well last night.    She has considered going to the hospital due to her symptoms. She vapes and is concerned about its potential impact on her health. She is hesitant to go to the ER this week due to the holiday and her 's work schedule. She does not take acetaminophen as it makes her feel unwell. She has not tried ibuprofen.    She underwent a colonoscopy on 06/13/2025 and started feeling unwell afterwards.    PAST SURGICAL HISTORY:  Cardiac catheterization 2 years ago with all grafts open.  Colonoscopy on 06/13/2025.         Objective     Vital Signs:   /65   Pulse 66   Ht 154.9 cm (61\")   Wt 61.2 kg (135 lb)   SpO2 97%   BMI 25.51 kg/m²       Physical Exam  Vitals reviewed.   Constitutional:       Appearance: Normal appearance. She is well-developed.   Cardiovascular:      Rate and Rhythm: Normal " rate and regular rhythm.      Heart sounds: No murmur heard.     No friction rub. No gallop.   Pulmonary:      Effort: Pulmonary effort is normal. No respiratory distress.      Breath sounds: Normal breath sounds. No wheezing or rales.   Skin:     General: Skin is warm and dry.   Neurological:      Mental Status: She is alert and oriented to person, place, and time.   Psychiatric:         Mood and Affect: Mood normal.         Behavior: Behavior normal.          Result Review :   The following data was reviewed by me 07/03/25 at 14:36 EDT: cardiac and lab studies as detailed below.    WBC   Date Value Ref Range Status   07/05/2025 8.07 3.40 - 10.80 10*3/mm3 Final     Hemoglobin   Date Value Ref Range Status   07/05/2025 11.7 (L) 12.0 - 15.9 g/dL Final     Hematocrit   Date Value Ref Range Status   07/05/2025 36.2 34.0 - 46.6 % Final     MCV   Date Value Ref Range Status   07/05/2025 93.1 79.0 - 97.0 fL Final     MCH   Date Value Ref Range Status   07/05/2025 30.1 26.6 - 33.0 pg Final     Platelets   Date Value Ref Range Status   07/05/2025 232 140 - 450 10*3/mm3 Final     Glucose   Date Value Ref Range Status   07/05/2025 104 (H) 65 - 99 mg/dL Final     BUN   Date Value Ref Range Status   07/05/2025 10.3 6.0 - 20.0 mg/dL Final     Creatinine   Date Value Ref Range Status   07/05/2025 0.74 0.57 - 1.00 mg/dL Final     Sodium   Date Value Ref Range Status   07/05/2025 141 136 - 145 mmol/L Final     Potassium   Date Value Ref Range Status   07/05/2025 3.4 (L) 3.5 - 5.2 mmol/L Final     Chloride   Date Value Ref Range Status   07/05/2025 107 98 - 107 mmol/L Final     CO2   Date Value Ref Range Status   07/05/2025 23.3 22.0 - 29.0 mmol/L Final     Calcium   Date Value Ref Range Status   07/05/2025 8.7 8.6 - 10.5 mg/dL Final     Total Protein   Date Value Ref Range Status   07/05/2025 6.7 6.0 - 8.5 g/dL Final     Albumin   Date Value Ref Range Status   07/05/2025 4.1 3.5 - 5.2 g/dL Final     ALT (SGPT)   Date Value Ref  Range Status   07/05/2025 <5 1 - 33 U/L Final     AST (SGOT)   Date Value Ref Range Status   07/05/2025 16 1 - 32 U/L Final     Alkaline Phosphatase   Date Value Ref Range Status   07/05/2025 114 39 - 117 U/L Final     Total Bilirubin   Date Value Ref Range Status   07/05/2025 0.2 0.0 - 1.2 mg/dL Final     Anion Gap   Date Value Ref Range Status   07/05/2025 10.7 5.0 - 15.0 mmol/L Final     eGFR   Date Value Ref Range Status   07/05/2025 93.3 >60.0 mL/min/1.73 Final     Total Cholesterol   Date Value Ref Range Status   07/03/2025 128 0 - 200 mg/dL Final     Triglycerides   Date Value Ref Range Status   07/03/2025 220 (H) 0 - 150 mg/dL Final     HDL Cholesterol   Date Value Ref Range Status   07/03/2025 52 40 - 60 mg/dL Final     LDL Cholesterol    Date Value Ref Range Status   07/03/2025 42 0 - 100 mg/dL Final     LDL/HDL Ratio   Date Value Ref Range Status   07/03/2025 0.62  Final     Hemoglobin A1C   Date Value Ref Range Status   07/05/2020 5.50 4.80 - 5.60 % Final     Magnesium   Date Value Ref Range Status   07/03/2025 2.5 1.6 - 2.6 mg/dL Final         ECG 12 Lead    Date/Time: 7/3/2025 5:32 PM  Performed by: Sharda Meyers APRN    Authorized by: Sharda Meyers APRN  Comparison: compared with previous ECG from 4/11/2024  Similar to previous ECG  Rhythm: sinus rhythm  Rate: normal  BPM: 62  T flattening: III, V1 and V2  Other findings: T wave abnormality  Comments: QTc 421          Most recent echocardiogram  Results for orders placed during the hospital encounter of 01/19/21    Adult Transthoracic Echo Complete W/ Cont if Necessary Per Protocol 01/21/2021  1:30 PM    Interpretation Summary  · Estimated left ventricular EF = 60% Left ventricular ejection fraction appears to be 56 - 60%. Left ventricular systolic function is normal.  · Left ventricular diastolic function was normal.  · No significant valvular abnormality  · No pericardial effusion  · No change since prev echo of 6/26/20      Most recent  Stress Test  Results for orders placed in visit on 02/23/22    Stress Test With Myocardial Perfusion (1 Day) 02/24/2022  4:20 PM    Interpretation Summary  · Myocardial perfusion imaging indicates a small-sized, mildly severe area of ischemia located in the basal inferior lateral wall. The anterior perfusion wall defect is likely secondary to breast attenuation artifact,  · Breast attenuation artifact is present.  · Left ventricular ejection fraction is normal. .  · Impressions are consistent with an intermediate risk study.       Most recent Cardiac Cath  Results for orders placed during the hospital encounter of 02/23/23    Cardiac Catheterization/Vascular Study 02/23/2023 11:11 AM    Conclusion  Images from the original result were not included.  CARDIAC CATHETERIZATION / INTERVENTION REPORT    DATE OF PROCEDURE: 2/23/2023    INDICATION FOR PROCEDURE: chest pain      PRE PROCEDURE DIAGNOSIS:  CAD s/p three-vessel CABG  Hypertension  Dyslipidemia    POST PROCEDURE DIAGNOSIS:  CAD with a patent LIMA graft and patent SVG to OM1 and diagonal 1 graft, native RCA mild nonobstructive disease unchanged from before.  Normal LV systolic function, mildly elevated LVEDP of 20 mmHg.    Face to face mdoerate conscious  sedation time:      COMPLICATIONS : None    Specimens collected : None        PROCEDURE PERFORMED:    1. Selective right and left coronary Angiogram  2. Left heart catheretization  3. Left ventriculography  4.  LIMA angiogram  5.SVG to diagonal and OM angiogram      PROCEDURE COMMENTS:    Prior to the procedure risks benefits alternatives and possible adverse events were discussed with the patient and informed consent was obtained.  Veda Enamorado was brought to the cath lab and placed on the cardiac catherization table in the postabsortive state. The patient was prepped and draped according to the cath lab protocol under strict aseptic and sterile condition. Patient's right femoral artery sight was prepped  and draped. Under fluoroscopic guidance the right femoral artery was punctured using a Micropuncture gauge needle utilizing the modified Seldinger technique. 6 Kinyarwanda sheath was introduced over a wire. After aspirating for blood it was flushed with heparinized saline. Angio was performed to confirm the position of the sheath in right common femoral artery    We advanced Genaro type diagnostic catheters over the wire. The  left and right coronary arteries  were selectively engaged. Left and right coronary angiogram were obtained in multiple orthogonal projections.  5 Korean JR4 catheter was used for engaging the SVG on the LIMA angiogram and angiogram pictures were obtained in orthogonal views .5 F Pigtail catheter was used to cross across the AV retrograde into the LV cavity and resting LV pressures were recorded. Manual pull back to used to record gradient across the Aortic valve.    After completion of the procedure the femoral sheath was removed in the cath lab and hemostasis was achieved by Mynx. The patient tolerated the procedure without complications.      Coronary anatomy findings:    LM: Moderate caliber vessel, short and no significant stenosis.    LAD: Was chronically totally occluded at the proximal segment before the diagonal takeoff.    Ramus intermedius.  Moderate caliber vessel with no significant stenosis.    LCX: Moderate calibre vessel, the OM1 which was patent before has been occluded in the proximal segment but the graft supplying the OM1 is patent and the distal OM 1 distal to the anastomosis had no significant stenosis.    RCA: Large calibre, dominant artery, proximal, mid and distal had mild luminal irregularities with no significant stenosis, distally divides into R PDA and PL branches both had mild luminal irregularities with no stensois.  Noted mild 30 to 40% stenosis across the entire RCA, the PDA and PL branches had no significant stenosis.    LIMA angiogram:  The CELESTE graft was well  matured with no significant stenosis with good proximal and distal anastomosis site, the distal LAD is a small caliber artery with mild diffuse disease.    SVG to OM graft appeared patent supplying the distal OM branch which had no significant stenosis.  SVG to diagonal 1 graft appeared patent with no significant stenosis supplying a small caliber diagonal artery with mild diffuse disease.    Left Ventriculography:    LV systolic function was normal with visual estimated EF of 60%. No significant mitral regurgitation noted.    LVEDP: 20 mmHg  No gradient across the aortic valve on pull back.        EBL: Less than 10cc        Final Impression:  CAD with patent vein grafts and the LIMA graft and no significant stenosis in the native RCA.        Recommendations:  Continue with the current medical rx, evaluate for non cardiac causes for her L arm pain          Lazaro MD Zoraida, [facility]  Interventional Cardiology    02/23/23  11:10 EST        Current Outpatient Medications   Medication Sig Dispense Refill    Alirocumab (Praluent) 150 MG/ML injection pen Inject 2 mL under the skin into the appropriate area as directed Every 28 (Twenty-Eight) Days. 6 mL 1    amLODIPine (NORVASC) 5 MG tablet Take 1 tablet by mouth Daily. 90 tablet 3    apixaban (Eliquis) 5 MG tablet tablet Take 1 tablet by mouth 2 (Two) Times a Day. 180 tablet 3    Aspirin Low Dose 81 MG EC tablet Take 1 tablet by mouth Daily.      isosorbide mononitrate (IMDUR) 30 MG 24 hr tablet Take 1 tablet by mouth Every Night. 90 tablet 3    lisinopril (PRINIVIL,ZESTRIL) 40 MG tablet Take 1 tablet by mouth Daily. 90 tablet 3    loratadine (CLARITIN) 10 MG tablet Take 1 tablet by mouth As Needed.      metoprolol tartrate (LOPRESSOR) 25 MG tablet Take 1 tablet by mouth 2 (Two) Times a Day. 90 tablet 3    nitroglycerin (NITROSTAT) 0.4 MG SL tablet TAKE 1 TABLET AT ONSET OF CHEST PAIN,MAY REPEAT IN 5 MIN, MAX 3 DOSES IN 24 HRS 25 tablet 11    omeprazole (priLOSEC) 40 MG  capsule Take 1 capsule by mouth Daily.      spironolactone (ALDACTONE) 25 MG tablet Take 0.5 tablets by mouth Daily. 45 tablet 3    Sublocade 100 MG/0.5ML injection Inject 0.5 mL under the skin into the appropriate area as directed Every 30 (Thirty) Days.      cephalexin (KEFLEX) 500 MG capsule Take 1 capsule by mouth 4 (Four) Times a Day. 20 capsule 0    vitamin D (ERGOCALCIFEROL) 1.25 MG (69148 UT) capsule capsule Take 1 capsule by mouth 1 (One) Time Per Week. 5 capsule 5     No current facility-administered medications for this visit.               Problem List Items Addressed This Visit          Cardiac and Vasculature    Coronary artery disease involving native coronary artery of native heart without angina pectoris - Primary (Chronic)    Overview   Remote past PCI ZURI x 2 to the LAD and diagonal 1  12/16/2019 [facility] for [facility]: Complex LAD/diagonal bifurcation lesion with severe in-stent restenosis of diagonal 1.  Patient referred to [place] for bypass  7/18/2023 vessel CABG LIMA to LAD, GSV to OM and GSV to D1 (Dr. Coronado)  3/3/2022 [facility] for abnormal stress: CAD with patent LIMA to LAD and grafts x 2.  No significant stenosis in the native RCA  2/23/2023 [facility] for [facility] patent saphenous vein grafts x 2 and LIMA to LAD.  Mild disease in the native RCA         Relevant Orders    Stress Test With Myocardial Perfusion (1 Day)    Adult Transthoracic Echo Complete W/ Cont if Necessary Per Protocol    Cardiac Event Monitor (ALIVIA) or Mobile Cardiac Outpatient Telemetry (MCT)    CBC (No Diff) (Completed)    Comprehensive Metabolic Panel (Completed)    Lipid Panel (Completed)    Magnesium (Completed)    Vitamin D,25-Hydroxy (Completed)    TSH (Completed)    ECG 12 Lead    Mixed hyperlipidemia (Chronic)    Overview   2/24/2022 total cholesterol 102, triglycerides 121, HDL 50, and LDL 30  8/10/2023 total cholesterol 99, triglycerides 121, HDL 44, and LDL 38         Essential hypertension (Chronic)    Chest pain    Overview   Added  automatically from request for surgery [id number]         Relevant Orders    ECG 12 Lead     Other Visit Diagnoses         Palpitations        Relevant Orders    Cardiac Event Monitor (ALIVIA) or Mobile Cardiac Outpatient Telemetry (MCT)            Assessment & Plan  1. Chest pain  - EKG results are within normal limits.  - Although unlikely that current symptoms are cardiac in nature given catheterization 2 years ago with open grafts, a  stress test and echocardiogram to evaluate cardiac function.  - Laboratory tests today to check thyroid function, cholesterol levels, vitamin D levels, electrolytes, and magnesium levels.  - Ibuprofen 400 mg every 4 hours as needed for pain relief, but not on a continuous basis.  - Seek immediate medical attention at the ER if symptoms worsen or persist.    2. Hypertension.  - Blood pressure readings are within normal range.  - Continue monitoring blood pressure at home.    3. Dyslipidemia.  - Non-fasting cholesterol test to be conducted today.    4. Palpitations  - Event monitor to assess palpitations.  - Seek immediate medical attention at the ER if symptoms worsen or persist.    5.  ASCVD  - Continue current medications    Follow-up  - Follow up in 4 weeks, or sooner if necessary.         Follow Up     Return in about 5 weeks (around 8/7/2025).    Patient was given instructions and counseling regarding her condition or for health maintenance advice. Please see specific information pulled into the AVS if appropriate.     Patient or patient representative verbalized consent for the use of Ambient Listening during the visit with  ZION Drake for chart documentation. 7/22/2025  17:31 EDT

## 2025-07-04 LAB
25(OH)D3 SERPL-MCNC: 18.9 NG/ML (ref 30–100)
ALBUMIN SERPL-MCNC: 4 G/DL (ref 3.5–5.2)
ALBUMIN/GLOB SERPL: 1.4 G/DL
ALP SERPL-CCNC: 118 U/L (ref 39–117)
ALT SERPL W P-5'-P-CCNC: <5 U/L (ref 1–33)
ANION GAP SERPL CALCULATED.3IONS-SCNC: 8.4 MMOL/L (ref 5–15)
AST SERPL-CCNC: 16 U/L (ref 1–32)
BILIRUB SERPL-MCNC: <0.2 MG/DL (ref 0–1.2)
BUN SERPL-MCNC: 8 MG/DL (ref 6–20)
BUN/CREAT SERPL: 10.8 (ref 7–25)
CALCIUM SPEC-SCNC: 9.1 MG/DL (ref 8.6–10.5)
CHLORIDE SERPL-SCNC: 108 MMOL/L (ref 98–107)
CHOLEST SERPL-MCNC: 128 MG/DL (ref 0–200)
CO2 SERPL-SCNC: 23.6 MMOL/L (ref 22–29)
CREAT SERPL-MCNC: 0.74 MG/DL (ref 0.57–1)
DEPRECATED RDW RBC AUTO: 47.7 FL (ref 37–54)
EGFRCR SERPLBLD CKD-EPI 2021: 93.3 ML/MIN/1.73
ERYTHROCYTE [DISTWIDTH] IN BLOOD BY AUTOMATED COUNT: 13.3 % (ref 12.3–15.4)
GLOBULIN UR ELPH-MCNC: 2.9 GM/DL
GLUCOSE SERPL-MCNC: 86 MG/DL (ref 65–99)
HCT VFR BLD AUTO: 39.1 % (ref 34–46.6)
HDLC SERPL-MCNC: 52 MG/DL (ref 40–60)
HGB BLD-MCNC: 12.2 G/DL (ref 12–15.9)
LDLC SERPL CALC-MCNC: 42 MG/DL (ref 0–100)
LDLC/HDLC SERPL: 0.62 {RATIO}
MAGNESIUM SERPL-MCNC: 2.5 MG/DL (ref 1.6–2.6)
MCH RBC QN AUTO: 29.9 PG (ref 26.6–33)
MCHC RBC AUTO-ENTMCNC: 31.2 G/DL (ref 31.5–35.7)
MCV RBC AUTO: 95.8 FL (ref 79–97)
PLATELET # BLD AUTO: 257 10*3/MM3 (ref 140–450)
PMV BLD AUTO: 10.5 FL (ref 6–12)
POTASSIUM SERPL-SCNC: 3.7 MMOL/L (ref 3.5–5.2)
PROT SERPL-MCNC: 6.9 G/DL (ref 6–8.5)
RBC # BLD AUTO: 4.08 10*6/MM3 (ref 3.77–5.28)
SODIUM SERPL-SCNC: 140 MMOL/L (ref 136–145)
TRIGL SERPL-MCNC: 220 MG/DL (ref 0–150)
TSH SERPL DL<=0.05 MIU/L-ACNC: 1.07 UIU/ML (ref 0.27–4.2)
VLDLC SERPL-MCNC: 34 MG/DL (ref 5–40)
WBC NRBC COR # BLD AUTO: 5.73 10*3/MM3 (ref 3.4–10.8)

## 2025-07-05 ENCOUNTER — HOSPITAL ENCOUNTER (EMERGENCY)
Facility: HOSPITAL | Age: 60
Discharge: HOME | End: 2025-07-05
Attending: STUDENT IN AN ORGANIZED HEALTH CARE EDUCATION/TRAINING PROGRAM
Payer: COMMERCIAL

## 2025-07-05 VITALS
WEIGHT: 135 LBS | RESPIRATION RATE: 18 BRPM | HEIGHT: 62 IN | TEMPERATURE: 98.3 F | DIASTOLIC BLOOD PRESSURE: 64 MMHG | SYSTOLIC BLOOD PRESSURE: 130 MMHG | OXYGEN SATURATION: 100 % | BODY MASS INDEX: 24.84 KG/M2 | HEART RATE: 60 BPM

## 2025-07-05 DIAGNOSIS — N39.0 ACUTE UTI: Primary | ICD-10-CM

## 2025-07-05 LAB
ALBUMIN SERPL-MCNC: 4.1 G/DL (ref 3.5–5.2)
ALBUMIN/GLOB SERPL: 1.6 G/DL
ALP SERPL-CCNC: 114 U/L (ref 39–117)
ALT SERPL W P-5'-P-CCNC: <5 U/L (ref 1–33)
ANION GAP SERPL CALCULATED.3IONS-SCNC: 10.7 MMOL/L (ref 5–15)
AST SERPL-CCNC: 16 U/L (ref 1–32)
BACTERIA UR QL AUTO: ABNORMAL /HPF
BASOPHILS # BLD AUTO: 0.03 10*3/MM3 (ref 0–0.2)
BASOPHILS NFR BLD AUTO: 0.4 % (ref 0–1.5)
BILIRUB SERPL-MCNC: 0.2 MG/DL (ref 0–1.2)
BILIRUB UR QL STRIP: NEGATIVE
BUN SERPL-MCNC: 10.3 MG/DL (ref 6–20)
BUN/CREAT SERPL: 13.9 (ref 7–25)
CALCIUM SPEC-SCNC: 8.7 MG/DL (ref 8.6–10.5)
CHLORIDE SERPL-SCNC: 107 MMOL/L (ref 98–107)
CLARITY UR: ABNORMAL
CO2 SERPL-SCNC: 23.3 MMOL/L (ref 22–29)
COLOR UR: YELLOW
CREAT SERPL-MCNC: 0.74 MG/DL (ref 0.57–1)
CRP SERPL-MCNC: <0.3 MG/DL (ref 0–0.5)
DEPRECATED RDW RBC AUTO: 44.9 FL (ref 37–54)
EGFRCR SERPLBLD CKD-EPI 2021: 93.3 ML/MIN/1.73
EOSINOPHIL # BLD AUTO: 0.16 10*3/MM3 (ref 0–0.4)
EOSINOPHIL NFR BLD AUTO: 2 % (ref 0.3–6.2)
ERYTHROCYTE [DISTWIDTH] IN BLOOD BY AUTOMATED COUNT: 13.2 % (ref 12.3–15.4)
GLOBULIN UR ELPH-MCNC: 2.6 GM/DL
GLUCOSE SERPL-MCNC: 104 MG/DL (ref 65–99)
GLUCOSE UR STRIP-MCNC: NEGATIVE MG/DL
HCT VFR BLD AUTO: 36.2 % (ref 34–46.6)
HGB BLD-MCNC: 11.7 G/DL (ref 12–15.9)
HGB UR QL STRIP.AUTO: ABNORMAL
HOLD SPECIMEN: NORMAL
HOLD SPECIMEN: NORMAL
HYALINE CASTS UR QL AUTO: ABNORMAL /LPF
IMM GRANULOCYTES # BLD AUTO: 0.01 10*3/MM3 (ref 0–0.05)
IMM GRANULOCYTES NFR BLD AUTO: 0.1 % (ref 0–0.5)
KETONES UR QL STRIP: NEGATIVE
LEUKOCYTE ESTERASE UR QL STRIP.AUTO: ABNORMAL
LYMPHOCYTES # BLD AUTO: 1.1 10*3/MM3 (ref 0.7–3.1)
LYMPHOCYTES NFR BLD AUTO: 13.6 % (ref 19.6–45.3)
MCH RBC QN AUTO: 30.1 PG (ref 26.6–33)
MCHC RBC AUTO-ENTMCNC: 32.3 G/DL (ref 31.5–35.7)
MCV RBC AUTO: 93.1 FL (ref 79–97)
MONOCYTES # BLD AUTO: 0.59 10*3/MM3 (ref 0.1–0.9)
MONOCYTES NFR BLD AUTO: 7.3 % (ref 5–12)
NEUTROPHILS NFR BLD AUTO: 6.18 10*3/MM3 (ref 1.7–7)
NEUTROPHILS NFR BLD AUTO: 76.6 % (ref 42.7–76)
NITRITE UR QL STRIP: POSITIVE
NRBC BLD AUTO-RTO: 0 /100 WBC (ref 0–0.2)
PH UR STRIP.AUTO: 5.5 [PH] (ref 5–8)
PLATELET # BLD AUTO: 232 10*3/MM3 (ref 140–450)
PMV BLD AUTO: 9.7 FL (ref 6–12)
POTASSIUM SERPL-SCNC: 3.4 MMOL/L (ref 3.5–5.2)
PROT SERPL-MCNC: 6.7 G/DL (ref 6–8.5)
PROT UR QL STRIP: ABNORMAL
RBC # BLD AUTO: 3.89 10*6/MM3 (ref 3.77–5.28)
RBC # UR STRIP: ABNORMAL /HPF
REF LAB TEST METHOD: ABNORMAL
SODIUM SERPL-SCNC: 141 MMOL/L (ref 136–145)
SP GR UR STRIP: 1.01 (ref 1–1.03)
SQUAMOUS #/AREA URNS HPF: ABNORMAL /HPF
UROBILINOGEN UR QL STRIP: ABNORMAL
WBC # UR STRIP: ABNORMAL /HPF
WBC NRBC COR # BLD AUTO: 8.07 10*3/MM3 (ref 3.4–10.8)
WHOLE BLOOD HOLD COAG: NORMAL
WHOLE BLOOD HOLD SPECIMEN: NORMAL

## 2025-07-05 PROCEDURE — 25810000003 SODIUM CHLORIDE 0.9 % SOLUTION

## 2025-07-05 PROCEDURE — 86140 C-REACTIVE PROTEIN: CPT | Performed by: NURSE PRACTITIONER

## 2025-07-05 PROCEDURE — 85025 COMPLETE CBC W/AUTO DIFF WBC: CPT | Performed by: NURSE PRACTITIONER

## 2025-07-05 PROCEDURE — 80053 COMPREHEN METABOLIC PANEL: CPT | Performed by: NURSE PRACTITIONER

## 2025-07-05 PROCEDURE — 87088 URINE BACTERIA CULTURE: CPT | Performed by: NURSE PRACTITIONER

## 2025-07-05 PROCEDURE — 87186 SC STD MICRODIL/AGAR DIL: CPT | Performed by: NURSE PRACTITIONER

## 2025-07-05 PROCEDURE — 99283 EMERGENCY DEPT VISIT LOW MDM: CPT

## 2025-07-05 PROCEDURE — 81001 URINALYSIS AUTO W/SCOPE: CPT | Performed by: NURSE PRACTITIONER

## 2025-07-05 PROCEDURE — 87086 URINE CULTURE/COLONY COUNT: CPT | Performed by: NURSE PRACTITIONER

## 2025-07-05 RX ORDER — CEPHALEXIN 500 MG/1
500 CAPSULE ORAL 4 TIMES DAILY
Qty: 20 CAPSULE | Refills: 0 | Status: SHIPPED | OUTPATIENT
Start: 2025-07-05

## 2025-07-05 RX ORDER — SODIUM CHLORIDE 0.9 % (FLUSH) 0.9 %
10 SYRINGE (ML) INJECTION AS NEEDED
Status: DISCONTINUED | OUTPATIENT
Start: 2025-07-05 | End: 2025-07-05 | Stop reason: HOSPADM

## 2025-07-05 RX ORDER — PHENAZOPYRIDINE HYDROCHLORIDE 200 MG/1
200 TABLET, FILM COATED ORAL 3 TIMES DAILY PRN
Qty: 6 TABLET | Refills: 0 | Status: SHIPPED | OUTPATIENT
Start: 2025-07-05 | End: 2025-07-07

## 2025-07-05 RX ORDER — PHENAZOPYRIDINE HYDROCHLORIDE 200 MG/1
200 TABLET, FILM COATED ORAL ONCE
Status: COMPLETED | OUTPATIENT
Start: 2025-07-05 | End: 2025-07-05

## 2025-07-05 RX ADMIN — PHENAZOPYRIDINE 200 MG: 200 TABLET ORAL at 21:11

## 2025-07-05 RX ADMIN — SODIUM CHLORIDE 500 ML: 9 INJECTION, SOLUTION INTRAVENOUS at 21:11

## 2025-07-05 RX ADMIN — CEPHALEXIN 500 MG: 250 CAPSULE ORAL at 21:11

## 2025-07-07 NOTE — ED PROVIDER NOTES
Subjective   History of Present Illness  Patient is a 60 year old female who presents with complaints of dysuria, increased hesitancy and frequency in urination. Denies abdominal pain, chest pain, fever. She reports these symptoms began today and the have worsened. Denies other complaints. She presents private vehicle.         Review of Systems   HENT: Negative.     Respiratory: Negative.     Cardiovascular: Negative.    Gastrointestinal: Negative.    Genitourinary:  Positive for difficulty urinating, dysuria, frequency and urgency.       Past Medical History:   Diagnosis Date    Arthritis     back    Back ache     Chronic back pain     Coronary artery disease     s/p 3 stents     GERD (gastroesophageal reflux disease)     History of MRSA infection 2020    labia; hospitalized 6 days on IV antibiotics and then went home on po antibiotics     Hyperlipidemia     Hypertension     Peptic ulceration     Urinary tract infection        Allergies   Allergen Reactions    Bactrim [Sulfamethoxazole-Trimethoprim] Rash    Ciprofloxacin Other (See Comments)     tremors    Ranexa [Ranolazine Er] Unknown - Low Severity       Past Surgical History:   Procedure Laterality Date    CARDIAC CATHETERIZATION      CARDIAC CATHETERIZATION N/A 2019    Procedure: Left Heart Cath;  Surgeon: Lazaro Conway MD;  Location: [facility] INVASIVE LOCATION;  Service: Cardiology    CARDIAC CATHETERIZATION      CARDIAC CATHETERIZATION N/A 3/9/2022    Procedure: Left Heart Cath;  Surgeon: Lazaro Conway MD;  Location: [facility] CATH INVASIVE LOCATION;  Service: Cardiology;  Laterality: N/A;    CARDIAC CATHETERIZATION N/A 2023    Procedure: Left Heart Cath;  Surgeon: Lazaro Conway MD;  Location:  [facility] CATH INVASIVE LOCATION;  Service: Cardiology;  Laterality: N/A;    CARDIAC SURGERY       SECTION      x2    CORONARY ARTERY BYPASS GRAFT N/A 2020    Procedure: MEDIAN STERNOTOMY, CORONARY  ARTERY BYPASS X3 WITH  INTERNAL MAMMARY ARTERY GRAFTING, ENDOVASCULAR VEIN HARVESTING OF THE RIGHT GREATER SAPHENOUS VEIN, MAICOL PER ANESTHESIA;  Surgeon: Juanjo Coronado MD;  Location:  [facility] OR;  Service: Cardiothoracic;  Laterality: N/A;  ST ON LE  VO: 1800  VR: 1814    HAND SURGERY      right    PERINEAL LESION/CYST EXCISION Right 2020    Procedure: I&D ABSCESS;  Surgeon: Leander Cardenas III, MD;  Location:  [facility] OR;  Service: Obstetrics/Gynecology       Family History   Problem Relation Age of Onset    Heart disease Mother     Coronary artery disease Mother     Heart disease Father     Heart attack Father     Hypertension Father     Stroke Father     No Known Problems Sister     Heart attack Brother     Heart disease Brother     Heart disease Maternal Grandmother     Heart attack Maternal Grandmother     No Known Problems Maternal Grandfather     Stroke Paternal Grandmother     Breast cancer Neg Hx        Social History     Socioeconomic History    Marital status:     Number of children: 2   Tobacco Use    Smoking status: Former     Current packs/day: 0.00     Types: Cigarettes     Quit date: 2020     Years since quittin.9    Smokeless tobacco: Never   Vaping Use    Vaping status: Former   Substance and Sexual Activity    Alcohol use: No    Drug use: No    Sexual activity: Defer           Objective   Physical Exam  Vitals and nursing note reviewed.   Constitutional:       Appearance: Normal appearance.   Cardiovascular:      Rate and Rhythm: Normal rate and regular rhythm.      Pulses: Normal pulses.   Pulmonary:      Effort: Pulmonary effort is normal.      Breath sounds: Normal breath sounds.   Abdominal:      General: Bowel sounds are normal.   Musculoskeletal:         General: Normal range of motion.   Skin:     Capillary Refill: Capillary refill takes less than 2 seconds.   Neurological:      Mental Status: She is alert and oriented to person, place, and time.   Psychiatric:          Mood and Affect: Mood normal.         Procedures           ED Course                                                       Medical Decision Making  Patient is a 60 year old female who presents with complaints of dysuria, increased hesitancy and frequency in urination. Denies abdominal pain, chest pain, fever. She reports these symptoms began today and the have worsened. Denies other complaints. She presents private vehicle.     Problems Addressed:  Acute UTI: complicated acute illness or injury    Risk  Prescription drug management.        Final diagnoses:   Acute UTI       ED Disposition  ED Disposition       ED Disposition   Discharge    Condition   Stable    Comment   --               Maribel Bajwa, APRN  [address] [facility] [address]  [place] [id number] [phone]    Schedule an appointment as soon as possible for a visit   As needed    [facility] EMERGENCY DEPARTMENT  [address] [place] [id number] [phone]  Go to   If symptoms worsen         Medication List        New Prescriptions      cephalexin 500 MG capsule  Commonly known as: KEFLEX  Take 1 capsule by mouth 4 (Four) Times a Day.     phenazopyridine 200 MG tablet  Commonly known as: PYRIDIUM  Take 1 tablet by mouth 3 (Three) Times a Day As Needed for Bladder Spasms for up to 2 days.               Where to Get Your Medications        These medications were sent to [facility] [address] [place] [address] [address] [place] [address] - [phone]  - [phone] [facility] [place] [address]      Phone: [phone]   cephalexin 500 MG capsule  phenazopyridine 200 MG tablet            Shakila Alonso, APRN  07/07/25 0546

## 2025-07-08 LAB — BACTERIA SPEC AEROBE CULT: ABNORMAL

## 2025-08-01 ENCOUNTER — HOSPITAL ENCOUNTER (OUTPATIENT)
Dept: NUCLEAR MEDICINE | Facility: HOSPITAL | Age: 60
Discharge: HOME | End: 2025-08-01
Payer: COMMERCIAL

## 2025-08-01 ENCOUNTER — HOSPITAL ENCOUNTER (OUTPATIENT)
Dept: CARDIOLOGY | Facility: HOSPITAL | Age: 60
Discharge: HOME | End: 2025-08-01
Payer: COMMERCIAL

## 2025-08-01 ENCOUNTER — APPOINTMENT (OUTPATIENT)
Dept: CARDIOLOGY | Facility: HOSPITAL | Age: 60
End: 2025-08-01
Payer: COMMERCIAL

## 2025-08-01 DIAGNOSIS — I25.10 CORONARY ARTERY DISEASE INVOLVING NATIVE CORONARY ARTERY OF NATIVE HEART WITHOUT ANGINA PECTORIS: Chronic | ICD-10-CM

## 2025-08-01 LAB
BH CV NUCLEAR PRIOR STUDY: 3
BH CV REST NUCLEAR ISOTOPE DOSE: 9.1 MCI
BH CV STRESS BP STAGE 1: NORMAL
BH CV STRESS COMMENTS STAGE 1: NORMAL
BH CV STRESS DOSE REGADENOSON STAGE 1: 0.4
BH CV STRESS DURATION MIN STAGE 1: 0
BH CV STRESS DURATION SEC STAGE 1: 10
BH CV STRESS HR STAGE 1: 86
BH CV STRESS NUCLEAR ISOTOPE DOSE: 28.5 MCI
BH CV STRESS PROTOCOL 1: NORMAL
BH CV STRESS RECOVERY BP: NORMAL MMHG
BH CV STRESS RECOVERY HR: 82 BPM
BH CV STRESS STAGE 1: 1
MAXIMAL PREDICTED HEART RATE: 161 BPM
PERCENT MAX PREDICTED HR: 53.42 %
SPECT HRT GATED+EF W RNC IV: 75 %
STRESS BASELINE BP: NORMAL MMHG
STRESS BASELINE HR: 70 BPM
STRESS PERCENT HR: 63 %
STRESS POST PEAK BP: NORMAL MMHG
STRESS POST PEAK HR: 86 BPM
STRESS TARGET HR: 137 BPM

## 2025-08-01 PROCEDURE — 78452 HT MUSCLE IMAGE SPECT MULT: CPT

## 2025-08-01 PROCEDURE — A9500 TC99M SESTAMIBI: HCPCS | Performed by: NURSE PRACTITIONER

## 2025-08-01 PROCEDURE — 34310000005 TECHNETIUM SESTAMIBI: Performed by: NURSE PRACTITIONER

## 2025-08-01 PROCEDURE — 93017 CV STRESS TEST TRACING ONLY: CPT

## 2025-08-01 PROCEDURE — 25010000002 REGADENOSON 0.4 MG/5ML SOLUTION: Performed by: NURSE PRACTITIONER

## 2025-08-01 RX ORDER — REGADENOSON 0.08 MG/ML
0.4 INJECTION, SOLUTION INTRAVENOUS
Status: COMPLETED | OUTPATIENT
Start: 2025-08-01 | End: 2025-08-01

## 2025-08-01 RX ADMIN — REGADENOSON 0.4 MG: 0.08 INJECTION, SOLUTION INTRAVENOUS at 10:53

## 2025-08-01 RX ADMIN — TECHNETIUM TC 99M SESTAMIBI 1 DOSE: 1 INJECTION INTRAVENOUS at 10:55

## 2025-08-01 RX ADMIN — TECHNETIUM TC 99M SESTAMIBI 1 DOSE: 1 INJECTION INTRAVENOUS at 10:01

## 2025-08-07 ENCOUNTER — OFFICE VISIT (OUTPATIENT)
Dept: CARDIOLOGY | Facility: CLINIC | Age: 60
End: 2025-08-07
Payer: COMMERCIAL

## 2025-08-07 VITALS
WEIGHT: 132.6 LBS | OXYGEN SATURATION: 98 % | BODY MASS INDEX: 24.4 KG/M2 | SYSTOLIC BLOOD PRESSURE: 119 MMHG | HEART RATE: 71 BPM | DIASTOLIC BLOOD PRESSURE: 63 MMHG | HEIGHT: 62 IN

## 2025-08-07 DIAGNOSIS — E78.2 MIXED HYPERLIPIDEMIA: Chronic | ICD-10-CM

## 2025-08-07 DIAGNOSIS — I25.10 CORONARY ARTERY DISEASE INVOLVING NATIVE CORONARY ARTERY OF NATIVE HEART WITHOUT ANGINA PECTORIS: Primary | Chronic | ICD-10-CM

## 2025-08-07 DIAGNOSIS — M79.10 MYALGIA DUE TO STATIN: Chronic | ICD-10-CM

## 2025-08-07 DIAGNOSIS — I48.0 PAF (PAROXYSMAL ATRIAL FIBRILLATION): Chronic | ICD-10-CM

## 2025-08-07 DIAGNOSIS — I10 ESSENTIAL HYPERTENSION: Chronic | ICD-10-CM

## 2025-08-07 DIAGNOSIS — T46.6X5A MYALGIA DUE TO STATIN: Chronic | ICD-10-CM

## 2025-08-07 RX ORDER — NITROFURANTOIN MACROCRYSTALS 100 MG/1
100 CAPSULE ORAL 4 TIMES DAILY
COMMUNITY
Start: 2025-08-06

## 2025-08-07 RX ORDER — FENOFIBRATE 145 MG/1
145 TABLET, FILM COATED ORAL DAILY
Qty: 90 TABLET | Refills: 2 | Status: SHIPPED | OUTPATIENT
Start: 2025-08-07

## 2025-08-07 RX ORDER — FENOFIBRATE 145 MG/1
145 TABLET, FILM COATED ORAL DAILY
Qty: 90 TABLET | Refills: 2 | Status: SHIPPED | OUTPATIENT
Start: 2025-08-07 | End: 2025-08-07 | Stop reason: SDUPTHER

## 2025-08-10 PROBLEM — M79.10 MYALGIA DUE TO STATIN: Status: ACTIVE | Noted: 2025-08-10

## 2025-08-10 PROBLEM — T46.6X5A MYALGIA DUE TO STATIN: Chronic | Status: ACTIVE | Noted: 2025-08-10

## 2025-08-10 PROBLEM — T46.6X5A MYALGIA DUE TO STATIN: Status: ACTIVE | Noted: 2025-08-10

## 2025-08-10 PROBLEM — M79.10 MYALGIA DUE TO STATIN: Chronic | Status: ACTIVE | Noted: 2025-08-10

## 2025-08-21 ENCOUNTER — SPECIALTY PHARMACY (OUTPATIENT)
Dept: PHARMACY | Facility: HOSPITAL | Age: 60
End: 2025-08-21
Payer: COMMERCIAL

## (undated) DEVICE — CATH F5 INF PIG145 110CM 6SH: Brand: INFINITI

## (undated) DEVICE — MODEL AT P65, P/N 701554-001KIT CONTENTS: HAND CONTROLLER, 3-WAY HIGH-PRESSURE STOPCOCK WITH ROTATING END AND PREMIUM HIGH-PRESSURE TUBING: Brand: ANGIOTOUCH® KIT

## (undated) DEVICE — 12 FOOT DISPOSABLE EXTENSION CABLE WITH SAFE CONNECT / SCREW-DOWN

## (undated) DEVICE — GW INQW FIX/CORE PTFE J/3MM .035 260CM

## (undated) DEVICE — SHEATH INTRO SUPERSHEATH JWIRE .035 6F 11CM

## (undated) DEVICE — ANTIBACTERIAL UNDYED BRAIDED (POLYGLACTIN 910), SYNTHETIC ABSORBABLE SUTURE: Brand: COATED VICRYL

## (undated) DEVICE — GLV SURG DERMASSURE GRN LF PF SZ 6.5

## (undated) DEVICE — INTRAOPERATIVE COVER KIT, 10 PACK: Brand: SITE-RITE

## (undated) DEVICE — SYR LUERLOK 30CC

## (undated) DEVICE — ST INF PRI SMRTSTE 20DRP 2VLV 24ML 117

## (undated) DEVICE — GLV SURG SENSICARE PI MIC PF SZ6.5 LF STRL

## (undated) DEVICE — CVR HNDL LIGHT RIGID

## (undated) DEVICE — PINNACLE INTRODUCER SHEATH: Brand: PINNACLE

## (undated) DEVICE — SAFETY SCALPEL: Brand: DEROYAL

## (undated) DEVICE — GLV SURG PREMIERPRO MIC LTX PF SZ6.5 BRN

## (undated) DEVICE — BLD SCLPL BEAVR MINI STR 2BVL 180D LF

## (undated) DEVICE — CANNULA,OXY,ADULT,SUPER SOFT,W/14'TUB,UC: Brand: MEDLINE INDUSTRIES, INC.

## (undated) DEVICE — GW INQWIRE FC PTFE J/3MM .035 180

## (undated) DEVICE — KT MINI ACC MAK COAXL 5F 10CM

## (undated) DEVICE — CATH F5 INF JR 4 100CM: Brand: INFINITI

## (undated) DEVICE — PAD, DEFIB, ADULT, RADIOTRANS, ZOLL: Brand: MEDLINE

## (undated) DEVICE — GLIDESHEATH SLENDER STAINLESS STEEL KIT: Brand: GLIDESHEATH SLENDER

## (undated) DEVICE — ST EXT IV SMRTSTE 2VLV FIX M LL 6ML 41

## (undated) DEVICE — SUT PROLN 7/0 BV1 D/A 24IN 8702H

## (undated) DEVICE — Device

## (undated) DEVICE — PRESSURE MONITORING ACCESSORY: Brand: TRUWAVE

## (undated) DEVICE — CATH F5 INF JL 4 100CM: Brand: INFINITI

## (undated) DEVICE — SUT PROLN SURGILENE SURGIPRO 8 0 24IN BL

## (undated) DEVICE — PK CATH CARD 70

## (undated) DEVICE — SUT SILK 4/0 TIES 18IN A183H

## (undated) DEVICE — RADIFOCUS OPTITORQUE ANGIOGRAPHIC CATHETER: Brand: OPTITORQUE

## (undated) DEVICE — SUT SILK 0/0 CT2 18IN C027D

## (undated) DEVICE — A2000 MULTI-USE SYRINGE KIT, P/N 701277-003KIT CONTENTS: 100ML CONTRAST RESERVOIR AND TUBING WITH CONTRAST SPIKE AND CLAMP: Brand: A2000 MULTI-USE SYRINGE KIT

## (undated) DEVICE — TUBING, SUCTION, 1/4" X 10', STRAIGHT: Brand: MEDLINE

## (undated) DEVICE — PAD ARMBRD SURG CONVOL 7.5X20X2IN

## (undated) DEVICE — RUNWAY RADL W/TOP PAD

## (undated) DEVICE — [HIGH FLOW INSUFFLATOR,  DO NOT USE IF PACKAGE IS DAMAGED,  KEEP DRY,  KEEP AWAY FROM SUNLIGHT,  PROTECT FROM HEAT AND RADIOACTIVE SOURCES.]: Brand: PNEUMOSURE

## (undated) DEVICE — DRSNG SURESITE WNDW 4X4.5

## (undated) DEVICE — ASMBL SPK CONTRST CONTRL

## (undated) DEVICE — SUCTION CANISTER, 2500CC, RIGID: Brand: DEROYAL

## (undated) DEVICE — TRAP,MUCUS SPECIMEN,40CC: Brand: MEDLINE

## (undated) DEVICE — PRESSURE MONITORING SET: Brand: TRUWAVE

## (undated) DEVICE — SUT PROLN 4/0 V7 36IN 8975H

## (undated) DEVICE — SUT STL 2/0 CV SH 24IN TPW32

## (undated) DEVICE — ADULT DISPOSABLE SINGLE-PATIENT USE PULSE OXIMETER SENSOR: Brand: NONIN

## (undated) DEVICE — KT INTRO SHEATH PERC W/FULL DRP 9F

## (undated) DEVICE — PK HEART OPN 10

## (undated) DEVICE — PAD GRND REM POLYHESIVE A/ DISP

## (undated) DEVICE — SOL NACL 0.9PCT 1000ML

## (undated) DEVICE — VASOVIEW HEMOPRO: Brand: VASOVIEW HEMOPRO

## (undated) DEVICE — COR MINOR LITHOTOMY: Brand: MEDLINE INDUSTRIES, INC.

## (undated) DEVICE — MYNXGRIP 6F/7F: Brand: MYNXGRIP

## (undated) DEVICE — 2963 MEDIPORE SOFT CLOTH TAPE 3 IN X 10 YD 12 RLS/CS: Brand: 3M™ MEDIPORE™

## (undated) DEVICE — CLTH CLENS READYCLEANSE PERI CARE PK/5

## (undated) DEVICE — SWAN-GANZ THERMODILUTION CATHETER: Brand: SWAN-GANZ

## (undated) DEVICE — CONNECTOR PH 6-IN-1 Y ST: Brand: CARDINAL HEALTH

## (undated) DEVICE — GLV SURG SENSICARE PI MIC PF SZ7 LF STRL

## (undated) DEVICE — PRESSURE MONITORING SET: Brand: TRUWAVE, VAMP

## (undated) DEVICE — CATHETER,FOLEY,100%SILICONE,18FR,10ML,LF: Brand: MEDLINE

## (undated) DEVICE — ST EXT IV SMARTSITE 2VLV SP M LL 5ML IV1

## (undated) DEVICE — SUT ETHIB 1 CT1 30IN  X425H

## (undated) DEVICE — LN FLTR ORL/NASL MICROSTREAM NONINTUB A/LNG

## (undated) DEVICE — SKIN AFFIX SURG ADHESIVE 72/CS 0.55ML: Brand: MEDLINE

## (undated) DEVICE — SUT PROLN CARDIO V5 3/0 36IN 8936H

## (undated) DEVICE — SUT PROLN 3/0 V7 D/A 36IN 8976H

## (undated) DEVICE — SUT SILK 2/0 CT1 CR8 18IN C022D

## (undated) DEVICE — SUT PROLN 6/0 C1 D/A 30IN 8706H

## (undated) DEVICE — OASIS DRAIN, SINGLE, INLINE & ATS COMPATIBLE: Brand: OASIS

## (undated) DEVICE — SUT SILK 2 SUTUPAK TIE 60IN SA8H 2STRAND

## (undated) DEVICE — MEDI-VAC NON-CONDUCTIVE SUCTION TUBING: Brand: CARDINAL HEALTH

## (undated) DEVICE — TR BAND RADIAL ARTERY COMPRESSION DEVICE: Brand: TR BAND

## (undated) DEVICE — GLV SURG DERMASSURE GRN LF PF 7.0

## (undated) DEVICE — DRAPE, RADIAL, STERILE: Brand: MEDLINE

## (undated) DEVICE — GLV SURG PREMIERPRO MIC LTX PF SZ7 BRN

## (undated) DEVICE — PENCL E/S HNDSWCH PUSHBTN HOLSTR 10FT

## (undated) DEVICE — SOL NS 500ML